# Patient Record
Sex: MALE | Race: WHITE | NOT HISPANIC OR LATINO | Employment: OTHER | URBAN - METROPOLITAN AREA
[De-identification: names, ages, dates, MRNs, and addresses within clinical notes are randomized per-mention and may not be internally consistent; named-entity substitution may affect disease eponyms.]

---

## 2017-05-30 ENCOUNTER — OFFICE VISIT (OUTPATIENT)
Dept: PODIATRY | Facility: CLINIC | Age: 77
End: 2017-05-30
Payer: MEDICARE

## 2017-05-30 VITALS — WEIGHT: 196.19 LBS | BODY MASS INDEX: 26.57 KG/M2 | HEIGHT: 72 IN

## 2017-05-30 DIAGNOSIS — L60.0 INGROWN NAIL: Primary | ICD-10-CM

## 2017-05-30 DIAGNOSIS — L03.031 PARONYCHIA, TOE, RIGHT: ICD-10-CM

## 2017-05-30 PROCEDURE — 1159F MED LIST DOCD IN RCRD: CPT | Mod: S$GLB,,, | Performed by: PODIATRIST

## 2017-05-30 PROCEDURE — 11719 TRIM NAIL(S) ANY NUMBER: CPT | Mod: S$GLB,,, | Performed by: PODIATRIST

## 2017-05-30 PROCEDURE — 99999 PR PBB SHADOW E&M-NEW PATIENT-LVL III: CPT | Mod: PBBFAC,,, | Performed by: PODIATRIST

## 2017-05-30 PROCEDURE — 99203 OFFICE O/P NEW LOW 30 MIN: CPT | Mod: 25,S$GLB,, | Performed by: PODIATRIST

## 2017-05-30 PROCEDURE — 1125F AMNT PAIN NOTED PAIN PRSNT: CPT | Mod: S$GLB,,, | Performed by: PODIATRIST

## 2017-05-30 RX ORDER — TOBRAMYCIN 3 MG/ML
2 SOLUTION/ DROPS OPHTHALMIC 2 TIMES DAILY
Qty: 5 ML | Refills: 3 | Status: SHIPPED | OUTPATIENT
Start: 2017-05-30 | End: 2017-06-06 | Stop reason: ALTCHOICE

## 2017-05-30 RX ORDER — DOXAZOSIN 8 MG/1
TABLET ORAL
COMMUNITY
Start: 2017-05-01 | End: 2019-01-08

## 2017-05-30 RX ORDER — LISINOPRIL 20 MG/1
TABLET ORAL
COMMUNITY
Start: 2017-03-31 | End: 2019-01-08

## 2017-05-30 RX ORDER — GABAPENTIN 300 MG/1
300 CAPSULE ORAL NIGHTLY
COMMUNITY
Start: 2017-03-18 | End: 2019-07-09 | Stop reason: SDUPTHER

## 2017-05-30 RX ORDER — ISOSORBIDE MONONITRATE 30 MG/1
TABLET, EXTENDED RELEASE ORAL
COMMUNITY
Start: 2017-05-01 | End: 2019-01-14 | Stop reason: SDUPTHER

## 2017-05-30 RX ORDER — FUROSEMIDE 20 MG/1
TABLET ORAL
COMMUNITY
Start: 2017-03-18 | End: 2019-01-08

## 2017-05-30 RX ORDER — SOTALOL HYDROCHLORIDE 80 MG/1
TABLET ORAL
COMMUNITY
Start: 2017-05-01 | End: 2019-01-14 | Stop reason: SDUPTHER

## 2017-05-30 RX ORDER — ALLOPURINOL 300 MG/1
300 TABLET ORAL DAILY
COMMUNITY
End: 2019-07-09 | Stop reason: SDUPTHER

## 2017-05-30 RX ORDER — HYDRALAZINE HYDROCHLORIDE 25 MG/1
TABLET, FILM COATED ORAL
COMMUNITY
Start: 2017-05-01 | End: 2019-01-08

## 2017-05-30 NOTE — PROGRESS NOTES
Subjective:      Patient ID: Dilip Lopez is a 76 y.o. male.    Chief Complaint: Ingrown Toenail (right great toe)    Sharp deep pain lateral right hallux form ingrown nail.  Both hallux painful on lateral sides, but right is red, swolen.  Gradual onset, worsening over past several weeks, aggravated by increased weight bearing, shoe gear, pressure.  Clindamycin helped some.  OTC pain med not helping.  Denies trauma, and surgery both hallux.    Review of Systems   Constitution: Negative for chills, diaphoresis, fever, malaise/fatigue and night sweats.   Cardiovascular: Negative for claudication, cyanosis, leg swelling and syncope.   Skin: Positive for nail changes. Negative for color change, dry skin, rash, suspicious lesions and unusual hair distribution.   Musculoskeletal: Negative for falls, joint pain, joint swelling, muscle cramps, muscle weakness and stiffness.   Gastrointestinal: Negative for constipation, diarrhea, nausea and vomiting.   Neurological: Negative for brief paralysis, disturbances in coordination, focal weakness, numbness, paresthesias, sensory change and tremors.           Objective:      Physical Exam   Constitutional: He is oriented to person, place, and time. He appears well-developed and well-nourished. He is cooperative.   Oriented to time, place, and person.   Cardiovascular:   Pulses:       Dorsalis pedis pulses are 2+ on the right side, and 2+ on the left side.        Posterior tibial pulses are 2+ on the right side, and 2+ on the left side.   Capillary fill time 3-5 seconds.  All toes warm to touch.      Negative lower extremity edema bilateral.    Negative elevational pallor and dependent rubor bilateral.     Musculoskeletal:   Normal angle, base, station of gait. All ten toes without clubbing, cyanosis, or signs of ischemia.  No pain to palpation bilateral lower extremities.  Range of motion, stability, muscle strength, and muscle tone normal bilateral feet and legs.        Lymphadenopathy:   Negative lymphadenopathy bilateral popliteal fossa and tarsal tunnel.     Neurological: He is alert and oriented to person, place, and time. He has normal strength. He is not disoriented. He displays no atrophy and no tremor. No sensory deficit. He exhibits normal muscle tone.   Reflex Scores:       Patellar reflexes are 2+ on the right side and 2+ on the left side.       Achilles reflexes are 2+ on the right side and 2+ on the left side.  Negative tinel sign to percussion sural, superficial peroneal, deep peroneal, saphenous, and posterior tibial nerves right and left ankles and feet.     Skin: Skin is warm, dry and intact. No abrasion, no bruising, no burn, no ecchymosis, no laceration, no lesion, no petechiae and no rash noted. He is not diaphoretic. No cyanosis or erythema. No pallor. Nails show no clubbing.   Visible and palpable ingrowth of toenail lateral border right hallux with pain to palpation, and focal localized erythema and edema, (left lateral border painful without redness/edema)  Both, without ulceration, drainage, pus, tracking, fluctuance, malodor, or cardinal signs infection.    Otherwise, Skin is normal age and health appropriate color, turgor, texture, and temperature bilateral lower extremities without ulceration, hyperpigmentation, discoloration, masses nodules or cords palpated.  No ecchymosis, erythema, edema, or cardinal signs of infection bilateral lower extremities.               Assessment:       Encounter Diagnoses   Name Primary?    Ingrown nail Yes    Paronychia, toe, right          Plan:       Dilip was seen today for ingrown toenail.    Diagnoses and all orders for this visit:    Ingrown nail    Paronychia, toe, right    Other orders  -     tobramycin sulfate 0.3% (TOBREX) 0.3 % ophthalmic solution; Place 2 drops into the left eye 2 (two) times daily.      I counseled the patient on his conditions, their implications and medical management.    With the  patient's permission, I trimmed lateral right hallux toenail with a clean nipper and curette, removing all offending nail and debris.  Patient tolerated the procedure well and related significant relief.    Rx tobramycin drops bid.  Keep both hallux covered all times with band aid dressings changing daily.    Discussed conservative treatment with shoes of adequate dimensions, material, and style to alleviate symptoms and delay or prevent surgical intervention.               Return in about 1 week (around 6/6/2017) for matrixectomy lateral left and right hallux.

## 2017-06-06 ENCOUNTER — OFFICE VISIT (OUTPATIENT)
Dept: PODIATRY | Facility: CLINIC | Age: 77
End: 2017-06-06
Payer: MEDICARE

## 2017-06-06 VITALS — WEIGHT: 196 LBS | HEIGHT: 72 IN | BODY MASS INDEX: 26.55 KG/M2

## 2017-06-06 DIAGNOSIS — L03.031 PARONYCHIA, TOE, RIGHT: ICD-10-CM

## 2017-06-06 DIAGNOSIS — L60.0 INGROWN NAIL: Primary | ICD-10-CM

## 2017-06-06 PROCEDURE — 99499 UNLISTED E&M SERVICE: CPT | Mod: S$GLB,,, | Performed by: PODIATRIST

## 2017-06-06 PROCEDURE — 99999 PR PBB SHADOW E&M-EST. PATIENT-LVL II: CPT | Mod: PBBFAC,,, | Performed by: PODIATRIST

## 2017-06-06 PROCEDURE — 11750 EXCISION NAIL&NAIL MATRIX: CPT | Mod: T5,S$GLB,, | Performed by: PODIATRIST

## 2017-06-06 RX ORDER — TOBRAMYCIN 3 MG/ML
2 SOLUTION/ DROPS OPHTHALMIC 2 TIMES DAILY
Qty: 5 ML | Refills: 3 | Status: SHIPPED | OUTPATIENT
Start: 2017-06-06 | End: 2017-06-06

## 2017-06-06 RX ORDER — TOBRAMYCIN 3 MG/ML
SOLUTION/ DROPS OPHTHALMIC
Qty: 5 ML | Refills: 3 | Status: SHIPPED | OUTPATIENT
Start: 2017-06-06 | End: 2019-01-08

## 2017-06-06 NOTE — PROGRESS NOTES
Subjective:      Patient ID: Dilip Lopez is a 76 y.o. male.    Chief Complaint: Ingrown Toenail (bilateral great toes, Lateral border)    Sharp deep pain lateral border right and left hallux  - minimal improvement from shoe changes and nail trimming alone.  Did not fill tobramycin drops - didn't make it to pharmacy.    Review of Systems   Constitution: Negative for chills, diaphoresis, fever, weakness, malaise/fatigue and night sweats.   Skin: Positive for nail changes.           Objective:      Physical Exam   Constitutional: He is oriented to person, place, and time. He appears well-developed and well-nourished. He is cooperative.   Oriented to time, place, and person.   Cardiovascular:   Pulses:       Dorsalis pedis pulses are 2+ on the right side, and 2+ on the left side.        Posterior tibial pulses are 2+ on the right side, and 2+ on the left side.   Capillary fill time 3-5 seconds.  All toes warm to touch.      Negative lower extremity edema bilateral.    Negative elevational pallor and dependent rubor bilateral.     Musculoskeletal:   Normal angle, base, station of gait. All ten toes without clubbing, cyanosis, or signs of ischemia.  No pain to palpation bilateral lower extremities.  Range of motion, stability, muscle strength, and muscle tone normal bilateral feet and legs.       Lymphadenopathy:   Negative lymphadenopathy bilateral popliteal fossa and tarsal tunnel.     Neurological: He is alert and oriented to person, place, and time. He has normal strength. He is not disoriented. He displays no atrophy and no tremor. No sensory deficit. He exhibits normal muscle tone.   Reflex Scores:       Patellar reflexes are 2+ on the right side and 2+ on the left side.       Achilles reflexes are 2+ on the right side and 2+ on the left side.  Negative tinel sign to percussion sural, superficial peroneal, deep peroneal, saphenous, and posterior tibial nerves right and left ankles and feet.     Skin: Skin is  warm, dry and intact. No abrasion, no bruising, no burn, no ecchymosis, no laceration, no lesion, no petechiae and no rash noted. He is not diaphoretic. No cyanosis or erythema. No pallor. Nails show no clubbing.   Visible and palpable ingrowth of toenail lateral border right and left hallux with pain to palpation, and focal localized erythema and edema, (left lateral border painful without redness/edema)  Both, without ulceration, drainage, pus, tracking, fluctuance, malodor, or cardinal signs infection.    Otherwise, Skin is normal age and health appropriate color, turgor, texture, and temperature bilateral lower extremities without ulceration, hyperpigmentation, discoloration, masses nodules or cords palpated.  No ecchymosis, erythema, edema, or cardinal signs of infection bilateral lower extremities.               Assessment:       Encounter Diagnoses   Name Primary?    Ingrown nail Yes    Paronychia, toe, right          Plan:       Dilip was seen today for ingrown toenail.    Diagnoses and all orders for this visit:    Ingrown nail    Paronychia, toe, right    Other orders  -     Discontinue: tobramycin sulfate 0.3% (TOBREX) 0.3 % ophthalmic solution; Place 2 drops into the left eye 2 (two) times daily.  -     tobramycin sulfate 0.3% (TOBREX) 0.3 % ophthalmic solution; 1-2 drops topically to affected areas right and left big toes.  Twice daily.      I counseled the patient on his conditions, their implications and medical management.      Time out performed prior to anesthetizing the surgical area and all patient identifiers procedures and site markings are in agreement.    This procedure has been fully reviewed with the patient and written informed consent has been obtained.        PREOPERATIVE DIAGNOSIS Ingrown toenail, lateral borders of the Bilateral 1st.    POSTOPERATIVE DIAGNOSIS: Ingrown toenail, lateral borders of the Bilateral 1st.     NAME OF THE PROCEDURE: Permanent matrixectomy,  lateral borders  of   the right and left hallux.     SURGEON: Dr. Parviz Monson.   No surgical assist.     ESTIMATED BLOOD LOSS: Minimal, being less than 1 mL.     HEMOSTASIS: Anatomic dissection, direct pressure manually.     ANESTHESIA: 1% lidocaine plain.     PROCEDURE IN DETAIL: After that time-out procedure and all the patient   identifiers and site markings being in agreement, I anesthetized the surgical toe(s) with a total of 3 mL of 1% lidocaine plain per digit. After verifying anesthesia, I   used a spatula  to undermine the lateral borders of the right and left  hallux, bring them from their soft tissue   attachments. I used an English Anvil nail splitter to split the nail and   propagated the split into the nail matrix of the lateral side   approximately 3 mm central from the offending borders with a sterile #15 blade. Using a   curved sterile hemostat to remove the offending portions of nail from the offending portions of the digit and discarded them into medical waste red bag .     At this time, the lateral most portions of the nail matrix of the   Right and left hallux and permanently destroyed with three consecutive 30-second   applications of 90% phenol, which was then neutralized with isopropyl alcohol   and rinsed with sterile normal saline, blotted dry and dressed with a thin layer   of antibiotic cream and a dry sterile dressing of 4 x 4s, Ruba and light   compression with Coban.     The patient was dispensed a surgical shoe today   and a prescription for tobramycin drops 0.3% for twice daily application to the   wound and keep it covered at all times. Follow in this office in two weeks for   reevaluation, sooner p.r.n. if he experiences fever, chills, night sweats,   nausea, vomiting, redness, pain out of proportion, drainage, pus or malodor of the surgical toe.              Return in about 2 weeks (around 6/20/2017) for post op nail both hallux.

## 2018-12-11 ENCOUNTER — TELEPHONE (OUTPATIENT)
Dept: FAMILY MEDICINE | Facility: CLINIC | Age: 78
End: 2018-12-11

## 2018-12-11 NOTE — TELEPHONE ENCOUNTER
----- Message from Isa Sandhuza sent at 12/10/2018  3:27 PM CST -----  Contact: self [phone]  He is requesting that you request his records.  Their phone number is [phone].  His doctor was Dr Kendrick Gunn Jr.  Thank you!

## 2018-12-27 ENCOUNTER — PATIENT OUTREACH (OUTPATIENT)
Dept: ADMINISTRATIVE | Facility: HOSPITAL | Age: 78
End: 2018-12-27

## 2019-01-08 ENCOUNTER — OFFICE VISIT (OUTPATIENT)
Dept: FAMILY MEDICINE | Facility: CLINIC | Age: 79
End: 2019-01-08
Payer: MEDICARE

## 2019-01-08 VITALS
SYSTOLIC BLOOD PRESSURE: 120 MMHG | DIASTOLIC BLOOD PRESSURE: 70 MMHG | HEIGHT: 72 IN | BODY MASS INDEX: 26.61 KG/M2 | OXYGEN SATURATION: 97 % | WEIGHT: 196.44 LBS | HEART RATE: 67 BPM

## 2019-01-08 DIAGNOSIS — I10 ESSENTIAL HYPERTENSION: ICD-10-CM

## 2019-01-08 DIAGNOSIS — R20.0 NUMBNESS AND TINGLING: ICD-10-CM

## 2019-01-08 DIAGNOSIS — M1A.09X0 IDIOPATHIC CHRONIC GOUT OF MULTIPLE SITES WITHOUT TOPHUS: ICD-10-CM

## 2019-01-08 DIAGNOSIS — G62.9 NEUROPATHY: ICD-10-CM

## 2019-01-08 DIAGNOSIS — R20.2 NUMBNESS AND TINGLING: ICD-10-CM

## 2019-01-08 DIAGNOSIS — R60.0 LOCALIZED EDEMA: ICD-10-CM

## 2019-01-08 DIAGNOSIS — E78.49 OTHER HYPERLIPIDEMIA: Primary | ICD-10-CM

## 2019-01-08 DIAGNOSIS — M51.36 DEGENERATIVE DISC DISEASE, LUMBAR: ICD-10-CM

## 2019-01-08 DIAGNOSIS — R53.83 OTHER FATIGUE: ICD-10-CM

## 2019-01-08 DIAGNOSIS — R53.1 WEAKNESS: ICD-10-CM

## 2019-01-08 DIAGNOSIS — I25.10 CORONARY ARTERY DISEASE INVOLVING NATIVE CORONARY ARTERY OF NATIVE HEART WITHOUT ANGINA PECTORIS: ICD-10-CM

## 2019-01-08 PROBLEM — M51.369 DEGENERATIVE DISC DISEASE, LUMBAR: Status: ACTIVE | Noted: 2019-01-08

## 2019-01-08 PROCEDURE — 99999 PR PBB SHADOW E&M-EST. PATIENT-LVL IV: ICD-10-PCS | Mod: PBBFAC,,, | Performed by: FAMILY MEDICINE

## 2019-01-08 PROCEDURE — 3074F SYST BP LT 130 MM HG: CPT | Mod: CPTII,S$GLB,, | Performed by: FAMILY MEDICINE

## 2019-01-08 PROCEDURE — 99204 PR OFFICE/OUTPT VISIT, NEW, LEVL IV, 45-59 MIN: ICD-10-PCS | Mod: S$GLB,,, | Performed by: FAMILY MEDICINE

## 2019-01-08 PROCEDURE — 3074F PR MOST RECENT SYSTOLIC BLOOD PRESSURE < 130 MM HG: ICD-10-PCS | Mod: CPTII,S$GLB,, | Performed by: FAMILY MEDICINE

## 2019-01-08 PROCEDURE — 1101F PT FALLS ASSESS-DOCD LE1/YR: CPT | Mod: CPTII,S$GLB,, | Performed by: FAMILY MEDICINE

## 2019-01-08 PROCEDURE — 99204 OFFICE O/P NEW MOD 45 MIN: CPT | Mod: S$GLB,,, | Performed by: FAMILY MEDICINE

## 2019-01-08 PROCEDURE — 99999 PR PBB SHADOW E&M-EST. PATIENT-LVL IV: CPT | Mod: PBBFAC,,, | Performed by: FAMILY MEDICINE

## 2019-01-08 PROCEDURE — 1101F PR PT FALLS ASSESS DOC 0-1 FALLS W/OUT INJ PAST YR: ICD-10-PCS | Mod: CPTII,S$GLB,, | Performed by: FAMILY MEDICINE

## 2019-01-08 PROCEDURE — 3078F PR MOST RECENT DIASTOLIC BLOOD PRESSURE < 80 MM HG: ICD-10-PCS | Mod: CPTII,S$GLB,, | Performed by: FAMILY MEDICINE

## 2019-01-08 PROCEDURE — 3078F DIAST BP <80 MM HG: CPT | Mod: CPTII,S$GLB,, | Performed by: FAMILY MEDICINE

## 2019-01-08 RX ORDER — LOSARTAN POTASSIUM AND HYDROCHLOROTHIAZIDE 25; 100 MG/1; MG/1
1 TABLET ORAL DAILY
COMMUNITY
Start: 2018-11-26 | End: 2019-07-09 | Stop reason: SDUPTHER

## 2019-01-08 RX ORDER — PROBENECID 500 MG/1
TABLET, FILM COATED ORAL
COMMUNITY
End: 2019-04-09

## 2019-01-08 RX ORDER — ATORVASTATIN CALCIUM 20 MG/1
TABLET, FILM COATED ORAL
COMMUNITY
Start: 2018-11-26 | End: 2019-01-14 | Stop reason: SDUPTHER

## 2019-01-08 RX ORDER — ASPIRIN 325 MG
325 TABLET, DELAYED RELEASE (ENTERIC COATED) ORAL NIGHTLY
COMMUNITY
End: 2023-04-19

## 2019-01-08 RX ORDER — CLOPIDOGREL BISULFATE 75 MG/1
TABLET ORAL
COMMUNITY
Start: 2018-11-13 | End: 2019-01-14 | Stop reason: SDUPTHER

## 2019-01-08 RX ORDER — DOXAZOSIN 4 MG/1
TABLET ORAL
COMMUNITY
Start: 2018-11-26 | End: 2019-01-14 | Stop reason: SDUPTHER

## 2019-01-08 NOTE — PROGRESS NOTES
Subjective:       Patient ID: Dilip Lopez is a 78 y.o. male.    Chief Complaint: Establish Care, hypertension, weakness of bilateral lower extremities, edema    The patient also has  coronary artery disease with stent placement, stated that he is not having any shortness of breath or chest pains at this visit but she keep his nitroglycerin just in case.  He is currently under the care of the cardiologist.  He also complains of weakness on bilateral lower extremities, unable to stand for long time because of the weakness and falling sensation.  He complains of numbness and tingling sensation on the feet.      Hypertension   This is a chronic problem. The current episode started more than 1 year ago. The problem is unchanged. The problem is controlled. Associated symptoms include malaise/fatigue and peripheral edema. Pertinent negatives include no anxiety, blurred vision, chest pain, headaches, neck pain, orthopnea, palpitations, PND, shortness of breath or sweats. There are no associated agents to hypertension. Risk factors for coronary artery disease include family history, male gender and sedentary lifestyle. Past treatments include angiotensin blockers and diuretics. The current treatment provides significant improvement. Compliance problems include diet and exercise.  Hypertensive end-organ damage includes CAD/MI. There is no history of angina, kidney disease, CVA, heart failure or left ventricular hypertrophy. There is no history of chronic renal disease or a hypertension causing med.   Edema   This is a chronic problem. The current episode started more than 1 month ago. The problem occurs intermittently. The problem has been waxing and waning. Associated symptoms include arthralgias, fatigue, numbness and weakness. Pertinent negatives include no abdominal pain, anorexia, change in bowel habit, chest pain, chills, congestion, coughing, fever, headaches, joint swelling, myalgias, nausea, neck pain, rash, sore  throat, swollen glands, urinary symptoms, vertigo, visual change or vomiting. The symptoms are aggravated by walking. He has tried position changes for the symptoms. The treatment provided mild relief.   Fatigue   This is a chronic problem. The current episode started more than 1 month ago. The problem occurs constantly. The problem has been gradually worsening. Associated symptoms include arthralgias, fatigue, numbness and weakness. Pertinent negatives include no abdominal pain, anorexia, change in bowel habit, chest pain, chills, congestion, coughing, fever, headaches, joint swelling, myalgias, nausea, neck pain, rash, sore throat, swollen glands, urinary symptoms, vertigo, visual change or vomiting. Nothing aggravates the symptoms. He has tried nothing for the symptoms. The treatment provided no relief.     Past medical history, past social history, past Family history, past surgical history was reviewed and discussed with the patient    Review of Systems   Constitutional: Positive for fatigue and malaise/fatigue. Negative for activity change, appetite change, chills and fever.   HENT: Negative for congestion, ear discharge and sore throat.    Eyes: Negative for blurred vision, discharge and itching.   Respiratory: Negative for cough, choking, chest tightness and shortness of breath.    Cardiovascular: Positive for leg swelling. Negative for chest pain, palpitations, orthopnea and PND.   Gastrointestinal: Negative for abdominal distention, abdominal pain, anorexia, change in bowel habit, nausea and vomiting.   Endocrine: Negative for cold intolerance and heat intolerance.   Genitourinary: Negative for dysuria and flank pain.   Musculoskeletal: Positive for arthralgias and back pain. Negative for joint swelling, myalgias and neck pain.   Skin: Negative for pallor and rash.   Allergic/Immunologic: Negative for environmental allergies and food allergies.   Neurological: Positive for weakness and numbness. Negative  for dizziness, vertigo, facial asymmetry and headaches.   Hematological: Negative for adenopathy. Does not bruise/bleed easily.   Psychiatric/Behavioral: Negative for agitation and confusion.       Objective:      Physical Exam   Constitutional: He appears well-developed and well-nourished. No distress.   HENT:   Head: Normocephalic and atraumatic.   Right Ear: External ear normal.   Left Ear: External ear normal.   Nose: Nose normal.   Mouth/Throat: No oropharyngeal exudate.   Eyes: Pupils are equal, round, and reactive to light. Left eye exhibits no discharge.   Neck: Normal range of motion. Neck supple. No thyromegaly present.   Cardiovascular: Normal rate, regular rhythm and normal heart sounds.   No murmur heard.  Pulmonary/Chest: Effort normal and breath sounds normal. No respiratory distress. He has no wheezes.   Abdominal: Soft. Bowel sounds are normal. There is no tenderness.   Musculoskeletal: He exhibits edema (Trace of bilateral lower extremities). He exhibits no tenderness.   Neurological: No cranial nerve deficit. Coordination normal.   Skin: Skin is warm and dry. No rash noted. He is not diaphoretic. No erythema.   Psychiatric: He has a normal mood and affect. His behavior is normal. Judgment and thought content normal.   Nursing note and vitals reviewed.      Assessment:       1. Other hyperlipidemia    2. Coronary artery disease involving native coronary artery of native heart without angina pectoris    3. Neuropathy    4. Idiopathic chronic gout of multiple sites without tophus    5. Essential hypertension    6. Other fatigue    7. Weakness    8. Numbness and tingling    9. Degenerative disc disease, lumbar        Plan:       Other hyperlipidemia:  Stable    Coronary artery disease involving native coronary artery of native heart without angina pectoris:  Stable    Neuropathy:  Stable    Idiopathic chronic gout of multiple sites without tophus:  Stable    Essential hypertension:  Well controlled  -      Comprehensive metabolic panel; Future; Expected date: 01/08/2019  -     Microalbumin/creatinine urine ratio; Future; Expected date: 01/08/2019  -     Lipid panel; Future; Expected date: 01/08/2019    Other fatigue:  Worsening  -     CBC auto differential; Future; Expected date: 01/08/2019  -     TSH; Future; Expected date: 01/08/2019    Weakness:  Worsening  -     X-Ray Lumbar Spine AP And Lateral; Future; Expected date: 01/08/2019    Numbness and tingling:  Stable  -     Vitamin B12; Future; Expected date: 01/08/2019  -     Folate; Future; Expected date: 01/08/2019    Degenerative disc disease, lumbar:  Worsening  -     X-Ray Lumbar Spine AP And Lateral; Future; Expected date: 01/08/2019  Edema:  Worsening.    Decrease salt intake to less than 2 g per day.  Place the feet elevated at the level the heart, increase physical activity as tolerated.  Will call the patient after we have the results of the test, healthy habits, 3 meals a day, 3 snacks and small portions, drink more water.  Continue with current medications.  Will release medical records from previous physician.  Patient agreed with assessment and plan. Patient verbalized understanding.

## 2019-01-08 NOTE — PATIENT INSTRUCTIONS
Eating Heart-Healthy Food: Using the DASH Plan    Eating for your heart doesnt have to be hard or boring. You just need to know how to make healthier choices. The DASH eating plan has been developed to help you do just that. DASH stands for Dietary Approaches to Stop Hypertension. It is a plan that has been proven to be healthier for your heart and to lower your risk for high blood pressure. It can also help lower your risk for cancer, heart disease, osteoporosis, and diabetes.  Choosing from each food group  Choose foods from each of the food groups below each day. Try to get the recommended number of servings for each food group. The serving numbers are based on a diet of 2,000 calories a day. Talk to your doctor if youre unsure about your calorie needs. Along with getting the correct servings, the DASH plan also recommends a sodium intake less than 2,300 mg per day.        Grains  Servings: 6 to 8 a day  A serving is:  · 1 slice bread  · 1 ounce dry cereal  · Half a cup cooked rice, pasta or cereal  Best choices: Whole grains and any grains high in fiber. Vegetables  Servings: 4 to 5 a day  A serving is:  · 1 cup raw leafy vegetable  · Half a cup cut-up raw or cooked vegetable  · Half a cup vegetable juice  Best choices: Fresh or frozen vegetables prepared without added salt or fat.   Fruits  Servings: 4 to 5 a day  A serving is:  · 1 medium fruit  · One-quarter cup dried fruit  · Half a cup fresh, frozen, or canned fruit  · Half a cup of 100% fruit juices  Best choices: A variety of fresh fruits of different colors. Whole fruits are a better choice than fruit juices. Low-fat or fat-free dairy  Servings: 2 to 3 a day  A serving is:  · 1 cup milk  · 1 cup yogurt  · One and a half ounces cheese  Best choices: Skim or 1% milk, low-fat or fat-free yogurt or buttermilk, and low-fat cheeses.         Lean meats, poultry, fish  Servings: 6 or fewer a day  A serving is:  · 1 ounce cooked meats, poultry, or fish  · 1  egg  Best choices: Lean poultry and fish. Trim away visible fat. Broil, grill, roast, or boil instead of frying. Remove skin from poultry before eating. Limit how much red meat you eat.  Nuts, seeds, beans  Servings: 4 to 5 a week  A serving is:  · One-third cup nuts (one and a half ounces)  · 2 tablespoons nut butter or seeds  · Half a cup cooked dry beans or legumes  Best choices: Dry roasted nuts with no salt added, lentils, kidney beans, garbanzo beans, and whole mata beans.   Fats and oils  Servings: 2 to 3 a day  A serving is:  · 1 teaspoon vegetable oil  · 1 teaspoon soft margarine  · 1 tablespoon mayonnaise  · 2 tablespoons salad dressing  Best choices: Nut and vegetable oils (nontropical vegetable oils), such as olive and canola oil. Sweets  Servings: 5 a week or fewer  A serving is:  · 1 tablespoon sugar, maple syrup, or honey  · 1 tablespoon jam or jelly  · 1 half-ounce jelly beans (about 15)  · 1 cup lemonade  Best choices: Dried fruit can be a satisfying sweet. Choose low-fat sweets. And watch your serving sizes!      For more on the DASH eating plan, visit:  www.nhlbi.nih.gov/health/health-topics/topics/dash   Date Last Reviewed: 6/1/2016  © 6112-6440 [employer]. [address] [place] [address], [place] [address]. All rights reserved. This information is not intended as a substitute for professional medical care. Always follow your healthcare professional's instructions.

## 2019-01-14 ENCOUNTER — OFFICE VISIT (OUTPATIENT)
Dept: CARDIOLOGY | Facility: CLINIC | Age: 79
End: 2019-01-14
Payer: MEDICARE

## 2019-01-14 ENCOUNTER — HOSPITAL ENCOUNTER (OUTPATIENT)
Dept: RADIOLOGY | Facility: HOSPITAL | Age: 79
Discharge: HOME | End: 2019-01-14
Attending: FAMILY MEDICINE
Payer: MEDICARE

## 2019-01-14 DIAGNOSIS — M47.26 OSTEOARTHRITIS OF SPINE WITH RADICULOPATHY, LUMBAR REGION: Primary | ICD-10-CM

## 2019-01-14 DIAGNOSIS — M51.36 DEGENERATIVE DISC DISEASE, LUMBAR: ICD-10-CM

## 2019-01-14 DIAGNOSIS — I25.118 CORONARY ARTERY DISEASE OF NATIVE ARTERY OF NATIVE HEART WITH STABLE ANGINA PECTORIS: Primary | ICD-10-CM

## 2019-01-14 DIAGNOSIS — I48.0 PAF (PAROXYSMAL ATRIAL FIBRILLATION): ICD-10-CM

## 2019-01-14 DIAGNOSIS — I25.10 CORONARY ARTERY DISEASE INVOLVING NATIVE CORONARY ARTERY OF NATIVE HEART WITHOUT ANGINA PECTORIS: ICD-10-CM

## 2019-01-14 DIAGNOSIS — E78.00 HYPERCHOLESTEROLEMIA: ICD-10-CM

## 2019-01-14 DIAGNOSIS — R53.1 WEAKNESS: ICD-10-CM

## 2019-01-14 DIAGNOSIS — I10 ESSENTIAL HYPERTENSION: ICD-10-CM

## 2019-01-14 DIAGNOSIS — Z79.02 LONG TERM (CURRENT) USE OF ANTITHROMBOTICS/ANTIPLATELETS: ICD-10-CM

## 2019-01-14 DIAGNOSIS — E78.49 OTHER HYPERLIPIDEMIA: ICD-10-CM

## 2019-01-14 DIAGNOSIS — Z79.899 LONG TERM CURRENT USE OF ANTIARRHYTHMIC DRUG: ICD-10-CM

## 2019-01-14 DIAGNOSIS — R01.1 UNDIAGNOSED CARDIAC MURMURS: ICD-10-CM

## 2019-01-14 PROCEDURE — 99999 PR PBB SHADOW E&M-EST. PATIENT-LVL II: CPT | Mod: PBBFAC,,, | Performed by: INTERNAL MEDICINE

## 2019-01-14 PROCEDURE — 99204 PR OFFICE/OUTPT VISIT, NEW, LEVL IV, 45-59 MIN: ICD-10-PCS | Mod: S$GLB,,, | Performed by: INTERNAL MEDICINE

## 2019-01-14 PROCEDURE — 72100 X-RAY EXAM L-S SPINE 2/3 VWS: CPT | Mod: TC,FY,PO

## 2019-01-14 PROCEDURE — 93000 EKG 12-LEAD: ICD-10-PCS | Mod: S$GLB,,, | Performed by: INTERNAL MEDICINE

## 2019-01-14 PROCEDURE — 1101F PT FALLS ASSESS-DOCD LE1/YR: CPT | Mod: CPTII,S$GLB,, | Performed by: INTERNAL MEDICINE

## 2019-01-14 PROCEDURE — 72100 XR LUMBAR SPINE AP AND LATERAL: ICD-10-PCS | Mod: 26,,, | Performed by: RADIOLOGY

## 2019-01-14 PROCEDURE — 72100 X-RAY EXAM L-S SPINE 2/3 VWS: CPT | Mod: 26,,, | Performed by: RADIOLOGY

## 2019-01-14 PROCEDURE — 99999 PR PBB SHADOW E&M-EST. PATIENT-LVL II: ICD-10-PCS | Mod: PBBFAC,,, | Performed by: INTERNAL MEDICINE

## 2019-01-14 PROCEDURE — 1101F PR PT FALLS ASSESS DOC 0-1 FALLS W/OUT INJ PAST YR: ICD-10-PCS | Mod: CPTII,S$GLB,, | Performed by: INTERNAL MEDICINE

## 2019-01-14 PROCEDURE — 99499 RISK ADDL DX/OHS AUDIT: ICD-10-PCS | Mod: S$GLB,,, | Performed by: INTERNAL MEDICINE

## 2019-01-14 PROCEDURE — 93000 ELECTROCARDIOGRAM COMPLETE: CPT | Mod: S$GLB,,, | Performed by: INTERNAL MEDICINE

## 2019-01-14 PROCEDURE — 99204 OFFICE O/P NEW MOD 45 MIN: CPT | Mod: S$GLB,,, | Performed by: INTERNAL MEDICINE

## 2019-01-14 PROCEDURE — 99499 UNLISTED E&M SERVICE: CPT | Mod: S$GLB,,, | Performed by: INTERNAL MEDICINE

## 2019-01-14 RX ORDER — ATORVASTATIN CALCIUM 20 MG/1
20 TABLET, FILM COATED ORAL NIGHTLY
Qty: 90 TABLET | Refills: 0 | Status: SHIPPED | OUTPATIENT
Start: 2019-01-14 | End: 2019-05-03 | Stop reason: SDUPTHER

## 2019-01-14 RX ORDER — DOXAZOSIN 4 MG/1
4 TABLET ORAL DAILY
Qty: 90 TABLET | Refills: 0 | Status: SHIPPED | OUTPATIENT
Start: 2019-01-14 | End: 2019-03-11 | Stop reason: ALTCHOICE

## 2019-01-14 RX ORDER — ISOSORBIDE MONONITRATE 30 MG/1
30 TABLET, EXTENDED RELEASE ORAL DAILY
Qty: 90 TABLET | Refills: 0 | Status: SHIPPED | OUTPATIENT
Start: 2019-01-14 | End: 2019-05-03 | Stop reason: SDUPTHER

## 2019-01-14 RX ORDER — CLOPIDOGREL BISULFATE 75 MG/1
75 TABLET ORAL DAILY
Qty: 90 TABLET | Refills: 0 | Status: SHIPPED | OUTPATIENT
Start: 2019-01-14 | End: 2019-05-03 | Stop reason: SDUPTHER

## 2019-01-14 RX ORDER — SOTALOL HYDROCHLORIDE 80 MG/1
80 TABLET ORAL DAILY
Qty: 90 TABLET | Refills: 0 | Status: SHIPPED | OUTPATIENT
Start: 2019-01-14 | End: 2019-07-09 | Stop reason: SDUPTHER

## 2019-01-14 NOTE — PROGRESS NOTES
Subjective:    Patient ID:  Dilip Lopez is a 78 y.o. male who presents for Coronary Artery Disease; Hypertension; Hyperlipidemia; and Atrial Fibrillation        Coronary Artery Disease   Presents for initial visit. The disease course has been stable. Pertinent negatives include no chest pain, dizziness, leg swelling (MILD CHRONIC), palpitations or shortness of breath. Risk factors include hyperlipidemia and hypertension. Past treatments include beta blockers, antiplatelet agents and statins. Compliance with prior treatments has been good.   Hypertension   This is a chronic problem. The current episode started more than 1 year ago. The problem is controlled. Pertinent negatives include no blurred vision, chest pain, malaise/fatigue, orthopnea, palpitations, PND or shortness of breath. Risk factors for coronary artery disease include dyslipidemia. Past treatments include angiotensin blockers and beta blockers.   Hyperlipidemia   This is a chronic problem. The problem is controlled. Pertinent negatives include no chest pain, focal weakness or shortness of breath. Current antihyperlipidemic treatment includes statins.   Atrial Fibrillation   Presents for follow-up (NO DEFINITE HISTORY) visit. Symptoms include hypertension. Symptoms are negative for chest pain, dizziness, palpitations, shortness of breath, syncope and weakness. The symptoms have been stable. Past medical history includes atrial fibrillation, CAD and hyperlipidemia.     NEW PATIENT EVALUATION, WAS FOLLOWED BY DR DIONE TERESA, CAD , PCI/ STENT 1/ 2018, HAD LABS TODAY,  H/O AFIB,RFA,DR GRANGER, ON SOTALOL, H/O DVT WAS ON ELIQUIS, TROUBLE STANDING UP WITH BACK AND HIPS,  SEE ROS    Past Medical History:   Diagnosis Date    Atrial fibrillation     CAD (coronary artery disease)     Hyperlipidemia     Hypertension      Past Surgical History:   Procedure Laterality Date    CARDIAC CATHETERIZATION      CARDIAC SURGERY      CORONARY ANGIOPLASTY       CORONARY STENT PLACEMENT  01/29/2018    HIATAL HERNIA REPAIR       Family History   Problem Relation Age of Onset    Hypertension Mother     Diabetes Mother     Heart disease Father 62        MI    Parkinsonism Brother      Social History     Socioeconomic History    Marital status:      Spouse name: Not on file    Number of children: Not on file    Years of education: Not on file    Highest education level: Not on file   Social Needs    Financial resource strain: Not on file    Food insecurity - worry: Not on file    Food insecurity - inability: Not on file    Transportation needs - medical: Not on file    Transportation needs - non-medical: Not on file   Occupational History    Not on file   Tobacco Use    Smoking status: Never Smoker    Smokeless tobacco: Never Used   Substance and Sexual Activity    Alcohol use: No     Frequency: Never    Drug use: No    Sexual activity: Not on file   Other Topics Concern    Not on file   Social History Narrative    Not on file       Review of patient's allergies indicates:   Allergen Reactions    Cinnamon Other (See Comments)    Ciprofloxacin Hives    Sulfa (sulfonamide antibiotics)        Current Outpatient Medications:     allopurinol (ZYLOPRIM) 300 MG tablet, Take 300 mg by mouth once daily., Disp: , Rfl:     aspirin (ECOTRIN) 325 MG EC tablet, aspirin 325 mg tablet,delayed release  Take 1 tablet every day by oral route., Disp: , Rfl:     atorvastatin (LIPITOR) 20 MG tablet, Take 1 tablet (20 mg total) by mouth every evening., Disp: 90 tablet, Rfl: 0    clopidogrel (PLAVIX) 75 mg tablet, Take 1 tablet (75 mg total) by mouth once daily., Disp: 90 tablet, Rfl: 0    doxazosin (CARDURA) 4 MG tablet, Take 1 tablet (4 mg total) by mouth once daily., Disp: 90 tablet, Rfl: 0    gabapentin (NEURONTIN) 300 MG capsule, , Disp: , Rfl:     isosorbide mononitrate (IMDUR) 30 MG 24 hr tablet, Take 1 tablet (30 mg total) by mouth once daily., Disp: 90  tablet, Rfl: 0    losartan-hydrochlorothiazide 100-25 mg (HYZAAR) 100-25 mg per tablet, , Disp: , Rfl:     probenecid (BENEMID) 500 mg Tab, probenecid 500 mg tablet, Disp: , Rfl:     sotalol (BETAPACE) 80 MG tablet, Take 1 tablet (80 mg total) by mouth once daily., Disp: 90 tablet, Rfl: 0    Current Facility-Administered Medications:     cyanocobalamin injection 1,000 mcg, 1,000 mcg, Intramuscular, Q7 Days, Nathalie Salgado MD    Review of Systems   Constitution: Negative for chills, diaphoresis, weakness, malaise/fatigue and night sweats.   HENT: Negative for congestion, hearing loss and nosebleeds.    Eyes: Negative for blurred vision, double vision and visual disturbance.   Cardiovascular: Negative for chest pain, claudication, cyanosis, dyspnea on exertion, irregular heartbeat, leg swelling (MILD CHRONIC), near-syncope, orthopnea, palpitations, paroxysmal nocturnal dyspnea and syncope.   Respiratory: Negative for cough, hemoptysis, shortness of breath and wheezing.    Endocrine: Negative for cold intolerance, heat intolerance and polyuria.   Hematologic/Lymphatic: Negative for adenopathy. Does not bruise/bleed easily.   Skin: Negative for color change, itching and nail changes.   Musculoskeletal: Negative for back pain (CHRONIC), falls, joint pain (HIPS) and stiffness.   Gastrointestinal: Negative for bloating, abdominal pain, change in bowel habit, dysphagia, heartburn, hematemesis, jaundice, melena and vomiting.   Genitourinary: Negative for dysuria, flank pain and frequency.   Neurological: Negative for brief paralysis, dizziness, focal weakness, light-headedness, loss of balance (SOME), numbness, paresthesias, seizures, sensory change and tremors.   Psychiatric/Behavioral: Negative for altered mental status, depression, memory loss and substance abuse. The patient is not nervous/anxious.    Allergic/Immunologic: Negative for persistent infections.        Objective:      There were no vitals filed for this  visit.  There is no height or weight on file to calculate BMI.    Physical Exam   Constitutional: He is oriented to person, place, and time. He appears well-developed and well-nourished. He is active.   HENT:   Head: Normocephalic and atraumatic.   Mouth/Throat: Oropharynx is clear and moist and mucous membranes are normal.   Eyes: Conjunctivae and EOM are normal. Pupils are equal, round, and reactive to light.   Neck: Normal range of motion. Neck supple. Normal carotid pulses, no hepatojugular reflux and no JVD present. Carotid bruit is not present. No tracheal deviation, no edema and no erythema present. No thyromegaly present.   Cardiovascular: Normal rate and regular rhythm.  No extrasystoles are present. PMI is not displaced. Exam reveals no gallop, no distant heart sounds, no friction rub and no midsystolic click.   Murmur heard.   Systolic murmur is present with a grade of 2/6 at the lower left sternal border.  Pulses:       Carotid pulses are 2+ on the right side, and 2+ on the left side.       Radial pulses are 2+ on the right side, and 2+ on the left side.        Femoral pulses are 2+ on the right side, and 2+ on the left side.       Dorsalis pedis pulses are 2+ on the right side, and 2+ on the left side.        Posterior tibial pulses are 2+ on the right side, and 2+ on the left side.   Pulmonary/Chest: Effort normal and breath sounds normal. No accessory muscle usage. No tachypnea and no bradypnea. No respiratory distress.   Abdominal: Soft. Bowel sounds are normal. He exhibits no distension and no mass. There is no hepatosplenomegaly. There is no tenderness. There is no CVA tenderness.   Musculoskeletal: Normal range of motion. He exhibits no edema or deformity.   SLOW GAIT, TRACE EDEMA   Lymphadenopathy:     He has no cervical adenopathy.   Neurological: He is alert and oriented to person, place, and time. He has normal strength. He displays no tremor. No cranial nerve deficit. He exhibits normal  muscle tone. Coordination normal.   Skin: Skin is warm and dry. No cyanosis or erythema. No pallor.   Psychiatric: He has a normal mood and affect. His speech is normal and behavior is normal.               ..    Chemistry        Component Value Date/Time     01/14/2019 1102    K 4.7 01/14/2019 1102     01/14/2019 1102    CO2 28 01/14/2019 1102    BUN 27 (H) 01/14/2019 1102    CREATININE 1.8 (H) 01/14/2019 1102     01/14/2019 1102        Component Value Date/Time    CALCIUM 9.3 01/14/2019 1102    ALKPHOS 88 01/14/2019 1102    AST 18 01/14/2019 1102    ALT 15 01/14/2019 1102    BILITOT 0.7 01/14/2019 1102    ESTGFRAFRICA 41.1 (A) 01/14/2019 1102    EGFRNONAA 35.5 (A) 01/14/2019 1102            ..  Lab Results   Component Value Date    CHOL 99 (L) 01/14/2019     Lab Results   Component Value Date    HDL 40 01/14/2019     Lab Results   Component Value Date    LDLCALC 44.0 (L) 01/14/2019     Lab Results   Component Value Date    TRIG 75 01/14/2019     Lab Results   Component Value Date    CHOLHDL 40.4 01/14/2019     ..  Lab Results   Component Value Date    WBC 5.50 01/14/2019    HGB 14.5 01/14/2019    HCT 45.6 01/14/2019    MCV 99 (H) 01/14/2019     01/14/2019       Test(s) Reviewed  I have reviewed the following in detail:  [] Stress test   [] Angiography   [] Echocardiogram   [x] Labs   [x] Other:       Assessment:         ICD-10-CM ICD-9-CM   1. Coronary artery disease of native artery of native heart with stable angina pectoris I25.118 414.01     413.9   2. Long term current use of antiarrhythmic drug Z79.899 V58.69   3. Long term (current) use of antithrombotics/antiplatelets Z79.02 V58.63   4. Essential hypertension I10 401.9   5. Hypercholesterolemia E78.00 272.0   6. Undiagnosed cardiac murmurs R01.1 785.2   7. PAF (paroxysmal atrial fibrillation) I48.0 427.31   8. Coronary artery disease involving native coronary artery of native heart without angina pectoris I25.10 414.01   9. Other  hyperlipidemia E78.49 272.4     Problem List Items Addressed This Visit        Cardiac/Vascular    Hypercholesterolemia    Relevant Medications    atorvastatin (LIPITOR) 20 MG tablet    Coronary artery disease of native artery of native heart with stable angina pectoris - Primary    Relevant Medications    clopidogrel (PLAVIX) 75 mg tablet    atorvastatin (LIPITOR) 20 MG tablet    Other Relevant Orders    SCHEDULED EKG 12-LEAD (to Muse) (Completed)    Transthoracic echo (TTE) complete (Cupid Only)    Essential hypertension    Relevant Medications    doxazosin (CARDURA) 4 MG tablet    Undiagnosed cardiac murmurs    Relevant Orders    Transthoracic echo (TTE) complete (Cupid Only)    PAF (paroxysmal atrial fibrillation)    Relevant Orders    Transthoracic echo (TTE) complete (Cupid Only)    Holter monitor - 24 hour (Cupid Only)       Hematology    Long term (current) use of antithrombotics/antiplatelets       Other    Long term current use of antiarrhythmic drug    Relevant Orders    Holter monitor - 24 hour (Cupid Only)      Other Visit Diagnoses     Coronary artery disease involving native coronary artery of native heart without angina pectoris        Relevant Medications    clopidogrel (PLAVIX) 75 mg tablet    atorvastatin (LIPITOR) 20 MG tablet    Other hyperlipidemia        Relevant Medications    atorvastatin (LIPITOR) 20 MG tablet           Plan:         ALL CV CLINICALLY RELATIVELY  STABLE, CLASS 1  ANGINA, NO HF, NO TIA, NO CLINICAL ARRHYTHMIA,CONTINUE CURRENT MEDS, EDUCATION, DIET, EXERCISE, CHECK ECHO, ASSESS VALVES, CHECK HOLTER , PAF, S/P RFA ON SOTALOL, DISCUSSED PLAN AT LENGTH, RTC IN FEW WEEKS  Coronary artery disease of native artery of native heart with stable angina pectoris  -     SCHEDULED EKG 12-LEAD (to Muse); Future; Expected date: 07/14/2019  -     Transthoracic echo (TTE) complete (Cupid Only); Future    Long term current use of antiarrhythmic drug  -     Holter monitor - 24 hour (Cupid  Only); Future    Long term (current) use of antithrombotics/antiplatelets    Essential hypertension  -     doxazosin (CARDURA) 4 MG tablet; Take 1 tablet (4 mg total) by mouth once daily.  Dispense: 90 tablet; Refill: 0    Hypercholesterolemia    Undiagnosed cardiac murmurs  -     Transthoracic echo (TTE) complete (Cupid Only); Future    PAF (paroxysmal atrial fibrillation)  -     Transthoracic echo (TTE) complete (Cupid Only); Future  -     Holter monitor - 24 hour (Cupid Only); Future    Coronary artery disease involving native coronary artery of native heart without angina pectoris  -     clopidogrel (PLAVIX) 75 mg tablet; Take 1 tablet (75 mg total) by mouth once daily.  Dispense: 90 tablet; Refill: 0  -     atorvastatin (LIPITOR) 20 MG tablet; Take 1 tablet (20 mg total) by mouth every evening.  Dispense: 90 tablet; Refill: 0    Other hyperlipidemia  -     atorvastatin (LIPITOR) 20 MG tablet; Take 1 tablet (20 mg total) by mouth every evening.  Dispense: 90 tablet; Refill: 0    Other orders  -     isosorbide mononitrate (IMDUR) 30 MG 24 hr tablet; Take 1 tablet (30 mg total) by mouth once daily.  Dispense: 90 tablet; Refill: 0  -     sotalol (BETAPACE) 80 MG tablet; Take 1 tablet (80 mg total) by mouth once daily.  Dispense: 90 tablet; Refill: 0    RTC Low level/low impact aerobic exercise 5x's/wk. Heart healthy diet and risk factor modification.    See labs and med orders.    Aerobic exercise 5x's/wk. Heart healthy diet and risk factor modification.    See labs and med orders.

## 2019-01-15 DIAGNOSIS — N18.30 CHRONIC KIDNEY DISEASE, STAGE 3: Primary | ICD-10-CM

## 2019-01-15 RX ORDER — CYANOCOBALAMIN 1000 UG/ML
1000 INJECTION, SOLUTION INTRAMUSCULAR; SUBCUTANEOUS
Status: COMPLETED | OUTPATIENT
Start: 2019-01-15 | End: 2019-02-06

## 2019-01-16 ENCOUNTER — CLINICAL SUPPORT (OUTPATIENT)
Dept: FAMILY MEDICINE | Facility: CLINIC | Age: 79
End: 2019-01-16
Payer: MEDICARE

## 2019-01-16 ENCOUNTER — HOSPITAL ENCOUNTER (OUTPATIENT)
Dept: RADIOLOGY | Facility: HOSPITAL | Age: 79
Discharge: HOME | End: 2019-01-16
Attending: FAMILY MEDICINE
Payer: MEDICARE

## 2019-01-16 DIAGNOSIS — N18.30 CHRONIC KIDNEY DISEASE, STAGE 3: ICD-10-CM

## 2019-01-16 DIAGNOSIS — E53.8 VITAMIN B 12 DEFICIENCY: Primary | ICD-10-CM

## 2019-01-16 PROCEDURE — 76770 US EXAM ABDO BACK WALL COMP: CPT | Mod: 26,,, | Performed by: RADIOLOGY

## 2019-01-16 PROCEDURE — 96372 PR INJECTION,THERAP/PROPH/DIAG2ST, IM OR SUBCUT: ICD-10-PCS | Mod: S$GLB,,, | Performed by: FAMILY MEDICINE

## 2019-01-16 PROCEDURE — 76770 US KIDNEY: ICD-10-PCS | Mod: 26,,, | Performed by: RADIOLOGY

## 2019-01-16 PROCEDURE — 76770 US EXAM ABDO BACK WALL COMP: CPT | Mod: TC,PO

## 2019-01-16 PROCEDURE — 96372 THER/PROPH/DIAG INJ SC/IM: CPT | Mod: S$GLB,,, | Performed by: FAMILY MEDICINE

## 2019-01-16 RX ADMIN — CYANOCOBALAMIN 1000 MCG: 1000 INJECTION, SOLUTION INTRAMUSCULAR; SUBCUTANEOUS at 03:01

## 2019-01-17 DIAGNOSIS — N18.30 CHRONIC KIDNEY DISEASE, STAGE 3: Primary | ICD-10-CM

## 2019-01-17 DIAGNOSIS — N28.1 KIDNEY CYSTS: ICD-10-CM

## 2019-01-22 ENCOUNTER — OFFICE VISIT (OUTPATIENT)
Dept: NEPHROLOGY | Facility: CLINIC | Age: 79
End: 2019-01-22
Payer: MEDICARE

## 2019-01-22 VITALS
HEART RATE: 67 BPM | HEIGHT: 72 IN | DIASTOLIC BLOOD PRESSURE: 66 MMHG | OXYGEN SATURATION: 97 % | BODY MASS INDEX: 26.34 KG/M2 | SYSTOLIC BLOOD PRESSURE: 114 MMHG | WEIGHT: 194.5 LBS

## 2019-01-22 DIAGNOSIS — M10.9 GOUT, UNSPECIFIED CAUSE, UNSPECIFIED CHRONICITY, UNSPECIFIED SITE: ICD-10-CM

## 2019-01-22 DIAGNOSIS — N18.30 CKD (CHRONIC KIDNEY DISEASE) STAGE 3, GFR 30-59 ML/MIN: Primary | ICD-10-CM

## 2019-01-22 PROCEDURE — 3074F PR MOST RECENT SYSTOLIC BLOOD PRESSURE < 130 MM HG: ICD-10-PCS | Mod: CPTII,S$GLB,, | Performed by: INTERNAL MEDICINE

## 2019-01-22 PROCEDURE — 1101F PR PT FALLS ASSESS DOC 0-1 FALLS W/OUT INJ PAST YR: ICD-10-PCS | Mod: CPTII,S$GLB,, | Performed by: INTERNAL MEDICINE

## 2019-01-22 PROCEDURE — 99499 UNLISTED E&M SERVICE: CPT | Mod: S$GLB,,, | Performed by: INTERNAL MEDICINE

## 2019-01-22 PROCEDURE — 99999 PR PBB SHADOW E&M-EST. PATIENT-LVL IV: ICD-10-PCS | Mod: PBBFAC,,, | Performed by: INTERNAL MEDICINE

## 2019-01-22 PROCEDURE — 3078F DIAST BP <80 MM HG: CPT | Mod: CPTII,S$GLB,, | Performed by: INTERNAL MEDICINE

## 2019-01-22 PROCEDURE — 99204 OFFICE O/P NEW MOD 45 MIN: CPT | Mod: S$GLB,,, | Performed by: INTERNAL MEDICINE

## 2019-01-22 PROCEDURE — 3074F SYST BP LT 130 MM HG: CPT | Mod: CPTII,S$GLB,, | Performed by: INTERNAL MEDICINE

## 2019-01-22 PROCEDURE — 1101F PT FALLS ASSESS-DOCD LE1/YR: CPT | Mod: CPTII,S$GLB,, | Performed by: INTERNAL MEDICINE

## 2019-01-22 PROCEDURE — 99499 RISK ADDL DX/OHS AUDIT: ICD-10-PCS | Mod: S$GLB,,, | Performed by: INTERNAL MEDICINE

## 2019-01-22 PROCEDURE — 99204 PR OFFICE/OUTPT VISIT, NEW, LEVL IV, 45-59 MIN: ICD-10-PCS | Mod: S$GLB,,, | Performed by: INTERNAL MEDICINE

## 2019-01-22 PROCEDURE — 3078F PR MOST RECENT DIASTOLIC BLOOD PRESSURE < 80 MM HG: ICD-10-PCS | Mod: CPTII,S$GLB,, | Performed by: INTERNAL MEDICINE

## 2019-01-22 PROCEDURE — 99999 PR PBB SHADOW E&M-EST. PATIENT-LVL IV: CPT | Mod: PBBFAC,,, | Performed by: INTERNAL MEDICINE

## 2019-01-22 RX ORDER — DIPHENHYDRAMINE HCL 25 MG
CAPSULE ORAL
Refills: 0
Start: 2019-01-22 | End: 2020-07-09

## 2019-01-22 NOTE — Clinical Note
January 29, 2019      Nathalie Salgado MD  [address] [facility] [address] [place] [address]           [place] [facility]  [address] [facility] [address] [place] [address]  Phone: [phone]          Patient: Dilip Lopez   MR Number: [id number]   YOB: 1940   Date of Visit: 1/22/2019       Dear Dr. Nathalie Salgado:    Thank you for referring Dilip Lopez to me for evaluation. Attached you will find relevant portions of my assessment and plan of care.    If you have questions, please do not hesitate to call me. I look forward to following Dilip Lopez along with you.    Sincerely,    Rich Vergara  CC:  No Recipients    If you would like to receive this communication electronically, please contact [email or web address] or [phone] to request more information on [employer] Link access.    For providers and/or their staff who would like to refer a patient to [facility], please contact us through our one-stop-shop provider referral line, [facility], at [phone].    If you feel you have received this communication in error or would no longer like to receive these types of communications, please e-mail [email or web address]

## 2019-01-24 ENCOUNTER — CLINICAL SUPPORT (OUTPATIENT)
Dept: FAMILY MEDICINE | Facility: CLINIC | Age: 79
End: 2019-01-24
Payer: MEDICARE

## 2019-01-24 ENCOUNTER — CLINICAL SUPPORT (OUTPATIENT)
Dept: CARDIOLOGY | Facility: CLINIC | Age: 79
End: 2019-01-24
Attending: INTERNAL MEDICINE
Payer: MEDICARE

## 2019-01-24 VITALS
WEIGHT: 194 LBS | HEART RATE: 75 BPM | DIASTOLIC BLOOD PRESSURE: 80 MMHG | BODY MASS INDEX: 26.28 KG/M2 | SYSTOLIC BLOOD PRESSURE: 120 MMHG | HEIGHT: 72 IN

## 2019-01-24 DIAGNOSIS — I48.0 PAF (PAROXYSMAL ATRIAL FIBRILLATION): ICD-10-CM

## 2019-01-24 DIAGNOSIS — Z79.899 LONG TERM CURRENT USE OF ANTIARRHYTHMIC DRUG: ICD-10-CM

## 2019-01-24 DIAGNOSIS — I25.118 CORONARY ARTERY DISEASE OF NATIVE ARTERY OF NATIVE HEART WITH STABLE ANGINA PECTORIS: ICD-10-CM

## 2019-01-24 DIAGNOSIS — E53.8 VITAMIN B 12 DEFICIENCY: Primary | ICD-10-CM

## 2019-01-24 DIAGNOSIS — R01.1 UNDIAGNOSED CARDIAC MURMURS: ICD-10-CM

## 2019-01-24 LAB
ASCENDING AORTA: 3.43 CM
AV INDEX (PROSTH): 0.75
AV MEAN GRADIENT: 6.4 MMHG
AV PEAK GRADIENT: 12.25 MMHG
AV VALVE AREA: 2.5 CM2
AV VELOCITY RATIO: 0.75
BSA FOR ECHO PROCEDURE: 2.11 M2
CV ECHO LV RWT: 0.49 CM
DOP CALC AO PEAK VEL: 1.75 M/S
DOP CALC AO VTI: 37.43 CM
DOP CALC LVOT AREA: 3.33 CM2
DOP CALC LVOT DIAMETER: 2.06 CM
DOP CALC LVOT PEAK VEL: 1.31 M/S
DOP CALC LVOT STROKE VOLUME: 93.64 CM3
DOP CALCLVOT PEAK VEL VTI: 28.11 CM
E WAVE DECELERATION TIME: 312.9 MSEC
E/A RATIO: 0.76
E/E' RATIO: 7.41
ECHO LV POSTERIOR WALL: 1.1 CM (ref 0.6–1.1)
FRACTIONAL SHORTENING: 52 % (ref 28–44)
INTERVENTRICULAR SEPTUM: 1.22 CM (ref 0.6–1.1)
IVRT: 0.1 MSEC
LA MAJOR: 5.49 CM
LA MINOR: 4.67 CM
LA WIDTH: 3.6 CM
LEFT ATRIUM SIZE: 3.43 CM
LEFT ATRIUM VOLUME INDEX: 25.2 ML/M2
LEFT ATRIUM VOLUME: 52.97 CM3
LEFT INTERNAL DIMENSION IN SYSTOLE: 2.16 CM (ref 2.1–4)
LEFT VENTRICLE DIASTOLIC VOLUME INDEX: 44.61 ML/M2
LEFT VENTRICLE DIASTOLIC VOLUME: 93.82 ML
LEFT VENTRICLE MASS INDEX: 90.7 G/M2
LEFT VENTRICLE SYSTOLIC VOLUME INDEX: 7.3 ML/M2
LEFT VENTRICLE SYSTOLIC VOLUME: 15.44 ML
LEFT VENTRICULAR INTERNAL DIMENSION IN DIASTOLE: 4.53 CM (ref 3.5–6)
LEFT VENTRICULAR MASS: 190.68 G
LV LATERAL E/E' RATIO: 6.3
LV SEPTAL E/E' RATIO: 9
MV PEAK A VEL: 0.83 M/S
MV PEAK E VEL: 0.63 M/S
PISA TR MAX VEL: 2.92 M/S
PULM VEIN S/D RATIO: 2.15
PV PEAK D VEL: 0.33 M/S
PV PEAK S VEL: 0.71 M/S
RA MAJOR: 5.44 CM
RA PRESSURE: 3 MMHG
RA WIDTH: 4.4 CM
RIGHT VENTRICULAR END-DIASTOLIC DIMENSION: 4.01 CM
RV TISSUE DOPPLER FREE WALL SYSTOLIC VELOCITY 1 (APICAL 4 CHAMBER VIEW): 21.47 M/S
SINUS: 2.86 CM
STJ: 3.18 CM
TDI LATERAL: 0.1
TDI SEPTAL: 0.07
TDI: 0.09
TR MAX PG: 34.11 MMHG
TRICUSPID ANNULAR PLANE SYSTOLIC EXCURSION: 2.68 CM
TV REST PULMONARY ARTERY PRESSURE: 37 MMHG

## 2019-01-24 PROCEDURE — 96372 THER/PROPH/DIAG INJ SC/IM: CPT | Mod: S$GLB,,, | Performed by: FAMILY MEDICINE

## 2019-01-24 PROCEDURE — 99999 PR PBB SHADOW E&M-EST. PATIENT-LVL II: ICD-10-PCS | Mod: PBBFAC,,,

## 2019-01-24 PROCEDURE — 93224 HOLTER MONITOR - 24 HOUR (CUPID ONLY): ICD-10-PCS | Mod: S$GLB,,, | Performed by: INTERNAL MEDICINE

## 2019-01-24 PROCEDURE — 93306 TTE W/DOPPLER COMPLETE: CPT | Mod: S$GLB,,, | Performed by: INTERNAL MEDICINE

## 2019-01-24 PROCEDURE — 99999 PR PBB SHADOW E&M-EST. PATIENT-LVL II: CPT | Mod: PBBFAC,,,

## 2019-01-24 PROCEDURE — 93224 XTRNL ECG REC UP TO 48 HRS: CPT | Mod: S$GLB,,, | Performed by: INTERNAL MEDICINE

## 2019-01-24 PROCEDURE — 93306 TRANSTHORACIC ECHO (TTE) COMPLETE (CUPID ONLY): ICD-10-PCS | Mod: S$GLB,,, | Performed by: INTERNAL MEDICINE

## 2019-01-24 PROCEDURE — 96372 PR INJECTION,THERAP/PROPH/DIAG2ST, IM OR SUBCUT: ICD-10-PCS | Mod: S$GLB,,, | Performed by: FAMILY MEDICINE

## 2019-01-24 RX ADMIN — CYANOCOBALAMIN 1000 MCG: 1000 INJECTION, SOLUTION INTRAMUSCULAR; SUBCUTANEOUS at 02:01

## 2019-01-25 ENCOUNTER — TELEPHONE (OUTPATIENT)
Dept: CARDIOLOGY | Facility: CLINIC | Age: 79
End: 2019-01-25

## 2019-01-30 ENCOUNTER — OFFICE VISIT (OUTPATIENT)
Dept: PAIN MEDICINE | Facility: CLINIC | Age: 79
End: 2019-01-30
Payer: MEDICARE

## 2019-01-30 ENCOUNTER — CLINICAL SUPPORT (OUTPATIENT)
Dept: FAMILY MEDICINE | Facility: CLINIC | Age: 79
End: 2019-01-30
Payer: MEDICARE

## 2019-01-30 VITALS
HEART RATE: 60 BPM | WEIGHT: 195.75 LBS | BODY MASS INDEX: 26.51 KG/M2 | OXYGEN SATURATION: 96 % | HEIGHT: 72 IN | SYSTOLIC BLOOD PRESSURE: 120 MMHG | DIASTOLIC BLOOD PRESSURE: 70 MMHG | TEMPERATURE: 97 F | RESPIRATION RATE: 18 BRPM

## 2019-01-30 DIAGNOSIS — G62.9 NEUROPATHY: ICD-10-CM

## 2019-01-30 DIAGNOSIS — I73.9 CLAUDICATION OF BOTH LOWER EXTREMITIES: ICD-10-CM

## 2019-01-30 DIAGNOSIS — M54.16 LUMBAR RADICULOPATHY: Primary | ICD-10-CM

## 2019-01-30 DIAGNOSIS — E53.8 VITAMIN B 12 DEFICIENCY: Primary | ICD-10-CM

## 2019-01-30 PROCEDURE — 3078F DIAST BP <80 MM HG: CPT | Mod: CPTII,S$GLB,, | Performed by: ANESTHESIOLOGY

## 2019-01-30 PROCEDURE — 99999 PR PBB SHADOW E&M-EST. PATIENT-LVL III: ICD-10-PCS | Mod: PBBFAC,,, | Performed by: ANESTHESIOLOGY

## 2019-01-30 PROCEDURE — 99999 PR PBB SHADOW E&M-EST. PATIENT-LVL III: CPT | Mod: PBBFAC,,, | Performed by: ANESTHESIOLOGY

## 2019-01-30 PROCEDURE — 99204 PR OFFICE/OUTPT VISIT, NEW, LEVL IV, 45-59 MIN: ICD-10-PCS | Mod: S$GLB,,, | Performed by: ANESTHESIOLOGY

## 2019-01-30 PROCEDURE — 99204 OFFICE O/P NEW MOD 45 MIN: CPT | Mod: S$GLB,,, | Performed by: ANESTHESIOLOGY

## 2019-01-30 PROCEDURE — 96372 PR INJECTION,THERAP/PROPH/DIAG2ST, IM OR SUBCUT: ICD-10-PCS | Mod: S$GLB,,, | Performed by: FAMILY MEDICINE

## 2019-01-30 PROCEDURE — 1101F PR PT FALLS ASSESS DOC 0-1 FALLS W/OUT INJ PAST YR: ICD-10-PCS | Mod: CPTII,S$GLB,, | Performed by: ANESTHESIOLOGY

## 2019-01-30 PROCEDURE — 96372 THER/PROPH/DIAG INJ SC/IM: CPT | Mod: S$GLB,,, | Performed by: FAMILY MEDICINE

## 2019-01-30 PROCEDURE — 99499 RISK ADDL DX/OHS AUDIT: ICD-10-PCS | Mod: S$GLB,,, | Performed by: ANESTHESIOLOGY

## 2019-01-30 PROCEDURE — 1101F PT FALLS ASSESS-DOCD LE1/YR: CPT | Mod: CPTII,S$GLB,, | Performed by: ANESTHESIOLOGY

## 2019-01-30 PROCEDURE — 3074F PR MOST RECENT SYSTOLIC BLOOD PRESSURE < 130 MM HG: ICD-10-PCS | Mod: CPTII,S$GLB,, | Performed by: ANESTHESIOLOGY

## 2019-01-30 PROCEDURE — 99499 UNLISTED E&M SERVICE: CPT | Mod: S$GLB,,, | Performed by: ANESTHESIOLOGY

## 2019-01-30 PROCEDURE — 3074F SYST BP LT 130 MM HG: CPT | Mod: CPTII,S$GLB,, | Performed by: ANESTHESIOLOGY

## 2019-01-30 PROCEDURE — 3078F PR MOST RECENT DIASTOLIC BLOOD PRESSURE < 80 MM HG: ICD-10-PCS | Mod: CPTII,S$GLB,, | Performed by: ANESTHESIOLOGY

## 2019-01-30 RX ADMIN — CYANOCOBALAMIN 1000 MCG: 1000 INJECTION, SOLUTION INTRAMUSCULAR; SUBCUTANEOUS at 10:01

## 2019-01-30 NOTE — LETTER
January 30, 2019      Nathalie Salgado MD  [address] [facility] [address] [place] [address]           [place] [facility]  [address] [facility] [address] [place] [address]  Phone: [phone]  Fax: [phone]          Patient: Dilip Lopez   MR Number: [id number]   YOB: 1940   Date of Visit: 1/30/2019       Dear Dr. Nathalie Salgado:    Thank you for referring Dilip Lopez to me for evaluation. Attached you will find relevant portions of my assessment and plan of care.    If you have questions, please do not hesitate to call me. I look forward to following Dilip Lopez along with you.    Sincerely,    Souleymane Laboy MD    Enclosure  CC:  No Recipients    If you would like to receive this communication electronically, please contact [email or web address] or [phone] to request more information on [employer] Link access.    For providers and/or their staff who would like to refer a patient to [facility], please contact us through our one-stop-shop provider referral line, [facility], at [phone].    If you feel you have received this communication in error or would no longer like to receive these types of communications, please e-mail [email or web address]

## 2019-01-30 NOTE — PROGRESS NOTES
[facility] New Patient Evaluation    Referred by: Dr. Salgado  Reason for referral: leg weakness    CC:   Chief Complaint   Patient presents with    Establish Care    Hip Pain    Leg Pain    Extremity Weakness      Last 3 PDI Scores 1/30/2019   Pain Disability Index (PDI) 0       HPI:   Dilip Lopez is a 78 y.o. male who complains of leg weakness    Onset: 5-10 years  Inciting Event: none  Progression: since onset, pain is gradually worsening  Current Pain Score: 1/10  Typical Range: 1-1/10  Timing: constant  Quality: heaviness  Radiation: yes, down both legs  Associated numbness or weakness: yes tingling the bottom of both feet, yes right leg and left leg, no saddle anesthesia  Exacerbated by: standing  Allievated by: nothing  Is Pain Level Acceptable?: No    Previous Therapies:  PT/OT: none  HEP:   Interventions: none  Surgery: none  Medications:   - NSAIDS:   - MSK Relaxants:   - TCAs:   - SNRIs:   - Topicals:   - Anticonvulsants:  - Opioids:     Current Pain Medications:  1. none     History:    Current Outpatient Medications:     allopurinol (ZYLOPRIM) 300 MG tablet, Take 300 mg by mouth once daily., Disp: , Rfl:     aspirin (ECOTRIN) 325 MG EC tablet, aspirin 325 mg tablet,delayed release  Take 1 tablet every day by oral route., Disp: , Rfl:     atorvastatin (LIPITOR) 20 MG tablet, Take 1 tablet (20 mg total) by mouth every evening., Disp: 90 tablet, Rfl: 0    clopidogrel (PLAVIX) 75 mg tablet, Take 1 tablet (75 mg total) by mouth once daily., Disp: 90 tablet, Rfl: 0    diphenhydrAMINE (BENADRYL) 25 mg capsule, Two po QHS for AR, Disp: , Rfl: 0    doxazosin (CARDURA) 4 MG tablet, Take 1 tablet (4 mg total) by mouth once daily., Disp: 90 tablet, Rfl: 0    gabapentin (NEURONTIN) 300 MG capsule, Take 300 mg by mouth every evening. , Disp: , Rfl:     isosorbide mononitrate (IMDUR) 30 MG 24 hr tablet, Take 1 tablet (30 mg total) by mouth once daily., Disp: 90 tablet, Rfl: 0     losartan-hydrochlorothiazide 100-25 mg (HYZAAR) 100-25 mg per tablet, Take 1 tablet by mouth once daily. , Disp: , Rfl:     probenecid (BENEMID) 500 mg Tab, probenecid 500 mg tablet prn, Disp: , Rfl:     sotalol (BETAPACE) 80 MG tablet, Take 1 tablet (80 mg total) by mouth once daily., Disp: 90 tablet, Rfl: 0    Current Facility-Administered Medications:     cyanocobalamin injection 1,000 mcg, 1,000 mcg, Intramuscular, Q7 Days, Nathalie Salgado MD, 1,000 mcg at 01/30/19 1043    Past Medical History:   Diagnosis Date    Atrial fibrillation     CAD (coronary artery disease)     Hyperlipidemia     Hypertension        Past Surgical History:   Procedure Laterality Date    CARDIAC CATHETERIZATION      CARDIAC SURGERY      CORONARY ANGIOPLASTY      CORONARY STENT PLACEMENT  01/29/2018    HIATAL HERNIA REPAIR         Family History   Problem Relation Age of Onset    Hypertension Mother     Diabetes Mother     Heart disease Father 62        MI    Parkinsonism Brother        Social History     Socioeconomic History    Marital status:      Spouse name: None    Number of children: None    Years of education: None    Highest education level: None   Social Needs    Financial resource strain: None    Food insecurity - worry: None    Food insecurity - inability: None    Transportation needs - medical: None    Transportation needs - non-medical: None   Occupational History    None   Tobacco Use    Smoking status: Never Smoker    Smokeless tobacco: Never Used   Substance and Sexual Activity    Alcohol use: No     Frequency: Never    Drug use: No    Sexual activity: None   Other Topics Concern    None   Social History Narrative    None       Review of patient's allergies indicates:   Allergen Reactions    Cinnamon Other (See Comments)    Ciprofloxacin Hives    Sulfa (sulfonamide antibiotics)        Review of Systems:  General ROS: negative for - fever  Psychological ROS: negative for -  hostility  Hematological and Lymphatic ROS: negative for - bleeding problems  Endocrine ROS: negative for - unexpected weight changes  Respiratory ROS: no cough, shortness of breath, or wheezing  Cardiovascular ROS: no chest pain or dyspnea on exertion  Gastrointestinal ROS: no abdominal pain, change in bowel habits, or black or bloody stools  Musculoskeletal ROS: negative for - muscular weakness  Neurological ROS: negative for - bowel and bladder control changes  Dermatological ROS: negative for rash    Physical Exam:  Vitals:    01/30/19 0954   BP: 120/70   Pulse: 60   Resp: 18   Temp: 96.6 °F (35.9 °C)   TempSrc: Oral   SpO2: 96%   Weight: 88.8 kg (195 lb 12.3 oz)   Height: 6' (1.829 m)   PainSc: 0-No pain     Body mass index is 26.55 kg/m².     Gen: NAD  Gait: gait intact  Psych:  Mood appropriate for given condition  HEENT: eyes anicteric   GI: Abd soft  CV: RRR  Lungs: breathing unlabored   ROM: limited AROM of the L spine in all planes, full ROM at ankles, knees and hips  Lumbar flexion 90 degrees, extension 50 degrees, side bending 30 degrees.    Sensation: intact to light touch in all dermatomes tested from L2-S1 bilaterally  Reflexes: 0/0 b/l patella and Achilles  Palpation: Diffusely tender over lumbar paraspinals  -TTP over the b/l greater trochanters and bilateral SI joint  Tone: normal in the b/l knees and hips   Skin: intact  Extremities: No edema in b/l ankles or hands  Provacative tests: - SLR, - Queen, - ALEAH testing, - FADIR testing       Right Left   L2/3 Iliacus Hip flexion  5  5   L3/4 Qudratus Femoris Knee Extension  5  5   L4/5 Tib Anterior Ankle Dorsiflexion   4  5   L5/S1 Extensor Hallicus Longus Great toe extension  3  5   L4/5 Tib Anterior/Posterior Inversion  5  5   L5/S1 Extensor Digitorum Longus, Peronues Eversion  5  5   S1/S2 Gastroc/Soleus Plantar Flexion  5  5       Reflexes: 2+ b/l Bicep, triceps, Singh negative  Motor:    Right Left   C4 Shoulder Abduction  5  5   C5 Elbow  Flexion    5  5   C6 Wrist Extension  5  5   C7 Elbow Extension   5  5   C8/T1 Hand Intrinsics   5  5   C8 First Dorsal Interosseus  5  5   C8 Abductor Pollicus Brevis  5  5     Imaging:  Xray lumbar spine 1/14/19  FINDINGS:  There is a mild rotary levocurvature of the lumbar spine.  The bones may be mildly osteopenic.  There is no acute fracture or malalignment.  There is moderate disc space narrowing at the L5-S1 level.  There is no other significant disc space narrowing.  There is marginal osteophyte formation most apparent at the thoracolumbar junction.    Labs:  BMP  Lab Results   Component Value Date     01/14/2019    K 4.7 01/14/2019     01/14/2019    CO2 28 01/14/2019    BUN 27 (H) 01/14/2019    CREATININE 1.8 (H) 01/14/2019    CALCIUM 9.3 01/14/2019    ANIONGAP 6 (L) 01/14/2019    ESTGFRAFRICA 41.1 (A) 01/14/2019    EGFRNONAA 35.5 (A) 01/14/2019     Lab Results   Component Value Date    ALT 15 01/14/2019    AST 18 01/14/2019    ALKPHOS 88 01/14/2019    BILITOT 0.7 01/14/2019       Assessment:  Problem List Items Addressed This Visit        Neuro    Neuropathy    Lumbar radiculopathy - Primary       Cardiac/Vascular    Claudication of both lower extremities          Treatment Plan:  78 y.o. year old male with PMH a-fib, CAD, HTN presents to the office today with 5-10 year history of gradually worsening leg weakness/heaviness.  Today he denies any pain in his back or extremities, but feels like when he starts walking his legs become heavy and he has a difficult time lifiting them up.  On exam he does have some weakness with right dorsiflexion and big toe extension, however he says that weakness is chronic after he had a tendon injury to his foot some years back.  His sensation is intact to light touch in all lower extremity dermatomes.  He says he had an MRI of his lumbar spine done a couple years ago and I have requested the patient to send over the report as well as the images.  I would also  like to get KRUNAL's to rule out any vascular cause of claudication as the patient's wife mentioned he had a history venous thrombosis in the past.  Once I have reviewed lumbar MRI and KRUNAL's, can consider next step in management which would likely be EMG/NCS of the lower extremities.  Since he does not have any pain today, I won't make any changes to his medication regimen.  He takes gabapentin 300mg qhs which I think is appropriate given the numbness he feels on the plantar surface of his feet.     Procedures: none at this time  PT/OT/HEP: no additional at this time  Medications: no changes  Labs: Reviewed and medications are appropriately dosed for current hepatorenal function.  Imaging: will have lumbar MRI report and images sent to the office, will get KRUNAL's to assess lower extremity circulation    : Not applicable    Souleymane Laboy M.D.  Interventional Pain Medicine / Anesthesiology

## 2019-01-31 LAB
OHS CV EVENT MONITOR DAY: 0
OHS CV HOLTER LENGTH DECIMAL HOURS: 24
OHS CV HOLTER LENGTH HOURS: 24
OHS CV HOLTER LENGTH MINUTES: 0

## 2019-02-04 ENCOUNTER — TELEPHONE (OUTPATIENT)
Dept: CARDIOLOGY | Facility: CLINIC | Age: 79
End: 2019-02-04

## 2019-02-06 ENCOUNTER — CLINICAL SUPPORT (OUTPATIENT)
Dept: FAMILY MEDICINE | Facility: CLINIC | Age: 79
End: 2019-02-06
Payer: MEDICARE

## 2019-02-06 PROCEDURE — 96372 THER/PROPH/DIAG INJ SC/IM: CPT | Mod: S$GLB,,, | Performed by: FAMILY MEDICINE

## 2019-02-06 PROCEDURE — 99999 PR PBB SHADOW E&M-EST. PATIENT-LVL I: ICD-10-PCS | Mod: PBBFAC,,,

## 2019-02-06 PROCEDURE — 99999 PR PBB SHADOW E&M-EST. PATIENT-LVL I: CPT | Mod: PBBFAC,,,

## 2019-02-06 PROCEDURE — 96372 PR INJECTION,THERAP/PROPH/DIAG2ST, IM OR SUBCUT: ICD-10-PCS | Mod: S$GLB,,, | Performed by: FAMILY MEDICINE

## 2019-02-06 RX ADMIN — CYANOCOBALAMIN 1000 MCG: 1000 INJECTION, SOLUTION INTRAMUSCULAR; SUBCUTANEOUS at 01:02

## 2019-02-10 PROBLEM — N18.30 CKD (CHRONIC KIDNEY DISEASE) STAGE 3, GFR 30-59 ML/MIN: Status: ACTIVE | Noted: 2019-02-10

## 2019-02-13 RX ORDER — CYANOCOBALAMIN 1000 UG/ML
1000 INJECTION, SOLUTION INTRAMUSCULAR; SUBCUTANEOUS
Status: SHIPPED | OUTPATIENT
Start: 2019-02-13 | End: 2019-03-13

## 2019-02-14 ENCOUNTER — CLINICAL SUPPORT (OUTPATIENT)
Dept: FAMILY MEDICINE | Facility: CLINIC | Age: 79
End: 2019-02-14
Payer: MEDICARE

## 2019-02-14 DIAGNOSIS — E53.8 VITAMIN B 12 DEFICIENCY: Primary | ICD-10-CM

## 2019-02-14 PROCEDURE — 96372 PR INJECTION,THERAP/PROPH/DIAG2ST, IM OR SUBCUT: ICD-10-PCS | Mod: S$GLB,,, | Performed by: FAMILY MEDICINE

## 2019-02-14 PROCEDURE — 96372 THER/PROPH/DIAG INJ SC/IM: CPT | Mod: S$GLB,,, | Performed by: FAMILY MEDICINE

## 2019-02-14 RX ADMIN — CYANOCOBALAMIN 1000 MCG: 1000 INJECTION, SOLUTION INTRAMUSCULAR; SUBCUTANEOUS at 01:02

## 2019-03-11 ENCOUNTER — OFFICE VISIT (OUTPATIENT)
Dept: CARDIOLOGY | Facility: CLINIC | Age: 79
End: 2019-03-11
Attending: INTERNAL MEDICINE
Payer: MEDICARE

## 2019-03-11 VITALS
HEART RATE: 92 BPM | DIASTOLIC BLOOD PRESSURE: 72 MMHG | WEIGHT: 190.94 LBS | SYSTOLIC BLOOD PRESSURE: 101 MMHG | BODY MASS INDEX: 25.86 KG/M2 | HEIGHT: 72 IN

## 2019-03-11 DIAGNOSIS — M79.605 PAIN IN BOTH LOWER EXTREMITIES: ICD-10-CM

## 2019-03-11 DIAGNOSIS — I49.3 FREQUENT PVCS: ICD-10-CM

## 2019-03-11 DIAGNOSIS — I10 ESSENTIAL HYPERTENSION: ICD-10-CM

## 2019-03-11 DIAGNOSIS — M79.604 PAIN IN BOTH LOWER EXTREMITIES: ICD-10-CM

## 2019-03-11 DIAGNOSIS — I25.118 CORONARY ARTERY DISEASE OF NATIVE ARTERY OF NATIVE HEART WITH STABLE ANGINA PECTORIS: Primary | ICD-10-CM

## 2019-03-11 DIAGNOSIS — I51.89 DIASTOLIC DYSFUNCTION: ICD-10-CM

## 2019-03-11 DIAGNOSIS — Z79.02 LONG TERM (CURRENT) USE OF ANTITHROMBOTICS/ANTIPLATELETS: ICD-10-CM

## 2019-03-11 DIAGNOSIS — E78.00 HYPERCHOLESTEROLEMIA: ICD-10-CM

## 2019-03-11 PROCEDURE — 1101F PR PT FALLS ASSESS DOC 0-1 FALLS W/OUT INJ PAST YR: ICD-10-PCS | Mod: CPTII,S$GLB,, | Performed by: INTERNAL MEDICINE

## 2019-03-11 PROCEDURE — 99999 PR PBB SHADOW E&M-EST. PATIENT-LVL III: CPT | Mod: PBBFAC,,, | Performed by: INTERNAL MEDICINE

## 2019-03-11 PROCEDURE — 3074F PR MOST RECENT SYSTOLIC BLOOD PRESSURE < 130 MM HG: ICD-10-PCS | Mod: CPTII,S$GLB,, | Performed by: INTERNAL MEDICINE

## 2019-03-11 PROCEDURE — 99499 RISK ADDL DX/OHS AUDIT: ICD-10-PCS | Mod: S$GLB,,, | Performed by: INTERNAL MEDICINE

## 2019-03-11 PROCEDURE — 3078F PR MOST RECENT DIASTOLIC BLOOD PRESSURE < 80 MM HG: ICD-10-PCS | Mod: CPTII,S$GLB,, | Performed by: INTERNAL MEDICINE

## 2019-03-11 PROCEDURE — 3078F DIAST BP <80 MM HG: CPT | Mod: CPTII,S$GLB,, | Performed by: INTERNAL MEDICINE

## 2019-03-11 PROCEDURE — 99999 PR PBB SHADOW E&M-EST. PATIENT-LVL III: ICD-10-PCS | Mod: PBBFAC,,, | Performed by: INTERNAL MEDICINE

## 2019-03-11 PROCEDURE — 99499 UNLISTED E&M SERVICE: CPT | Mod: S$GLB,,, | Performed by: INTERNAL MEDICINE

## 2019-03-11 PROCEDURE — 99214 PR OFFICE/OUTPT VISIT, EST, LEVL IV, 30-39 MIN: ICD-10-PCS | Mod: S$GLB,,, | Performed by: INTERNAL MEDICINE

## 2019-03-11 PROCEDURE — 3074F SYST BP LT 130 MM HG: CPT | Mod: CPTII,S$GLB,, | Performed by: INTERNAL MEDICINE

## 2019-03-11 PROCEDURE — 99214 OFFICE O/P EST MOD 30 MIN: CPT | Mod: S$GLB,,, | Performed by: INTERNAL MEDICINE

## 2019-03-11 PROCEDURE — 1101F PT FALLS ASSESS-DOCD LE1/YR: CPT | Mod: CPTII,S$GLB,, | Performed by: INTERNAL MEDICINE

## 2019-03-11 RX ORDER — METOPROLOL SUCCINATE 25 MG/1
12.5 TABLET, EXTENDED RELEASE ORAL DAILY
Qty: 45 TABLET | Refills: 1 | Status: SHIPPED | OUTPATIENT
Start: 2019-03-11 | End: 2020-07-09

## 2019-03-11 NOTE — PROGRESS NOTES
Subjective:    Patient ID:  Dilip Lopez is a 78 y.o. male who presents for Coronary Artery Disease (Holter and Echo ); Irregular Heart Beat; and Hypertension        HPI    DISCUSSED TESTS AND GOALS, HOLTER FREQUENT PVC;S, ECHO, HYPERCONTRACTILE LV, DIASTOLIC DYSFUNCTION, LVH,LEGS CHRONICALLY WEAK,  SEE ROS  Past Medical History:   Diagnosis Date    Atrial fibrillation     CAD (coronary artery disease)     Hyperlipidemia     Hypertension      Past Surgical History:   Procedure Laterality Date    CARDIAC CATHETERIZATION      CARDIAC SURGERY      CORONARY ANGIOPLASTY      CORONARY STENT PLACEMENT  01/29/2018    HIATAL HERNIA REPAIR       Family History   Problem Relation Age of Onset    Hypertension Mother     Diabetes Mother     Heart disease Father 62        MI    Parkinsonism Brother      Social History     Socioeconomic History    Marital status:      Spouse name: Not on file    Number of children: Not on file    Years of education: Not on file    Highest education level: Not on file   Social Needs    Financial resource strain: Not on file    Food insecurity - worry: Not on file    Food insecurity - inability: Not on file    Transportation needs - medical: Not on file    Transportation needs - non-medical: Not on file   Occupational History    Not on file   Tobacco Use    Smoking status: Never Smoker    Smokeless tobacco: Never Used   Substance and Sexual Activity    Alcohol use: No     Frequency: Never    Drug use: No    Sexual activity: Not on file   Other Topics Concern    Not on file   Social History Narrative    Not on file       Review of patient's allergies indicates:   Allergen Reactions    Cinnamon Other (See Comments)    Ciprofloxacin Hives    Sulfa (sulfonamide antibiotics)        Current Outpatient Medications:     allopurinol (ZYLOPRIM) 300 MG tablet, Take 300 mg by mouth once daily., Disp: , Rfl:     aspirin (ECOTRIN) 325 MG EC tablet, aspirin 325 mg  tablet,delayed release  Take 1 tablet every day by oral route., Disp: , Rfl:     atorvastatin (LIPITOR) 20 MG tablet, Take 1 tablet (20 mg total) by mouth every evening., Disp: 90 tablet, Rfl: 0    clopidogrel (PLAVIX) 75 mg tablet, Take 1 tablet (75 mg total) by mouth once daily., Disp: 90 tablet, Rfl: 0    diphenhydrAMINE (BENADRYL) 25 mg capsule, Two po QHS for AR, Disp: , Rfl: 0    gabapentin (NEURONTIN) 300 MG capsule, Take 300 mg by mouth every evening. , Disp: , Rfl:     isosorbide mononitrate (IMDUR) 30 MG 24 hr tablet, Take 1 tablet (30 mg total) by mouth once daily., Disp: 90 tablet, Rfl: 0    losartan-hydrochlorothiazide 100-25 mg (HYZAAR) 100-25 mg per tablet, Take 1 tablet by mouth once daily. , Disp: , Rfl:     probenecid (BENEMID) 500 mg Tab, probenecid 500 mg tablet prn, Disp: , Rfl:     sotalol (BETAPACE) 80 MG tablet, Take 1 tablet (80 mg total) by mouth once daily., Disp: 90 tablet, Rfl: 0    metoprolol succinate (TOPROL-XL) 25 MG 24 hr tablet, Take 0.5 tablets (12.5 mg total) by mouth once daily., Disp: 45 tablet, Rfl: 1    Current Facility-Administered Medications:     cyanocobalamin injection 1,000 mcg, 1,000 mcg, Intramuscular, Q7 Days, Nathalie Salgado MD, 1,000 mcg at 02/14/19 1323    Review of Systems   Constitution: Negative for chills, diaphoresis, weakness, malaise/fatigue and night sweats.   HENT: Negative for congestion, hearing loss and nosebleeds.    Eyes: Negative for blurred vision and visual disturbance.   Cardiovascular: Negative for chest pain, claudication (??), cyanosis, dyspnea on exertion, irregular heartbeat, leg swelling (MILD CHRONIC), near-syncope, orthopnea, palpitations, paroxysmal nocturnal dyspnea and syncope.   Respiratory: Negative for cough, hemoptysis, shortness of breath and wheezing.    Endocrine: Negative for cold intolerance, heat intolerance and polyuria.   Hematologic/Lymphatic: Negative for adenopathy. Does not bruise/bleed easily.   Skin: Negative  for color change, itching and nail changes.   Musculoskeletal: Negative for back pain (CHRONIC), falls, joint pain (HIPS) and stiffness.   Gastrointestinal: Negative for bloating, abdominal pain, change in bowel habit, dysphagia, heartburn, hematemesis, jaundice, melena and vomiting.   Genitourinary: Negative for dysuria, flank pain and frequency.   Neurological: Negative for brief paralysis, dizziness, focal weakness, light-headedness, loss of balance (SOME), numbness, paresthesias, seizures, sensory change and tremors.   Psychiatric/Behavioral: Negative for altered mental status, depression, memory loss and substance abuse. The patient is not nervous/anxious.    Allergic/Immunologic: Negative for persistent infections.        Objective:      Vitals:    03/11/19 1306   BP: 101/72   Pulse: 92   Weight: 86.6 kg (190 lb 14.7 oz)   Height: 6' (1.829 m)   PainSc: 0-No pain     Body mass index is 25.89 kg/m².    Physical Exam   Constitutional: He is oriented to person, place, and time. He appears well-developed and well-nourished. He is active.   HENT:   Head: Normocephalic and atraumatic.   Mouth/Throat: Oropharynx is clear and moist and mucous membranes are normal.   Eyes: Conjunctivae and EOM are normal. Pupils are equal, round, and reactive to light.   Neck: Normal range of motion. Neck supple. Normal carotid pulses, no hepatojugular reflux and no JVD present. Carotid bruit is not present. No edema and no erythema present. No thyromegaly present.   Cardiovascular: Normal rate and regular rhythm.  No extrasystoles are present. PMI is not displaced. Exam reveals no gallop, no distant heart sounds, no friction rub and no midsystolic click.   Murmur heard.   Systolic murmur is present with a grade of 2/6 at the lower left sternal border.  Pulses:       Carotid pulses are 2+ on the right side, and 2+ on the left side.       Radial pulses are 2+ on the right side, and 2+ on the left side.        Femoral pulses are 2+ on  the right side, and 2+ on the left side.       Dorsalis pedis pulses are 2+ on the right side, and 2+ on the left side.        Posterior tibial pulses are 2+ on the right side, and 2+ on the left side.   Pulmonary/Chest: Effort normal and breath sounds normal. No accessory muscle usage. No tachypnea and no bradypnea. No respiratory distress.   Abdominal: Soft. Bowel sounds are normal. He exhibits no distension and no mass. There is no hepatosplenomegaly. There is no CVA tenderness.   Musculoskeletal: Normal range of motion. He exhibits no edema or deformity.   SLOW GAIT, TRACE EDEMA   Lymphadenopathy:     He has no cervical adenopathy.   Neurological: He is alert and oriented to person, place, and time. He has normal strength. He displays no tremor. No cranial nerve deficit. He exhibits normal muscle tone. Coordination normal.   Skin: Skin is warm and dry. No cyanosis or erythema. No pallor.   Psychiatric: He has a normal mood and affect. His speech is normal and behavior is normal.               ..    Chemistry        Component Value Date/Time     01/14/2019 1102    K 4.7 01/14/2019 1102     01/14/2019 1102    CO2 28 01/14/2019 1102    BUN 27 (H) 01/14/2019 1102    CREATININE 1.8 (H) 01/14/2019 1102     01/14/2019 1102        Component Value Date/Time    CALCIUM 9.3 01/14/2019 1102    ALKPHOS 88 01/14/2019 1102    AST 18 01/14/2019 1102    ALT 15 01/14/2019 1102    BILITOT 0.7 01/14/2019 1102    ESTGFRAFRICA 41.1 (A) 01/14/2019 1102    EGFRNONAA 35.5 (A) 01/14/2019 1102            ..  Lab Results   Component Value Date    CHOL 99 (L) 01/14/2019     Lab Results   Component Value Date    HDL 40 01/14/2019     Lab Results   Component Value Date    LDLCALC 44.0 (L) 01/14/2019     Lab Results   Component Value Date    TRIG 75 01/14/2019     Lab Results   Component Value Date    CHOLHDL 40.4 01/14/2019     ..  Lab Results   Component Value Date    WBC 5.50 01/14/2019    HGB 14.5 01/14/2019    HCT 45.6  01/14/2019    MCV 99 (H) 01/14/2019     01/14/2019       Test(s) Reviewed  I have reviewed the following in detail:  [] Stress test   [] Angiography   [x] Echocardiogram   [x] Labs   [] Other:       Assessment:         ICD-10-CM ICD-9-CM   1. Coronary artery disease of native artery of native heart with stable angina pectoris I25.118 414.01     413.9   2. Frequent PVCs I49.3 427.69   3. Pain in both lower extremities M79.604 729.5    M79.605    4. Diastolic dysfunction I51.9 429.9   5. Long term (current) use of antithrombotics/antiplatelets Z79.02 V58.63   6. Essential hypertension I10 401.9   7. Hypercholesterolemia E78.00 272.0     Problem List Items Addressed This Visit        Cardiac/Vascular    Hypercholesterolemia    Relevant Orders    Comprehensive metabolic panel    Lipid panel    Coronary artery disease of native artery of native heart with stable angina pectoris - Primary    Relevant Orders    Comprehensive metabolic panel    Essential hypertension    Relevant Orders    Comprehensive metabolic panel    Frequent PVCs    Relevant Orders    Comprehensive metabolic panel    Diastolic dysfunction    Relevant Orders    Comprehensive metabolic panel       Hematology    Long term (current) use of antithrombotics/antiplatelets    Relevant Orders    Comprehensive metabolic panel    Hemoglobin       Orthopedic    Pain in both lower extremities    Relevant Orders    Resting KRUNAL (Cupid Only)           Plan:     DC CARDURA, START METOPROLOL IN VIEW OF ARRHYTHMIA, CHECK KRUNAL, PVD, ALL OTHER CV CLINICALLY STABLE, NO ANGINA, NO HF, NO TIA, TX FOR  CLINICAL ARRHYTHMIA,CONTINUE CURRENT MEDS, EDUCATION, DIET, EXERCISE, RTC IN 6 MO WITH LABS, DISCUSSED PLAN WITH PT AND WIFE      Coronary artery disease of native artery of native heart with stable angina pectoris  -     Comprehensive metabolic panel; Future; Expected date: 03/11/2019    Frequent PVCs  -     Comprehensive metabolic panel; Future; Expected date:  03/11/2019    Pain in both lower extremities  -     Resting KRUNAL (Cupid Only); Future    Diastolic dysfunction  -     Comprehensive metabolic panel; Future; Expected date: 03/11/2019    Long term (current) use of antithrombotics/antiplatelets  -     Comprehensive metabolic panel; Future; Expected date: 03/11/2019  -     Hemoglobin; Future; Expected date: 03/11/2019    Essential hypertension  -     Comprehensive metabolic panel; Future; Expected date: 03/11/2019    Hypercholesterolemia  -     Comprehensive metabolic panel; Future; Expected date: 03/11/2019  -     Lipid panel; Future; Expected date: 03/11/2019    Other orders  -     metoprolol succinate (TOPROL-XL) 25 MG 24 hr tablet; Take 0.5 tablets (12.5 mg total) by mouth once daily.  Dispense: 45 tablet; Refill: 1    RTC Low level/low impact aerobic exercise 5x's/wk. Heart healthy diet and risk factor modification.    See labs and med orders.    Aerobic exercise 5x's/wk. Heart healthy diet and risk factor modification.    See labs and med orders.

## 2019-03-14 ENCOUNTER — CLINICAL SUPPORT (OUTPATIENT)
Dept: CARDIOLOGY | Facility: CLINIC | Age: 79
End: 2019-03-14
Attending: INTERNAL MEDICINE
Payer: MEDICARE

## 2019-03-14 DIAGNOSIS — M79.604 PAIN IN BOTH LOWER EXTREMITIES: ICD-10-CM

## 2019-03-14 DIAGNOSIS — M79.605 PAIN IN BOTH LOWER EXTREMITIES: ICD-10-CM

## 2019-03-14 LAB
LEFT ABI: 1.11
LEFT ARM BP: 103 MMHG
LEFT DORSALIS PEDIS: 111 MMHG
LEFT POSTERIOR TIBIAL: 114 MMHG
RIGHT ABI: 1.1
RIGHT ARM BP: 99 MMHG
RIGHT DORSALIS PEDIS: 113 MMHG
RIGHT POSTERIOR TIBIAL: 102 MMHG

## 2019-03-14 PROCEDURE — 93922 CV US ANKLE BRACHIAL INDICES RESTING (CUPID ONLY): ICD-10-PCS | Mod: S$GLB,,, | Performed by: INTERNAL MEDICINE

## 2019-03-14 PROCEDURE — 93922 UPR/L XTREMITY ART 2 LEVELS: CPT | Mod: S$GLB,,, | Performed by: INTERNAL MEDICINE

## 2019-03-26 ENCOUNTER — PATIENT OUTREACH (OUTPATIENT)
Dept: ADMINISTRATIVE | Facility: HOSPITAL | Age: 79
End: 2019-03-26

## 2019-04-09 ENCOUNTER — OFFICE VISIT (OUTPATIENT)
Dept: FAMILY MEDICINE | Facility: CLINIC | Age: 79
End: 2019-04-09
Payer: MEDICARE

## 2019-04-09 VITALS
DIASTOLIC BLOOD PRESSURE: 82 MMHG | HEIGHT: 72 IN | BODY MASS INDEX: 25.86 KG/M2 | HEART RATE: 76 BPM | WEIGHT: 190.94 LBS | SYSTOLIC BLOOD PRESSURE: 122 MMHG

## 2019-04-09 DIAGNOSIS — L23.1 ALLERGIC CONTACT DERMATITIS DUE TO ADHESIVES: ICD-10-CM

## 2019-04-09 DIAGNOSIS — L23.9 ALLERGIC DERMATITIS: ICD-10-CM

## 2019-04-09 DIAGNOSIS — N18.30 CHRONIC KIDNEY DISEASE, STAGE 3: ICD-10-CM

## 2019-04-09 DIAGNOSIS — E53.8 B12 DEFICIENCY: ICD-10-CM

## 2019-04-09 DIAGNOSIS — M79.674 PAIN AND SWELLING OF TOE, RIGHT: ICD-10-CM

## 2019-04-09 DIAGNOSIS — M79.89 LEFT LEG SWELLING: ICD-10-CM

## 2019-04-09 DIAGNOSIS — M79.605 PAIN OF LEFT LEG: ICD-10-CM

## 2019-04-09 DIAGNOSIS — I10 ESSENTIAL HYPERTENSION: ICD-10-CM

## 2019-04-09 DIAGNOSIS — M79.89 PAIN AND SWELLING OF TOE, RIGHT: ICD-10-CM

## 2019-04-09 DIAGNOSIS — E78.49 OTHER HYPERLIPIDEMIA: ICD-10-CM

## 2019-04-09 DIAGNOSIS — G89.29 CHRONIC PAIN OF LEFT KNEE: Primary | ICD-10-CM

## 2019-04-09 DIAGNOSIS — M25.562 CHRONIC PAIN OF LEFT KNEE: Primary | ICD-10-CM

## 2019-04-09 PROCEDURE — 1101F PT FALLS ASSESS-DOCD LE1/YR: CPT | Mod: CPTII,S$GLB,, | Performed by: FAMILY MEDICINE

## 2019-04-09 PROCEDURE — 3079F PR MOST RECENT DIASTOLIC BLOOD PRESSURE 80-89 MM HG: ICD-10-PCS | Mod: CPTII,S$GLB,, | Performed by: FAMILY MEDICINE

## 2019-04-09 PROCEDURE — 99999 PR PBB SHADOW E&M-EST. PATIENT-LVL IV: CPT | Mod: PBBFAC,,, | Performed by: FAMILY MEDICINE

## 2019-04-09 PROCEDURE — 99214 PR OFFICE/OUTPT VISIT, EST, LEVL IV, 30-39 MIN: ICD-10-PCS | Mod: S$GLB,,, | Performed by: FAMILY MEDICINE

## 2019-04-09 PROCEDURE — 99214 OFFICE O/P EST MOD 30 MIN: CPT | Mod: S$GLB,,, | Performed by: FAMILY MEDICINE

## 2019-04-09 PROCEDURE — 3079F DIAST BP 80-89 MM HG: CPT | Mod: CPTII,S$GLB,, | Performed by: FAMILY MEDICINE

## 2019-04-09 PROCEDURE — 99999 PR PBB SHADOW E&M-EST. PATIENT-LVL IV: ICD-10-PCS | Mod: PBBFAC,,, | Performed by: FAMILY MEDICINE

## 2019-04-09 PROCEDURE — 3074F PR MOST RECENT SYSTOLIC BLOOD PRESSURE < 130 MM HG: ICD-10-PCS | Mod: CPTII,S$GLB,, | Performed by: FAMILY MEDICINE

## 2019-04-09 PROCEDURE — 99499 RISK ADDL DX/OHS AUDIT: ICD-10-PCS | Mod: S$GLB,,, | Performed by: FAMILY MEDICINE

## 2019-04-09 PROCEDURE — 1101F PR PT FALLS ASSESS DOC 0-1 FALLS W/OUT INJ PAST YR: ICD-10-PCS | Mod: CPTII,S$GLB,, | Performed by: FAMILY MEDICINE

## 2019-04-09 PROCEDURE — 3074F SYST BP LT 130 MM HG: CPT | Mod: CPTII,S$GLB,, | Performed by: FAMILY MEDICINE

## 2019-04-09 PROCEDURE — 99499 UNLISTED E&M SERVICE: CPT | Mod: S$GLB,,, | Performed by: FAMILY MEDICINE

## 2019-04-09 RX ORDER — CYANOCOBALAMIN 1000 UG/ML
1000 INJECTION, SOLUTION INTRAMUSCULAR; SUBCUTANEOUS
Qty: 3 ML | Refills: 3 | Status: SHIPPED | OUTPATIENT
Start: 2019-04-09 | End: 2019-07-09 | Stop reason: SDUPTHER

## 2019-04-09 NOTE — PROGRESS NOTES
Subjective:       Patient ID: Dilip Lopez is a 78 y.o. male.    Chief Complaint: Hyperlipidemia (here for 3 month f/u. Hm due tetanus,pneumo. states left leg back of knee hurting and swollen, has a spot on right upper foot, might have pulled a muscle on groin area)    HPI     The patient is coming here today complaining of a rash on the right foot, the symptoms started more than 4 weeks ago, the patient is not been applying any medications, he wants to make sure is not cancer.  The rash is dry and very pruritic.  The patient is coming here for assessment.    He also complains of  Pain, swelling and discomfort on the posterior aspect of the left knee, the symptoms started more than 6 fruits ago, the symptoms are getting worse.  The patient is not been taking over-the-counter medication.  He is concerned because the leg also swelling slightly and he has history of DVT in the past.    He also complaining of heating the right foot and noticed some swelling and pain on the 2nd and 3rd toes, but still swollen and wants to make sure that he not broke it.  He is coming here for assessment.    He has chronic kidney disease stage 3 which he sees take nephrologist for, he has scheduled to see the nephrologist again in 2 months, the patient has blood work scheduled at that time.  He had 1 episode after his of the cardiologist with low blood pressure and low heart rate, the patient stated that the episode last for a few minutes and resolved.  He currently was placed on metoprolol.  The patient also is taking sotalol.  The patient denies any shortness of breath, chest pain, palpitation at this visit.      Past medical history, past social history was reviewed discussed with the patient.    Review of Systems   Constitutional: Negative for activity change and appetite change.   HENT: Negative for congestion and dental problem.    Respiratory: Negative for apnea and chest tightness.    Cardiovascular: Negative for chest pain and  leg swelling.   Musculoskeletal: Positive for arthralgias, gait problem and joint swelling.       Objective:      Physical Exam   Constitutional: He appears well-developed and well-nourished. No distress.   HENT:   Head: Normocephalic and atraumatic.   Right Ear: External ear normal.   Left Ear: External ear normal.   Nose: Nose normal.   Eyes: Pupils are equal, round, and reactive to light.   Neck: Normal range of motion. Neck supple.   Cardiovascular: Regular rhythm and normal heart sounds.   No murmur heard.  Pulmonary/Chest: Effort normal and breath sounds normal. No respiratory distress.   Musculoskeletal: He exhibits edema (Trace edema on the left lower extremity) and tenderness (Tenderness to palpation on the left knee and on the left calf with decreased range of motion.).   Right foot has presence of swelling and tenderness on the 2nd toe   Neurological: No cranial nerve deficit.   Skin: Skin is warm and dry. No rash noted. He is not diaphoretic. No erythema. No pallor.   Psychiatric: He has a normal mood and affect. His behavior is normal. Judgment and thought content normal.   Nursing note and vitals reviewed.      Assessment:       1. Chronic pain of left knee    2. Acute gout due to renal impairment involving right foot    3. B12 deficiency    4. Essential hypertension    5. Other hyperlipidemia    6. Pain and swelling of toe, right    7. Left leg swelling    8. Pain of left leg         Plan:       Chronic pain of left knee:  New problem, workup needed  -     X-Ray Knee 1 or 2 View Left; Future; Expected date: 04/09/2019    B12 deficiency:  Stable  -     Cancel: Vitamin B12; Future; Expected date: 04/09/2019  -     cyanocobalamin 1,000 mcg/mL injection; Inject 1 mL (1,000 mcg total) into the muscle every 30 days.  Dispense: 3 mL; Refill: 3    Essential hypertension:  Well controlled    Other hyperlipidemia:  Well controlled    Pain and swelling of toe, right:  New problem, workup needed  -     X-Ray Toe  2 or More Views Right; Future; Expected date: 04/09/2019    Left leg swelling:  New problem, workup needed  -     US Lower Extremity Veins Left; Future; Expected date: 04/09/2019    Pain of left leg :  New problem, workup needed  -     US Lower Extremity Veins Left; Future; Expected date: 04/09/2019  Contact dermatitis:  Avoid  Triggers, use cortisone cream twice daily for 14 days.  If the symptoms get worse, the patient will notify us immediately.    The patient will contact the cardiologist secondary to the new episode of hypotension while taking the new blood pressure medication.  Will call the patient after we have the results of the test, because of the history of clots on the lower extremities in the past, will refer the patient for ultrasound on the lower extremity.  The patient was advised to take extra-strength Tylenol as needed for pain. Continue with healthy habits.  Follow up with the cardiologist and with the kidney specialist as directed.  Will discontinue probenecid due to abnormal kidney function.Patient agreed with assessment and plan. Patient verbalized understanding.

## 2019-04-10 ENCOUNTER — HOSPITAL ENCOUNTER (OUTPATIENT)
Dept: RADIOLOGY | Facility: HOSPITAL | Age: 79
Discharge: HOME | End: 2019-04-10
Attending: FAMILY MEDICINE
Payer: MEDICARE

## 2019-04-10 ENCOUNTER — ANCILLARY ORDERS (OUTPATIENT)
Dept: FAMILY MEDICINE | Facility: CLINIC | Age: 79
End: 2019-04-10

## 2019-04-10 DIAGNOSIS — M25.562 CHRONIC PAIN OF LEFT KNEE: ICD-10-CM

## 2019-04-10 DIAGNOSIS — G89.29 CHRONIC PAIN OF LEFT KNEE: ICD-10-CM

## 2019-04-10 DIAGNOSIS — M79.89 PAIN AND SWELLING OF TOE, RIGHT: ICD-10-CM

## 2019-04-10 DIAGNOSIS — M79.89 LEFT LEG SWELLING: ICD-10-CM

## 2019-04-10 DIAGNOSIS — M79.605 PAIN OF LEFT LEG: ICD-10-CM

## 2019-04-10 DIAGNOSIS — S92.521A CLOSED DISPLACED FRACTURE OF MIDDLE PHALANX OF LESSER TOE OF RIGHT FOOT, INITIAL ENCOUNTER: Primary | ICD-10-CM

## 2019-04-10 DIAGNOSIS — M79.674 PAIN AND SWELLING OF TOE, RIGHT: ICD-10-CM

## 2019-04-10 PROBLEM — S92.531A CLOSED DISPLACED FRACTURE OF DISTAL PHALANX OF LESSER TOE OF RIGHT FOOT: Status: ACTIVE | Noted: 2019-04-10

## 2019-04-10 PROCEDURE — 73660 X-RAY EXAM OF TOE(S): CPT | Mod: TC,FY,PO,RT

## 2019-04-10 PROCEDURE — 73562 X-RAY EXAM OF KNEE 3: CPT | Mod: 26,LT,, | Performed by: RADIOLOGY

## 2019-04-10 PROCEDURE — 73562 XR KNEE 3 VIEW LEFT: ICD-10-PCS | Mod: 26,LT,, | Performed by: RADIOLOGY

## 2019-04-10 PROCEDURE — 93971 US LOWER EXTREMITY VEINS LEFT: ICD-10-PCS | Mod: 26,LT,, | Performed by: RADIOLOGY

## 2019-04-10 PROCEDURE — 73562 X-RAY EXAM OF KNEE 3: CPT | Mod: TC,FY,PO,LT

## 2019-04-10 PROCEDURE — 73660 X-RAY EXAM OF TOE(S): CPT | Mod: 26,RT,, | Performed by: RADIOLOGY

## 2019-04-10 PROCEDURE — 93971 EXTREMITY STUDY: CPT | Mod: 26,LT,, | Performed by: RADIOLOGY

## 2019-04-10 PROCEDURE — 73660 XR TOE 2 OR MORE VIEWS RIGHT: ICD-10-PCS | Mod: 26,RT,, | Performed by: RADIOLOGY

## 2019-04-10 PROCEDURE — 93971 EXTREMITY STUDY: CPT | Mod: TC,PO,LT

## 2019-04-25 ENCOUNTER — DOCUMENTATION ONLY (OUTPATIENT)
Dept: FAMILY MEDICINE | Facility: CLINIC | Age: 79
End: 2019-04-25

## 2019-04-25 NOTE — PROGRESS NOTES
PA initiated for Cyanocobalam 1000mcg/1ml injection  CMM:  [id number]   Peoples  --------------------------------------------  Not Covered

## 2019-05-03 DIAGNOSIS — E78.49 OTHER HYPERLIPIDEMIA: ICD-10-CM

## 2019-05-03 DIAGNOSIS — I25.10 CORONARY ARTERY DISEASE INVOLVING NATIVE CORONARY ARTERY OF NATIVE HEART WITHOUT ANGINA PECTORIS: ICD-10-CM

## 2019-05-03 RX ORDER — ATORVASTATIN CALCIUM 20 MG/1
TABLET, FILM COATED ORAL
Qty: 90 TABLET | Refills: 0 | Status: SHIPPED | OUTPATIENT
Start: 2019-05-03 | End: 2019-07-09 | Stop reason: SDUPTHER

## 2019-05-03 RX ORDER — CLOPIDOGREL BISULFATE 75 MG/1
TABLET ORAL
Qty: 90 TABLET | Refills: 0 | Status: SHIPPED | OUTPATIENT
Start: 2019-05-03 | End: 2019-07-09 | Stop reason: SDUPTHER

## 2019-05-03 RX ORDER — ISOSORBIDE MONONITRATE 30 MG/1
TABLET, EXTENDED RELEASE ORAL
Qty: 90 TABLET | Refills: 0 | Status: SHIPPED | OUTPATIENT
Start: 2019-05-03 | End: 2019-07-09 | Stop reason: SDUPTHER

## 2019-06-25 ENCOUNTER — PATIENT OUTREACH (OUTPATIENT)
Dept: ADMINISTRATIVE | Facility: HOSPITAL | Age: 79
End: 2019-06-25

## 2019-07-09 ENCOUNTER — IMMUNIZATION (OUTPATIENT)
Dept: PHARMACY | Facility: CLINIC | Age: 79
End: 2019-07-09
Payer: MEDICARE

## 2019-07-09 ENCOUNTER — OFFICE VISIT (OUTPATIENT)
Dept: FAMILY MEDICINE | Facility: CLINIC | Age: 79
End: 2019-07-09
Payer: MEDICARE

## 2019-07-09 VITALS
BODY MASS INDEX: 25.05 KG/M2 | TEMPERATURE: 98 F | SYSTOLIC BLOOD PRESSURE: 108 MMHG | DIASTOLIC BLOOD PRESSURE: 68 MMHG | WEIGHT: 184.94 LBS | OXYGEN SATURATION: 95 % | HEIGHT: 72 IN | HEART RATE: 81 BPM

## 2019-07-09 DIAGNOSIS — M51.36 DEGENERATIVE DISC DISEASE, LUMBAR: ICD-10-CM

## 2019-07-09 DIAGNOSIS — E78.49 OTHER HYPERLIPIDEMIA: ICD-10-CM

## 2019-07-09 DIAGNOSIS — I48.0 PAF (PAROXYSMAL ATRIAL FIBRILLATION): ICD-10-CM

## 2019-07-09 DIAGNOSIS — I25.10 CORONARY ARTERY DISEASE INVOLVING NATIVE CORONARY ARTERY OF NATIVE HEART WITHOUT ANGINA PECTORIS: ICD-10-CM

## 2019-07-09 DIAGNOSIS — E53.8 B12 DEFICIENCY: Primary | ICD-10-CM

## 2019-07-09 DIAGNOSIS — M1A.09X0 IDIOPATHIC CHRONIC GOUT OF MULTIPLE SITES WITHOUT TOPHUS: ICD-10-CM

## 2019-07-09 DIAGNOSIS — I10 ESSENTIAL HYPERTENSION: ICD-10-CM

## 2019-07-09 PROCEDURE — 99499 RISK ADDL DX/OHS AUDIT: ICD-10-PCS | Mod: S$GLB,,, | Performed by: FAMILY MEDICINE

## 2019-07-09 PROCEDURE — 96372 THER/PROPH/DIAG INJ SC/IM: CPT | Mod: S$GLB,,, | Performed by: FAMILY MEDICINE

## 2019-07-09 PROCEDURE — 99999 PR PBB SHADOW E&M-EST. PATIENT-LVL III: CPT | Mod: PBBFAC,,, | Performed by: FAMILY MEDICINE

## 2019-07-09 PROCEDURE — 99499 UNLISTED E&M SERVICE: CPT | Mod: S$GLB,,, | Performed by: FAMILY MEDICINE

## 2019-07-09 PROCEDURE — 99999 PR PBB SHADOW E&M-EST. PATIENT-LVL III: ICD-10-PCS | Mod: PBBFAC,,, | Performed by: FAMILY MEDICINE

## 2019-07-09 PROCEDURE — 3074F PR MOST RECENT SYSTOLIC BLOOD PRESSURE < 130 MM HG: ICD-10-PCS | Mod: CPTII,S$GLB,, | Performed by: FAMILY MEDICINE

## 2019-07-09 PROCEDURE — 3074F SYST BP LT 130 MM HG: CPT | Mod: CPTII,S$GLB,, | Performed by: FAMILY MEDICINE

## 2019-07-09 PROCEDURE — 96372 PR INJECTION,THERAP/PROPH/DIAG2ST, IM OR SUBCUT: ICD-10-PCS | Mod: S$GLB,,, | Performed by: FAMILY MEDICINE

## 2019-07-09 PROCEDURE — 1101F PT FALLS ASSESS-DOCD LE1/YR: CPT | Mod: CPTII,S$GLB,, | Performed by: FAMILY MEDICINE

## 2019-07-09 PROCEDURE — 3078F PR MOST RECENT DIASTOLIC BLOOD PRESSURE < 80 MM HG: ICD-10-PCS | Mod: CPTII,S$GLB,, | Performed by: FAMILY MEDICINE

## 2019-07-09 PROCEDURE — 1101F PR PT FALLS ASSESS DOC 0-1 FALLS W/OUT INJ PAST YR: ICD-10-PCS | Mod: CPTII,S$GLB,, | Performed by: FAMILY MEDICINE

## 2019-07-09 PROCEDURE — 99214 OFFICE O/P EST MOD 30 MIN: CPT | Mod: 25,S$GLB,, | Performed by: FAMILY MEDICINE

## 2019-07-09 PROCEDURE — 3078F DIAST BP <80 MM HG: CPT | Mod: CPTII,S$GLB,, | Performed by: FAMILY MEDICINE

## 2019-07-09 PROCEDURE — 99214 PR OFFICE/OUTPT VISIT, EST, LEVL IV, 30-39 MIN: ICD-10-PCS | Mod: 25,S$GLB,, | Performed by: FAMILY MEDICINE

## 2019-07-09 RX ORDER — GABAPENTIN 300 MG/1
300 CAPSULE ORAL NIGHTLY
Qty: 90 CAPSULE | Refills: 3 | Status: SHIPPED | OUTPATIENT
Start: 2019-07-09 | End: 2020-06-09

## 2019-07-09 RX ORDER — ATORVASTATIN CALCIUM 20 MG/1
20 TABLET, FILM COATED ORAL NIGHTLY
Qty: 90 TABLET | Refills: 3 | Status: SHIPPED | OUTPATIENT
Start: 2019-07-09 | End: 2020-07-13 | Stop reason: DRUGHIGH

## 2019-07-09 RX ORDER — CLOPIDOGREL BISULFATE 75 MG/1
75 TABLET ORAL DAILY
Qty: 90 TABLET | Refills: 3 | Status: SHIPPED | OUTPATIENT
Start: 2019-07-09 | End: 2020-07-16

## 2019-07-09 RX ORDER — LOSARTAN POTASSIUM AND HYDROCHLOROTHIAZIDE 25; 100 MG/1; MG/1
1 TABLET ORAL DAILY
Qty: 90 TABLET | Refills: 3 | Status: SHIPPED | OUTPATIENT
Start: 2019-07-09 | End: 2019-10-01 | Stop reason: SDUPTHER

## 2019-07-09 RX ORDER — SOTALOL HYDROCHLORIDE 80 MG/1
80 TABLET ORAL DAILY
Qty: 90 TABLET | Refills: 0 | Status: SHIPPED | OUTPATIENT
Start: 2019-07-09 | End: 2020-08-04

## 2019-07-09 RX ORDER — CYANOCOBALAMIN 1000 UG/ML
1000 INJECTION, SOLUTION INTRAMUSCULAR; SUBCUTANEOUS
Status: SHIPPED | OUTPATIENT
Start: 2019-07-09 | End: 2020-07-03

## 2019-07-09 RX ORDER — ALLOPURINOL 300 MG/1
300 TABLET ORAL DAILY
Qty: 90 TABLET | Refills: 3 | Status: SHIPPED | OUTPATIENT
Start: 2019-07-09 | End: 2020-06-08

## 2019-07-09 RX ORDER — ISOSORBIDE MONONITRATE 30 MG/1
30 TABLET, EXTENDED RELEASE ORAL DAILY
Qty: 90 TABLET | Refills: 3 | Status: SHIPPED | OUTPATIENT
Start: 2019-07-09 | End: 2020-07-16 | Stop reason: SDUPTHER

## 2019-07-09 RX ADMIN — CYANOCOBALAMIN 1000 MCG: 1000 INJECTION, SOLUTION INTRAMUSCULAR; SUBCUTANEOUS at 03:07

## 2019-07-09 NOTE — PATIENT INSTRUCTIONS

## 2019-07-09 NOTE — PROGRESS NOTES
Subjective:       Patient ID: Dilip Lopez is a 78 y.o. male.    Chief Complaint: Follow-up (3mth ) and Medication Refill    HPI     The patient is coming here today for a follow-up visit and medication refill, the patient is out almost all his medications.  He has high blood pressure, he takes losartan HCTZ, he went to see the nephrologist who recommend more blood work and also continue with losartan and increase the water intake and avoid any anti-inflammatories.  The patient the no go for a follow-up appointment with nephrologist, and he not do the blood work.  But he wants to come back fasting to do it.  He does not monitor his blood pressure home, he not bring a log.  He denies any symptoms of chest pain, shortness of breath, nausea or vomiting at this office visit.    The patient also has hyperlipidemia, he wants to know why he is taking his cholesterol medication as his cholesterol levels are always been good.  He has coronary artery disease, he is taking currently Plavix and aspirin.  His cholesterol levels were actually low.  He has an appointment to see the cardiologist in 2 months, he needs his prescriptions at this time, he will discuss with the cardiologist about decreasing the dosage of the cholesterol medicine.    The patient also was diagnosed with B12 deficiency, he was given B12 injections once daily for 7 days and then once a week for 4 weeks, but the patient stated that his insurance did not pay to have B12 injections at home, and preferred to have a in the office.  He also has gout, he denies any new exacerbations, he is taking allopurinol as directed.    He has chronic back pain, he is taking gabapentin with good relief of the symptoms, the patient needs a new prescription for that.    He also complains of pruritus on bilateral ears.    The patient is due for Prevnar 13, he would like to receive the injection today.      Past medical history, past social history was reviewed and discussed  with the patient.    Review of Systems   Constitutional: Negative for activity change and appetite change.   HENT: Negative for congestion and dental problem.    Respiratory: Negative for cough and shortness of breath.    Cardiovascular: Negative for chest pain and leg swelling.       Objective:      Physical Exam   Constitutional: He appears well-developed and well-nourished. No distress.   Limited ambulation, the patient is using a cane   HENT:   Head: Normocephalic and atraumatic.   Right Ear: External ear normal.   Left Ear: External ear normal.   Nose: Nose normal.   Mouth/Throat: No oropharyngeal exudate.   Eyes: Pupils are equal, round, and reactive to light. Right eye exhibits no discharge. Left eye exhibits no discharge.   Neck: Normal range of motion. Neck supple. No thyromegaly present.   Cardiovascular: Normal rate, regular rhythm and normal heart sounds. Exam reveals no friction rub.   No murmur heard.  Pulmonary/Chest: Effort normal and breath sounds normal. No respiratory distress. He has no wheezes.   Musculoskeletal: He exhibits no edema or tenderness.   Neurological: No cranial nerve deficit.   Skin: Skin is warm and dry. No rash noted. He is not diaphoretic. No erythema.   Psychiatric: He has a normal mood and affect. His behavior is normal. Judgment and thought content normal.   Nursing note and vitals reviewed.      Assessment:       1. B12 deficiency    2. Other hyperlipidemia    3. Coronary artery disease involving native coronary artery of native heart without angina pectoris    4. Essential hypertension    5. 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 Degenerative disc disease, lumbar        Plan:       B12 deficiency:  Worsening  -     cyanocobalamin injection 1,000 mcg    Other hyperlipidemia:  Well controlled, on the lower side  -     atorvastatin (LIPITOR) 20 MG tablet; Take 1 tablet (20 mg total) by mouth every evening.   Dispense: 90 tablet; Refill: 3    Coronary artery disease involving native coronary artery of native heart without angina pectoris:  Stable  -     atorvastatin (LIPITOR) 20 MG tablet; Take 1 tablet (20 mg total) by mouth every evening.  Dispense: 90 tablet; Refill: 3  -     clopidogrel (PLAVIX) 75 mg tablet; Take 1 tablet (75 mg total) by mouth once daily.  Dispense: 90 tablet; Refill: 3  -     sotalol (BETAPACE) 80 MG tablet; Take 1 tablet (80 mg total) by mouth once daily.  Dispense: 90 tablet; Refill: 0    Essential hypertension:  Well controlled  -     losartan-hydrochlorothiazide 100-25 mg (HYZAAR) 100-25 mg per tablet; Take 1 tablet by mouth once daily.  Dispense: 90 tablet; Refill: 3    PAF (paroxysmal atrial fibrillation):  Stable  -     isosorbide mononitrate (IMDUR) 30 MG 24 hr tablet; Take 1 tablet (30 mg total) by mouth once daily.  Dispense: 90 tablet; Refill: 3  -     sotalol (BETAPACE) 80 MG tablet; Take 1 tablet (80 mg total) by mouth once daily.  Dispense: 90 tablet; Refill: 0    Idiopathic chronic gout of multiple sites without tophus:  Stable  -     allopurinol (ZYLOPRIM) 300 MG tablet; Take 1 tablet (300 mg total) by mouth once daily.  Dispense: 90 tablet; Refill: 3    Degenerative disc disease, lumbar:  Stable  -     gabapentin (NEURONTIN) 300 MG capsule; Take 1 capsule (300 mg total) by mouth every evening.  Dispense: 90 capsule; Refill: 3    Ears examination did not show any abnormalities, he was recommended to apply some Vaseline outside in the canal in the ears.  If the symptoms get worse, the patient will let us know.  He also was recommended to drink more water.  The patient will follow with the cardiologist, he will return in 1 month for a blood work that was ordered from the cardiologist and nephrologist.  B12 injection was given in the clinic, the patient will return in a month for a B12 injection.  Continue with healthy habits, weight fried foods, red meat and processed starches.  Will  give the prescription for sotalol until he can talk with his cardiologist for further refills.Patient agreed with assessment and plan. Patient verbalized understanding.

## 2019-08-07 ENCOUNTER — LAB VISIT (OUTPATIENT)
Dept: LAB | Facility: HOSPITAL | Age: 79
End: 2019-08-07
Attending: INTERNAL MEDICINE
Payer: MEDICARE

## 2019-08-07 ENCOUNTER — CLINICAL SUPPORT (OUTPATIENT)
Dept: FAMILY MEDICINE | Facility: CLINIC | Age: 79
End: 2019-08-07
Payer: MEDICARE

## 2019-08-07 DIAGNOSIS — N18.30 CKD (CHRONIC KIDNEY DISEASE) STAGE 3, GFR 30-59 ML/MIN: ICD-10-CM

## 2019-08-07 DIAGNOSIS — M10.9 GOUT, UNSPECIFIED CAUSE, UNSPECIFIED CHRONICITY, UNSPECIFIED SITE: ICD-10-CM

## 2019-08-07 LAB
BACTERIA #/AREA URNS HPF: ABNORMAL /HPF
BILIRUB UR QL STRIP: NEGATIVE
CLARITY UR: CLEAR
COLOR UR: YELLOW
CREAT UR-MCNC: 122 MG/DL (ref 23–375)
GLUCOSE UR QL STRIP: NEGATIVE
HGB UR QL STRIP: NEGATIVE
HYALINE CASTS #/AREA URNS LPF: 1 /LPF
KETONES UR QL STRIP: NEGATIVE
LEUKOCYTE ESTERASE UR QL STRIP: ABNORMAL
MICROSCOPIC COMMENT: ABNORMAL
NITRITE UR QL STRIP: NEGATIVE
PH UR STRIP: 5 [PH] (ref 5–8)
PROT UR QL STRIP: NEGATIVE
PROT UR-MCNC: <7 MG/DL (ref 0–15)
PROT/CREAT UR: NORMAL MG/G{CREAT} (ref 0–0.2)
SP GR UR STRIP: >=1.03 (ref 1–1.03)
SQUAMOUS #/AREA URNS HPF: 1 /HPF
URN SPEC COLLECT METH UR: ABNORMAL
WBC #/AREA URNS HPF: 4 /HPF (ref 0–5)

## 2019-08-07 PROCEDURE — 96372 THER/PROPH/DIAG INJ SC/IM: CPT | Mod: S$GLB,,, | Performed by: FAMILY MEDICINE

## 2019-08-07 PROCEDURE — 84156 ASSAY OF PROTEIN URINE: CPT

## 2019-08-07 PROCEDURE — 81000 URINALYSIS NONAUTO W/SCOPE: CPT | Mod: PO

## 2019-08-07 PROCEDURE — 96372 PR INJECTION,THERAP/PROPH/DIAG2ST, IM OR SUBCUT: ICD-10-PCS | Mod: S$GLB,,, | Performed by: FAMILY MEDICINE

## 2019-08-07 RX ADMIN — CYANOCOBALAMIN 1000 MCG: 1000 INJECTION, SOLUTION INTRAMUSCULAR; SUBCUTANEOUS at 08:08

## 2019-09-04 ENCOUNTER — OFFICE VISIT (OUTPATIENT)
Dept: DERMATOLOGY | Facility: CLINIC | Age: 79
End: 2019-09-04
Payer: MEDICARE

## 2019-09-04 VITALS — HEIGHT: 72 IN | BODY MASS INDEX: 25.05 KG/M2 | WEIGHT: 184.94 LBS | RESPIRATION RATE: 18 BRPM

## 2019-09-04 DIAGNOSIS — L30.8 OTHER ECZEMA: Primary | ICD-10-CM

## 2019-09-04 DIAGNOSIS — D48.5 NEOPLASM OF UNCERTAIN BEHAVIOR OF SKIN: ICD-10-CM

## 2019-09-04 DIAGNOSIS — L57.0 ACTINIC KERATOSIS: ICD-10-CM

## 2019-09-04 DIAGNOSIS — L21.9 SEBORRHEIC DERMATITIS: ICD-10-CM

## 2019-09-04 PROCEDURE — 11102 PR TANGENTIAL BIOPSY, SKIN, SINGLE LESION: ICD-10-PCS | Mod: S$GLB,,, | Performed by: DERMATOLOGY

## 2019-09-04 PROCEDURE — 1101F PT FALLS ASSESS-DOCD LE1/YR: CPT | Mod: CPTII,S$GLB,, | Performed by: DERMATOLOGY

## 2019-09-04 PROCEDURE — 11102 TANGNTL BX SKIN SINGLE LES: CPT | Mod: S$GLB,,, | Performed by: DERMATOLOGY

## 2019-09-04 PROCEDURE — 88305 TISSUE EXAM BY PATHOLOGIST: CPT | Mod: 26,,, | Performed by: PATHOLOGY

## 2019-09-04 PROCEDURE — 99999 PR PBB SHADOW E&M-EST. PATIENT-LVL III: ICD-10-PCS | Mod: PBBFAC,,, | Performed by: DERMATOLOGY

## 2019-09-04 PROCEDURE — 88305 TISSUE SPECIMEN TO PATHOLOGY, DERMATOLOGY: ICD-10-PCS | Mod: 26,,, | Performed by: PATHOLOGY

## 2019-09-04 PROCEDURE — 99999 PR PBB SHADOW E&M-EST. PATIENT-LVL III: CPT | Mod: PBBFAC,,, | Performed by: DERMATOLOGY

## 2019-09-04 PROCEDURE — 99203 OFFICE O/P NEW LOW 30 MIN: CPT | Mod: 25,S$GLB,, | Performed by: DERMATOLOGY

## 2019-09-04 PROCEDURE — 1101F PR PT FALLS ASSESS DOC 0-1 FALLS W/OUT INJ PAST YR: ICD-10-PCS | Mod: CPTII,S$GLB,, | Performed by: DERMATOLOGY

## 2019-09-04 PROCEDURE — 88305 TISSUE EXAM BY PATHOLOGIST: CPT | Performed by: PATHOLOGY

## 2019-09-04 PROCEDURE — 99203 PR OFFICE/OUTPT VISIT, NEW, LEVL III, 30-44 MIN: ICD-10-PCS | Mod: 25,S$GLB,, | Performed by: DERMATOLOGY

## 2019-09-04 RX ORDER — TRIAMCINOLONE ACETONIDE 1 MG/G
CREAM TOPICAL 2 TIMES DAILY
Qty: 45 G | Refills: 1 | Status: SHIPPED | OUTPATIENT
Start: 2019-09-04 | End: 2020-07-21 | Stop reason: SDUPTHER

## 2019-09-04 NOTE — PROGRESS NOTES
Subjective:       Patient ID:  Dilip oLpez is a 78 y.o. male who presents for   Chief Complaint   Patient presents with    Spot     eyebrow, R foot, nose, back     HPI  77 yo M presents for evaluation of several spots:    Rash on L eyebrow, R foot x 6 months, itchy, tried antibiotic ointment, no improvement  Lesion on L nose x 8-9 months, + bleeding, no prior treatments    Denies personal or family h/o skin cancer    Past Medical History:   Diagnosis Date    Atrial fibrillation     CAD (coronary artery disease)     Hyperlipidemia     Hypertension      Review of Systems   Skin: Positive for itching and rash.   Hematologic/Lymphatic: Bruises/bleeds easily.        Objective:    Physical Exam   Constitutional: He appears well-developed and well-nourished.   HENT:   Mouth/Throat: Lips normal.    Eyes: Lids are normal.    Cardiovascular: There is local extremity swelling.     Neurological: He is alert and oriented to person, place, and time.   Psychiatric: He has a normal mood and affect.   Skin:   Areas Examined (abnormalities noted in diagram):   Scalp / Hair Palpated and Inspected  Head / Face Inspection Performed  Neck Inspection Performed  RUE Inspected  LUE Inspection Performed  RLE Inspected                       Diagram Legend     Erythematous scaling macule/papule c/w actinic keratosis       Vascular papule c/w angioma      Pigmented verrucoid papule/plaque c/w seborrheic keratosis      Yellow umbilicated papule c/w sebaceous hyperplasia      Irregularly shaped tan macule c/w lentigo     1-2 mm smooth white papules consistent with Milia      Movable subcutaneous cyst with punctum c/w epidermal inclusion cyst      Subcutaneous movable cyst c/w pilar cyst      Firm pink to brown papule c/w dermatofibroma      Pedunculated fleshy papule(s) c/w skin tag(s)      Evenly pigmented macule c/w junctional nevus     Mildly variegated pigmented, slightly irregular-bordered macule c/w mildly atypical nevus       Flesh colored to evenly pigmented papule c/w intradermal nevus       Pink pearly papule/plaque c/w basal cell carcinoma      Erythematous hyperkeratotic cursted plaque c/w SCC      Surgical scar with no sign of skin cancer recurrence      Open and closed comedones      Inflammatory papules and pustules      Verrucoid papule consistent consistent with wart     Erythematous eczematous patches and plaques     Dystrophic onycholytic nail with subungual debris c/w onychomycosis     Umbilicated papule    Erythematous-base heme-crusted tan verrucoid plaque consistent with inflamed seborrheic keratosis     Erythematous Silvery Scaling Plaque c/w Psoriasis     See annotation      Assessment / Plan:      Pathology Orders:     Normal Orders This Visit    Tissue Specimen To Pathology, Dermatology     Questions:    Directional Terms:  Other(comment)    Clinical Information:  r/o BCC    Specific Site:  L nasal sidewall        Neoplasm of uncertain behavior of skin  -     Tissue Specimen To Pathology, Dermatology  Shave biopsy procedure note:    Shave biopsy performed after verbal consent including risk of infection, scar, recurrence, need for additional treatment of site. Area prepped with alcohol, anesthetized with approximately 1.0cc of 1% lidocaine with epinephrine. Lesional tissue shaved with razor blade. Hemostasis achieved with application of aluminum chloride followed by hyfrecation. No complications. Dressing applied. Wound care explained.    If +, Mohs    Other eczema-xerotic eczema  -     triamcinolone acetonide 0.1% (KENALOG) 0.1 % cream; Apply topically 2 (two) times daily. Right foot  Dispense: 45 g; Refill: 1    Actinic keratosis  Will defer until next OV    Seborrheic dermatitis  Hydrocortisone OCT bid prn flare           Follow up for pending Pathology.

## 2019-09-17 ENCOUNTER — TELEPHONE (OUTPATIENT)
Dept: DERMATOLOGY | Facility: CLINIC | Age: 79
End: 2019-09-17

## 2019-09-17 NOTE — TELEPHONE ENCOUNTER
----- Message from Marisa Parnell MD sent at 9/17/2019 12:43 PM CDT -----  FINAL PATHOLOGIC DIAGNOSIS  Skin, left nasal sidewall, shave biopsy:  - BASAL CELL CARCINOMA, NODULAR GROWTH PATTERN.  - THE DEEP AND ONE LATERAL BIOPSY EDGES ARE INVOLVED.    Recommend meeting with Dr Hubbard to discuss Mohs.  Please notify.  Thanks  ERIS

## 2019-10-01 DIAGNOSIS — I10 ESSENTIAL HYPERTENSION: ICD-10-CM

## 2019-10-01 RX ORDER — LOSARTAN POTASSIUM AND HYDROCHLOROTHIAZIDE 25; 100 MG/1; MG/1
1 TABLET ORAL DAILY
Qty: 90 TABLET | Refills: 3 | Status: SHIPPED | OUTPATIENT
Start: 2019-10-01 | End: 2019-10-09 | Stop reason: DRUGHIGH

## 2019-10-02 NOTE — TELEPHONE ENCOUNTER
----- Message from Shiloh Horowitz sent at 10/1/2019  4:48 PM CDT -----  Contact: Celeste Mahoney  Placed call to Celeste rodriguez want to speak with a nurse regarding losartan-hydrochlorothiazide 100-25 mg being on back order please call back at [phone] option 2    Reference number [id number]

## 2019-10-02 NOTE — TELEPHONE ENCOUNTER
The patient has an appointment with me next week, can you ask the patient he has enough medication until he sees me back next week?

## 2019-10-07 ENCOUNTER — PATIENT OUTREACH (OUTPATIENT)
Dept: ADMINISTRATIVE | Facility: OTHER | Age: 79
End: 2019-10-07

## 2019-10-09 ENCOUNTER — OFFICE VISIT (OUTPATIENT)
Dept: FAMILY MEDICINE | Facility: CLINIC | Age: 79
End: 2019-10-09
Payer: MEDICARE

## 2019-10-09 ENCOUNTER — LAB VISIT (OUTPATIENT)
Dept: LAB | Facility: HOSPITAL | Age: 79
End: 2019-10-09
Attending: FAMILY MEDICINE
Payer: MEDICARE

## 2019-10-09 VITALS
DIASTOLIC BLOOD PRESSURE: 80 MMHG | SYSTOLIC BLOOD PRESSURE: 138 MMHG | OXYGEN SATURATION: 97 % | WEIGHT: 186.5 LBS | BODY MASS INDEX: 25.3 KG/M2 | HEART RATE: 61 BPM

## 2019-10-09 DIAGNOSIS — M1A.09X0 IDIOPATHIC CHRONIC GOUT OF MULTIPLE SITES WITHOUT TOPHUS: ICD-10-CM

## 2019-10-09 DIAGNOSIS — E53.8 B12 DEFICIENCY: ICD-10-CM

## 2019-10-09 DIAGNOSIS — J01.80 ACUTE NON-RECURRENT SINUSITIS OF OTHER SINUS: ICD-10-CM

## 2019-10-09 DIAGNOSIS — I10 ESSENTIAL HYPERTENSION: ICD-10-CM

## 2019-10-09 DIAGNOSIS — J02.9 SORE THROAT: Primary | ICD-10-CM

## 2019-10-09 LAB
BASOPHILS # BLD AUTO: 0.02 K/UL (ref 0–0.2)
BASOPHILS NFR BLD: 0.3 % (ref 0–1.9)
DIFFERENTIAL METHOD: ABNORMAL
EOSINOPHIL # BLD AUTO: 0.4 K/UL (ref 0–0.5)
EOSINOPHIL NFR BLD: 5.3 % (ref 0–8)
ERYTHROCYTE [DISTWIDTH] IN BLOOD BY AUTOMATED COUNT: 13.9 % (ref 11.5–14.5)
HCT VFR BLD AUTO: 43.9 % (ref 40–54)
HGB BLD-MCNC: 14.4 G/DL (ref 14–18)
IMM GRANULOCYTES # BLD AUTO: 0.01 K/UL (ref 0–0.04)
IMM GRANULOCYTES NFR BLD AUTO: 0.1 % (ref 0–0.5)
LYMPHOCYTES # BLD AUTO: 1.4 K/UL (ref 1–4.8)
LYMPHOCYTES NFR BLD: 20 % (ref 18–48)
MCH RBC QN AUTO: 32.4 PG (ref 27–31)
MCHC RBC AUTO-ENTMCNC: 32.8 G/DL (ref 32–36)
MCV RBC AUTO: 99 FL (ref 82–98)
MONOCYTES # BLD AUTO: 0.8 K/UL (ref 0.3–1)
MONOCYTES NFR BLD: 12.3 % (ref 4–15)
NEUTROPHILS # BLD AUTO: 4.3 K/UL (ref 1.8–7.7)
NEUTROPHILS NFR BLD: 62 % (ref 38–73)
NRBC BLD-RTO: 0 /100 WBC
PLATELET # BLD AUTO: 174 K/UL (ref 150–350)
PMV BLD AUTO: 10.7 FL (ref 9.2–12.9)
RBC # BLD AUTO: 4.45 M/UL (ref 4.6–6.2)
WBC # BLD AUTO: 6.85 K/UL (ref 3.9–12.7)

## 2019-10-09 PROCEDURE — 3079F DIAST BP 80-89 MM HG: CPT | Mod: CPTII,S$GLB,, | Performed by: FAMILY MEDICINE

## 2019-10-09 PROCEDURE — 84550 ASSAY OF BLOOD/URIC ACID: CPT

## 2019-10-09 PROCEDURE — 99214 OFFICE O/P EST MOD 30 MIN: CPT | Mod: S$GLB,,, | Performed by: FAMILY MEDICINE

## 2019-10-09 PROCEDURE — 99999 PR PBB SHADOW E&M-EST. PATIENT-LVL III: ICD-10-PCS | Mod: PBBFAC,,, | Performed by: FAMILY MEDICINE

## 2019-10-09 PROCEDURE — 99999 PR PBB SHADOW E&M-EST. PATIENT-LVL III: CPT | Mod: PBBFAC,,, | Performed by: FAMILY MEDICINE

## 2019-10-09 PROCEDURE — 99214 PR OFFICE/OUTPT VISIT, EST, LEVL IV, 30-39 MIN: ICD-10-PCS | Mod: S$GLB,,, | Performed by: FAMILY MEDICINE

## 2019-10-09 PROCEDURE — 1101F PR PT FALLS ASSESS DOC 0-1 FALLS W/OUT INJ PAST YR: ICD-10-PCS | Mod: CPTII,S$GLB,, | Performed by: FAMILY MEDICINE

## 2019-10-09 PROCEDURE — 1101F PT FALLS ASSESS-DOCD LE1/YR: CPT | Mod: CPTII,S$GLB,, | Performed by: FAMILY MEDICINE

## 2019-10-09 PROCEDURE — 87081 CULTURE SCREEN ONLY: CPT

## 2019-10-09 PROCEDURE — 3075F PR MOST RECENT SYSTOLIC BLOOD PRESS GE 130-139MM HG: ICD-10-PCS | Mod: CPTII,S$GLB,, | Performed by: FAMILY MEDICINE

## 2019-10-09 PROCEDURE — 36415 COLL VENOUS BLD VENIPUNCTURE: CPT | Mod: PO

## 2019-10-09 PROCEDURE — 85025 COMPLETE CBC W/AUTO DIFF WBC: CPT

## 2019-10-09 PROCEDURE — 3075F SYST BP GE 130 - 139MM HG: CPT | Mod: CPTII,S$GLB,, | Performed by: FAMILY MEDICINE

## 2019-10-09 PROCEDURE — 3079F PR MOST RECENT DIASTOLIC BLOOD PRESSURE 80-89 MM HG: ICD-10-PCS | Mod: CPTII,S$GLB,, | Performed by: FAMILY MEDICINE

## 2019-10-09 PROCEDURE — 80048 BASIC METABOLIC PNL TOTAL CA: CPT

## 2019-10-09 RX ORDER — HYDROCHLOROTHIAZIDE 12.5 MG/1
12.5 TABLET ORAL DAILY
Qty: 90 TABLET | Refills: 3 | Status: SHIPPED | OUTPATIENT
Start: 2019-10-09 | End: 2020-10-29

## 2019-10-09 RX ORDER — MONTELUKAST SODIUM 10 MG/1
10 TABLET ORAL NIGHTLY
Qty: 30 TABLET | Refills: 0 | Status: SHIPPED | OUTPATIENT
Start: 2019-10-09 | End: 2019-11-08 | Stop reason: SDUPTHER

## 2019-10-09 RX ORDER — CYANOCOBALAMIN 1000 UG/ML
100 INJECTION, SOLUTION INTRAMUSCULAR; SUBCUTANEOUS ONCE
Status: DISCONTINUED | OUTPATIENT
Start: 2019-10-09 | End: 2019-10-15

## 2019-10-09 RX ORDER — CODEINE PHOSPHATE AND GUAIFENESIN 10; 100 MG/5ML; MG/5ML
5 SOLUTION ORAL NIGHTLY PRN
Qty: 118 ML | Refills: 0 | Status: SHIPPED | OUTPATIENT
Start: 2019-10-09 | End: 2020-07-09

## 2019-10-09 RX ORDER — MONTELUKAST SODIUM 10 MG/1
10 TABLET ORAL NIGHTLY
Qty: 30 TABLET | Refills: 0 | Status: SHIPPED | OUTPATIENT
Start: 2019-10-09 | End: 2019-10-09 | Stop reason: SDUPTHER

## 2019-10-09 RX ORDER — FLUTICASONE PROPIONATE 50 MCG
1 SPRAY, SUSPENSION (ML) NASAL DAILY
Qty: 1 BOTTLE | Refills: 0 | Status: SHIPPED | OUTPATIENT
Start: 2019-10-09 | End: 2020-07-09

## 2019-10-09 RX ORDER — LOSARTAN POTASSIUM 100 MG/1
100 TABLET ORAL DAILY
Qty: 90 TABLET | Refills: 3 | Status: SHIPPED | OUTPATIENT
Start: 2019-10-09 | End: 2020-10-29

## 2019-10-09 NOTE — PATIENT INSTRUCTIONS
Eating Heart-Healthy Food: Using the DASH Plan    Eating for your heart doesnt have to be hard or boring. You just need to know how to make healthier choices. The DASH eating plan has been developed to help you do just that. DASH stands for Dietary Approaches to Stop Hypertension. It is a plan that has been proven to be healthier for your heart and to lower your risk for high blood pressure. It can also help lower your risk for cancer, heart disease, osteoporosis, and diabetes.  Choosing from each food group  Choose foods from each of the food groups below each day. Try to get the recommended number of servings for each food group. The serving numbers are based on a diet of 2,000 calories a day. Talk to your doctor if youre unsure about your calorie needs. Along with getting the correct servings, the DASH plan also recommends a sodium intake less than 2,300 mg per day.        Grains  Servings: 6 to 8 a day  A serving is:  · 1 slice bread  · 1 ounce dry cereal  · Half a cup cooked rice, pasta or cereal  Best choices: Whole grains and any grains high in fiber. Vegetables  Servings: 4 to 5 a day  A serving is:  · 1 cup raw leafy vegetable  · Half a cup cut-up raw or cooked vegetable  · Half a cup vegetable juice  Best choices: Fresh or frozen vegetables prepared without added salt or fat.   Fruits  Servings: 4 to 5 a day  A serving is:  · 1 medium fruit  · One-quarter cup dried fruit  · Half a cup fresh, frozen, or canned fruit  · Half a cup of 100% fruit juices  Best choices: A variety of fresh fruits of different colors. Whole fruits are a better choice than fruit juices. Low-fat or fat-free dairy  Servings: 2 to 3 a day  A serving is:  · 1 cup milk  · 1 cup yogurt  · One and a half ounces cheese  Best choices: Skim or 1% milk, low-fat or fat-free yogurt or buttermilk, and low-fat cheeses.         Lean meats, poultry, fish  Servings: 6 or fewer a day  A serving is:  · 1 ounce cooked meats, poultry, or fish  · 1  egg  Best choices: Lean poultry and fish. Trim away visible fat. Broil, grill, roast, or boil instead of frying. Remove skin from poultry before eating. Limit how much red meat you eat.  Nuts, seeds, beans  Servings: 4 to 5 a week  A serving is:  · One-third cup nuts (one and a half ounces)  · 2 tablespoons nut butter or seeds  · Half a cup cooked dry beans or legumes  Best choices: Dry roasted nuts with no salt added, lentils, kidney beans, garbanzo beans, and whole mata beans.   Fats and oils  Servings: 2 to 3 a day  A serving is:  · 1 teaspoon vegetable oil  · 1 teaspoon soft margarine  · 1 tablespoon mayonnaise  · 2 tablespoons salad dressing  Best choices: Nut and vegetable oils (nontropical vegetable oils), such as olive and canola oil. Sweets  Servings: 5 a week or fewer  A serving is:  · 1 tablespoon sugar, maple syrup, or honey  · 1 tablespoon jam or jelly  · 1 half-ounce jelly beans (about 15)  · 1 cup lemonade  Best choices: Dried fruit can be a satisfying sweet. Choose low-fat sweets. And watch your serving sizes!      For more on the DASH eating plan, visit:  www.nhlbi.nih.gov/health/health-topics/topics/dash   Date Last Reviewed: 6/1/2016  © 8997-1114 [email or web address]. [address] [place] [address], [place] [address]. All rights reserved. This information is not intended as a substitute for professional medical care. Always follow your healthcare professional's instructions.

## 2019-10-10 ENCOUNTER — INITIAL CONSULT (OUTPATIENT)
Dept: DERMATOLOGY | Facility: CLINIC | Age: 79
End: 2019-10-10
Payer: MEDICARE

## 2019-10-10 VITALS
BODY MASS INDEX: 25.06 KG/M2 | WEIGHT: 185 LBS | HEART RATE: 75 BPM | HEIGHT: 72 IN | SYSTOLIC BLOOD PRESSURE: 136 MMHG | DIASTOLIC BLOOD PRESSURE: 86 MMHG

## 2019-10-10 DIAGNOSIS — C44.311 BASAL CELL CARCINOMA OF NOSE: Primary | ICD-10-CM

## 2019-10-10 LAB
ANION GAP SERPL CALC-SCNC: 9 MMOL/L (ref 8–16)
BUN SERPL-MCNC: 20 MG/DL (ref 8–23)
CALCIUM SERPL-MCNC: 9.1 MG/DL (ref 8.7–10.5)
CHLORIDE SERPL-SCNC: 105 MMOL/L (ref 95–110)
CO2 SERPL-SCNC: 24 MMOL/L (ref 23–29)
CREAT SERPL-MCNC: 1.5 MG/DL (ref 0.5–1.4)
EST. GFR  (AFRICAN AMERICAN): 51.2 ML/MIN/1.73 M^2
EST. GFR  (NON AFRICAN AMERICAN): 44.3 ML/MIN/1.73 M^2
GLUCOSE SERPL-MCNC: 106 MG/DL (ref 70–110)
POTASSIUM SERPL-SCNC: 3.9 MMOL/L (ref 3.5–5.1)
SODIUM SERPL-SCNC: 138 MMOL/L (ref 136–145)
URATE SERPL-MCNC: 6.2 MG/DL (ref 3.4–7)

## 2019-10-10 PROCEDURE — 99214 OFFICE O/P EST MOD 30 MIN: CPT | Mod: S$GLB,,, | Performed by: DERMATOLOGY

## 2019-10-10 PROCEDURE — 1101F PR PT FALLS ASSESS DOC 0-1 FALLS W/OUT INJ PAST YR: ICD-10-PCS | Mod: CPTII,S$GLB,, | Performed by: DERMATOLOGY

## 2019-10-10 PROCEDURE — 3079F DIAST BP 80-89 MM HG: CPT | Mod: CPTII,S$GLB,, | Performed by: DERMATOLOGY

## 2019-10-10 PROCEDURE — 1101F PT FALLS ASSESS-DOCD LE1/YR: CPT | Mod: CPTII,S$GLB,, | Performed by: DERMATOLOGY

## 2019-10-10 PROCEDURE — 3075F SYST BP GE 130 - 139MM HG: CPT | Mod: CPTII,S$GLB,, | Performed by: DERMATOLOGY

## 2019-10-10 PROCEDURE — 99999 PR PBB SHADOW E&M-EST. PATIENT-LVL III: ICD-10-PCS | Mod: PBBFAC,,, | Performed by: DERMATOLOGY

## 2019-10-10 PROCEDURE — 99999 PR PBB SHADOW E&M-EST. PATIENT-LVL III: CPT | Mod: PBBFAC,,, | Performed by: DERMATOLOGY

## 2019-10-10 PROCEDURE — 99499 UNLISTED E&M SERVICE: CPT | Mod: S$GLB,,, | Performed by: DERMATOLOGY

## 2019-10-10 PROCEDURE — 3075F PR MOST RECENT SYSTOLIC BLOOD PRESS GE 130-139MM HG: ICD-10-PCS | Mod: CPTII,S$GLB,, | Performed by: DERMATOLOGY

## 2019-10-10 PROCEDURE — 99214 PR OFFICE/OUTPT VISIT, EST, LEVL IV, 30-39 MIN: ICD-10-PCS | Mod: S$GLB,,, | Performed by: DERMATOLOGY

## 2019-10-10 PROCEDURE — 3079F PR MOST RECENT DIASTOLIC BLOOD PRESSURE 80-89 MM HG: ICD-10-PCS | Mod: CPTII,S$GLB,, | Performed by: DERMATOLOGY

## 2019-10-10 PROCEDURE — 99499 RISK ADDL DX/OHS AUDIT: ICD-10-PCS | Mod: S$GLB,,, | Performed by: DERMATOLOGY

## 2019-10-10 NOTE — PROGRESS NOTES
ALLERGIES:  Bactrim [sulfamethoxazole-trimethoprim]; Cinnamon; Ciprofloxacin; and Sulfa (sulfonamide antibiotics)    CHIEF COMPLAINT:  This 78 y.o. male comes for evaluation for Mohs' Micrographic Surgery, Fresh Tissue Technique, for treatment of a biopsy-proven basal cell carcinoma on the left nasal sidewall. Consultation requested by Marisa Parnell M.D..    The patient is accompanied to this visit by his wife.    HISTORY OF PRESENT ILLNESS:   Location: left nasal sidewall  Duration: 4-6 months or more  Quality: persistent  Context: status post biopsy by Marisa Parnell M.D.; path = basal cell carcinoma; pathology accession #[id number], [facility]     Prior Treatment: none  See also the handwritten notes/diagrams scanned to chart for additional details.    Defibrillator: No  Pacemaker: No  Artificial heart valves: No  Artificial joints: No    REVIEW OF SYSTEMS:   General: general health good  Skin: previous skin cancer(s) No   If yes, details:   Relevant other:  No   Cardiovascular:   High Blood Pressure: Yes   Chest Pain:  No   Defibrillator: as above  Pacemaker: as above  Artificial heart valves: as above  Prior Endocarditis: No   Prior Heart Attack/MI: No     If yes, when:   Prior Cardiac Bypass or Stents:  Yes   If yes, when: Stents 2018  Mitral Valve Prolapse: No   Relevant other:  A- Fib  Respiratory:   Shortness of breath:  No   Relevant other:  No   Endocrine:   Diabetes:  No   Relevant other:  No   Hem/Lymph:   Taking Prescribed Blood Thinners:  Yes  Plavix and aspirin 325 mg  Easy Bleeding:  Yes  Relevant other:  No   Allergy/Immuno: as noted above  Relevant other:  No   GI:   Prior Hepatitis:  No     If yes, details:   Relevant other:  No   Musculoskeletal:   Artificial joints: as above  Relevant other:  No   Neurologic:   Prior Stroke:  No     If yes, details:   Relevant other:  No   Relevant other info:  No      PAST MEDICAL HISTORY:  Past Medical History:   Diagnosis Date    Atrial fibrillation      CAD (coronary artery disease)     Hyperlipidemia     Hypertension        PAST SURGICAL HISTORY:  Past Surgical History:   Procedure Laterality Date    CARDIAC CATHETERIZATION      CARDIAC SURGERY      CORONARY ANGIOPLASTY      CORONARY STENT PLACEMENT  01/29/2018    HIATAL HERNIA REPAIR          SOCIAL HISTORY:  Dependencies: smoking status as noted below  Social History     Tobacco Use    Smoking status: Never Smoker    Smokeless tobacco: Never Used   Substance Use Topics    Alcohol use: No     Frequency: Never    Drug use: No       PERTINENT MEDICATIONS:  See medications list.    Current Outpatient Medications:     allopurinol (ZYLOPRIM) 300 MG tablet, Take 1 tablet (300 mg total) by mouth once daily., Disp: 90 tablet, Rfl: 3    aspirin (ECOTRIN) 325 MG EC tablet, aspirin 325 mg tablet,delayed release  Take 1 tablet every day by oral route., Disp: , Rfl:     atorvastatin (LIPITOR) 20 MG tablet, Take 1 tablet (20 mg total) by mouth every evening., Disp: 90 tablet, Rfl: 3    clopidogrel (PLAVIX) 75 mg tablet, Take 1 tablet (75 mg total) by mouth once daily., Disp: 90 tablet, Rfl: 3    diphenhydrAMINE (BENADRYL) 25 mg capsule, Two po QHS for AR, Disp: , Rfl: 0    fluticasone propionate (FLONASE) 50 mcg/actuation nasal spray, 1 spray (50 mcg total) by Each Nostril route once daily., Disp: 1 Bottle, Rfl: 0    gabapentin (NEURONTIN) 300 MG capsule, Take 1 capsule (300 mg total) by mouth every evening., Disp: 90 capsule, Rfl: 3    guaifenesin-codeine 100-10 mg/5 ml (GUAIFENESIN AC)  mg/5 mL syrup, Take 5 mLs by mouth nightly as needed for Cough., Disp: 118 mL, Rfl: 0    hydroCHLOROthiazide (HYDRODIURIL) 12.5 MG Tab, Take 1 tablet (12.5 mg total) by mouth once daily., Disp: 90 tablet, Rfl: 3    isosorbide mononitrate (IMDUR) 30 MG 24 hr tablet, Take 1 tablet (30 mg total) by mouth once daily., Disp: 90 tablet, Rfl: 3    losartan (COZAAR) 100 MG tablet, Take 1 tablet (100 mg total) by mouth  once daily., Disp: 90 tablet, Rfl: 3    metoprolol succinate (TOPROL-XL) 25 MG 24 hr tablet, Take 0.5 tablets (12.5 mg total) by mouth once daily., Disp: 45 tablet, Rfl: 1    montelukast (SINGULAIR) 10 mg tablet, Take 1 tablet (10 mg total) by mouth every evening., Disp: 30 tablet, Rfl: 0    sotalol (BETAPACE) 80 MG tablet, Take 1 tablet (80 mg total) by mouth once daily., Disp: 90 tablet, Rfl: 0    triamcinolone acetonide 0.1% (KENALOG) 0.1 % cream, Apply topically 2 (two) times daily. Right foot, Disp: 45 g, Rfl: 1    Current Facility-Administered Medications:     cyanocobalamin injection 1,000 mcg, 1,000 mcg, Intramuscular, Q30 Days, Nathalie Salgado MD, 1,000 mcg at 08/07/19 0845    cyanocobalamin injection 100 mcg, 100 mcg, Intramuscular, Once, Nathalie Salgado MD    ALLERGIES:  Bactrim [sulfamethoxazole-trimethoprim]; Cinnamon; Ciprofloxacin; and Sulfa (sulfonamide antibiotics)    EXAM:  See also the handwritten notes/diagrams scanned to chart for additional details.  Constitutional  General appearance: well-developed, well-nourished, well-kempt older white male    Eyes  Inspection of conjunctivae and lids reveals no abnormalities; sclerae anicteric  Neurologic/Psychiatric  Alert,  normal orientation to time, place, person  Normal mood and affect with no evidence of depression, anxiety, agitation  Skin: see photo(s)  Head: background marked solar damage to exposed areas of skin; in addition, inspection/palpation reveals an approximately 1 cm pearly plaque on the left upper nose which feels freely movable over the underlying tissues on palpation; site(s) confirmed by reference to the photograph(s) attached below taken at the time of the biopsy/biopsies by the referring physician and he confirmed this as the site of the prior biopsy  Neck: examination reveals marked chronic solar damage  Right upper extremity: examination reveals marked chronic solar damage; some ecchymosis/ecchymoses   Left upper extremity:  examination reveals marked chronic solar damage; some ecchymosis/ecchymoses     Photo(s) this visit:       Photo(s) from biopsy visit:      ASSESSMENT: biopsy-proven basal cell carcinoma of the left upper nose  chronic solar damage to areas as noted above  personal history of non-melanoma skin cancer  Long term current use of anticoagulant  Long term current use of aspirin    PLAN:  The diagnosis and management options, and risks and benefits of the alternatives, including observation/non-treatment, radiation treatment, excision with vertical frozen section or paraffin-embedded section margin evaluation, and Mohs' Micrographic Surgery, Fresh Tissue Technique, were discussed at length with the patient. In particular, the discussion included, but was not limited to, the following:    One alternative at this point would be to defer further treatment and observe the lesion. With small skin cancers of this kind, it is possible that a biopsy can be sufficient to definitively treat a small skin cancer of this kind. Alternatively, some skin cancers are slow growing and do not require immediate treatment. The potential advantage of this choice would be to avoid the need for possibly unnecessary additional surgery. Among the potential disadvantages of this would be the possibility of enlargement of the lesion, more extensive spread of the lesion or recurrence at a later date, which might necessitate a larger and more complex surgery.    Radiation treatment can be an effective treatment for this type of skin cancer. The usual course of treatment is every weekday for several weeks. Local irritation will result from treatment, although no systemic side effects are expected. The potential advantage of radiation treatment is that it avoids the need for surgery. Among the disadvantages of radiation treatment are the length of treatment, the local inflammatory response, the absence of pathologic confirmation of the removal of the  skin cancer, a possible increased risk of additional skin cancer in the treated area in later years, and a somewhat increased risk of recurrence at a later date.     Excisional surgery can be an effective treatment for this type of skin cancer. This would involve excision of the lesion with margin evaluation by submitting the specimen to a pathologist for either immediate marginal assessment via frozen section processing, or delayed marginal assessment by fixed-tissue processing. The potential advantage of this technique is that it offers a way of treating the lesion with some degree of histologic confirmation of tumor removal. Among the disadvantages of this treatment are the possible need for re-excision if marginal involvement is identified, a somewhat greater likelihood of recurrence as compared to Mohs' surgery because of the less comprehensive margin evaluation inherent in the technique, and the general potential risks of surgery, including allergic reactions to the anesthetic and other materials used, infection, injury to nerves in the area with consequent loss of sensation or muscle function, and scarring or distortion of surrounding structures.    Mohs' surgery is a very effective treatment for this type of skin cancer. The potential advantage of Mohs' surgery is that this technique offers the greatest possible certainty of knowing that the skin cancer has been completely removed, with the removal of the least amount of normal tissue. The potential disadvantages of Mohs' surgery include the duration of the surgery, the possible need for a separate surgery for reconstruction following tumor removal, and scarring as a result. In addition, general potential risks of surgery as noted above also apply to treatment via Mohs' surgery.    In light of the nature of this tumor and the location on the nose in an area of increased risk of recurrence,  Mohs' micrographic surgery was thought to be the most appropriate  management choice, and this diagnosis is appropriate for treatment by Mohs' micrographic surgery.     Sufficient time was available for questions, and all questions were answered to his satisfaction. He fully understands the aims, risks, alternatives, and possible complications, and has elected to proceed with the surgery, and verbally consented to do so. The procedure will be scheduled in the near future.    Routine pre-op instructions were given to him.    I instructed him to continue ASA and Plavix prior to surgery.  --------------------------------------  Note: Some or all of this note may have been generated using voice recognition software. There may be voice recognition errors including grammatical and/or spelling errors found in the text. Attempts were made to correct these errors prior to signature.

## 2019-10-10 NOTE — LETTER
October 10, 2019      Marisa Parnell MD  [address] [facility] [address] [place] [address]           [facility]  [address] [place] [address]  Phone: [phone]          Patient: Dilip Lopez   MR Number: [id number]   YOB: 1940   Date of Visit: 10/10/2019       Dear Dr. Marisa Parnell:    Thank you for referring Dilip Lopez to me for evaluation. Attached you will find relevant portions of my assessment and plan of care.    If you have questions, please do not hesitate to call me. I look forward to following Dilip Lopez along with you.    Sincerely,    Danilo Hubbard MD    Enclosure  CC:  No Recipients    If you would like to receive this communication electronically, please contact [email or web address] or [phone] to request more information on [facility] access.    For providers and/or their staff who would like to refer a patient to [facility], please contact us through our one-stop-shop provider referral line, [facility], at [phone].    If you feel you have received this communication in error or would no longer like to receive these types of communications, please e-mail [email or web address]

## 2019-10-12 LAB — BACTERIA THROAT CULT: NORMAL

## 2019-10-15 NOTE — PROGRESS NOTES
Subjective:       Patient ID: Dilip Lopez is a 78 y.o. male.    Chief Complaint: Follow-up; Cold Exposure; and Sore Throat    Sore Throat    This is a new problem. The current episode started in the past 7 days. The problem has been gradually worsening. There has been no fever. The pain is mild. Associated symptoms include congestion, coughing and a plugged ear sensation. Pertinent negatives include no abdominal pain, diarrhea, drooling, ear discharge, ear pain, headaches, hoarse voice, neck pain, shortness of breath, stridor, swollen glands, trouble swallowing or vomiting. He has had no exposure to strep. He has tried cool liquids and acetaminophen for the symptoms. The treatment provided no relief.   Hypertension   This is a chronic problem. The current episode started more than 1 year ago. The problem is unchanged. The problem is controlled. Pertinent negatives include no anxiety, blurred vision, chest pain, headaches, malaise/fatigue, neck pain, orthopnea, palpitations, peripheral edema, PND, shortness of breath or sweats. There are no associated agents to hypertension. Risk factors for coronary artery disease include dyslipidemia, male gender and stress. The current treatment provides moderate improvement. Hypertensive end-organ damage includes CAD/MI. There is no history of angina.      The patient is also here today for a follow-up on gout, denies any new symptoms of gout, he is currently taking allopurinol 300 mg.  The last uric acid levels were not therapeutic.    The patient also has B12 deficiency, he did not return for B12 injections, he is coming here for assessment.    Past medical history, past social history was reviewed and discussed with the patient.    Review of Systems   Constitutional: Negative for activity change, appetite change and malaise/fatigue.   HENT: Positive for congestion, postnasal drip, rhinorrhea, sinus pain and sore throat. Negative for drooling, ear discharge, ear pain, hoarse  voice and trouble swallowing.    Eyes: Negative for blurred vision, discharge and itching.   Respiratory: Positive for cough. Negative for choking, chest tightness, shortness of breath and stridor.    Cardiovascular: Negative for chest pain, palpitations, orthopnea, leg swelling and PND.   Gastrointestinal: Negative for abdominal distention, abdominal pain, diarrhea and vomiting.   Endocrine: Negative for cold intolerance and heat intolerance.   Genitourinary: Negative for dysuria and flank pain.   Musculoskeletal: Negative for arthralgias, back pain and neck pain.   Skin: Negative for pallor and rash.   Allergic/Immunologic: Negative for environmental allergies and food allergies.   Neurological: Negative for dizziness, facial asymmetry and headaches.   Hematological: Negative for adenopathy. Does not bruise/bleed easily.   Psychiatric/Behavioral: Negative for agitation, confusion and sleep disturbance.       Objective:      Physical Exam   Constitutional: He appears well-developed and well-nourished. No distress.   HENT:   Head: Normocephalic and atraumatic.   Right Ear: External ear normal.   Left Ear: External ear normal.   Nose: Rhinorrhea present.   Mouth/Throat: Posterior oropharyngeal erythema present. No oropharyngeal exudate.   Eyes: Pupils are equal, round, and reactive to light. Left eye exhibits no discharge. No scleral icterus.   Neck: Normal range of motion. Neck supple. No tracheal deviation present. No thyromegaly present.   Cardiovascular: Normal rate, regular rhythm and normal heart sounds. Exam reveals no friction rub.   No murmur heard.  Pulmonary/Chest: Effort normal and breath sounds normal. No respiratory distress. He has no wheezes.   Abdominal: Soft. Bowel sounds are normal. There is no tenderness. There is no guarding.   Musculoskeletal: He exhibits no edema or tenderness.   Neurological: No cranial nerve deficit. Coordination normal.   Skin: Skin is warm and dry. No rash noted. He is not  diaphoretic. No erythema. No pallor.   Psychiatric: He has a normal mood and affect. His behavior is normal. Judgment and thought content normal.   Nursing note and vitals reviewed.      Assessment:       1. Sore throat    2. Acute non-recurrent sinusitis of other sinus    3. Essential hypertension    4. B12 deficiency    5. Idiopathic chronic gout of multiple sites without tophus        Plan:       Sore throat:  New problem, workup needed  -     Strep A culture, throat    Acute non-recurrent sinusitis of other sinus:  New problem, next visit workup  -     fluticasone propionate (FLONASE) 50 mcg/actuation nasal spray; 1 spray (50 mcg total) by Each Nostril route once daily.  Dispense: 1 Bottle; Refill: 0  -     montelukast (SINGULAIR) 10 mg tablet; Take 1 tablet (10 mg total) by mouth every evening.  Dispense: 30 tablet; Refill: 0  -     guaifenesin-codeine 100-10 mg/5 ml (GUAIFENESIN AC)  mg/5 mL syrup; Take 5 mLs by mouth nightly as needed for Cough.  Dispense: 118 mL; Refill: 0    Essential hypertension:  Well controlled  -     hydroCHLOROthiazide (HYDRODIURIL) 12.5 MG Tab; Take 1 tablet (12.5 mg total) by mouth once daily.  Dispense: 90 tablet; Refill: 3    B12 deficiency:  Improved  -     cyanocobalamin injection 1000 mcg    Idiopathic chronic gout of multiple sites without tophus:  Stable  -     Uric acid; Future; Expected date: 10/09/2019  -     Basic metabolic panel; Future; Expected date: 10/09/2019  -     CBC auto differential; Future; Expected date: 10/09/2019    I spent 30 min in this encounter, from this time more than 50% of the time was spent in counseling and plan of care for this patient.  Will call the patient after we have the results of the test, fluticasone, montelukast and guaifenesin were prescribed for the patient, hydrochlorothiazide to take 1 tablet p.o. q.day, if the symptoms get worse, the patient will notify us immediately.Patient agreed with assessment and plan. Patient verbalized  understanding.

## 2019-11-08 DIAGNOSIS — J01.80 ACUTE NON-RECURRENT SINUSITIS OF OTHER SINUS: ICD-10-CM

## 2019-11-08 RX ORDER — MONTELUKAST SODIUM 10 MG/1
TABLET ORAL
Qty: 90 TABLET | Refills: 3 | Status: SHIPPED | OUTPATIENT
Start: 2019-11-08 | End: 2020-04-02 | Stop reason: SDUPTHER

## 2019-11-08 NOTE — PROGRESS NOTES
Refill Routing Note    Medication(s) are appropriate for refill Outside of protocol  Medication(s) are on the active medication list Yes  Refills and Dispense Quantity have been updated NO      Requested Prescriptions   Pending Prescriptions Disp Refills    montelukast (SINGULAIR) 10 mg tablet [Pharmacy Med Name: MONTELUKAST SOD 10 MG TABLET] 30 tablet 0     Sig: TAKE 1 TABLET BY MOUTH EVERY DAY IN THE EVENING       Pulmonology:  Leukotriene Inhibitors Passed - 11/8/2019  3:51 AM        Passed - Patient is at least 18 years old        Passed - Office visit in past 12 months or future 90 days     Recent Outpatient Visits            1 month ago Sore throat    [facility] Nathalie Salgado MD    2 months ago Other eczema    [facility] Marisa Parnell MD    4 months ago B12 deficiency    [facility] Nathalie Salgado MD    7 months ago Chronic pain of left knee    [facility] Nathalie Salgado MD    8 months ago Coronary artery disease of native artery of native heart with stable angina pectoris    [facility] Jose Walker MD          Future Appointments              [facility] month [facility]    In 2 months [facility]                Passed - An appropriate indication is on the problem list     Allergic Rhinitis  Sinusitis  Seasonal Allergies   Asthma

## 2019-12-21 ENCOUNTER — PATIENT OUTREACH (OUTPATIENT)
Dept: ADMINISTRATIVE | Facility: OTHER | Age: 79
End: 2019-12-21

## 2019-12-22 NOTE — PROGRESS NOTES
Prior photo(s) of site(s) to confirm location(s):    ALLERGIES:   Bactrim [sulfamethoxazole-trimethoprim]; Cinnamon; Ciprofloxacin; and Sulfa (sulfonamide antibiotics)      Current Outpatient Medications:     allopurinol (ZYLOPRIM) 300 MG tablet, Take 1 tablet (300 mg total) by mouth once daily., Disp: 90 tablet, Rfl: 3    aspirin (ECOTRIN) 325 MG EC tablet, aspirin 325 mg tablet,delayed release  Take 1 tablet every day by oral route., Disp: , Rfl:     atorvastatin (LIPITOR) 20 MG tablet, Take 1 tablet (20 mg total) by mouth every evening., Disp: 90 tablet, Rfl: 3    clopidogrel (PLAVIX) 75 mg tablet, Take 1 tablet (75 mg total) by mouth once daily., Disp: 90 tablet, Rfl: 3    diphenhydrAMINE (BENADRYL) 25 mg capsule, Two po QHS for AR, Disp: , Rfl: 0    fluticasone propionate (FLONASE) 50 mcg/actuation nasal spray, 1 spray (50 mcg total) by Each Nostril route once daily., Disp: 1 Bottle, Rfl: 0    gabapentin (NEURONTIN) 300 MG capsule, Take 1 capsule (300 mg total) by mouth every evening., Disp: 90 capsule, Rfl: 3    guaifenesin-codeine 100-10 mg/5 ml (GUAIFENESIN AC)  mg/5 mL syrup, Take 5 mLs by mouth nightly as needed for Cough., Disp: 118 mL, Rfl: 0    hydroCHLOROthiazide (HYDRODIURIL) 12.5 MG Tab, Take 1 tablet (12.5 mg total) by mouth once daily., Disp: 90 tablet, Rfl: 3    isosorbide mononitrate (IMDUR) 30 MG 24 hr tablet, Take 1 tablet (30 mg total) by mouth once daily., Disp: 90 tablet, Rfl: 3    losartan (COZAAR) 100 MG tablet, Take 1 tablet (100 mg total) by mouth once daily., Disp: 90 tablet, Rfl: 3    metoprolol succinate (TOPROL-XL) 25 MG 24 hr tablet, Take 0.5 tablets (12.5 mg total) by mouth once daily., Disp: 45 tablet, Rfl: 1    montelukast (SINGULAIR) 10 mg tablet, TAKE 1 TABLET BY MOUTH EVERY DAY IN THE EVENING, Disp: 90 tablet, Rfl: 3    sotalol (BETAPACE) 80 MG tablet, Take 1 tablet (80 mg total) by mouth once daily., Disp: 90 tablet, Rfl: 0    triamcinolone acetonide 0.1%  (KENALOG) 0.1 % cream, Apply topically 2 (two) times daily. Right foot, Disp: 45 g, Rfl: 1    Current Facility-Administered Medications:     cyanocobalamin injection 1,000 mcg, 1,000 mcg, Intramuscular, Q30 Days, Nathalie Salgado MD, 1,000 mcg at 08/07/19 0845  -------------------------------------------------------------  PROCEDURE: Mohs' Micrographic Surgery    SITE: left nasal sidewall    INDICATION: basal cell carcinoma in an area at increased risk of recurrence    CASE NUMBER: [id number]      ANESTHETIC: 1.5 mL 1% Lidocaine with Epinephrine 1:100,000    SURGICAL PREP: Ethanol and ophthalmicBetadine    SURGEON: Danilo Hubbard MD    ASSISTANTS: Reilly Sheppard CST     PREOPERATIVE DIAGNOSIS: basal cell carcinoma    POSTOPERATIVE DIAGNOSIS: basal cell carcinoma    PATHOLOGIC DIAGNOSIS: basal cell carcinoma    STAGES OF MOHS' SURGERY PERFORMED: one    TUMOR-FREE PLANE ACHIEVED: yes    HEMOSTASIS: Hyfrecation     SPECIMENS: one (one in stage A)    INITIAL LESION SIZE: 0.9 x 1.5 cm    FINAL DEFECT SIZE: 1.1 x 1.5 cm    WOUND REPAIR/DISPOSITION: see below    NARRATIVE:    The patient is a 79 y.o.male referred by Marisa Parnell MD with a history of cancer on the left upper nasal sidewall  which was biopsied - pathology accession #[id number], [facility]. Findings revealed basal cell carcinoma. Examination revealed a hypopigmented scar on the left upper nose at the site of prior biopsy, which was confirmed by reference to the photograph taken at the previous patient visit, as attached above. In light of the nature of this tumor and the location on the nose, Mohs' micrographic surgery was thought to be the most appropriate management choice, and this diagnosis is appropriate for treatment by Mohs' micrographic surgery.  I discussed it with the patient and he fully understands the aims, risks, alternatives, and possible complications, and elects to proceed.  There are no medical or surgical contraindications to the  "procedure.     A signed informed consent was obtained.    PROCEDURE:  The patient was placed in the semi-recumbent position on the operating table in the Mohs' Surgery Suite. The area in question was thoroughly prepped with ethanol and ophthalmicBetadine. A sterile surgical marker was used to outline the clinically apparent margins of the involved area, and a narrow margin of normal-appearing skin. Reference marks were made at the periphery of the outlined area with the surgical marker. The proposed area of excision was measured and photographed. Local anesthesia as noted above was administered.  The total volume of anesthetic used throughout this portion of the procedure was as documented above. The area was prepared and draped in the standard manner. All of the grossly identifiable area of clinically abnormal tissue and an underlying/peripheral layer was taken and processed by the Mohs' technique.  Hemostasis was obtained with the hyfrecator. Tissue was taken from any areas of residual marginal involvement (if present) and processed by the Mohs' technique in as many stages as needed until a tumor-free plane was achieved.    Colors of inks used in the reference nicks at epidermal margins (if present) and/or inking of non-epithelial edges, if applicable, is represented on the Mohs map as follows: solid lines represent red ink, dots represent blue ink, jagged lines represent black ink, curlicues represent green ink, "xxx" represents yellow ink.    The first Mohs' layer consisted of one section(s) with 3 slide(s) evaluated. No residual tumor was noted at the margins of the first Mohs' layer. Histology of the specimen(s) showed changes consistent with chronic solar damage.     A total of one section(s) and 3 slide(s) were examined under the microscope via the Mohs technique.  A cancer free plane was reached after layer number one. Defect final size was as noted above.      The wound was covered with a nonadherent " dressing between stages, and the patient allowed to wait in the waiting area during these periods. The final defect was photographed at the completion of the Mohs' procedure.    See the separate procedure note which follows regarding repair of the defect following Mohs' surgery.       -----------------------------------------------    REPAIR FOLLOWING MOHS' MICROGRAPHIC SURGERY    PREOPERATIVE DIAGNOSIS: defect following Mohs' surgery for a basal cell carcinoma    POSTOPERATIVE DIAGNOSIS: same    PROCEDURE PERFORMED: complex linear closure     ANESTHETIC: 3 mL 1%ophthalmic Lidocaine with Epinephrine 1:100,000    SURGICAL PREP: ophthalmic Betadine    SURGEON: Danilo Hubbard MD     ASSISTANTS: as above    LOCATION: left nasal sidewall      INDICATIONS:  Earlier in the day, the patient underwent Mohs' micrographic surgical excision of a basal cell carcinoma on the left nose. Tumor free margins were achieved after layer number one.  Later in the day, the management of the resulting wound was addressed with the patient. I discussed the various wound management options with the patient and he fully understands the aims, risks, alternatives, and possible complications of the alternatives, and he elects to proceed with closure of the defect in the manner noted below.  There are no medical or surgical contraindications to the procedure.    A signed informed consent was previously obtained.    PROCEDURE:  Repair via complex closure:  The patient was returned to the procedure room following completion of the Mohs' procedure and final slide review. Because of the size, shape and location of the defect, simple closure could not be achieved without possible distortion of surrounding structures, excessive tension on the wound margins and an unacceptable risk of wound dehiscence, and the creation of standing cone deformities. Consideration was given to the site of the wound, the surrounding structures, and the orientation of  closure necessary to provide the optimal functional and cosmetic outcome. After devoting time to these considerations, and to the orientation of the vectors of maximal skin tension surrounding the defect, the area was prepped again and a fusiform closure was outlined on the skin surrounding the defect with a sterile surgical marker, to minimize tension across the wound. Additional anesthetic was infiltrated into the tissues surrounding the defect and the anticipated area of repair, to maintain anesthesia during the procedure. Preparation of the site for closure was then carried out by extending the defect through excision of triangles of superfluous tissue on either side of the wound to square the shoulders of the defect and to allow closure without distortion by standing cone deformities, creating a fusiform defect measuring 1.1 x 3.0 cm in size.  Wound margins were extensively undermined to allow advancement of the wound margins into the defect and to permit closure with minimal tension. After hemostasis was achieved with the hyfrecator, closure was accomplished with:      multiple #5-0 buried interrupted Vicryl suture(s) and    multiple #5-0 vertical mattress horizontal mattress, and simple interrupted Prolene suture(s) and    one #5-0 running locked Prolene suture(s) for final approximation of the wound margins.    Total length of the final closure was 3.0 cm.      The site was photographed following completion of the repair. Final dressing consisted of petrolatum, Telfa and tape.    Estimated blood loss for the total procedure was less than 10 mL.    Total operative time including tissue processing in the Mohs' laboratory and microscopic Mohs' frozen section slide review was 2 hour(s). Verbal and written wound care instructions were given to the patient, and he expressed understanding of these instructions. The patient tolerated the procedure well and left the operating room in good condition; he is to return  in 7 days for suture removal.     Dr. Hubbard's cell phone number was given to the patient with instructions to call prn with any problems.

## 2019-12-23 ENCOUNTER — PROCEDURE VISIT (OUTPATIENT)
Dept: DERMATOLOGY | Facility: CLINIC | Age: 79
End: 2019-12-23
Payer: MEDICARE

## 2019-12-23 VITALS
WEIGHT: 185 LBS | SYSTOLIC BLOOD PRESSURE: 118 MMHG | HEIGHT: 72 IN | HEART RATE: 53 BPM | DIASTOLIC BLOOD PRESSURE: 73 MMHG | BODY MASS INDEX: 25.06 KG/M2

## 2019-12-23 DIAGNOSIS — C44.311 BASAL CELL CARCINOMA OF NOSE: Primary | ICD-10-CM

## 2019-12-23 PROCEDURE — 17311: ICD-10-PCS | Mod: S$GLB,,, | Performed by: DERMATOLOGY

## 2019-12-23 PROCEDURE — 17311 MOHS 1 STAGE H/N/HF/G: CPT | Mod: S$GLB,,, | Performed by: DERMATOLOGY

## 2019-12-23 PROCEDURE — 99499 UNLISTED E&M SERVICE: CPT | Mod: S$GLB,,, | Performed by: DERMATOLOGY

## 2019-12-23 PROCEDURE — 13152 CMPLX RPR E/N/E/L 2.6-7.5 CM: CPT | Mod: 51,S$GLB,, | Performed by: DERMATOLOGY

## 2019-12-23 PROCEDURE — 13152 PR RECMPL WND LID,NOS,EAR 2.6-7.5 CM: ICD-10-PCS | Mod: 51,S$GLB,, | Performed by: DERMATOLOGY

## 2019-12-23 PROCEDURE — 99499 NO LOS: ICD-10-PCS | Mod: S$GLB,,, | Performed by: DERMATOLOGY

## 2019-12-30 ENCOUNTER — OFFICE VISIT (OUTPATIENT)
Dept: DERMATOLOGY | Facility: CLINIC | Age: 79
End: 2019-12-30
Payer: MEDICARE

## 2019-12-30 DIAGNOSIS — Z48.02 VISIT FOR SUTURE REMOVAL: Primary | ICD-10-CM

## 2019-12-30 PROCEDURE — 99024 POSTOP FOLLOW-UP VISIT: CPT | Mod: S$GLB,,, | Performed by: DERMATOLOGY

## 2019-12-30 PROCEDURE — 99999 PR PBB SHADOW E&M-EST. PATIENT-LVL II: CPT | Mod: PBBFAC,,, | Performed by: DERMATOLOGY

## 2019-12-30 PROCEDURE — 99999 PR PBB SHADOW E&M-EST. PATIENT-LVL II: ICD-10-PCS | Mod: PBBFAC,,, | Performed by: DERMATOLOGY

## 2019-12-30 PROCEDURE — 99024 PR POST-OP FOLLOW-UP VISIT: ICD-10-PCS | Mod: S$GLB,,, | Performed by: DERMATOLOGY

## 2019-12-30 NOTE — PROGRESS NOTES
CC: 79 y.o.male patient is here for suture removal.     HPI: Patient is one week(s) s/p Mohs' micrographic surgery, fresh tissue technique of a basal cell carcinoma on the nose, with subsequent repair   Patient reports no problems.    EXAM:  Sutures intact.  Wound healing well.  Good approximation of skin edges.  No undue erythema to surrounding skin or signs or symptoms of infection.    IMPRESSION:  Healing well post Mohs' micrographic surgery and repair    PLAN:  Site cleaned with peroxide, sutures removed  Dressed with petrolatum   Reviewed further care and expected course  Followup 6 weeks; call prn sooner

## 2020-01-21 DIAGNOSIS — J01.80 ACUTE NON-RECURRENT SINUSITIS OF OTHER SINUS: ICD-10-CM

## 2020-01-21 RX ORDER — MONTELUKAST SODIUM 10 MG/1
10 TABLET ORAL NIGHTLY
Qty: 90 TABLET | Refills: 3 | Status: CANCELLED | OUTPATIENT
Start: 2020-01-21

## 2020-02-06 ENCOUNTER — TELEPHONE (OUTPATIENT)
Dept: DERMATOLOGY | Facility: CLINIC | Age: 80
End: 2020-02-06

## 2020-02-10 ENCOUNTER — TELEPHONE (OUTPATIENT)
Dept: DERMATOLOGY | Facility: CLINIC | Age: 80
End: 2020-02-10

## 2020-02-10 NOTE — TELEPHONE ENCOUNTER
----- Message from Jane Aaron sent at 2/10/2020  6:09 AM CST -----  Contact: Pt request via [facility]   Message     Dilip Lopez would like to cancel the following appointments:    Danilo Hubbard MD in [facility] DERMATOLOGY SURGERY ([phone]), 3/19/2020  1:45 PM    Comments:  Please cancel my appointment. I Will reschedule at a later   date.

## 2020-02-12 ENCOUNTER — TELEPHONE (OUTPATIENT)
Dept: DERMATOLOGY | Facility: CLINIC | Age: 80
End: 2020-02-12

## 2020-02-12 NOTE — TELEPHONE ENCOUNTER
----- Message from Danilo Hubbard MD sent at 2/12/2020 11:03 AM CST -----  If he is doing well, he does not need to come back to me.  He should see Dr. Parnell in 4-6 months for routine followup.  Thanks.   ----- Message -----  From: Ny Bland CST  Sent: 2/10/2020   8:23 AM CST  To: Ny Bland, JAMIE, Danilo Hubbard MD    Patient canceled his follow up visit in March. Do you need to see him again.

## 2020-03-24 ENCOUNTER — TELEPHONE (OUTPATIENT)
Dept: FAMILY MEDICINE | Facility: CLINIC | Age: 80
End: 2020-03-24

## 2020-03-24 NOTE — TELEPHONE ENCOUNTER
----- Message from Katina Ortiz sent at 3/24/2020  8:41 AM CDT -----  Contact: Jane wife  Type: Needs Medical Advice    Who Called:  Jane  Symptoms (please be specific): sinus infection,  Fever, chills, burning when he urinates, possible UTI, pt does have a cough  How long has patient had these symptoms:  Sinus infection started last week, UTI symptoms yesterday  Pharmacy name and phone #:      [id number] [place] - [address]  [address] [place] [address] AVE.  Radha FRITZ [address]  Phone: [phone] Fax: [phone]      Best Call Back Number: [phone]  Additional Information: Pls call pt to adv of what should he do

## 2020-03-24 NOTE — TELEPHONE ENCOUNTER
Per dr weeks, pt to go to [facility] due to fever and chills, cough and congestion and burning during urination and constipation, pt verbalized understanding and address given

## 2020-03-26 ENCOUNTER — OFFICE VISIT (OUTPATIENT)
Dept: FAMILY MEDICINE | Facility: CLINIC | Age: 80
End: 2020-03-26
Payer: MEDICARE

## 2020-03-26 ENCOUNTER — LAB VISIT (OUTPATIENT)
Dept: LAB | Facility: HOSPITAL | Age: 80
End: 2020-03-26
Attending: FAMILY MEDICINE
Payer: MEDICARE

## 2020-03-26 VITALS
HEIGHT: 72 IN | HEART RATE: 56 BPM | BODY MASS INDEX: 24.73 KG/M2 | SYSTOLIC BLOOD PRESSURE: 142 MMHG | TEMPERATURE: 98 F | DIASTOLIC BLOOD PRESSURE: 88 MMHG | WEIGHT: 182.56 LBS | OXYGEN SATURATION: 96 %

## 2020-03-26 DIAGNOSIS — E53.8 B12 DEFICIENCY: ICD-10-CM

## 2020-03-26 DIAGNOSIS — N30.00 ACUTE CYSTITIS WITHOUT HEMATURIA: ICD-10-CM

## 2020-03-26 DIAGNOSIS — E53.8 B12 DEFICIENCY: Primary | ICD-10-CM

## 2020-03-26 DIAGNOSIS — I10 ESSENTIAL HYPERTENSION: ICD-10-CM

## 2020-03-26 DIAGNOSIS — A49.9 BACTERIAL UTI: ICD-10-CM

## 2020-03-26 DIAGNOSIS — I25.118 CORONARY ARTERY DISEASE OF NATIVE ARTERY OF NATIVE HEART WITH STABLE ANGINA PECTORIS: ICD-10-CM

## 2020-03-26 DIAGNOSIS — N39.0 BACTERIAL UTI: ICD-10-CM

## 2020-03-26 DIAGNOSIS — I48.91 ATRIAL FIBRILLATION, UNSPECIFIED TYPE: ICD-10-CM

## 2020-03-26 DIAGNOSIS — N18.30 STAGE 3 CHRONIC KIDNEY DISEASE: ICD-10-CM

## 2020-03-26 DIAGNOSIS — K21.00 GASTROESOPHAGEAL REFLUX DISEASE WITH ESOPHAGITIS: ICD-10-CM

## 2020-03-26 DIAGNOSIS — I73.9 CLAUDICATION OF BOTH LOWER EXTREMITIES: ICD-10-CM

## 2020-03-26 LAB
ALBUMIN SERPL BCP-MCNC: 3.7 G/DL (ref 3.5–5.2)
ALP SERPL-CCNC: 89 U/L (ref 55–135)
ALT SERPL W/O P-5'-P-CCNC: 8 U/L (ref 10–44)
ANION GAP SERPL CALC-SCNC: 12 MMOL/L (ref 8–16)
AST SERPL-CCNC: 13 U/L (ref 10–40)
BACTERIA #/AREA URNS HPF: ABNORMAL /HPF
BASOPHILS # BLD AUTO: 0.03 K/UL (ref 0–0.2)
BASOPHILS NFR BLD: 0.3 % (ref 0–1.9)
BILIRUB SERPL-MCNC: 0.5 MG/DL (ref 0.1–1)
BILIRUB UR QL STRIP: NEGATIVE
BUN SERPL-MCNC: 39 MG/DL (ref 8–23)
CALCIUM SERPL-MCNC: 9.2 MG/DL (ref 8.7–10.5)
CHLORIDE SERPL-SCNC: 104 MMOL/L (ref 95–110)
CHOLEST SERPL-MCNC: 122 MG/DL (ref 120–199)
CHOLEST/HDLC SERPL: 3.5 {RATIO} (ref 2–5)
CLARITY UR: ABNORMAL
CO2 SERPL-SCNC: 22 MMOL/L (ref 23–29)
COLOR UR: YELLOW
CREAT SERPL-MCNC: 2.4 MG/DL (ref 0.5–1.4)
DIFFERENTIAL METHOD: ABNORMAL
EOSINOPHIL # BLD AUTO: 0.4 K/UL (ref 0–0.5)
EOSINOPHIL NFR BLD: 3.5 % (ref 0–8)
ERYTHROCYTE [DISTWIDTH] IN BLOOD BY AUTOMATED COUNT: 14.3 % (ref 11.5–14.5)
EST. GFR  (AFRICAN AMERICAN): 28.8 ML/MIN/1.73 M^2
EST. GFR  (NON AFRICAN AMERICAN): 24.9 ML/MIN/1.73 M^2
GLUCOSE SERPL-MCNC: 93 MG/DL (ref 70–110)
GLUCOSE UR QL STRIP: NEGATIVE
HCT VFR BLD AUTO: 48.9 % (ref 40–54)
HDLC SERPL-MCNC: 35 MG/DL (ref 40–75)
HDLC SERPL: 28.7 % (ref 20–50)
HGB BLD-MCNC: 15.6 G/DL (ref 14–18)
HGB UR QL STRIP: NEGATIVE
HYALINE CASTS #/AREA URNS LPF: 1 /LPF
IMM GRANULOCYTES # BLD AUTO: 0.04 K/UL (ref 0–0.04)
IMM GRANULOCYTES NFR BLD AUTO: 0.4 % (ref 0–0.5)
KETONES UR QL STRIP: NEGATIVE
LDLC SERPL CALC-MCNC: 71.4 MG/DL (ref 63–159)
LEUKOCYTE ESTERASE UR QL STRIP: ABNORMAL
LYMPHOCYTES # BLD AUTO: 1.3 K/UL (ref 1–4.8)
LYMPHOCYTES NFR BLD: 12.7 % (ref 18–48)
MCH RBC QN AUTO: 32 PG (ref 27–31)
MCHC RBC AUTO-ENTMCNC: 31.9 G/DL (ref 32–36)
MCV RBC AUTO: 100 FL (ref 82–98)
MICROSCOPIC COMMENT: ABNORMAL
MONOCYTES # BLD AUTO: 0.9 K/UL (ref 0.3–1)
MONOCYTES NFR BLD: 8.8 % (ref 4–15)
NEUTROPHILS # BLD AUTO: 7.6 K/UL (ref 1.8–7.7)
NEUTROPHILS NFR BLD: 74.3 % (ref 38–73)
NITRITE UR QL STRIP: NEGATIVE
NONHDLC SERPL-MCNC: 87 MG/DL
NRBC BLD-RTO: 0 /100 WBC
PH UR STRIP: 5 [PH] (ref 5–8)
PLATELET # BLD AUTO: 208 K/UL (ref 150–350)
PMV BLD AUTO: 10.6 FL (ref 9.2–12.9)
POTASSIUM SERPL-SCNC: 4.3 MMOL/L (ref 3.5–5.1)
PROT SERPL-MCNC: 8 G/DL (ref 6–8.4)
PROT UR QL STRIP: NEGATIVE
RBC # BLD AUTO: 4.87 M/UL (ref 4.6–6.2)
SODIUM SERPL-SCNC: 138 MMOL/L (ref 136–145)
SP GR UR STRIP: >=1.03 (ref 1–1.03)
SQUAMOUS #/AREA URNS HPF: 1 /HPF
TRIGL SERPL-MCNC: 78 MG/DL (ref 30–150)
URN SPEC COLLECT METH UR: ABNORMAL
VIT B12 SERPL-MCNC: 341 PG/ML (ref 210–950)
WBC # BLD AUTO: 10.23 K/UL (ref 3.9–12.7)
WBC #/AREA URNS HPF: 30 /HPF (ref 0–5)

## 2020-03-26 PROCEDURE — 3079F DIAST BP 80-89 MM HG: CPT | Mod: CPTII,S$GLB,, | Performed by: FAMILY MEDICINE

## 2020-03-26 PROCEDURE — 80053 COMPREHEN METABOLIC PANEL: CPT

## 2020-03-26 PROCEDURE — 99999 PR PBB SHADOW E&M-EST. PATIENT-LVL III: ICD-10-PCS | Mod: PBBFAC,,, | Performed by: FAMILY MEDICINE

## 2020-03-26 PROCEDURE — 81000 URINALYSIS NONAUTO W/SCOPE: CPT | Mod: PO

## 2020-03-26 PROCEDURE — 36415 COLL VENOUS BLD VENIPUNCTURE: CPT | Mod: PO

## 2020-03-26 PROCEDURE — 3077F SYST BP >= 140 MM HG: CPT | Mod: CPTII,S$GLB,, | Performed by: FAMILY MEDICINE

## 2020-03-26 PROCEDURE — 3288F PR FALLS RISK ASSESSMENT DOCUMENTED: ICD-10-PCS | Mod: CPTII,S$GLB,, | Performed by: FAMILY MEDICINE

## 2020-03-26 PROCEDURE — 3077F PR MOST RECENT SYSTOLIC BLOOD PRESSURE >= 140 MM HG: ICD-10-PCS | Mod: CPTII,S$GLB,, | Performed by: FAMILY MEDICINE

## 2020-03-26 PROCEDURE — 99499 UNLISTED E&M SERVICE: CPT | Mod: S$GLB,,, | Performed by: FAMILY MEDICINE

## 2020-03-26 PROCEDURE — 1100F PTFALLS ASSESS-DOCD GE2>/YR: CPT | Mod: CPTII,S$GLB,, | Performed by: FAMILY MEDICINE

## 2020-03-26 PROCEDURE — 1100F PR PT FALLS ASSESS DOC 2+ FALLS/FALL W/INJURY/YR: ICD-10-PCS | Mod: CPTII,S$GLB,, | Performed by: FAMILY MEDICINE

## 2020-03-26 PROCEDURE — 99499 RISK ADDL DX/OHS AUDIT: ICD-10-PCS | Mod: S$GLB,,, | Performed by: FAMILY MEDICINE

## 2020-03-26 PROCEDURE — 99214 PR OFFICE/OUTPT VISIT, EST, LEVL IV, 30-39 MIN: ICD-10-PCS | Mod: S$GLB,,, | Performed by: FAMILY MEDICINE

## 2020-03-26 PROCEDURE — 85025 COMPLETE CBC W/AUTO DIFF WBC: CPT

## 2020-03-26 PROCEDURE — 99999 PR PBB SHADOW E&M-EST. PATIENT-LVL III: CPT | Mod: PBBFAC,,, | Performed by: FAMILY MEDICINE

## 2020-03-26 PROCEDURE — 1159F PR MEDICATION LIST DOCUMENTED IN MEDICAL RECORD: ICD-10-PCS | Mod: S$GLB,,, | Performed by: FAMILY MEDICINE

## 2020-03-26 PROCEDURE — 3288F FALL RISK ASSESSMENT DOCD: CPT | Mod: CPTII,S$GLB,, | Performed by: FAMILY MEDICINE

## 2020-03-26 PROCEDURE — 3079F PR MOST RECENT DIASTOLIC BLOOD PRESSURE 80-89 MM HG: ICD-10-PCS | Mod: CPTII,S$GLB,, | Performed by: FAMILY MEDICINE

## 2020-03-26 PROCEDURE — 80061 LIPID PANEL: CPT

## 2020-03-26 PROCEDURE — 82607 VITAMIN B-12: CPT

## 2020-03-26 PROCEDURE — 87086 URINE CULTURE/COLONY COUNT: CPT

## 2020-03-26 PROCEDURE — 99214 OFFICE O/P EST MOD 30 MIN: CPT | Mod: S$GLB,,, | Performed by: FAMILY MEDICINE

## 2020-03-26 PROCEDURE — 1159F MED LIST DOCD IN RCRD: CPT | Mod: S$GLB,,, | Performed by: FAMILY MEDICINE

## 2020-03-26 RX ORDER — ALLOPURINOL 300 MG/1
TABLET ORAL
COMMUNITY
End: 2020-03-27

## 2020-03-26 RX ORDER — DOXYCYCLINE 100 MG/1
CAPSULE ORAL
COMMUNITY
End: 2020-07-09

## 2020-03-26 RX ORDER — AMOXICILLIN AND CLAVULANATE POTASSIUM 875; 125 MG/1; MG/1
1 TABLET, FILM COATED ORAL 2 TIMES DAILY
Qty: 20 TABLET | Refills: 0 | Status: SHIPPED | OUTPATIENT
Start: 2020-03-26 | End: 2020-03-27 | Stop reason: DRUGHIGH

## 2020-03-26 RX ORDER — SOTALOL HYDROCHLORIDE 80 MG/1
TABLET ORAL
COMMUNITY
End: 2020-07-09

## 2020-03-26 RX ORDER — PROBENECID 500 MG/1
TABLET, FILM COATED ORAL
COMMUNITY
End: 2020-03-27

## 2020-03-26 RX ORDER — HYDRALAZINE HYDROCHLORIDE 25 MG/1
25 TABLET, FILM COATED ORAL NIGHTLY
COMMUNITY
End: 2023-12-26

## 2020-03-26 RX ORDER — ISOSORBIDE MONONITRATE 30 MG/1
TABLET, EXTENDED RELEASE ORAL
COMMUNITY
End: 2020-07-16 | Stop reason: SDUPTHER

## 2020-03-26 NOTE — PATIENT INSTRUCTIONS
Eating Heart-Healthy Food: Using the DASH Plan    Eating for your heart doesnt have to be hard or boring. You just need to know how to make healthier choices. The DASH eating plan has been developed to help you do just that. DASH stands for Dietary Approaches to Stop Hypertension. It is a plan that has been proven to be healthier for your heart and to lower your risk for high blood pressure. It can also help lower your risk for cancer, heart disease, osteoporosis, and diabetes.  Choosing from each food group  Choose foods from each of the food groups below each day. Try to get the recommended number of servings for each food group. The serving numbers are based on a diet of 2,000 calories a day. Talk to your doctor if youre unsure about your calorie needs. Along with getting the correct servings, the DASH plan also recommends a sodium intake less than 2,300 mg per day.        Grains  Servings: 6 to 8 a day  A serving is:  · 1 slice bread  · 1 ounce dry cereal  · Half a cup cooked rice, pasta or cereal  Best choices: Whole grains and any grains high in fiber. Vegetables  Servings: 4 to 5 a day  A serving is:  · 1 cup raw leafy vegetable  · Half a cup cut-up raw or cooked vegetable  · Half a cup vegetable juice  Best choices: Fresh or frozen vegetables prepared without added salt or fat.   Fruits  Servings: 4 to 5 a day  A serving is:  · 1 medium fruit  · One-quarter cup dried fruit  · Half a cup fresh, frozen, or canned fruit  · Half a cup of 100% fruit juices  Best choices: A variety of fresh fruits of different colors. Whole fruits are a better choice than fruit juices. Low-fat or fat-free dairy  Servings: 2 to 3 a day  A serving is:  · 1 cup milk  · 1 cup yogurt  · One and a half ounces cheese  Best choices: Skim or 1% milk, low-fat or fat-free yogurt or buttermilk, and low-fat cheeses.         Lean meats, poultry, fish  Servings: 6 or fewer a day  A serving is:  · 1 ounce cooked meats, poultry, or fish  · 1  egg  Best choices: Lean poultry and fish. Trim away visible fat. Broil, grill, roast, or boil instead of frying. Remove skin from poultry before eating. Limit how much red meat you eat.  Nuts, seeds, beans  Servings: 4 to 5 a week  A serving is:  · One-third cup nuts (one and a half ounces)  · 2 tablespoons nut butter or seeds  · Half a cup cooked dry beans or legumes  Best choices: Dry roasted nuts with no salt added, lentils, kidney beans, garbanzo beans, and whole mata beans.   Fats and oils  Servings: 2 to 3 a day  A serving is:  · 1 teaspoon vegetable oil  · 1 teaspoon soft margarine  · 1 tablespoon mayonnaise  · 2 tablespoons salad dressing  Best choices: Nut and vegetable oils (nontropical vegetable oils), such as olive and canola oil. Sweets  Servings: 5 a week or fewer  A serving is:  · 1 tablespoon sugar, maple syrup, or honey  · 1 tablespoon jam or jelly  · 1 half-ounce jelly beans (about 15)  · 1 cup lemonade  Best choices: Dried fruit can be a satisfying sweet. Choose low-fat sweets. And watch your serving sizes!      For more on the DASH eating plan, visit:  www.nhlbi.nih.gov/health/health-topics/topics/dash   Date Last Reviewed: 6/1/2016  © 7262-6484 [employer]. [address] [place] [address], [place] [address]. All rights reserved. This information is not intended as a substitute for professional medical care. Always follow your healthcare professional's instructions.

## 2020-03-26 NOTE — PROGRESS NOTES
"Subjective:       Patient ID: Dilip Lopez is a 79 y.o. male.    Chief Complaint: Urinary Tract Infection ("burning like fire" [id number])    HPI     Patient is coming here today for a follow-up visit and also secondary to UTI.    UTI:  The patient complains of burning sensation of urination for the last 10 days, he started taking ciprofloxacin from his wife left overs for 2 days, he stop yesterday because he was having rash all over, as soon as he stop the medication the rash start to subside and only is localized now on the left leg.  The patient denies any abdominal pain, chest pain or shortness of breath or fever at this office visit.    Hypertension:  The patient has been taking his blood pressure medications as directed.  He did not bring a log of the blood pressure readings from home.    Afib:  The patient is currently seen the cardiologist for this problem.  He is taking all his medications as recommended.    Chronic kidney disease stage 3:  The patient last kidney function was in the stage III.  The patient stated that he can take penicillin related medications but not sulfa or Cipro.    Claudication:  The patient stated the symptoms of weakness are improved and is able to walk more without having to stop as much.    Past medical history, past social history was reviewed and discussed with the patient.    Review of Systems   Constitutional: Negative for activity change and appetite change.   HENT: Negative for congestion and ear discharge.    Eyes: Negative for discharge and itching.   Respiratory: Negative for choking and chest tightness.    Cardiovascular: Negative for chest pain and leg swelling.   Gastrointestinal: Negative for abdominal distention and abdominal pain.   Endocrine: Negative for cold intolerance and heat intolerance.   Genitourinary: Positive for dysuria. Negative for flank pain.   Musculoskeletal: Positive for arthralgias. Negative for back pain.   Skin: Negative for pallor and rash. "   Allergic/Immunologic: Negative for environmental allergies and food allergies.   Neurological: Negative for dizziness and facial asymmetry.   Hematological: Negative for adenopathy. Does not bruise/bleed easily.   Psychiatric/Behavioral: Negative for agitation and confusion.       Objective:      Physical Exam   Constitutional: He appears well-developed and well-nourished. No distress.   Limited ambulation, the patient is using a cane for mobilization.   HENT:   Head: Normocephalic and atraumatic.   Right Ear: External ear normal.   Left Ear: External ear normal.   Nose: Nose normal.   Eyes: Pupils are equal, round, and reactive to light. Right eye exhibits no discharge. Left eye exhibits no discharge. No scleral icterus.   Neck: Normal range of motion. Neck supple. No tracheal deviation present. No thyromegaly present.   Cardiovascular: Normal rate, regular rhythm and normal heart sounds. Exam reveals no friction rub.   No murmur heard.  Pulmonary/Chest: Effort normal and breath sounds normal. No respiratory distress. He has no wheezes.   Abdominal: Soft. Bowel sounds are normal. He exhibits no distension. There is no tenderness.   Musculoskeletal: He exhibits no edema or tenderness.   Neurological: No cranial nerve deficit. Coordination normal.   Skin: Skin is warm and dry. No rash noted. He is not diaphoretic. No erythema. No pallor.   Psychiatric: He has a normal mood and affect. His behavior is normal. Judgment and thought content normal.   Nursing note and vitals reviewed.      Assessment:       1. B12 deficiency    2. Acute cystitis without hematuria    3. Bacterial UTI    4. Gastroesophageal reflux disease with esophagitis    5. Stage 3 chronic kidney disease    6. Atrial fibrillation, unspecified type    7. Claudication of both lower extremities    8. Coronary artery disease of native artery of native heart with stable angina pectoris    9. Essential hypertension        Plan:         B12 deficiency:   Stable  -     Vitamin B12; Future; Expected date: 03/26/2020    Acute cystitis without hematuria:  New problem workup needed  -     Urinalysis; Future; Expected date: 03/26/2020  -     Urine culture; Future; Expected date: 03/26/2020    Bacterial UTI:  New problem, workup needed  -     amoxicillin-clavulanate 875-125mg (AUGMENTIN) 875-125 mg per tablet; Take 1 tablet by mouth 2 (two) times daily. for 10 days  Dispense: 20 tablet; Refill: 0  -     Urinalysis Microscopic    Gastroesophageal reflux disease with esophagitis:  Stable    Stage 3 chronic kidney disease:  Stable  -     CBC auto differential; Future; Expected date: 03/26/2020  -     Comprehensive metabolic panel; Future; Expected date: 03/26/2020    Atrial fibrillation, unspecified type:  Stable    Claudication of both lower extremities:  Improved    Coronary artery disease of native artery of native heart with stable angina pectoris:  Stable  -     Lipid panel; Future; Expected date: 03/26/2020    Essential hypertension:  Uncontrolled    Will start patient Augmentin, discontinue ciprofloxacin due to side effects, drink plenty water, can take Benadryl over-the-counter, can apply cortisone cream on the rash area.  The symptoms get worse, the patient will notify us immediately.  Will call the patient after we have the results of the test.   Patient agreed with assessment and plan. Patient verbalized understanding.

## 2020-03-27 ENCOUNTER — TELEPHONE (OUTPATIENT)
Dept: FAMILY MEDICINE | Facility: CLINIC | Age: 80
End: 2020-03-27

## 2020-03-27 DIAGNOSIS — A49.9 BACTERIAL UTI: Primary | ICD-10-CM

## 2020-03-27 DIAGNOSIS — N28.9 ABNORMAL KIDNEY FUNCTION: Primary | ICD-10-CM

## 2020-03-27 DIAGNOSIS — N39.0 BACTERIAL UTI: Primary | ICD-10-CM

## 2020-03-27 LAB — BACTERIA UR CULT: NO GROWTH

## 2020-03-27 RX ORDER — AMOXICILLIN AND CLAVULANATE POTASSIUM 500; 125 MG/1; MG/1
1 TABLET, FILM COATED ORAL 2 TIMES DAILY
Qty: 20 TABLET | Refills: 0 | Status: SHIPPED | OUTPATIENT
Start: 2020-03-27 | End: 2020-04-06

## 2020-03-27 NOTE — TELEPHONE ENCOUNTER
Spoke with pts wife, whos reports pts symptoms are worse, but pt refuses to go to ER at this time, advised that per Beatrice, she recommends ER visit for treatment of worsening symptoms. Wife verbalized understanding

## 2020-03-27 NOTE — TELEPHONE ENCOUNTER
----- Message from Kendrick Birch sent at 3/27/2020  1:50 PM CDT -----  Contact: jose alberto Lara [phone]  Type: Needs Medical Advice    Who Called:  jose alberto Lara [phone]    Symptoms (please be specific):  Constipated nothing is working to help him the past three days, has UTI burns badly on urinating.     How long has patient had these symptoms:  The past three days.    Pharmacy name and phone #:      [facility], [address] [place] [address]  [phone]    Best Call Back Number: jose alberto Lara [phone]    Additional Information: Advised needs to speak with the nurse about his condition, ptnt very distressed at this point.  Please call as soon as possible

## 2020-03-30 ENCOUNTER — LAB VISIT (OUTPATIENT)
Dept: LAB | Facility: HOSPITAL | Age: 80
End: 2020-03-30
Attending: FAMILY MEDICINE
Payer: MEDICARE

## 2020-03-30 DIAGNOSIS — N28.9 ABNORMAL KIDNEY FUNCTION: ICD-10-CM

## 2020-03-30 LAB
ANION GAP SERPL CALC-SCNC: 9 MMOL/L (ref 8–16)
BUN SERPL-MCNC: 21 MG/DL (ref 8–23)
CALCIUM SERPL-MCNC: 8.5 MG/DL (ref 8.7–10.5)
CHLORIDE SERPL-SCNC: 107 MMOL/L (ref 95–110)
CO2 SERPL-SCNC: 21 MMOL/L (ref 23–29)
CREAT SERPL-MCNC: 1.4 MG/DL (ref 0.5–1.4)
EST. GFR  (AFRICAN AMERICAN): 55.2 ML/MIN/1.73 M^2
EST. GFR  (NON AFRICAN AMERICAN): 47.8 ML/MIN/1.73 M^2
GLUCOSE SERPL-MCNC: 91 MG/DL (ref 70–110)
POTASSIUM SERPL-SCNC: 4.6 MMOL/L (ref 3.5–5.1)
SODIUM SERPL-SCNC: 137 MMOL/L (ref 136–145)

## 2020-03-30 PROCEDURE — 36415 COLL VENOUS BLD VENIPUNCTURE: CPT | Mod: PO

## 2020-03-30 PROCEDURE — 80048 BASIC METABOLIC PNL TOTAL CA: CPT

## 2020-04-02 DIAGNOSIS — J01.80 ACUTE NON-RECURRENT SINUSITIS OF OTHER SINUS: ICD-10-CM

## 2020-04-02 NOTE — TELEPHONE ENCOUNTER
----- Message from Henrietta Morgan sent at 4/2/2020  8:46 AM CDT -----  Contact: wife  Type:  RX Refill Request    Who Called:  Wife, Jane  Refill or New Rx:  Refill  RX Name and Strength: montelukast (SINGULAIR) 10 mg tablet  How is the patient currently taking it? (ex. 1XDay):  1XDay  Is this a 30 day or 90 day RX: 90  Preferred Pharmacy with phone number:  [facility] - [address] [phone] (Phone)  [phone] (Fax)    Local or Mail Order:  Mail  Ordering Provider:  Naomi Brown Call Back Number:  [phone]  Additional Information:

## 2020-04-07 RX ORDER — MONTELUKAST SODIUM 10 MG/1
10 TABLET ORAL NIGHTLY
Qty: 90 TABLET | Refills: 3 | Status: SHIPPED | OUTPATIENT
Start: 2020-04-07 | End: 2020-07-09

## 2020-04-07 NOTE — PROGRESS NOTES
Refill Authorization Note     is requesting a refill authorization.    Brief assessment and rationale for refill: APPROVE: prr                                         Comments:   Refill Center Care Gap Closure protocols temporarily suspended.   Requested Prescriptions   Pending Prescriptions Disp Refills    montelukast (SINGULAIR) 10 mg tablet 90 tablet 3     Sig: Take 1 tablet (10 mg total) by mouth every evening.       Pulmonology:  Leukotriene Inhibitors Passed - 4/2/2020  8:51 AM        Passed - Patient is at least 18 years old        Passed - Office visit in past 12 months or future 90 days.     Recent Outpatient Visits            1 week ago B12 deficiency    [facility] Nathalie Salgado MD    3 months ago Visit for suture removal    [facility] Danilo Hubbard MD    6 months ago Sore throat    [facility] Nathalie Salgado MD    7 months ago Other eczema    [facility] Marisa Parnell MD    9 months ago B12 deficiency    [facility] Nathalie Salgado MD                    Passed - An appropriate indication is on the problem list     Allergic Rhinitis  Sinusitis  Seasonal Allergies   Asthma               Appointments  past 12m or future 3m with PCP    Date Provider   Last Visit   3/26/2020 Nathalie Salgado MD   Next Visit   Visit date not found Nathalie Salgado MD   .  ED visits in past 90 days: 0       Note composed:10:22 PM 04/06/2020

## 2020-04-10 DIAGNOSIS — J01.80 ACUTE NON-RECURRENT SINUSITIS OF OTHER SINUS: ICD-10-CM

## 2020-04-10 RX ORDER — MONTELUKAST SODIUM 10 MG/1
10 TABLET ORAL NIGHTLY
Qty: 90 TABLET | Refills: 3 | Status: CANCELLED | OUTPATIENT
Start: 2020-04-10

## 2020-04-14 RX ORDER — MONTELUKAST SODIUM 10 MG/1
10 TABLET ORAL NIGHTLY
Qty: 90 TABLET | Refills: 3 | OUTPATIENT
Start: 2020-04-14

## 2020-04-14 NOTE — PROGRESS NOTES
Quick DC. Duplicate Request  Refill Authorization Note     is requesting a refill authorization.    Brief assessment and rationale for refill: QUICK DC: handled in a previous encounter               Medication reconciliation completed: No                         Comments:   Pended Medication(s)   Requested Prescriptions     Pending Prescriptions Disp Refills    montelukast (SINGULAIR) 10 mg tablet 90 tablet 3     Sig: Take 1 tablet (10 mg total) by mouth every evening.        Duplicate Pended Encounter(s)/ Last Prescribed Details:    Ordering Encounter Report     Associated Reports   View Encounter                  Note composed:1:04 PM 04/14/2020

## 2020-05-06 ENCOUNTER — PATIENT MESSAGE (OUTPATIENT)
Dept: ADMINISTRATIVE | Facility: HOSPITAL | Age: 80
End: 2020-05-06

## 2020-05-26 ENCOUNTER — PATIENT OUTREACH (OUTPATIENT)
Dept: ADMINISTRATIVE | Facility: OTHER | Age: 80
End: 2020-05-26

## 2020-05-26 ENCOUNTER — PATIENT MESSAGE (OUTPATIENT)
Dept: FAMILY MEDICINE | Facility: CLINIC | Age: 80
End: 2020-05-26

## 2020-05-26 DIAGNOSIS — L29.9 SCALP PRURITUS: Primary | ICD-10-CM

## 2020-05-26 NOTE — TELEPHONE ENCOUNTER
Please see mychart message.   Should we perhaps sent to Dermatology?    Referral pended for review.

## 2020-05-27 ENCOUNTER — OFFICE VISIT (OUTPATIENT)
Dept: DERMATOLOGY | Facility: CLINIC | Age: 80
End: 2020-05-27
Payer: MEDICARE

## 2020-05-27 DIAGNOSIS — Z85.828 HISTORY OF NONMELANOMA SKIN CANCER: Primary | ICD-10-CM

## 2020-05-27 DIAGNOSIS — L70.2 ACNE NECROTICA: ICD-10-CM

## 2020-05-27 PROCEDURE — 99212 OFFICE O/P EST SF 10 MIN: CPT | Mod: S$GLB,,, | Performed by: DERMATOLOGY

## 2020-05-27 PROCEDURE — 1159F MED LIST DOCD IN RCRD: CPT | Mod: S$GLB,,, | Performed by: DERMATOLOGY

## 2020-05-27 PROCEDURE — 99212 PR OFFICE/OUTPT VISIT, EST, LEVL II, 10-19 MIN: ICD-10-PCS | Mod: S$GLB,,, | Performed by: DERMATOLOGY

## 2020-05-27 PROCEDURE — 99999 PR PBB SHADOW E&M-EST. PATIENT-LVL II: ICD-10-PCS | Mod: PBBFAC,,, | Performed by: DERMATOLOGY

## 2020-05-27 PROCEDURE — 1100F PR PT FALLS ASSESS DOC 2+ FALLS/FALL W/INJURY/YR: ICD-10-PCS | Mod: CPTII,S$GLB,, | Performed by: DERMATOLOGY

## 2020-05-27 PROCEDURE — 3288F FALL RISK ASSESSMENT DOCD: CPT | Mod: CPTII,S$GLB,, | Performed by: DERMATOLOGY

## 2020-05-27 PROCEDURE — 3288F PR FALLS RISK ASSESSMENT DOCUMENTED: ICD-10-PCS | Mod: CPTII,S$GLB,, | Performed by: DERMATOLOGY

## 2020-05-27 PROCEDURE — 99999 PR PBB SHADOW E&M-EST. PATIENT-LVL II: CPT | Mod: PBBFAC,,, | Performed by: DERMATOLOGY

## 2020-05-27 PROCEDURE — 1159F PR MEDICATION LIST DOCUMENTED IN MEDICAL RECORD: ICD-10-PCS | Mod: S$GLB,,, | Performed by: DERMATOLOGY

## 2020-05-27 PROCEDURE — 1100F PTFALLS ASSESS-DOCD GE2>/YR: CPT | Mod: CPTII,S$GLB,, | Performed by: DERMATOLOGY

## 2020-05-27 RX ORDER — KETOCONAZOLE 20 MG/ML
SHAMPOO, SUSPENSION TOPICAL
Qty: 120 ML | Refills: 5 | Status: SHIPPED | OUTPATIENT
Start: 2020-05-27 | End: 2021-04-12

## 2020-05-27 RX ORDER — CLINDAMYCIN PHOSPHATE 11.9 MG/ML
SOLUTION TOPICAL 2 TIMES DAILY
Qty: 60 ML | Refills: 1 | Status: SHIPPED | OUTPATIENT
Start: 2020-05-27 | End: 2021-04-12

## 2020-05-27 RX ORDER — DOXYCYCLINE 100 MG/1
100 CAPSULE ORAL DAILY
Qty: 30 CAPSULE | Refills: 1 | Status: SHIPPED | OUTPATIENT
Start: 2020-05-27 | End: 2020-07-09

## 2020-05-27 NOTE — LETTER
May 27, 2020      Nathalie Salgado MD  [address] [facility] [address] [place] [address]           [place] - D[facility]  [address] [place] [address]  Phone: [phone]  Fax: [phone]          Patient: Dilip Lopez   MR Number: [id number]   YOB: 1940   Date of Visit: 5/27/2020       Dear Dr. Nathalie Salgado:    Thank you for referring Dilip Lopez to me for evaluation. Attached you will find relevant portions of my assessment and plan of care.    If you have questions, please do not hesitate to call me. I look forward to following Dilip Lopez along with you.    Sincerely,    Kalyani Anderson MD    Enclosure  CC:  No Recipients    If you would like to receive this communication electronically, please contact [email or web address] or [phone] to request more information on [facility] Link access.    For providers and/or their staff who would like to refer a patient to [facility], please contact us through our one-stop-shop provider referral line, [facility], [place], at [phone].    If you feel you have received this communication in error or would no longer like to receive these types of communications, please e-mail [email or web address]

## 2020-05-27 NOTE — PROGRESS NOTES
Subjective:       Patient ID:  Dilip Lopez is a 79 y.o. male who presents for   Chief Complaint   Patient presents with    Itching     Patient present for initial visit w. provider, rash. Last seen on 9/4/2019 w/ Dr. Parnell    C/o rash to scalp x 1years, pt states pain is a 0/10, rash per pt appears to be  itchy, pt states no new exposures, Medications, soaps, detergents, or topical applications. pt states no known causes to flare rash.  Denies oral ulcers or erosions     Treated with:  pt states use of otc treatments, anti-dandriff shampoo    Pt hx:  yes Allergies  No Asthma  yes Eczema  no psoriasis      Skin Cancer assessment:  yes Phx of NMSC.  + BCC L nasal sidewall  ([facility] 12/23/2019)  no Fhx of melanoma.    Past Medical History:  No date: Atrial fibrillation  No date: CAD (coronary artery disease)  No date: Hyperlipidemia  No date: Hypertension      Review of Systems   Skin: Positive for itching and rash.   Hematologic/Lymphatic: Bruises/bleeds easily.        Objective:    Physical Exam   Constitutional: He appears well-developed and well-nourished. No distress.   Neurological: He is alert and oriented to person, place, and time. He is not disoriented.   Psychiatric: He has a normal mood and affect.   Skin:   Areas Examined (abnormalities noted in diagram):   Scalp / Hair Palpated and Inspected              Diagram Legend     Erythematous scaling macule/papule c/w actinic keratosis       Vascular papule c/w angioma      Pigmented verrucoid papule/plaque c/w seborrheic keratosis      Yellow umbilicated papule c/w sebaceous hyperplasia      Irregularly shaped tan macule c/w lentigo     1-2 mm smooth white papules consistent with Milia      Movable subcutaneous cyst with punctum c/w epidermal inclusion cyst      Subcutaneous movable cyst c/w pilar cyst      Firm pink to brown papule c/w dermatofibroma      Pedunculated fleshy papule(s) c/w skin tag(s)      Evenly pigmented macule c/w junctional nevus      Mildly variegated pigmented, slightly irregular-bordered macule c/w mildly atypical nevus      Flesh colored to evenly pigmented papule c/w intradermal nevus       Pink pearly papule/plaque c/w basal cell carcinoma      Erythematous hyperkeratotic cursted plaque c/w SCC      Surgical scar with no sign of skin cancer recurrence      Open and closed comedones      Inflammatory papules and pustules      Verrucoid papule consistent consistent with wart     Erythematous eczematous patches and plaques     Dystrophic onycholytic nail with subungual debris c/w onychomycosis     Umbilicated papule    Erythematous-base heme-crusted tan verrucoid plaque consistent with inflamed seborrheic keratosis     Erythematous Silvery Scaling Plaque c/w Psoriasis     See annotation      Assessment / Plan:        History of nonmelanoma skin cancer    Acne necrotica  -     Ambulatory referral/consult to Dermatology  -     ketoconazole (NIZORAL) 2 % shampoo; Wash hair with medicated shampoo at least 2x/week - let sit on scalp at least 5 minutes prior to rinsing  Dispense: 120 mL; Refill: 5  -     clindamycin (CLEOCIN T) 1 % external solution; Apply topically 2 (two) times daily.  Dispense: 60 mL; Refill: 1  -     doxycycline (VIBRAMYCIN) 100 MG Cap; Take 1 capsule (100 mg total) by mouth once daily.  Dispense: 30 capsule; Refill: 1    The patient was counseled on the chronic waxing and waning nature for this condition.  - Will initiate oral antibiotic regimen, specifically doxycycline 100 milligrams PO daily for the next 2 months. Side effects were reviewed with the patien  - Initiate topical clindamycin lotion 1% at least twice daily up to six times per day.  - I prescribed Ketoconazole 2% shampoo to be applied to the affected areas once daily, lather, wait for 5 minutes, then rinse. Instructed to do this daily for 1 week. Then 1-2 times weekly thereafter as maintenance therapy.   - May alternate with Head & Shoulders, Selsun Blue, or  Neutrogena T-Gel shampoo as desired.    History of NMSC  - scar noted on left nasal sidewall. No evidence of recurrence               Follow up if symptoms worsen or fail to improve.

## 2020-05-29 ENCOUNTER — TELEPHONE (OUTPATIENT)
Dept: DERMATOLOGY | Facility: CLINIC | Age: 80
End: 2020-05-29

## 2020-05-29 NOTE — TELEPHONE ENCOUNTER
Attempted to contact pt. Medications were sent to wrong pharmacy. Spoke with pharmacist and had Rx sent to [facility]. Left detailed message.

## 2020-06-22 ENCOUNTER — TELEPHONE (OUTPATIENT)
Dept: FAMILY MEDICINE | Facility: CLINIC | Age: 80
End: 2020-06-22

## 2020-06-22 NOTE — TELEPHONE ENCOUNTER
Pt requesting a referral for ENT for left ear drainage & Pain and itching in inner right ear. Provider is out does pt need an appointment with you for this referral. Please advise

## 2020-06-22 NOTE — TELEPHONE ENCOUNTER
----- Message from Za Mcmahon sent at 6/22/2020  9:37 AM CDT -----  Regarding: Referral for ENT  Contact: pt  Patient called  and asked for a Referral to see ENT for Left Ear drainage and pain and itching on the Right  inner Ear.    Call back  [phone]

## 2020-07-08 ENCOUNTER — PATIENT OUTREACH (OUTPATIENT)
Dept: ADMINISTRATIVE | Facility: HOSPITAL | Age: 80
End: 2020-07-08

## 2020-07-08 NOTE — PROGRESS NOTES
Chart review completed 07/08/2020  Care Everywhere updates requested and reviewed.  Immunizations reconciled. Media reviewed.     Health Maintenance Due   Topic Date Due    TETANUS VACCINE  10/29/1958    Shingles Vaccine (1 of 2) 10/29/1991    Pneumococcal Vaccine (65+ High/Highest Risk) (2 of 2 - PPSV23) 09/03/2019

## 2020-07-09 ENCOUNTER — TELEPHONE (OUTPATIENT)
Dept: FAMILY MEDICINE | Facility: CLINIC | Age: 80
End: 2020-07-09

## 2020-07-09 ENCOUNTER — OFFICE VISIT (OUTPATIENT)
Dept: FAMILY MEDICINE | Facility: CLINIC | Age: 80
End: 2020-07-09
Payer: MEDICARE

## 2020-07-09 ENCOUNTER — LAB VISIT (OUTPATIENT)
Dept: LAB | Facility: HOSPITAL | Age: 80
End: 2020-07-09
Attending: FAMILY MEDICINE
Payer: MEDICARE

## 2020-07-09 VITALS
SYSTOLIC BLOOD PRESSURE: 128 MMHG | OXYGEN SATURATION: 99 % | WEIGHT: 182.13 LBS | DIASTOLIC BLOOD PRESSURE: 78 MMHG | HEART RATE: 40 BPM | TEMPERATURE: 98 F | BODY MASS INDEX: 24.7 KG/M2

## 2020-07-09 DIAGNOSIS — R53.83 OTHER FATIGUE: ICD-10-CM

## 2020-07-09 DIAGNOSIS — I10 ESSENTIAL HYPERTENSION: ICD-10-CM

## 2020-07-09 DIAGNOSIS — E53.8 B12 DEFICIENCY: ICD-10-CM

## 2020-07-09 DIAGNOSIS — M1A.09X0 IDIOPATHIC CHRONIC GOUT OF MULTIPLE SITES WITHOUT TOPHUS: ICD-10-CM

## 2020-07-09 DIAGNOSIS — R00.1 BRADYCARDIA: ICD-10-CM

## 2020-07-09 DIAGNOSIS — L29.9 PRURITUS: Primary | ICD-10-CM

## 2020-07-09 DIAGNOSIS — J30.2 CHRONIC SEASONAL ALLERGIC RHINITIS: ICD-10-CM

## 2020-07-09 DIAGNOSIS — N18.30 STAGE 3 CHRONIC KIDNEY DISEASE: ICD-10-CM

## 2020-07-09 LAB
25(OH)D3+25(OH)D2 SERPL-MCNC: 27 NG/ML (ref 30–96)
ALBUMIN SERPL BCP-MCNC: 4.2 G/DL (ref 3.5–5.2)
ALP SERPL-CCNC: 78 U/L (ref 55–135)
ALT SERPL W/O P-5'-P-CCNC: 10 U/L (ref 10–44)
ANION GAP SERPL CALC-SCNC: 8 MMOL/L (ref 8–16)
AST SERPL-CCNC: 16 U/L (ref 10–40)
BASOPHILS # BLD AUTO: 0.02 K/UL (ref 0–0.2)
BASOPHILS NFR BLD: 0.4 % (ref 0–1.9)
BILIRUB SERPL-MCNC: 0.9 MG/DL (ref 0.1–1)
BUN SERPL-MCNC: 21 MG/DL (ref 8–23)
CALCIUM SERPL-MCNC: 8.8 MG/DL (ref 8.7–10.5)
CHLORIDE SERPL-SCNC: 107 MMOL/L (ref 95–110)
CHOLEST SERPL-MCNC: 99 MG/DL (ref 120–199)
CHOLEST/HDLC SERPL: 3 {RATIO} (ref 2–5)
CO2 SERPL-SCNC: 22 MMOL/L (ref 23–29)
CREAT SERPL-MCNC: 1.6 MG/DL (ref 0.5–1.4)
DIFFERENTIAL METHOD: ABNORMAL
EOSINOPHIL # BLD AUTO: 0.3 K/UL (ref 0–0.5)
EOSINOPHIL NFR BLD: 6.2 % (ref 0–8)
ERYTHROCYTE [DISTWIDTH] IN BLOOD BY AUTOMATED COUNT: 13.3 % (ref 11.5–14.5)
EST. GFR  (AFRICAN AMERICAN): 47 ML/MIN/1.73 M^2
EST. GFR  (NON AFRICAN AMERICAN): 40.7 ML/MIN/1.73 M^2
GLUCOSE SERPL-MCNC: 88 MG/DL (ref 70–110)
HCT VFR BLD AUTO: 47.2 % (ref 40–54)
HDLC SERPL-MCNC: 33 MG/DL (ref 40–75)
HDLC SERPL: 33.3 % (ref 20–50)
HGB BLD-MCNC: 15 G/DL (ref 14–18)
IMM GRANULOCYTES # BLD AUTO: 0.01 K/UL (ref 0–0.04)
IMM GRANULOCYTES NFR BLD AUTO: 0.2 % (ref 0–0.5)
LDLC SERPL CALC-MCNC: 46.2 MG/DL (ref 63–159)
LYMPHOCYTES # BLD AUTO: 1.3 K/UL (ref 1–4.8)
LYMPHOCYTES NFR BLD: 24 % (ref 18–48)
MCH RBC QN AUTO: 31.7 PG (ref 27–31)
MCHC RBC AUTO-ENTMCNC: 31.8 G/DL (ref 32–36)
MCV RBC AUTO: 100 FL (ref 82–98)
MONOCYTES # BLD AUTO: 0.8 K/UL (ref 0.3–1)
MONOCYTES NFR BLD: 15.3 % (ref 4–15)
NEUTROPHILS # BLD AUTO: 2.9 K/UL (ref 1.8–7.7)
NEUTROPHILS NFR BLD: 53.9 % (ref 38–73)
NONHDLC SERPL-MCNC: 66 MG/DL
NRBC BLD-RTO: 0 /100 WBC
PLATELET # BLD AUTO: 167 K/UL (ref 150–350)
PMV BLD AUTO: 11.5 FL (ref 9.2–12.9)
POTASSIUM SERPL-SCNC: 4.7 MMOL/L (ref 3.5–5.1)
PROT SERPL-MCNC: 7.3 G/DL (ref 6–8.4)
RBC # BLD AUTO: 4.73 M/UL (ref 4.6–6.2)
SODIUM SERPL-SCNC: 137 MMOL/L (ref 136–145)
TRIGL SERPL-MCNC: 99 MG/DL (ref 30–150)
TSH SERPL DL<=0.005 MIU/L-ACNC: 1.73 UIU/ML (ref 0.4–4)
URATE SERPL-MCNC: 9.1 MG/DL (ref 3.4–7)
VIT B12 SERPL-MCNC: 196 PG/ML (ref 210–950)
WBC # BLD AUTO: 5.3 K/UL (ref 3.9–12.7)

## 2020-07-09 PROCEDURE — 99999 PR PBB SHADOW E&M-EST. PATIENT-LVL IV: ICD-10-PCS | Mod: PBBFAC,,, | Performed by: FAMILY MEDICINE

## 2020-07-09 PROCEDURE — 99214 PR OFFICE/OUTPT VISIT, EST, LEVL IV, 30-39 MIN: ICD-10-PCS | Mod: S$GLB,,, | Performed by: FAMILY MEDICINE

## 2020-07-09 PROCEDURE — 1126F PR PAIN SEVERITY QUANTIFIED, NO PAIN PRESENT: ICD-10-PCS | Mod: S$GLB,,, | Performed by: FAMILY MEDICINE

## 2020-07-09 PROCEDURE — 3288F FALL RISK ASSESSMENT DOCD: CPT | Mod: CPTII,S$GLB,, | Performed by: FAMILY MEDICINE

## 2020-07-09 PROCEDURE — 36415 COLL VENOUS BLD VENIPUNCTURE: CPT | Mod: PO

## 2020-07-09 PROCEDURE — 82306 VITAMIN D 25 HYDROXY: CPT

## 2020-07-09 PROCEDURE — 82607 VITAMIN B-12: CPT

## 2020-07-09 PROCEDURE — 3074F SYST BP LT 130 MM HG: CPT | Mod: CPTII,S$GLB,, | Performed by: FAMILY MEDICINE

## 2020-07-09 PROCEDURE — 3078F PR MOST RECENT DIASTOLIC BLOOD PRESSURE < 80 MM HG: ICD-10-PCS | Mod: CPTII,S$GLB,, | Performed by: FAMILY MEDICINE

## 2020-07-09 PROCEDURE — 1159F PR MEDICATION LIST DOCUMENTED IN MEDICAL RECORD: ICD-10-PCS | Mod: S$GLB,,, | Performed by: FAMILY MEDICINE

## 2020-07-09 PROCEDURE — 3078F DIAST BP <80 MM HG: CPT | Mod: CPTII,S$GLB,, | Performed by: FAMILY MEDICINE

## 2020-07-09 PROCEDURE — 1100F PTFALLS ASSESS-DOCD GE2>/YR: CPT | Mod: CPTII,S$GLB,, | Performed by: FAMILY MEDICINE

## 2020-07-09 PROCEDURE — 1100F PR PT FALLS ASSESS DOC 2+ FALLS/FALL W/INJURY/YR: ICD-10-PCS | Mod: CPTII,S$GLB,, | Performed by: FAMILY MEDICINE

## 2020-07-09 PROCEDURE — 84443 ASSAY THYROID STIM HORMONE: CPT

## 2020-07-09 PROCEDURE — 1159F MED LIST DOCD IN RCRD: CPT | Mod: S$GLB,,, | Performed by: FAMILY MEDICINE

## 2020-07-09 PROCEDURE — 3288F PR FALLS RISK ASSESSMENT DOCUMENTED: ICD-10-PCS | Mod: CPTII,S$GLB,, | Performed by: FAMILY MEDICINE

## 2020-07-09 PROCEDURE — 80061 LIPID PANEL: CPT

## 2020-07-09 PROCEDURE — 99999 PR PBB SHADOW E&M-EST. PATIENT-LVL IV: CPT | Mod: PBBFAC,,, | Performed by: FAMILY MEDICINE

## 2020-07-09 PROCEDURE — 80053 COMPREHEN METABOLIC PANEL: CPT

## 2020-07-09 PROCEDURE — 84550 ASSAY OF BLOOD/URIC ACID: CPT

## 2020-07-09 PROCEDURE — 99214 OFFICE O/P EST MOD 30 MIN: CPT | Mod: S$GLB,,, | Performed by: FAMILY MEDICINE

## 2020-07-09 PROCEDURE — 3074F PR MOST RECENT SYSTOLIC BLOOD PRESSURE < 130 MM HG: ICD-10-PCS | Mod: CPTII,S$GLB,, | Performed by: FAMILY MEDICINE

## 2020-07-09 PROCEDURE — 1126F AMNT PAIN NOTED NONE PRSNT: CPT | Mod: S$GLB,,, | Performed by: FAMILY MEDICINE

## 2020-07-09 PROCEDURE — 85025 COMPLETE CBC W/AUTO DIFF WBC: CPT

## 2020-07-09 RX ORDER — AZELASTINE 1 MG/ML
2 SPRAY, METERED NASAL 2 TIMES DAILY
Qty: 30 ML | Refills: 6 | Status: SHIPPED | OUTPATIENT
Start: 2020-07-09 | End: 2020-09-12 | Stop reason: SDUPTHER

## 2020-07-09 RX ORDER — HYDROCORTISONE 25 MG/ML
LOTION TOPICAL 2 TIMES DAILY
Qty: 59 ML | Refills: 0 | Status: SHIPPED | OUTPATIENT
Start: 2020-07-09 | End: 2021-04-12

## 2020-07-09 NOTE — PROGRESS NOTES
Subjective:       Patient ID: Dilip oLpez is a 79 y.o. male.    Chief Complaint: Ear Problem (both ears)    HPI     The patient is coming here today for a follow-up visit.    Pruritus of the ears:  The patient is complaining of severe itching on bilateral ears, the symptoms are getting worse.  The patient went to see the dermatologist who prescribed some antibiotics and creams but the patient stated that is not helping too much.  The patient is coming here for assessment.  The patient denies any pain on the ears.  The patient stated the symptoms started to get worse since he start taking montelukast.  He complains of congestion but the montelukast is helping for that.    Bradycardia:  The patient stated that he has been checking at home his heart rate and is in the 30s and 40s, the patient is concerned because he is feeling very tired most of the time.  He has follow-up with the cardiologist, he is taking all his medications as directed.  His metoprolol is 12.5 mg daily.    Gout:  The patient denies any new gouty attack, he is currently taking allopurinol 100 mg daily.  The uric acid levels were 9.0.    Chronic kidney disease stage 3:  The last kidney function was improved slightly from previous.  The patient has been drinking more water.    B12 deficiency:  The patient was receiving B12 injections secondary to B12 deficiency, he has not been taking injections for more than 6 months.    Past medical history, past social history was reviewed and discussed with the patient.    Review of Systems   Constitutional: Positive for fatigue. Negative for activity change and appetite change.   HENT: Positive for congestion. Negative for ear discharge.    Eyes: Negative for discharge and itching.   Respiratory: Negative for choking and chest tightness.    Cardiovascular: Negative for chest pain and leg swelling.   Gastrointestinal: Negative for abdominal distention and abdominal pain.   Endocrine: Negative for cold  intolerance and heat intolerance.   Genitourinary: Negative for dysuria and flank pain.   Musculoskeletal: Positive for arthralgias. Negative for back pain.   Skin: Positive for rash. Negative for pallor.   Allergic/Immunologic: Negative for environmental allergies and food allergies.   Neurological: Negative for dizziness and facial asymmetry.   Hematological: Negative for adenopathy. Does not bruise/bleed easily.   Psychiatric/Behavioral: Negative for agitation and confusion.       Objective:      Physical Exam  Vitals signs and nursing note reviewed.   Constitutional:       General: He is not in acute distress.     Appearance: He is well-developed. He is not diaphoretic.   HENT:      Head: Normocephalic and atraumatic.      Right Ear: External ear normal. There is no impacted cerumen.      Left Ear: External ear normal. Decreased hearing noted. There is impacted cerumen.      Nose: Nose normal.      Mouth/Throat:      Pharynx: No oropharyngeal exudate.   Eyes:      General: No scleral icterus.        Left eye: No discharge.      Pupils: Pupils are equal, round, and reactive to light.   Neck:      Musculoskeletal: Normal range of motion and neck supple.   Cardiovascular:      Rate and Rhythm: Normal rate and regular rhythm.      Heart sounds: Normal heart sounds. No murmur. No friction rub.   Pulmonary:      Effort: Pulmonary effort is normal. No respiratory distress.      Breath sounds: Normal breath sounds. No wheezing.   Musculoskeletal:         General: No tenderness.   Skin:     General: Skin is warm and dry.      Coloration: Skin is not pale.      Findings: No erythema.   Neurological:      Cranial Nerves: No cranial nerve deficit.      Motor: No abnormal muscle tone.      Coordination: Coordination normal.   Psychiatric:         Behavior: Behavior normal.         Thought Content: Thought content normal.         Judgment: Judgment normal.         Assessment:       1. Pruritus    2. Bradycardia    3.  Essential hypertension    4. Other fatigue    5. Idiopathic chronic gout of multiple sites without tophus    6. B12 deficiency    7. Stage 3 chronic kidney disease        Plan:       Pruritus:  Worsening  -     hydrocortisone 2.5 % lotion; Apply topically 2 (two) times daily.  Dispense: 59 mL; Refill: 0    Bradycardia:  Worsening    Essential hypertension:  Stable  -     Lipid Panel; Future; Expected date: 07/09/2020    Other fatigue:  Worsening  -     CBC auto differential; Future; Expected date: 07/09/2020  -     Comprehensive metabolic panel; Future; Expected date: 07/09/2020  -     TSH; Future; Expected date: 07/09/2020    Idiopathic chronic gout of multiple sites without tophus:  Uncontrolled  -     Uric acid; Future; Expected date: 07/09/2020    B12 deficiency:  Stable  -     Vitamin B12; Future; Expected date: 07/09/2020    Stage 3 chronic kidney disease:  Improved  -     Vitamin D; Future; Expected date: 07/09/2020    Chronic seasonal allergic rhinitis:  Stable  -     azelastine (ASTELIN) 137 mcg (0.1 %) nasal spray; 2 sprays (274 mcg total) by Nasal route 2 (two) times daily.  Dispense: 30 mL; Refill: 6      Will call the patient after have the results of the test, will start patient on hydrocortisone lotion 2.5% apply on the affected area, if the symptoms get worse, the patient will notify us immediately.  Will discontinue metoprolol, will notify this to the cardiologist.  Will start patient on Astelin 137 mcg discontinue montelukast.   Patient agreed with assessment and plan. Patient verbalized understanding.

## 2020-07-09 NOTE — TELEPHONE ENCOUNTER
Spoke with pt informed him of message below and scheduled follow up appointment. Pt verbalized understanding phone call ended.

## 2020-07-09 NOTE — TELEPHONE ENCOUNTER
Please can you let the patient know that I spoke with the cardiologist and he recommended to decrease the sotalol to 40 mg daily instead of 80 mg daily, to cut in half and to hold on the metoprolol and please make him an appointment to see me back in couple weeks with Birgit to recheck on this.  Thank you

## 2020-07-09 NOTE — PATIENT INSTRUCTIONS
Eating Heart-Healthy Food: Using the DASH Plan    Eating for your heart doesnt have to be hard or boring. You just need to know how to make healthier choices. The DASH eating plan has been developed to help you do just that. DASH stands for Dietary Approaches to Stop Hypertension. It is a plan that has been proven to be healthier for your heart and to lower your risk for high blood pressure. It can also help lower your risk for cancer, heart disease, osteoporosis, and diabetes.  Choosing from each food group  Choose foods from each of the food groups below each day. Try to get the recommended number of servings for each food group. The serving numbers are based on a diet of 2,000 calories a day. Talk to your doctor if youre unsure about your calorie needs. Along with getting the correct servings, the DASH plan also recommends a sodium intake less than 2,300 mg per day.        Grains  Servings: 6 to 8 a day  A serving is:  · 1 slice bread  · 1 ounce dry cereal  · Half a cup cooked rice, pasta or cereal  Best choices: Whole grains and any grains high in fiber. Vegetables  Servings: 4 to 5 a day  A serving is:  · 1 cup raw leafy vegetable  · Half a cup cut-up raw or cooked vegetable  · Half a cup vegetable juice  Best choices: Fresh or frozen vegetables prepared without added salt or fat.   Fruits  Servings: 4 to 5 a day  A serving is:  · 1 medium fruit  · One-quarter cup dried fruit  · Half a cup fresh, frozen, or canned fruit  · Half a cup of 100% fruit juices  Best choices: A variety of fresh fruits of different colors. Whole fruits are a better choice than fruit juices. Low-fat or fat-free dairy  Servings: 2 to 3 a day  A serving is:  · 1 cup milk  · 1 cup yogurt  · One and a half ounces cheese  Best choices: Skim or 1% milk, low-fat or fat-free yogurt or buttermilk, and low-fat cheeses.         Lean meats, poultry, fish  Servings: 6 or fewer a day  A serving is:  · 1 ounce cooked meats, poultry, or fish  · 1  egg  Best choices: Lean poultry and fish. Trim away visible fat. Broil, grill, roast, or boil instead of frying. Remove skin from poultry before eating. Limit how much red meat you eat.  Nuts, seeds, beans  Servings: 4 to 5 a week  A serving is:  · One-third cup nuts (one and a half ounces)  · 2 tablespoons nut butter or seeds  · Half a cup cooked dry beans or legumes  Best choices: Dry roasted nuts with no salt added, lentils, kidney beans, garbanzo beans, and whole mata beans.   Fats and oils  Servings: 2 to 3 a day  A serving is:  · 1 teaspoon vegetable oil  · 1 teaspoon soft margarine  · 1 tablespoon mayonnaise  · 2 tablespoons salad dressing  Best choices: Nut and vegetable oils (nontropical vegetable oils), such as olive and canola oil. Sweets  Servings: 5 a week or fewer  A serving is:  · 1 tablespoon sugar, maple syrup, or honey  · 1 tablespoon jam or jelly  · 1 half-ounce jelly beans (about 15)  · 1 cup lemonade  Best choices: Dried fruit can be a satisfying sweet. Choose low-fat sweets. And watch your serving sizes!      For more on the DASH eating plan, visit:  www.nhlbi.nih.gov/health/health-topics/topics/dash   Date Last Reviewed: 6/1/2016  © 0214-1177 [employer]. [address] [place] [address], [place] [address]. All rights reserved. This information is not intended as a substitute for professional medical care. Always follow your healthcare professional's instructions.

## 2020-07-13 RX ORDER — ATORVASTATIN CALCIUM 10 MG/1
10 TABLET, FILM COATED ORAL NIGHTLY
Qty: 90 TABLET | Refills: 3 | Status: SHIPPED | OUTPATIENT
Start: 2020-07-13 | End: 2022-07-14 | Stop reason: SINTOL

## 2020-07-13 RX ORDER — CYANOCOBALAMIN 1000 UG/ML
1000 INJECTION, SOLUTION INTRAMUSCULAR; SUBCUTANEOUS
Status: DISCONTINUED | OUTPATIENT
Start: 2020-07-14 | End: 2020-08-04

## 2020-07-16 DIAGNOSIS — I25.10 CORONARY ARTERY DISEASE INVOLVING NATIVE CORONARY ARTERY OF NATIVE HEART WITHOUT ANGINA PECTORIS: ICD-10-CM

## 2020-07-16 RX ORDER — CLOPIDOGREL BISULFATE 75 MG/1
75 TABLET ORAL DAILY
Qty: 90 TABLET | Refills: 3 | Status: SHIPPED | OUTPATIENT
Start: 2020-07-16 | End: 2020-08-05 | Stop reason: SDUPTHER

## 2020-07-16 RX ORDER — ISOSORBIDE MONONITRATE 30 MG/1
30 TABLET, EXTENDED RELEASE ORAL DAILY
Qty: 90 TABLET | Refills: 3 | Status: SHIPPED | OUTPATIENT
Start: 2020-07-16 | End: 2020-07-16

## 2020-07-16 NOTE — TELEPHONE ENCOUNTER
Refill Authorization Note    is requesting a refill authorization.    Brief assessment and rationale for refill: APPROVE: prr               Medication reconciliation completed: No                         Comments:      Requested Prescriptions   Signed Prescriptions Disp Refills    isosorbide mononitrate (IMDUR) 30 MG 24 hr tablet 90 tablet 3     Sig: Take 1 tablet (30 mg total) by mouth once daily.       There is no refill protocol information for this order       clopidogreL (PLAVIX) 75 mg tablet 90 tablet 3     Sig: Take 1 tablet (75 mg total) by mouth once daily.       Hematology: Antiplatelets - clopidogrel Passed - 7/16/2020  8:24 AM        Passed - Patient is at least 18 years old        Passed - No contraindicated proton pump inhibitors on active medication list        Passed - Office visit in past 12 months or future 90 days.     Recent Outpatient Visits            1 week ago Pruritus    [facility] Nathalie Salgado MD    1 month ago History of nonmelanoma skin cancer    [facility] Kalyani Anderson MD    3 months ago B12 deficiency    [facility] Nathalie Salgado MD    6 months ago Visit for suture removal    [facility] Danilo Hubbard MD    9 months ago Sore throat    [facility] Nathalie Salgado MD          Future Appointments              In 2 weeks [facility], NURSE [facility], [place]    [facility]    In 2 months Nathalie Salgado MD [facility] [place]                Passed - HCT in normal range and within 360 days     Hematocrit   Date Value Ref Range Status   07/09/2020 47.2 40.0 - 54.0 % Final   03/26/2020 48.9 40.0 - 54.0 % Final   10/09/2019 43.9 40.0 - 54.0 % Final              Passed - HGB in normal range and within 360 days     Hemoglobin   Date Value Ref Range Status   07/09/2020 15.0 14.0 - 18.0 g/dL Final   03/26/2020 15.6 14.0 -  18.0 g/dL Final   10/09/2019 14.4 14.0 - 18.0 g/dL Final              Passed - PLT in normal range and within 360 days     Platelets   Date Value Ref Range Status   07/09/2020 167 150 - 350 K/uL Final   03/26/2020 208 150 - 350 K/uL Final   10/09/2019 174 150 - 350 K/uL Final                  Appointments  past 12m or future 3m with PCP    Date Provider   Last Visit   7/9/2020 Nathalie Salgado MD   Next Visit   8/4/2020 Nathalie Salgado MD   ED visits in past 90 days: [unfilled]     Note composed:1:31 PM 07/16/2020

## 2020-07-21 DIAGNOSIS — L30.8 OTHER ECZEMA: ICD-10-CM

## 2020-07-22 RX ORDER — CYANOCOBALAMIN 1000 UG/ML
1000 INJECTION, SOLUTION INTRAMUSCULAR; SUBCUTANEOUS
Qty: 3 ML | Refills: 3 | Status: SHIPPED | OUTPATIENT
Start: 2020-07-22 | End: 2020-08-04 | Stop reason: SDUPTHER

## 2020-07-22 RX ORDER — TRIAMCINOLONE ACETONIDE 1 MG/G
CREAM TOPICAL 2 TIMES DAILY
Qty: 45 G | Refills: 1 | Status: SHIPPED | OUTPATIENT
Start: 2020-07-22 | End: 2021-04-12

## 2020-07-27 ENCOUNTER — TELEPHONE (OUTPATIENT)
Dept: FAMILY MEDICINE | Facility: CLINIC | Age: 80
End: 2020-07-27

## 2020-07-27 NOTE — TELEPHONE ENCOUNTER
----- Message from Samantha Whitmore sent at 7/27/2020  9:04 AM CDT -----  Regarding: advice  Contact: wife, Jane Lopez  Type: Needs Medical Advice  Who Called:  wifeJane  Symptoms (please be specific):    How long has patient had these symptoms:    Pharmacy name and phone #:    Best Call Back Number: [phone] or [phone]  Additional Information: Patient's wife would like the nurse with [facility] to give him is B 12 injection. She would like to know if you can order Home health for patient. Please call patient's wife. Thanks!

## 2020-08-03 ENCOUNTER — PATIENT OUTREACH (OUTPATIENT)
Dept: ADMINISTRATIVE | Facility: OTHER | Age: 80
End: 2020-08-03

## 2020-08-03 DIAGNOSIS — E78.49 OTHER HYPERLIPIDEMIA: ICD-10-CM

## 2020-08-03 DIAGNOSIS — I25.118 CORONARY ARTERY DISEASE OF NATIVE ARTERY OF NATIVE HEART WITH STABLE ANGINA PECTORIS: Primary | ICD-10-CM

## 2020-08-03 NOTE — PROGRESS NOTES
Requested updates within Care Everywhere.  Patient's chart was reviewed for overdue CASANDRA topics.  Immunizations reconciled.

## 2020-08-04 ENCOUNTER — OFFICE VISIT (OUTPATIENT)
Dept: FAMILY MEDICINE | Facility: CLINIC | Age: 80
End: 2020-08-04
Payer: MEDICARE

## 2020-08-04 ENCOUNTER — LAB VISIT (OUTPATIENT)
Dept: LAB | Facility: HOSPITAL | Age: 80
End: 2020-08-04
Attending: INTERNAL MEDICINE
Payer: MEDICARE

## 2020-08-04 VITALS
TEMPERATURE: 99 F | HEART RATE: 45 BPM | OXYGEN SATURATION: 97 % | DIASTOLIC BLOOD PRESSURE: 76 MMHG | BODY MASS INDEX: 24.73 KG/M2 | SYSTOLIC BLOOD PRESSURE: 130 MMHG | WEIGHT: 182.31 LBS

## 2020-08-04 DIAGNOSIS — I48.0 PAF (PAROXYSMAL ATRIAL FIBRILLATION): ICD-10-CM

## 2020-08-04 DIAGNOSIS — E53.8 B12 DEFICIENCY: Primary | ICD-10-CM

## 2020-08-04 DIAGNOSIS — M1A.0710 CHRONIC IDIOPATHIC GOUT INVOLVING TOE OF RIGHT FOOT WITHOUT TOPHUS: ICD-10-CM

## 2020-08-04 DIAGNOSIS — E78.49 OTHER HYPERLIPIDEMIA: ICD-10-CM

## 2020-08-04 DIAGNOSIS — R00.1 BRADYCARDIA: ICD-10-CM

## 2020-08-04 DIAGNOSIS — E55.9 VITAMIN D DEFICIENCY: ICD-10-CM

## 2020-08-04 DIAGNOSIS — I10 ESSENTIAL HYPERTENSION: ICD-10-CM

## 2020-08-04 DIAGNOSIS — I25.118 CORONARY ARTERY DISEASE OF NATIVE ARTERY OF NATIVE HEART WITH STABLE ANGINA PECTORIS: ICD-10-CM

## 2020-08-04 PROCEDURE — 80061 LIPID PANEL: CPT

## 2020-08-04 PROCEDURE — 99214 PR OFFICE/OUTPT VISIT, EST, LEVL IV, 30-39 MIN: ICD-10-PCS | Mod: S$GLB,,, | Performed by: FAMILY MEDICINE

## 2020-08-04 PROCEDURE — 3078F DIAST BP <80 MM HG: CPT | Mod: CPTII,S$GLB,, | Performed by: FAMILY MEDICINE

## 2020-08-04 PROCEDURE — 3288F FALL RISK ASSESSMENT DOCD: CPT | Mod: CPTII,S$GLB,, | Performed by: FAMILY MEDICINE

## 2020-08-04 PROCEDURE — 3288F PR FALLS RISK ASSESSMENT DOCUMENTED: ICD-10-PCS | Mod: CPTII,S$GLB,, | Performed by: FAMILY MEDICINE

## 2020-08-04 PROCEDURE — 99999 PR PBB SHADOW E&M-EST. PATIENT-LVL IV: CPT | Mod: PBBFAC,,, | Performed by: FAMILY MEDICINE

## 2020-08-04 PROCEDURE — 84550 ASSAY OF BLOOD/URIC ACID: CPT

## 2020-08-04 PROCEDURE — 1100F PR PT FALLS ASSESS DOC 2+ FALLS/FALL W/INJURY/YR: ICD-10-PCS | Mod: CPTII,S$GLB,, | Performed by: FAMILY MEDICINE

## 2020-08-04 PROCEDURE — 3075F PR MOST RECENT SYSTOLIC BLOOD PRESS GE 130-139MM HG: ICD-10-PCS | Mod: CPTII,S$GLB,, | Performed by: FAMILY MEDICINE

## 2020-08-04 PROCEDURE — 1126F PR PAIN SEVERITY QUANTIFIED, NO PAIN PRESENT: ICD-10-PCS | Mod: S$GLB,,, | Performed by: FAMILY MEDICINE

## 2020-08-04 PROCEDURE — 3075F SYST BP GE 130 - 139MM HG: CPT | Mod: CPTII,S$GLB,, | Performed by: FAMILY MEDICINE

## 2020-08-04 PROCEDURE — 1126F AMNT PAIN NOTED NONE PRSNT: CPT | Mod: S$GLB,,, | Performed by: FAMILY MEDICINE

## 2020-08-04 PROCEDURE — 99999 PR PBB SHADOW E&M-EST. PATIENT-LVL IV: ICD-10-PCS | Mod: PBBFAC,,, | Performed by: FAMILY MEDICINE

## 2020-08-04 PROCEDURE — 1159F PR MEDICATION LIST DOCUMENTED IN MEDICAL RECORD: ICD-10-PCS | Mod: S$GLB,,, | Performed by: FAMILY MEDICINE

## 2020-08-04 PROCEDURE — 99214 OFFICE O/P EST MOD 30 MIN: CPT | Mod: S$GLB,,, | Performed by: FAMILY MEDICINE

## 2020-08-04 PROCEDURE — 1100F PTFALLS ASSESS-DOCD GE2>/YR: CPT | Mod: CPTII,S$GLB,, | Performed by: FAMILY MEDICINE

## 2020-08-04 PROCEDURE — 3078F PR MOST RECENT DIASTOLIC BLOOD PRESSURE < 80 MM HG: ICD-10-PCS | Mod: CPTII,S$GLB,, | Performed by: FAMILY MEDICINE

## 2020-08-04 PROCEDURE — 80053 COMPREHEN METABOLIC PANEL: CPT

## 2020-08-04 PROCEDURE — 36415 COLL VENOUS BLD VENIPUNCTURE: CPT | Mod: PO

## 2020-08-04 PROCEDURE — 1159F MED LIST DOCD IN RCRD: CPT | Mod: S$GLB,,, | Performed by: FAMILY MEDICINE

## 2020-08-04 RX ORDER — CYANOCOBALAMIN 1000 UG/ML
1000 INJECTION, SOLUTION INTRAMUSCULAR; SUBCUTANEOUS
Qty: 3 ML | Refills: 3 | Status: SHIPPED | OUTPATIENT
Start: 2020-08-04 | End: 2020-09-12 | Stop reason: SDUPTHER

## 2020-08-04 RX ORDER — SOTALOL HYDROCHLORIDE 80 MG/1
40 TABLET ORAL DAILY
COMMUNITY
End: 2023-12-13 | Stop reason: CLARIF

## 2020-08-04 NOTE — PROGRESS NOTES
Subjective:       Patient ID: Dilip Lopez is a 79 y.o. male.    Chief Complaint: Follow-up    HPI     The patient is coming here today for a follow-up visit.    B12 deficiency:  B12 levels were very low, the patient is taking B12 injections every week, today is the 3rd week of the injections, after a month, she will start taking want a month injections.  The patient stated that since started the B12 injections, he does not feel too much difference in his energy level.  He also start to take vitamin-D because his vitamin-D levels were low.    Afib:  His heart rate was in the 40s, the metoprolol was discontinue and sotalol was decreased to 40 mg daily.  The patient has an appointment to see the cardiologist tomorrow.  His cholesterol levels were low, the patient is currently taking atorvastatin 10 mg at bedtime.    Gout:  The patient stated that he does not have any gouty attacks since a year ago, the uric acid levels were elevated, the patient is currently taking allopurinol 300 mg daily.  He is not taking any colchicine at this time, his kidney function was abnormal in the stage III.  The patient stated that he has been drinking more water.    Hypertension:  The patient is taking his medications as directed, he is taking hydrochlorothiazide also complains of swelling on the lower extremities which is mild.  He is only taking a small dosage of fluid pill, denies any side effects of medication.    Past medical history, past social history was reviewed and discussed with the patient.    Review of Systems   Constitutional: Positive for fatigue. Negative for activity change and appetite change.   HENT: Negative for congestion and ear discharge.    Eyes: Negative for discharge and itching.   Respiratory: Negative for choking and chest tightness.    Cardiovascular: Positive for leg swelling. Negative for chest pain.   Gastrointestinal: Negative for abdominal distention and abdominal pain.   Endocrine: Negative for cold  intolerance and heat intolerance.   Genitourinary: Negative for dysuria and flank pain.   Musculoskeletal: Positive for arthralgias. Negative for back pain.   Skin: Negative for pallor and rash.   Allergic/Immunologic: Negative for environmental allergies and food allergies.   Neurological: Negative for dizziness and facial asymmetry.   Hematological: Negative for adenopathy. Does not bruise/bleed easily.   Psychiatric/Behavioral: Negative for agitation, confusion, decreased concentration and sleep disturbance.       Objective:      Physical Exam  Vitals signs and nursing note reviewed.   Constitutional:       General: He is not in acute distress.     Appearance: He is well-developed. He is not diaphoretic.   HENT:      Head: Normocephalic and atraumatic.      Right Ear: External ear normal.      Left Ear: External ear normal.      Nose: Nose normal.      Mouth/Throat:      Pharynx: No oropharyngeal exudate.   Eyes:      General:         Left eye: No discharge.      Pupils: Pupils are equal, round, and reactive to light.   Neck:      Musculoskeletal: Normal range of motion and neck supple.   Cardiovascular:      Rate and Rhythm: Regular rhythm. Bradycardia present.      Heart sounds: Normal heart sounds. No murmur.   Pulmonary:      Effort: Pulmonary effort is normal. No respiratory distress.      Breath sounds: Normal breath sounds. No wheezing.   Abdominal:      General: Bowel sounds are normal.      Palpations: Abdomen is soft.      Tenderness: There is no abdominal tenderness.   Musculoskeletal:         General: No tenderness.      Right lower leg: Edema (Trace) present.      Left lower leg: Edema (Trace) present.   Skin:     General: Skin is warm and dry.      Coloration: Skin is not pale.   Neurological:      Cranial Nerves: No cranial nerve deficit.      Motor: No abnormal muscle tone.      Coordination: Coordination normal.   Psychiatric:         Behavior: Behavior normal.         Thought Content: Thought  content normal.         Judgment: Judgment normal.         Assessment:       1. B12 deficiency    2. Vitamin D deficiency    3. Chronic idiopathic gout involving toe of right foot without tophus    4. Essential hypertension    5. Bradycardia        Plan:       B12 deficiency:  Improved  -     cyanocobalamin 1,000 mcg/mL injection; Inject 1 mL (1,000 mcg total) into the muscle every 30 days.  Dispense: 3 mL; Refill: 3    Vitamin D deficiency:  Improved    Chronic idiopathic gout involving toe of right foot without tophus:  Worsening  -     Uric acid; Future; Expected date: 08/04/2020    Essential hypertension:  Well controlled    Bradycardia:  Improved    PAF (paroxysmal atrial fibrillation):  Stable    The patient has an appointment to see the cardiologist tomorrow, will continue with B12 injections, continue with vitamin-D over-the-counter.  Will are add uric acid levels, will call the patient after we have the results of the test, continue with allopurinol.  Healthy habits, decrease salt intake was recommended.   Patient agreed with assessment and plan. Patient verbalized understanding.

## 2020-08-04 NOTE — PATIENT INSTRUCTIONS
Eating Heart-Healthy Food: Using the DASH Plan    Eating for your heart doesnt have to be hard or boring. You just need to know how to make healthier choices. The DASH eating plan has been developed to help you do just that. DASH stands for Dietary Approaches to Stop Hypertension. It is a plan that has been proven to be healthier for your heart and to lower your risk for high blood pressure. It can also help lower your risk for cancer, heart disease, osteoporosis, and diabetes.  Choosing from each food group  Choose foods from each of the food groups below each day. Try to get the recommended number of servings for each food group. The serving numbers are based on a diet of 2,000 calories a day. Talk to your doctor if youre unsure about your calorie needs. Along with getting the correct servings, the DASH plan also recommends a sodium intake less than 2,300 mg per day.        Grains  Servings: 6 to 8 a day  A serving is:  · 1 slice bread  · 1 ounce dry cereal  · Half a cup cooked rice, pasta or cereal  Best choices: Whole grains and any grains high in fiber. Vegetables  Servings: 4 to 5 a day  A serving is:  · 1 cup raw leafy vegetable  · Half a cup cut-up raw or cooked vegetable  · Half a cup vegetable juice  Best choices: Fresh or frozen vegetables prepared without added salt or fat.   Fruits  Servings: 4 to 5 a day  A serving is:  · 1 medium fruit  · One-quarter cup dried fruit  · Half a cup fresh, frozen, or canned fruit  · Half a cup of 100% fruit juices  Best choices: A variety of fresh fruits of different colors. Whole fruits are a better choice than fruit juices. Low-fat or fat-free dairy  Servings: 2 to 3 a day  A serving is:  · 1 cup milk  · 1 cup yogurt  · One and a half ounces cheese  Best choices: Skim or 1% milk, low-fat or fat-free yogurt or buttermilk, and low-fat cheeses.         Lean meats, poultry, fish  Servings: 6 or fewer a day  A serving is:  · 1 ounce cooked meats, poultry, or fish  · 1  egg  Best choices: Lean poultry and fish. Trim away visible fat. Broil, grill, roast, or boil instead of frying. Remove skin from poultry before eating. Limit how much red meat you eat.  Nuts, seeds, beans  Servings: 4 to 5 a week  A serving is:  · One-third cup nuts (one and a half ounces)  · 2 tablespoons nut butter or seeds  · Half a cup cooked dry beans or legumes  Best choices: Dry roasted nuts with no salt added, lentils, kidney beans, garbanzo beans, and whole mata beans.   Fats and oils  Servings: 2 to 3 a day  A serving is:  · 1 teaspoon vegetable oil  · 1 teaspoon soft margarine  · 1 tablespoon mayonnaise  · 2 tablespoons salad dressing  Best choices: Nut and vegetable oils (nontropical vegetable oils), such as olive and canola oil. Sweets  Servings: 5 a week or fewer  A serving is:  · 1 tablespoon sugar, maple syrup, or honey  · 1 tablespoon jam or jelly  · 1 half-ounce jelly beans (about 15)  · 1 cup lemonade  Best choices: Dried fruit can be a satisfying sweet. Choose low-fat sweets. And watch your serving sizes!      For more on the DASH eating plan, visit:  www.nhlbi.nih.gov/health/health-topics/topics/dash   Date Last Reviewed: 6/1/2016  © 8975-9359 [employer]. [address] [place] [address], [place] [address]. All rights reserved. This information is not intended as a substitute for professional medical care. Always follow your healthcare professional's instructions.

## 2020-08-05 ENCOUNTER — OFFICE VISIT (OUTPATIENT)
Dept: CARDIOLOGY | Facility: CLINIC | Age: 80
End: 2020-08-05
Payer: MEDICARE

## 2020-08-05 VITALS
SYSTOLIC BLOOD PRESSURE: 112 MMHG | HEART RATE: 57 BPM | BODY MASS INDEX: 24.69 KG/M2 | WEIGHT: 182.31 LBS | OXYGEN SATURATION: 97 % | DIASTOLIC BLOOD PRESSURE: 70 MMHG | HEIGHT: 72 IN

## 2020-08-05 DIAGNOSIS — Z79.02 LONG TERM (CURRENT) USE OF ANTITHROMBOTICS/ANTIPLATELETS: ICD-10-CM

## 2020-08-05 DIAGNOSIS — Z79.899 LONG TERM CURRENT USE OF ANTIARRHYTHMIC DRUG: ICD-10-CM

## 2020-08-05 DIAGNOSIS — I25.10 CORONARY ARTERY DISEASE INVOLVING NATIVE CORONARY ARTERY OF NATIVE HEART WITHOUT ANGINA PECTORIS: ICD-10-CM

## 2020-08-05 DIAGNOSIS — E78.00 HYPERCHOLESTEROLEMIA: ICD-10-CM

## 2020-08-05 DIAGNOSIS — I48.0 PAF (PAROXYSMAL ATRIAL FIBRILLATION): ICD-10-CM

## 2020-08-05 DIAGNOSIS — I10 ESSENTIAL HYPERTENSION: ICD-10-CM

## 2020-08-05 DIAGNOSIS — I25.118 CORONARY ARTERY DISEASE OF NATIVE ARTERY OF NATIVE HEART WITH STABLE ANGINA PECTORIS: Primary | ICD-10-CM

## 2020-08-05 PROBLEM — R01.1 UNDIAGNOSED CARDIAC MURMURS: Status: RESOLVED | Noted: 2019-01-14 | Resolved: 2020-08-05

## 2020-08-05 LAB
ALBUMIN SERPL BCP-MCNC: 4.4 G/DL (ref 3.5–5.2)
ALP SERPL-CCNC: 82 U/L (ref 55–135)
ALT SERPL W/O P-5'-P-CCNC: 10 U/L (ref 10–44)
ANION GAP SERPL CALC-SCNC: 11 MMOL/L (ref 8–16)
AST SERPL-CCNC: 17 U/L (ref 10–40)
BILIRUB SERPL-MCNC: 0.9 MG/DL (ref 0.1–1)
BUN SERPL-MCNC: 23 MG/DL (ref 8–23)
CALCIUM SERPL-MCNC: 10 MG/DL (ref 8.7–10.5)
CHLORIDE SERPL-SCNC: 107 MMOL/L (ref 95–110)
CHOLEST SERPL-MCNC: 99 MG/DL (ref 120–199)
CHOLEST/HDLC SERPL: 2.4 {RATIO} (ref 2–5)
CO2 SERPL-SCNC: 21 MMOL/L (ref 23–29)
CREAT SERPL-MCNC: 1.6 MG/DL (ref 0.5–1.4)
EST. GFR  (AFRICAN AMERICAN): 47 ML/MIN/1.73 M^2
EST. GFR  (NON AFRICAN AMERICAN): 40.7 ML/MIN/1.73 M^2
GLUCOSE SERPL-MCNC: 89 MG/DL (ref 70–110)
HDLC SERPL-MCNC: 41 MG/DL (ref 40–75)
HDLC SERPL: 41.4 % (ref 20–50)
LDLC SERPL CALC-MCNC: 44.8 MG/DL (ref 63–159)
NONHDLC SERPL-MCNC: 58 MG/DL
POTASSIUM SERPL-SCNC: 4.4 MMOL/L (ref 3.5–5.1)
PROT SERPL-MCNC: 7.6 G/DL (ref 6–8.4)
SODIUM SERPL-SCNC: 139 MMOL/L (ref 136–145)
TRIGL SERPL-MCNC: 66 MG/DL (ref 30–150)
URATE SERPL-MCNC: 10.4 MG/DL (ref 3.4–7)

## 2020-08-05 PROCEDURE — 1101F PT FALLS ASSESS-DOCD LE1/YR: CPT | Mod: CPTII,S$GLB,, | Performed by: INTERNAL MEDICINE

## 2020-08-05 PROCEDURE — 1101F PR PT FALLS ASSESS DOC 0-1 FALLS W/OUT INJ PAST YR: ICD-10-PCS | Mod: CPTII,S$GLB,, | Performed by: INTERNAL MEDICINE

## 2020-08-05 PROCEDURE — 1159F PR MEDICATION LIST DOCUMENTED IN MEDICAL RECORD: ICD-10-PCS | Mod: S$GLB,,, | Performed by: INTERNAL MEDICINE

## 2020-08-05 PROCEDURE — 3074F PR MOST RECENT SYSTOLIC BLOOD PRESSURE < 130 MM HG: ICD-10-PCS | Mod: CPTII,S$GLB,, | Performed by: INTERNAL MEDICINE

## 2020-08-05 PROCEDURE — 1126F AMNT PAIN NOTED NONE PRSNT: CPT | Mod: S$GLB,,, | Performed by: INTERNAL MEDICINE

## 2020-08-05 PROCEDURE — 99214 PR OFFICE/OUTPT VISIT, EST, LEVL IV, 30-39 MIN: ICD-10-PCS | Mod: S$GLB,,, | Performed by: INTERNAL MEDICINE

## 2020-08-05 PROCEDURE — 1159F MED LIST DOCD IN RCRD: CPT | Mod: S$GLB,,, | Performed by: INTERNAL MEDICINE

## 2020-08-05 PROCEDURE — 99214 OFFICE O/P EST MOD 30 MIN: CPT | Mod: S$GLB,,, | Performed by: INTERNAL MEDICINE

## 2020-08-05 PROCEDURE — 3078F PR MOST RECENT DIASTOLIC BLOOD PRESSURE < 80 MM HG: ICD-10-PCS | Mod: CPTII,S$GLB,, | Performed by: INTERNAL MEDICINE

## 2020-08-05 PROCEDURE — 3078F DIAST BP <80 MM HG: CPT | Mod: CPTII,S$GLB,, | Performed by: INTERNAL MEDICINE

## 2020-08-05 PROCEDURE — 1126F PR PAIN SEVERITY QUANTIFIED, NO PAIN PRESENT: ICD-10-PCS | Mod: S$GLB,,, | Performed by: INTERNAL MEDICINE

## 2020-08-05 PROCEDURE — 3074F SYST BP LT 130 MM HG: CPT | Mod: CPTII,S$GLB,, | Performed by: INTERNAL MEDICINE

## 2020-08-05 RX ORDER — CLOPIDOGREL BISULFATE 75 MG/1
75 TABLET ORAL DAILY
Qty: 90 TABLET | Refills: 1 | Status: SHIPPED | OUTPATIENT
Start: 2020-08-05 | End: 2021-04-20

## 2020-08-05 NOTE — PROGRESS NOTES
Subjective:    Patient ID:  Dilip Lopez is a 79 y.o. male who presents for Coronary Artery Disease, Medication Management, Hypertension, and Atrial Fibrillation        HPI    DISCUSSED LABS AND GOALS LDL 45, HDL 66, CR 1.60, HR OK WITH DECREASING MEDS, WAS BRADYCARDIC AT [facility] OFFICE, , DOING OK,NORMAL KRUNAL'S,  SEE ROS  Past Medical History:   Diagnosis Date    Atrial fibrillation     CAD (coronary artery disease)     Hyperlipidemia     Hypertension      Past Surgical History:   Procedure Laterality Date    CARDIAC CATHETERIZATION      CARDIAC SURGERY      CORONARY ANGIOPLASTY      CORONARY STENT PLACEMENT  01/29/2018    HIATAL HERNIA REPAIR       Family History   Problem Relation Age of Onset    Hypertension Mother     Diabetes Mother     Heart disease Father 62        MI    Parkinsonism Brother      Social History     Socioeconomic History    Marital status:      Spouse name: Not on file    Number of children: Not on file    Years of education: Not on file    Highest education level: Not on file   Occupational History    Not on file   Social Needs    Financial resource strain: Not on file    Food insecurity     Worry: Not on file     Inability: Not on file    Transportation needs     Medical: Not on file     Non-medical: Not on file   Tobacco Use    Smoking status: Never Smoker    Smokeless tobacco: Never Used   Substance and Sexual Activity    Alcohol use: No     Frequency: Never    Drug use: No    Sexual activity: Not on file   Lifestyle    Physical activity     Days per week: Not on file     Minutes per session: Not on file    Stress: Not on file   Relationships    Social connections     Talks on phone: Not on file     Gets together: Not on file     Attends Jainism service: Not on file     Active member of club or organization: Not on file     Attends meetings of clubs or organizations: Not on file     Relationship status: Not on file   Other Topics Concern    Not on file  "  Social History Narrative    Not on file       Review of patient's allergies indicates:   Allergen Reactions    Bactrim [sulfamethoxazole-trimethoprim]     Cinnamon Other (See Comments)    Ciprofloxacin Hives    Sulfa (sulfonamide antibiotics)        Current Outpatient Medications:     allopurinoL (ZYLOPRIM) 300 MG tablet, TAKE 1 TABLET BY MOUTH ONCE DAILY, Disp: 90 tablet, Rfl: 0    aspirin (ECOTRIN) 325 MG EC tablet, aspirin 325 mg tablet,delayed release  Take 1 tablet every day by oral route., Disp: , Rfl:     atorvastatin (LIPITOR) 10 MG tablet, Take 1 tablet (10 mg total) by mouth every evening., Disp: 90 tablet, Rfl: 3    azelastine (ASTELIN) 137 mcg (0.1 %) nasal spray, 2 sprays (274 mcg total) by Nasal route 2 (two) times daily., Disp: 30 mL, Rfl: 6    clindamycin (CLEOCIN T) 1 % external solution, Apply topically 2 (two) times daily., Disp: 60 mL, Rfl: 1    clopidogreL (PLAVIX) 75 mg tablet, Take 1 tablet (75 mg total) by mouth once daily., Disp: 90 tablet, Rfl: 1    cyanocobalamin 1,000 mcg/mL injection, Inject 1 mL (1,000 mcg total) into the muscle every 30 days. (Patient taking differently: Inject 1,000 mcg into the muscle every 7 days. ), Disp: 3 mL, Rfl: 3    gabapentin (NEURONTIN) 300 MG capsule, TAKE 1 CAPSULE BY MOUTH IN  THE EVENING, Disp: 90 capsule, Rfl: 3    hydrALAZINE (APRESOLINE) 25 MG tablet, hydralazine 25 mg tablet, Disp: , Rfl:     hydroCHLOROthiazide (HYDRODIURIL) 12.5 MG Tab, Take 1 tablet (12.5 mg total) by mouth once daily., Disp: 90 tablet, Rfl: 3    hydrocortisone 2.5 % lotion, Apply topically 2 (two) times daily., Disp: 59 mL, Rfl: 0    ketoconazole (NIZORAL) 2 % shampoo, Wash hair with medicated shampoo at least 2x/week - let sit on scalp at least 5 minutes prior to rinsing, Disp: 120 mL, Rfl: 5    losartan (COZAAR) 100 MG tablet, Take 1 tablet (100 mg total) by mouth once daily., Disp: 90 tablet, Rfl: 3    needle, disp, 25 gauge 25 gauge x 1" Ndzeny, Use as " directed, Disp: 20 each, Rfl: 0    sotalol (BETAPACE) 40 MG tablet, Take 40 mg by mouth once daily., Disp: , Rfl:     triamcinolone acetonide 0.1% (KENALOG) 0.1 % cream, Apply topically 2 (two) times daily. Right foot, Disp: 45 g, Rfl: 1    Review of Systems   Constitution: Negative for chills, diaphoresis, fever, malaise/fatigue and night sweats.   HENT: Negative for congestion, hearing loss and nosebleeds.    Eyes: Negative for blurred vision and visual disturbance.   Cardiovascular: Negative for chest pain, claudication (??), cyanosis, dyspnea on exertion, irregular heartbeat, leg swelling (OCC), near-syncope, orthopnea, palpitations, paroxysmal nocturnal dyspnea and syncope.   Respiratory: Negative for cough, hemoptysis, shortness of breath and wheezing.    Endocrine: Negative for cold intolerance, heat intolerance and polyuria.   Hematologic/Lymphatic: Negative for adenopathy. Does not bruise/bleed easily.   Skin: Negative for color change, itching and nail changes.   Musculoskeletal: Negative for back pain (CHRONIC), falls and joint pain (HIPS).   Gastrointestinal: Negative for bloating, abdominal pain, change in bowel habit, dysphagia, heartburn, hematemesis, jaundice, melena and vomiting.   Genitourinary: Negative for dysuria, flank pain and frequency.   Neurological: Negative for brief paralysis, dizziness, focal weakness, light-headedness, loss of balance (SOME), numbness, paresthesias, seizures, sensory change, tremors and weakness.   Psychiatric/Behavioral: Negative for altered mental status, depression and memory loss. The patient is not nervous/anxious.    Allergic/Immunologic: Negative for hives and persistent infections.        Objective:      Vitals:    08/05/20 1345   BP: 112/70   Pulse: (!) 57   SpO2: 97%   Weight: 82.7 kg (182 lb 5.1 oz)   Height: 6' (1.829 m)   PainSc: 0-No pain     Body mass index is 24.73 kg/m².    Physical Exam   Constitutional: He is oriented to person, place, and time. He  appears well-developed and well-nourished. He is active.   HENT:   Head: Normocephalic and atraumatic.   Mouth/Throat: Oropharynx is clear and moist and mucous membranes are normal.   Eyes: Pupils are equal, round, and reactive to light. Conjunctivae and EOM are normal.   Neck: Normal range of motion. Neck supple. Normal carotid pulses, no hepatojugular reflux and no JVD present. Carotid bruit is not present. No edema and no erythema present. No thyromegaly present.   Cardiovascular: Normal rate and regular rhythm.  No extrasystoles are present. PMI is not displaced. Exam reveals no gallop, no distant heart sounds, no friction rub and no midsystolic click.   Murmur heard.   Systolic murmur is present with a grade of 2/6 at the lower left sternal border.  Pulses:       Carotid pulses are 2+ on the right side and 2+ on the left side.       Radial pulses are 2+ on the right side and 2+ on the left side.        Femoral pulses are 2+ on the right side and 2+ on the left side.       Dorsalis pedis pulses are 2+ on the right side and 2+ on the left side.        Posterior tibial pulses are 2+ on the right side and 2+ on the left side.   Pulmonary/Chest: Effort normal and breath sounds normal. No accessory muscle usage. No tachypnea and no bradypnea. No respiratory distress.   Abdominal: Soft. Bowel sounds are normal. He exhibits no distension and no mass. There is no hepatosplenomegaly. There is no CVA tenderness.   Musculoskeletal: Normal range of motion.         General: No deformity or edema.      Comments: SLOW GAIT, TRACE EDEMA   Lymphadenopathy:     He has no cervical adenopathy.   Neurological: He is alert and oriented to person, place, and time. He has normal strength. He displays no tremor. No cranial nerve deficit. He exhibits normal muscle tone. Coordination normal.   Skin: Skin is warm and dry. No cyanosis or erythema. No pallor.   Psychiatric: He has a normal mood and affect. His speech is normal and behavior  is normal.               ..    Chemistry        Component Value Date/Time     08/04/2020 1331    K 4.4 08/04/2020 1331     08/04/2020 1331    CO2 21 (L) 08/04/2020 1331    BUN 23 08/04/2020 1331    CREATININE 1.6 (H) 08/04/2020 1331    GLU 89 08/04/2020 1331        Component Value Date/Time    CALCIUM 10.0 08/04/2020 1331    ALKPHOS 82 08/04/2020 1331    AST 17 08/04/2020 1331    ALT 10 08/04/2020 1331    BILITOT 0.9 08/04/2020 1331    ESTGFRAFRICA 47.0 (A) 08/04/2020 1331    EGFRNONAA 40.7 (A) 08/04/2020 1331            ..  Lab Results   Component Value Date    CHOL 99 (L) 08/04/2020    CHOL 99 (L) 07/09/2020    CHOL 122 03/26/2020     Lab Results   Component Value Date    HDL 41 08/04/2020    HDL 33 (L) 07/09/2020    HDL 35 (L) 03/26/2020     Lab Results   Component Value Date    LDLCALC 44.8 (L) 08/04/2020    LDLCALC 46.2 (L) 07/09/2020    LDLCALC 71.4 03/26/2020     Lab Results   Component Value Date    TRIG 66 08/04/2020    TRIG 99 07/09/2020    TRIG 78 03/26/2020     Lab Results   Component Value Date    CHOLHDL 41.4 08/04/2020    CHOLHDL 33.3 07/09/2020    CHOLHDL 28.7 03/26/2020     ..  Lab Results   Component Value Date    WBC 5.30 07/09/2020    HGB 15.0 07/09/2020    HCT 47.2 07/09/2020     (H) 07/09/2020     07/09/2020       Test(s) Reviewed  I have reviewed the following in detail:  [] Stress test   [] Angiography   [] Echocardiogram   [x] Labs   [] Other:       Assessment:         ICD-10-CM ICD-9-CM   1. Coronary artery disease of native artery of native heart with stable angina pectoris  I25.118 414.01     413.9   2. Essential hypertension  I10 401.9   3. PAF (paroxysmal atrial fibrillation)  I48.0 427.31   4. Hypercholesterolemia  E78.00 272.0   5. Long term (current) use of antithrombotics/antiplatelets  Z79.02 V58.63   6. Long term current use of antiarrhythmic drug  Z79.899 V58.69   7. Coronary artery disease involving native coronary artery of native heart without angina  pectoris  I25.10 414.01     Problem List Items Addressed This Visit        Cardiac/Vascular    Hypercholesterolemia    Relevant Orders    Comprehensive metabolic panel    Coronary artery disease of native artery of native heart with stable angina pectoris - Primary    Relevant Medications    clopidogreL (PLAVIX) 75 mg tablet    Other Relevant Orders    Holter monitor - 24 hour    Comprehensive metabolic panel    Nuclear Stress - Cardiology Interpreted    Essential hypertension    Relevant Orders    Comprehensive metabolic panel    PAF (paroxysmal atrial fibrillation)    Relevant Orders    Holter monitor - 24 hour    Comprehensive metabolic panel       Hematology    Long term (current) use of antithrombotics/antiplatelets    Relevant Orders    Comprehensive metabolic panel    Hemoglobin       Other    Long term current use of antiarrhythmic drug    Relevant Orders    Comprehensive metabolic panel      Other Visit Diagnoses     Coronary artery disease involving native coronary artery of native heart without angina pectoris        Relevant Medications    clopidogreL (PLAVIX) 75 mg tablet           Plan:     KEEP SOTALOL AT 40 MG, OFF TOPROL, CHECK NUCLEAR STRESS TEST ASSESS FOR CAD PROGRESSION, ALL OTHER CV CLINICALLY STABLE, CLASS 1-2  ANGINA, NO HF, NO TIA, NO CLINICAL ARRHYTHMIA,CONTINUE CURRENT MEDS, EDUCATION, DIET, EXERCISE , RETURN TO CLINIC IN 6 MO WITH LABS AND HOLTER      Coronary artery disease of native artery of native heart with stable angina pectoris  -     Holter monitor - 24 hour; Future  -     Comprehensive metabolic panel; Future; Expected date: 02/05/2021  -     Nuclear Stress - Cardiology Interpreted; Future    Essential hypertension  -     Comprehensive metabolic panel; Future; Expected date: 02/05/2021    PAF (paroxysmal atrial fibrillation)  -     Holter monitor - 24 hour; Future  -     Comprehensive metabolic panel; Future; Expected date: 02/05/2021    Hypercholesterolemia  -     Comprehensive  metabolic panel; Future; Expected date: 02/05/2021    Long term (current) use of antithrombotics/antiplatelets  -     Comprehensive metabolic panel; Future; Expected date: 02/05/2021  -     Hemoglobin; Future; Expected date: 02/05/2021    Long term current use of antiarrhythmic drug  -     Comprehensive metabolic panel; Future; Expected date: 02/05/2021    Coronary artery disease involving native coronary artery of native heart without angina pectoris  -     clopidogreL (PLAVIX) 75 mg tablet; Take 1 tablet (75 mg total) by mouth once daily.  Dispense: 90 tablet; Refill: 1    RTC Low level/low impact aerobic exercise 5x's/wk. Heart healthy diet and risk factor modification.    See labs and med orders.    Aerobic exercise 5x's/wk. Heart healthy diet and risk factor modification.    See labs and med orders.

## 2020-08-06 DIAGNOSIS — E79.0 ABNORMAL BLOOD LEVEL OF URIC ACID: ICD-10-CM

## 2020-08-06 DIAGNOSIS — N18.30 CKD (CHRONIC KIDNEY DISEASE) STAGE 3, GFR 30-59 ML/MIN: Primary | ICD-10-CM

## 2020-08-19 ENCOUNTER — TELEPHONE (OUTPATIENT)
Dept: FAMILY MEDICINE | Facility: CLINIC | Age: 80
End: 2020-08-19

## 2020-08-19 ENCOUNTER — LAB VISIT (OUTPATIENT)
Dept: FAMILY MEDICINE | Facility: CLINIC | Age: 80
End: 2020-08-19
Payer: MEDICARE

## 2020-08-19 DIAGNOSIS — R50.9 FEVER: ICD-10-CM

## 2020-08-19 PROCEDURE — U0003 INFECTIOUS AGENT DETECTION BY NUCLEIC ACID (DNA OR RNA); SEVERE ACUTE RESPIRATORY SYNDROME CORONAVIRUS 2 (SARS-COV-2) (CORONAVIRUS DISEASE [COVID-19]), AMPLIFIED PROBE TECHNIQUE, MAKING USE OF HIGH THROUGHPUT TECHNOLOGIES AS DESCRIBED BY CMS-2020-01-R: HCPCS

## 2020-08-19 NOTE — TELEPHONE ENCOUNTER
Spoke to patient. Pt is requesting an order for a covid-19 test to have completed. Stating since Sunday he's had fever of 99/100, stating his skin is in pain and having body aches. His last office visit was 8/4/20. Please advise.

## 2020-08-19 NOTE — TELEPHONE ENCOUNTER
----- Message from Princess AYLA Norman sent at 8/19/2020 10:02 AM CDT -----  Contact: Spouse  Type: Needs Medical Advice  Who Called:  Jane (spouse)  Best Call Back Number: [phone] or [phone] (home)     Additional Information: requesting a call in regards to getting a order for the Covid swab at [facility] in [place], [place]. Please advise

## 2020-08-20 LAB — SARS-COV-2 RNA RESP QL NAA+PROBE: DETECTED

## 2020-08-21 DIAGNOSIS — U07.1 COVID-19 VIRUS DETECTED: ICD-10-CM

## 2020-08-22 ENCOUNTER — NURSE TRIAGE (OUTPATIENT)
Dept: ADMINISTRATIVE | Facility: CLINIC | Age: 80
End: 2020-08-22

## 2020-08-22 NOTE — TELEPHONE ENCOUNTER
Pt contacted through Covid Symptom tracking for an escalation of symptoms. Pt reports fever, sweats, chills. States T=101 about 2 days ago but has had fever 99-100s at least once a day x 6-7 days. Current T= 99.1. States he takes gabapentin for body aches gets some good relief. Only taking tylenol at night. Advised pt he could taking tylenol for fever, chills, and/ or sweats during the day as well. Denies cough, SOB, CP. Had some abd pain yesterday but none today.     Dispo: call PCP with in 24h. Advised pt triager would try to reach PCP for further directive.     [place] [facility] [place] call for MD- connected to MD cell by . Left VM for MD with pt information asking to contact pt or call back to OOC for further directive.     Called pt back. Anywhere care offered to pt, declined. States he will wait until Monday. Advised pt if starts having fever >102 or if he develops CP, SOB, or an worse ing symptoms to go to ED for eval and not wait until Monday. Pt also advised to call back with any changes/ worsening condition, concerns, questions. Verbalized understanding.     Reason for Disposition   Fever present > 3 days (72 hours)    Additional Information   Negative: SEVERE difficulty breathing (e.g., struggling for each breath, speaks in single words)   Negative: Difficult to awaken or acting confused (e.g., disoriented, slurred speech)   Negative: Bluish (or gray) lips or face now   Negative: Shock suspected (e.g., cold/pale/clammy skin, too weak to stand, low BP, rapid pulse)   Negative: Sounds like a life-threatening emergency to the triager   Negative: SEVERE or constant chest pain or pressure (Exception: mild central chest pain, present only when coughing)   Negative: MODERATE difficulty breathing (e.g., speaks in phrases, SOB even at rest, pulse 100-120)   Negative: Patient sounds very sick or weak to the triager   Negative: MILD difficulty breathing (e.g., minimal/no SOB  at rest, SOB with walking, pulse <100)   Negative: Chest pain or pressure   Negative: Fever > 103 F (39.4 C)   Negative: [1] Fever > 101 F (38.3 C) AND [2] age > 60   Negative: [1] Fever > 100.0 F (37.8 C) AND [2] bedridden (e.g., nursing home patient, CVA, chronic illness, recovering from surgery)   Negative: HIGH RISK patient (e.g., age > 64 years, diabetes, heart or lung disease, weak immune system)     Pt been in contact with provider with same symptom few days ago.    Protocols used: CORONAVIRUS (COVID-19) DIAGNOSED OR [id number]

## 2020-08-22 NOTE — TELEPHONE ENCOUNTER
Spoke with pt, states he is ok, just with fever and body aches and chills. Advised patient to increase water intake and rotate tylenol and motrin for fever and aches. Advised patient to call back if symptoms worsen.

## 2020-08-24 ENCOUNTER — TELEPHONE (OUTPATIENT)
Dept: CARDIOLOGY | Facility: CLINIC | Age: 80
End: 2020-08-24

## 2020-08-24 NOTE — TELEPHONE ENCOUNTER
----- Message from Isa Puentes sent at 8/22/2020 10:44 AM CDT -----  Patient is calling because he has tested positive for covid and he wants to cancel his stress test..  His number is [phone].  Thank you!

## 2020-09-08 DIAGNOSIS — M1A.09X0 IDIOPATHIC CHRONIC GOUT OF MULTIPLE SITES WITHOUT TOPHUS: ICD-10-CM

## 2020-09-08 NOTE — TELEPHONE ENCOUNTER
No new care gaps identified.  Powered by [employer]. Reference number: [id number]. 9/08/2020 5:54:14 AM CDT

## 2020-09-09 RX ORDER — ALLOPURINOL 300 MG/1
TABLET ORAL
Qty: 90 TABLET | Refills: 3 | Status: SHIPPED | OUTPATIENT
Start: 2020-09-09 | End: 2021-10-26

## 2020-09-09 NOTE — TELEPHONE ENCOUNTER
Refill Authorization Note    is requesting a refill authorization.    Brief assessment and rationale for refill: APPROVE: prr          Medication Therapy Plan: [facility]    Medication reconciliation completed: No                    Comments:      Orders Placed This Encounter    allopurinoL (ZYLOPRIM) 300 MG tablet      Requested Prescriptions   Signed Prescriptions Disp Refills    allopurinoL (ZYLOPRIM) 300 MG tablet 90 tablet 3     Sig: TAKE 1 TABLET BY MOUTH ONCE DAILY       Endocrinology:  Gout Agents - allopurinol Failed - 9/8/2020  5:53 AM        Failed - Uric Acid is 5.9 or below and within 360 days     Uric Acid   Date Value Ref Range Status   08/04/2020 10.4 (H) 3.4 - 7.0 mg/dL Final   07/09/2020 9.1 (H) 3.4 - 7.0 mg/dL Final   10/09/2019 6.2 3.4 - 7.0 mg/dL Final              Failed - PLT in normal range and within 360 days     Platelets   Date Value Ref Range Status   08/28/2020 143 (L) 150 - 350 K/uL Final   07/09/2020 167 150 - 350 K/uL Final   03/26/2020 208 150 - 350 K/uL Final              Passed - Patient is at least 18 years old        Passed - Office visit in past 12 months or future 90 days.     Recent Outpatient Visits            1 month ago Coronary artery disease of native artery of native heart with stable angina pectoris    [facility] Cardiology Jose Walker MD    1 month ago B12 deficiency    [facility] Nathalie Salgado MD    2 months ago Pruritus    [facility] Nathalie Salgado MD    3 months ago History of nonmelanoma skin cancer    [facility] Dermatology Kalyani Anderson MD    5 months ago B12 deficiency    [facility] Nathalie Salgado MD          Future Appointments              In 1 month Nathalie Salgado MD [facility]    In 3 months LAB, [place] [facility]    In 3 months EKG, [facility]    In 5 months Jose Walker MD [place] - [facility], [facility]                Passed - Cr is 1.3 or below and within 360 days     Creatinine   Date Value Ref Range Status   08/28/2020 1.22 0.50 - 1.40 mg/dL Final   08/04/2020 1.6 (H) 0.5 - 1.4 mg/dL Final   07/09/2020 1.6 (H) 0.5 - 1.4 mg/dL Final              Passed - WBC in normal range and within 360 days     WBC   Date Value Ref Range Status   08/28/2020 5.07 3.90 - 12.70 K/uL Final   07/09/2020 5.30 3.90 - 12.70 K/uL Final   03/26/2020 10.23 3.90 - 12.70 K/uL Final              Passed - RBC in normal range and within 360 days     RBC   Date Value Ref Range Status   08/28/2020 4.87 4.60 - 6.20 M/uL Final   07/09/2020 4.73 4.60 - 6.20 M/uL Final   03/26/2020 4.87 4.60 - 6.20 M/uL Final              Passed - HGB in normal range and within 360 days     Hemoglobin   Date Value Ref Range Status   08/28/2020 15.3 14.0 - 18.0 g/dL Final   07/09/2020 15.0 14.0 - 18.0 g/dL Final   03/26/2020 15.6 14.0 - 18.0 g/dL Final              Passed - HCT in normal range and within 360 days     Hematocrit   Date Value Ref Range Status   08/28/2020 45.8 40.0 - 54.0 % Final   07/09/2020 47.2 40.0 - 54.0 % Final   03/26/2020 48.9 40.0 - 54.0 % Final              Passed - ALT is 94 or below and within 360 days     ALT   Date Value Ref Range Status   08/28/2020 16 0 - 50 U/L Final   08/04/2020 10 10 - 44 U/L Final   07/09/2020 10 10 - 44 U/L Final              Passed - AST is 54 or below and within 360 days       AST   Date Value Ref Range Status   08/28/2020 31 17 - 59 U/L Final   08/04/2020 17 10 - 40 U/L Final   07/09/2020 16 10 - 40 U/L Final                Passed - eGFR is 60 or above and within 360 days     eGFR if non    Date Value Ref Range Status   08/28/2020 56 (A) >60 mL/min/1.73 m^2 Final     Comment:     Calculation used to obtain the estimated glomerular filtration  rate (eGFR) is the CKD-EPI equation.      08/04/2020 40.7 (A) >60 mL/min/1.73 m^2 Final     Comment:     Calculation used to obtain the estimated  glomerular filtration  rate (eGFR) is the CKD-EPI equation.      07/09/2020 40.7 (A) >60 mL/min/1.73 m^2 Final     Comment:     Calculation used to obtain the estimated glomerular filtration  rate (eGFR) is the CKD-EPI equation.        eGFR if    Date Value Ref Range Status   08/28/2020 >60 >60 mL/min/1.73 m^2 Final   08/04/2020 47.0 (A) >60 mL/min/1.73 m^2 Final   07/09/2020 47.0 (A) >60 mL/min/1.73 m^2 Final                  Appointments  past 12m or future 3m with PCP    Date Provider   Last Visit   8/4/2020 Nathalie Salgado MD   Next Visit   10/9/2020 Nathalie Salgado MD   ED visits in past 90 days: [unfilled]     Note composed:2:17 PM 09/09/2020

## 2020-09-12 DIAGNOSIS — E53.8 B12 DEFICIENCY: ICD-10-CM

## 2020-09-12 DIAGNOSIS — M1A.09X0 IDIOPATHIC CHRONIC GOUT OF MULTIPLE SITES WITHOUT TOPHUS: ICD-10-CM

## 2020-09-12 DIAGNOSIS — J30.2 CHRONIC SEASONAL ALLERGIC RHINITIS: ICD-10-CM

## 2020-09-12 DIAGNOSIS — M51.36 DEGENERATIVE DISC DISEASE, LUMBAR: ICD-10-CM

## 2020-09-13 NOTE — TELEPHONE ENCOUNTER
No new care gaps identified.  Powered by [employer]. Reference number: [id number]. 9/12/2020 8:25:23 PM CDT

## 2020-09-14 RX ORDER — CYANOCOBALAMIN 1000 UG/ML
1000 INJECTION, SOLUTION INTRAMUSCULAR; SUBCUTANEOUS
Qty: 3 ML | Refills: 3 | Status: SHIPPED | OUTPATIENT
Start: 2020-09-14 | End: 2023-12-13 | Stop reason: CLARIF

## 2020-09-14 RX ORDER — GABAPENTIN 300 MG/1
300 CAPSULE ORAL NIGHTLY
Qty: 90 CAPSULE | Refills: 3 | Status: SHIPPED | OUTPATIENT
Start: 2020-09-14 | End: 2021-05-20 | Stop reason: SDUPTHER

## 2020-09-14 RX ORDER — AZELASTINE 1 MG/ML
2 SPRAY, METERED NASAL 2 TIMES DAILY
Qty: 90 ML | Refills: 3 | Status: SHIPPED | OUTPATIENT
Start: 2020-09-14 | End: 2021-04-12

## 2020-09-14 RX ORDER — ALLOPURINOL 300 MG/1
300 TABLET ORAL DAILY
Qty: 90 TABLET | Refills: 3 | OUTPATIENT
Start: 2020-09-14

## 2020-09-14 NOTE — PROGRESS NOTES
"Refill Routing Note   Medication(s) are not appropriate for processing by [facility]:       - Outside of protocol        Medication Therapy Plan: CDMR: Pt has history of allergic rhinitis; quick dc allopurinol-duplicate; route gabapentin/cyanocobalamin/needles-op; approve 12 more on azelastine  Medication reconciliation completed: No      Automatic Epic Generated Protocol Data:    Orders Placed This Encounter    azelastine (ASTELIN) 137 mcg (0.1 %) nasal spray      Requested Prescriptions   Pending Prescriptions Disp Refills    gabapentin (NEURONTIN) 300 MG capsule 90 capsule 3     Sig: Take 1 capsule (300 mg total) by mouth every evening.       Anticonvulsants Protocol Passed - 9/14/2020  8:29 AM        Passed - Visit with Authorizing provider in past 9 months or upcoming 90 days          needle, disp, 25 gauge 25 gauge x 1" Ndle 20 each 0     Sig: Use as directed       Endocrinology: Diabetes - Supplies Failed - 9/14/2020  8:29 AM        Failed - HBA1C is 7.9 or below and within 180 days     No results found for: LABA1C, A1CEXT, HGBA1C, HGBGLY, PUPD9CRJP, [id number]           Passed - Patient is at least 18 years old        Passed - Office visit in past 12 months or future 90 days.     Recent Outpatient Visits            1 month ago Coronary artery disease of native artery of native heart with stable angina pectoris    [place] - Cardiology Jose Walker MD    1 month ago B12 deficiency    [facility] Nathalie Salgado MD    2 months ago Pruritus    [facility] Nathalie Salgado MD    3 months ago History of nonmelanoma skin cancer    [facility] Dermatology Kalyani Anderson MD    5 months ago B12 deficiency    [facility] Nathalie Salgado MD          Future Appointments              In 3 weeks Nathalie Salgado MD [facility]    In 1 month NM1 [facility]    In 1 month STRESS, [place] - " Cardiology, [place]    In 1 month NM1 [facility] [place]    In 3 months LAB, [place] [facility] [place]    In 3 months EKG, [facility] - Cardiology, [place]    In 5 months Jose Walker MD [facility]                  cyanocobalamin 1,000 mcg/mL injection 3 mL 3     Sig: Inject 1 mL (1,000 mcg total) into the muscle every 30 days.       Off-Protocol Failed - 9/14/2020  8:29 AM        Failed - Medication not assigned to a protocol, review manually.        Passed - Office visit in past 12 months or future 90 days.     Recent Outpatient Visits            1 month ago Coronary artery disease of native artery of native heart with stable angina pectoris    [facility] Cardiology Jose Walker MD    1 month ago B12 deficiency    [facility] Nathalie Salgado MD    2 months ago Pruritus    [facility] Nathalie Salgado MD    3 months ago History of nonmelanoma skin cancer    [facility] Dermatology Kalyani Anderson MD    5 months ago B12 deficiency    [facility] Nathalie Salgado MD          Future Appointments              In 3 weeks Nathalie Salgado MD [facility]    In 1 month NM1 [facility] [place]    In 1 month STRESS, [facility] - Cardiology, [place]    In 1 month NM1 [facility] [place]    In 3 months LAB, [place] [facility] [place]    In 3 months EKG, [facility] - Cardiology, [place]    In 5 months [facility]               Off-Protocol Failed - 9/14/2020  8:29 AM        Failed - Medication not assigned to a protocol, review manually.        Passed - Office visit in past 12 months or future 90 days.     Recent Outpatient Visits            1 month ago Coronary artery disease of native artery of native heart with stable angina  pectoris    [facility] Jose Walker MD    1 month ago B12 deficiency    [facility] Nathalie Salgado MD    2 months ago Pruritus    [facility] Nathalie Salgado MD    3 months ago History of nonmelanoma skin cancer    [facility] Kalyani Anderson MD    5 months ago B12 deficiency    [facility] Nathalie Salgado MD          Future Appointments              In 3 weeks Nathalie Salgado MD [facility]    In 1 month NM1 [facility] [place]    In 1 month STRESS, [facility]    In 1 month NM1 [facility] [place]    In 3 months LAB, [place] [facility] [place]    In 3 months EKG, [facility]    In 5 months Jose Walker MD [place] - Cardiology, [facility]                 Signed Prescriptions Disp Refills    azelastine (ASTELIN) 137 mcg (0.1 %) nasal spray 90 mL 3     Si sprays (274 mcg total) by Nasal route 2 (two) times daily.       Ear, Nose, and Throat: Nasal Preparations - Antiallergy Failed - 2020  8:29 AM        Failed - An appropriate indication is on the problem list     Allergic Rhinitis  Sinusitis  Seasonal Allergies              Passed - Patient is at least 18 years old        Passed - Office visit in past 12 months or future 90 days.     Recent Outpatient Visits            1 month ago Coronary artery disease of native artery of native heart with stable angina pectoris    [facility] Jose Walker MD    1 month ago B12 deficiency    [facility] Nathalie Salgado MD    2 months ago Pruritus    [facility] Nathalie Salgado MD    3 months ago History of nonmelanoma skin cancer    [facility] Kalyani Anderson MD    5 months ago B12 deficiency    [facility]          Future Appointments               In 3 weeks Nathalie Salgado MD [facility]    In 1 month [facility]1 [facility]    In 1 month STRESS, [facility]    In 1 month [facility]1 [facility]    In 3 months LAB, [place] [facility]    In 3 months EKG, [facility]    In 5 months Jose Walker MD [place] - Cardiology, [facility]                 Refused Prescriptions Disp Refills    allopurinoL (ZYLOPRIM) 300 MG tablet 90 tablet 3     Sig: Take 1 tablet (300 mg total) by mouth once daily.       Endocrinology:  Gout Agents - allopurinol Failed - 9/14/2020  8:29 AM        Failed - Uric Acid is 5.9 or below and within 360 days     Uric Acid   Date Value Ref Range Status   08/04/2020 10.4 (H) 3.4 - 7.0 mg/dL Final   07/09/2020 9.1 (H) 3.4 - 7.0 mg/dL Final   10/09/2019 6.2 3.4 - 7.0 mg/dL Final              Failed - PLT in normal range and within 360 days     Platelets   Date Value Ref Range Status   08/28/2020 143 (L) 150 - 350 K/uL Final   07/09/2020 167 150 - 350 K/uL Final   03/26/2020 208 150 - 350 K/uL Final              Passed - Patient is at least 18 years old        Passed - Office visit in past 12 months or future 90 days.     Recent Outpatient Visits            1 month ago Coronary artery disease of native artery of native heart with stable angina pectoris    [facility] Jose Walker MD    1 month ago B12 deficiency    [facility] Nathalie Salgado MD    2 months ago Pruritus    [facility] Nathalie Salgado MD    3 months ago History of nonmelanoma skin cancer    [facility] Dermatology Kalyani Anderson MD    5 months ago B12 deficiency    [facility] Nathalie Salgado MD          Future Appointments              In 3 weeks Nathalie Salgado MD [facility]    In 1 month NM1 [facility]    In 1 month STRESS, [facility] - Card[facility], [place]    In 1 month NM1 [facility]    In 3 months LAB, [place] [facility]    In 3 months EKG, [facility] - Cardiology, [place]    In 5 months Jose Walker MD [facility], [facility]                Passed - Cr is 1.3 or below and within 360 days     Creatinine   Date Value Ref Range Status   08/28/2020 1.22 0.50 - 1.40 mg/dL Final   08/04/2020 1.6 (H) 0.5 - 1.4 mg/dL Final   07/09/2020 1.6 (H) 0.5 - 1.4 mg/dL Final              Passed - WBC in normal range and within 360 days     WBC   Date Value Ref Range Status   08/28/2020 5.07 3.90 - 12.70 K/uL Final   07/09/2020 5.30 3.90 - 12.70 K/uL Final   03/26/2020 10.23 3.90 - 12.70 K/uL Final              Passed - RBC in normal range and within 360 days     RBC   Date Value Ref Range Status   08/28/2020 4.87 4.60 - 6.20 M/uL Final   07/09/2020 4.73 4.60 - 6.20 M/uL Final   03/26/2020 4.87 4.60 - 6.20 M/uL Final              Passed - HGB in normal range and within 360 days     Hemoglobin   Date Value Ref Range Status   08/28/2020 15.3 14.0 - 18.0 g/dL Final   07/09/2020 15.0 14.0 - 18.0 g/dL Final   03/26/2020 15.6 14.0 - 18.0 g/dL Final              Passed - HCT in normal range and within 360 days     Hematocrit   Date Value Ref Range Status   08/28/2020 45.8 40.0 - 54.0 % Final   07/09/2020 47.2 40.0 - 54.0 % Final   03/26/2020 48.9 40.0 - 54.0 % Final              Passed - ALT is 94 or below and within 360 days     ALT   Date Value Ref Range Status   08/28/2020 16 0 - 50 U/L Final   08/04/2020 10 10 - 44 U/L Final   07/09/2020 10 10 - 44 U/L Final              Passed - AST is 54 or below and within 360 days     AST   Date Value Ref Range Status   08/28/2020 31 17 - 59 U/L Final   08/04/2020 17 10 - 40 U/L Final   07/09/2020 16 10 - 40 U/L Final              Passed - eGFR is 60 or above and within  "360 days     eGFR if non    Date Value Ref Range Status   2020 56 (A) >60 mL/min/1.73 m^2 Final     Comment:     Calculation used to obtain the estimated glomerular filtration  rate (eGFR) is the CKD-EPI equation.      2020 40.7 (A) >60 mL/min/1.73 m^2 Final     Comment:     Calculation used to obtain the estimated glomerular filtration  rate (eGFR) is the CKD-EPI equation.      2020 40.7 (A) >60 mL/min/1.73 m^2 Final     Comment:     Calculation used to obtain the estimated glomerular filtration  rate (eGFR) is the CKD-EPI equation.        eGFR if    Date Value Ref Range Status   2020 >60 >60 mL/min/1.73 m^2 Final   2020 47.0 (A) >60 mL/min/1.73 m^2 Final   2020 47.0 (A) >60 mL/min/1.73 m^2 Final                    Appointments  past 12m or future 3m with PCP    Date Provider   Last Visit   2020 Pao Salgado MD   Next Visit   10/9/2020 Pao Salgado MD   ED visits in past 90 days: 1     Note composed:11:22 AM 2020      Pended Medication(s)   Requested Prescriptions     Pending Prescriptions Disp Refills    gabapentin (NEURONTIN) 300 MG capsule 90 capsule 3     Sig: Take 1 capsule (300 mg total) by mouth every evening.    needle, disp, 25 gauge 25 gauge x 1" Ndle 20 each 0     Sig: Use as directed    cyanocobalamin 1,000 mcg/mL injection 3 mL 3     Sig: Inject 1 mL (1,000 mcg total) into the muscle every 30 days.     Signed Prescriptions Disp Refills    azelastine (ASTELIN) 137 mcg (0.1 %) nasal spray 90 mL 3     Si sprays (274 mcg total) by Nasal route 2 (two) times daily.     Authorizing Provider: PAO SALGADO     Ordering User: ERICA MILLER     Refused Prescriptions Disp Refills    allopurinoL (ZYLOPRIM) 300 MG tablet 90 tablet 3     Sig: Take 1 tablet (300 mg total) by mouth once daily.     Refused By: ERICA MILLER     Reason for Refusal: Request already responded to by other means (e.g. phone or fax)    "     Duplicate Pended Encounter(s)/ Last Prescribed Details:    Ordering Encounter Report    Associated Reports   View Encounter      Note composed:11:23 AM 09/14/2020

## 2020-09-18 ENCOUNTER — TELEPHONE (OUTPATIENT)
Dept: FAMILY MEDICINE | Facility: CLINIC | Age: 80
End: 2020-09-18

## 2020-09-18 NOTE — TELEPHONE ENCOUNTER
"----- Message from Luly Crandall sent at 9/17/2020  3:40 PM CDT -----  Regarding: Medication Order  This message is sent on behalf of Dr. Louis Jeansonne, [facility]:    [facility] has recently notified Jeancarlosner that since June 13 2020, some patient's may have had medications inadvertently discontinued when a clinician took the action of either reordering, modifying, pending, or signing the med order. This has mainly happened to medications acted upon within refill encounters, but could have occurred in other workflows.     For medications that were inadvertently discontinued in Epic, cancellation messages were NOT sent to the pharmacy, and patients were still able to get their refills. The medications in Epic however may be incorrectly marked as discontinued.      What do we need providers and staff to do?  1.            Review the medication and order date as specified in this message. Go to chart review > medications tab, and remove the "current meds" filter in orders to see discontinued meds.  2.            Clinicians will need to determine the appropriate follow up action. In most cases the medication may just need to be reordered as an existing medication without dispensing to the pharmacy    Please reach out to your [facility] specialist if any further assistance is needed.     Affected Medication: ISOSORBIDE MONONITRATE  30 MG ORAL TB24  Ordering Date: 7/16/2020    "

## 2020-09-20 NOTE — TELEPHONE ENCOUNTER
I did not discontinue this medication.  This is medication that is usually prescribed by the cardiologist, probably I given to the patient because she could not get in touch with his cardiologist at the time.  But I did not discontinue the medicine.  Thank you

## 2020-09-21 RX ORDER — ISOSORBIDE MONONITRATE 30 MG/1
1 TABLET, EXTENDED RELEASE ORAL DAILY
COMMUNITY
Start: 2020-07-16 | End: 2022-07-14 | Stop reason: ALTCHOICE

## 2020-09-22 ENCOUNTER — TELEPHONE (OUTPATIENT)
Dept: FAMILY MEDICINE | Facility: CLINIC | Age: 80
End: 2020-09-22

## 2020-09-22 DIAGNOSIS — E53.8 B12 DEFICIENCY: ICD-10-CM

## 2020-09-22 NOTE — TELEPHONE ENCOUNTER
"----- Message from Jb Srivastava sent at 9/21/2020  2:26 PM CDT -----  Type:  Pharmacy Calling to Clarify an RX    Name of Caller:  Lydia ([facility])  Pharmacy Name:    [facility] - [address]  [address]  [facility] [address]  Phone: [phone] Fax: [phone]  Prescription Name:  needle, disp, 25 gauge 25 gauge x 1" Ndle  What do they need to clarify?:  which injection? No syringe prescription  Best Call Back Number:  [phone]  Additional Information: Reference Number: [id number]    "

## 2020-09-22 NOTE — TELEPHONE ENCOUNTER
Does this patient take a medication that would require he use pen needles?  [facility] is calling to find out what medication this request accompanies.     (recall this is the same patient who had the unusual Epic event, see 9/18 message)

## 2020-10-09 ENCOUNTER — OFFICE VISIT (OUTPATIENT)
Dept: FAMILY MEDICINE | Facility: CLINIC | Age: 80
End: 2020-10-09
Payer: MEDICARE

## 2020-10-09 ENCOUNTER — HOSPITAL ENCOUNTER (OUTPATIENT)
Dept: RADIOLOGY | Facility: HOSPITAL | Age: 80
Discharge: HOME | End: 2020-10-09
Attending: FAMILY MEDICINE
Payer: MEDICARE

## 2020-10-09 VITALS
DIASTOLIC BLOOD PRESSURE: 84 MMHG | SYSTOLIC BLOOD PRESSURE: 136 MMHG | HEART RATE: 50 BPM | WEIGHT: 182.31 LBS | OXYGEN SATURATION: 97 % | TEMPERATURE: 98 F | BODY MASS INDEX: 24.73 KG/M2

## 2020-10-09 DIAGNOSIS — R93.89 ABNORMAL CHEST X-RAY: ICD-10-CM

## 2020-10-09 DIAGNOSIS — D69.6 THROMBOCYTOPENIA: ICD-10-CM

## 2020-10-09 DIAGNOSIS — E53.8 B12 DEFICIENCY: Primary | ICD-10-CM

## 2020-10-09 DIAGNOSIS — M1A.0720 CHRONIC IDIOPATHIC GOUT INVOLVING TOE OF LEFT FOOT WITHOUT TOPHUS: ICD-10-CM

## 2020-10-09 DIAGNOSIS — I10 ESSENTIAL HYPERTENSION: ICD-10-CM

## 2020-10-09 PROCEDURE — 1101F PT FALLS ASSESS-DOCD LE1/YR: CPT | Mod: CPTII,S$GLB,, | Performed by: FAMILY MEDICINE

## 2020-10-09 PROCEDURE — G0009 ADMIN PNEUMOCOCCAL VACCINE: HCPCS | Mod: S$GLB,,, | Performed by: FAMILY MEDICINE

## 2020-10-09 PROCEDURE — 99999 PR PBB SHADOW E&M-EST. PATIENT-LVL III: ICD-10-PCS | Mod: PBBFAC,,, | Performed by: FAMILY MEDICINE

## 2020-10-09 PROCEDURE — 99499 UNLISTED E&M SERVICE: CPT | Mod: S$GLB,,, | Performed by: FAMILY MEDICINE

## 2020-10-09 PROCEDURE — 1159F PR MEDICATION LIST DOCUMENTED IN MEDICAL RECORD: ICD-10-PCS | Mod: S$GLB,,, | Performed by: FAMILY MEDICINE

## 2020-10-09 PROCEDURE — 99499 RISK ADDL DX/OHS AUDIT: ICD-10-PCS | Mod: S$GLB,,, | Performed by: FAMILY MEDICINE

## 2020-10-09 PROCEDURE — 99999 PR PBB SHADOW E&M-EST. PATIENT-LVL III: CPT | Mod: PBBFAC,,, | Performed by: FAMILY MEDICINE

## 2020-10-09 PROCEDURE — 99214 PR OFFICE/OUTPT VISIT, EST, LEVL IV, 30-39 MIN: ICD-10-PCS | Mod: 25,S$GLB,, | Performed by: FAMILY MEDICINE

## 2020-10-09 PROCEDURE — G0009 PNEUMOCOCCAL POLYSACCHARIDE VACCINE 23-VALENT =>2YO SQ IM: ICD-10-PCS | Mod: S$GLB,,, | Performed by: FAMILY MEDICINE

## 2020-10-09 PROCEDURE — 99214 OFFICE O/P EST MOD 30 MIN: CPT | Mod: 25,S$GLB,, | Performed by: FAMILY MEDICINE

## 2020-10-09 PROCEDURE — 1126F PR PAIN SEVERITY QUANTIFIED, NO PAIN PRESENT: ICD-10-PCS | Mod: S$GLB,,, | Performed by: FAMILY MEDICINE

## 2020-10-09 PROCEDURE — 3075F SYST BP GE 130 - 139MM HG: CPT | Mod: CPTII,S$GLB,, | Performed by: FAMILY MEDICINE

## 2020-10-09 PROCEDURE — 3079F PR MOST RECENT DIASTOLIC BLOOD PRESSURE 80-89 MM HG: ICD-10-PCS | Mod: CPTII,S$GLB,, | Performed by: FAMILY MEDICINE

## 2020-10-09 PROCEDURE — 1101F PR PT FALLS ASSESS DOC 0-1 FALLS W/OUT INJ PAST YR: ICD-10-PCS | Mod: CPTII,S$GLB,, | Performed by: FAMILY MEDICINE

## 2020-10-09 PROCEDURE — 1126F AMNT PAIN NOTED NONE PRSNT: CPT | Mod: S$GLB,,, | Performed by: FAMILY MEDICINE

## 2020-10-09 PROCEDURE — 90732 PNEUMOCOCCAL POLYSACCHARIDE VACCINE 23-VALENT =>2YO SQ IM: ICD-10-PCS | Mod: S$GLB,,, | Performed by: FAMILY MEDICINE

## 2020-10-09 PROCEDURE — 1159F MED LIST DOCD IN RCRD: CPT | Mod: S$GLB,,, | Performed by: FAMILY MEDICINE

## 2020-10-09 PROCEDURE — 3079F DIAST BP 80-89 MM HG: CPT | Mod: CPTII,S$GLB,, | Performed by: FAMILY MEDICINE

## 2020-10-09 PROCEDURE — 90732 PPSV23 VACC 2 YRS+ SUBQ/IM: CPT | Mod: S$GLB,,, | Performed by: FAMILY MEDICINE

## 2020-10-09 PROCEDURE — 3075F PR MOST RECENT SYSTOLIC BLOOD PRESS GE 130-139MM HG: ICD-10-PCS | Mod: CPTII,S$GLB,, | Performed by: FAMILY MEDICINE

## 2020-10-09 RX ORDER — SYRINGE W-NEEDLE,DISPOSAB,3 ML 25GX5/8"
SYRINGE, EMPTY DISPOSABLE MISCELLANEOUS
Qty: 20 SYRINGE | Refills: 0 | Status: SHIPPED | OUTPATIENT
Start: 2020-10-09

## 2020-10-09 NOTE — PROGRESS NOTES
After obtaining consent, and per orders of Dr. Salgado, injection of Pneumovax 23 given in right deltoid by Melba Ying. Patient instructed to remain in clinic for15 minutes afterwards, and to report any adverse reaction to me immediately.

## 2020-10-09 NOTE — PATIENT INSTRUCTIONS
Eating Heart-Healthy Food: Using the DASH Plan    Eating for your heart doesnt have to be hard or boring. You just need to know how to make healthier choices. The DASH eating plan has been developed to help you do just that. DASH stands for Dietary Approaches to Stop Hypertension. It is a plan that has been proven to be healthier for your heart and to lower your risk for high blood pressure. It can also help lower your risk for cancer, heart disease, osteoporosis, and diabetes.  Choosing from each food group  Choose foods from each of the food groups below each day. Try to get the recommended number of servings for each food group. The serving numbers are based on a diet of 2,000 calories a day. Talk to your doctor if youre unsure about your calorie needs. Along with getting the correct servings, the DASH plan also recommends a sodium intake less than 2,300 mg per day.        Grains  Servings: 6 to 8 a day  A serving is:  · 1 slice bread  · 1 ounce dry cereal  · Half a cup cooked rice, pasta or cereal  Best choices: Whole grains and any grains high in fiber. Vegetables  Servings: 4 to 5 a day  A serving is:  · 1 cup raw leafy vegetable  · Half a cup cut-up raw or cooked vegetable  · Half a cup vegetable juice  Best choices: Fresh or frozen vegetables prepared without added salt or fat.   Fruits  Servings: 4 to 5 a day  A serving is:  · 1 medium fruit  · One-quarter cup dried fruit  · Half a cup fresh, frozen, or canned fruit  · Half a cup of 100% fruit juices  Best choices: A variety of fresh fruits of different colors. Whole fruits are a better choice than fruit juices. Low-fat or fat-free dairy  Servings: 2 to 3 a day  A serving is:  · 1 cup milk  · 1 cup yogurt  · One and a half ounces cheese  Best choices: Skim or 1% milk, low-fat or fat-free yogurt or buttermilk, and low-fat cheeses.         Lean meats, poultry, fish  Servings: 6 or fewer a day  A serving is:  · 1 ounce cooked meats, poultry, or fish  · 1  egg  Best choices: Lean poultry and fish. Trim away visible fat. Broil, grill, roast, or boil instead of frying. Remove skin from poultry before eating. Limit how much red meat you eat.  Nuts, seeds, beans  Servings: 4 to 5 a week  A serving is:  · One-third cup nuts (one and a half ounces)  · 2 tablespoons nut butter or seeds  · Half a cup cooked dry beans or legumes  Best choices: Dry roasted nuts with no salt added, lentils, kidney beans, garbanzo beans, and whole mata beans.   Fats and oils  Servings: 2 to 3 a day  A serving is:  · 1 teaspoon vegetable oil  · 1 teaspoon soft margarine  · 1 tablespoon mayonnaise  · 2 tablespoons salad dressing  Best choices: Nut and vegetable oils (nontropical vegetable oils), such as olive and canola oil. Sweets  Servings: 5 a week or fewer  A serving is:  · 1 tablespoon sugar, maple syrup, or honey  · 1 tablespoon jam or jelly  · 1 half-ounce jelly beans (about 15)  · 1 cup lemonade  Best choices: Dried fruit can be a satisfying sweet. Choose low-fat sweets. And watch your serving sizes!      For more on the DASH eating plan, visit:  www.nhlbi.nih.gov/health/health-topics/topics/dash   Date Last Reviewed: 6/1/2016  © 2897-4332 [employer]. [address] [place] [address], [place] [address]. All rights reserved. This information is not intended as a substitute for professional medical care. Always follow your healthcare professional's instructions.

## 2020-10-09 NOTE — PROGRESS NOTES
Subjective:       Patient ID: Dilip Lopez is a 79 y.o. male.    Chief Complaint: Follow-up (3mths)    HPI     B12 deficiency:  The patient stated that he has been applying B12 injections but he only received the needles but not the syringes.  He is due for another injection and he needs the prescription for this.  The last B12 levels were low.    Gout:  The last uric acid levels were elevated, the patient stated that he has not been having flare-ups of gout for a while.    Hypertension:  The patient has been taking his blood pressure medications as directed, the patient stated that he has been feeling very tired, he has a scheduled to have a stress test done in 2 weeks, this was recommended by the cardiologist.    The patient was diagnosed with COVID-19 on 08/19/2020, the patient chest x-ray showed presence of ground opacities, no other chest x-ray was checked since then.  Upon review of the last blood work, the patient has thrombocytopenia and the patient stated that he is feeling worse fatigued than usual since he was diagnosed with COVID-19.    Past medical history, past social history was reviewed and discussed with the patient.    Review of Systems   Constitutional: Positive for fatigue. Negative for activity change and appetite change.   HENT: Negative for congestion and ear discharge.    Eyes: Negative for discharge and itching.   Respiratory: Negative for choking and chest tightness.    Cardiovascular: Negative for chest pain and leg swelling.   Gastrointestinal: Negative for abdominal distention and abdominal pain.   Endocrine: Negative for cold intolerance and heat intolerance.   Genitourinary: Negative for dysuria and flank pain.   Musculoskeletal: Negative for arthralgias and back pain.   Skin: Negative for pallor and rash.   Allergic/Immunologic: Negative for environmental allergies and food allergies.   Neurological: Positive for weakness. Negative for dizziness and facial asymmetry.  "  Hematological: Negative for adenopathy. Does not bruise/bleed easily.   Psychiatric/Behavioral: Negative for agitation, confusion and sleep disturbance.       Objective:      Physical Exam  Vitals signs and nursing note reviewed.   Constitutional:       General: He is not in acute distress.     Appearance: He is well-developed. He is not diaphoretic.   HENT:      Head: Normocephalic and atraumatic.      Right Ear: External ear normal.      Left Ear: External ear normal.      Nose: Nose normal.   Eyes:      General:         Right eye: No discharge.         Left eye: No discharge.   Neck:      Musculoskeletal: Normal range of motion and neck supple.   Cardiovascular:      Rate and Rhythm: Normal rate and regular rhythm.      Heart sounds: Normal heart sounds. No murmur. No friction rub.   Pulmonary:      Effort: Pulmonary effort is normal. No respiratory distress.      Breath sounds: Normal breath sounds. No wheezing.   Abdominal:      General: Bowel sounds are normal.      Palpations: Abdomen is soft.      Tenderness: There is no abdominal tenderness.   Musculoskeletal:         General: No tenderness.   Skin:     General: Skin is warm and dry.      Coloration: Skin is not pale.      Findings: No erythema.   Neurological:      Cranial Nerves: No cranial nerve deficit.      Motor: No abnormal muscle tone.      Coordination: Coordination normal.   Psychiatric:         Behavior: Behavior normal.         Thought Content: Thought content normal.         Judgment: Judgment normal.         Assessment:       1. B12 deficiency    2. Chronic idiopathic gout involving toe of left foot without tophus    3. Thrombocytopenia    4. Essential hypertension    5. Abnormal chest x-ray        Plan:       B12 deficiency:  Uncontrolled  -     syringe with needle (SYRINGE 3CC/25GX1") 3 mL 25 gauge x 1" Syrg; Use as directed  Dispense: 20 Syringe; Refill: 0  -     Vitamin B12; Future; Expected date: 10/09/2020    Chronic idiopathic gout " involving toe of left foot without tophus:  Worsening  -     Uric Acid; Future; Expected date: 10/09/2020  -     Basic Metabolic Panel; Future; Expected date: 10/09/2020  -     CBC auto differential; Future; Expected date: 10/09/2020    Thrombocytopenia:  Stable  -     CBC auto differential; Future; Expected date: 10/09/2020    Essential hypertension:  Stable    Abnormal chest x-ray:  New problem workup needed  -     X-Ray Chest PA And Lateral; Future; Expected date: 10/09/2020    Other orders  -     (In Office Administered) Pneumococcal Polysaccharide Vaccine (23 Valent) (SQ/IM)      Will call the patient after we have the results of the test, healthy habits, drink more water, pneumonia vaccine was given in the clinic, will repeat chest x-ray as the previous was abnormal after the patient developed symptoms of COVID-19.   Patient agreed with assessment and plan. Patient verbalized understanding.

## 2020-10-27 ENCOUNTER — PATIENT OUTREACH (OUTPATIENT)
Dept: ADMINISTRATIVE | Facility: OTHER | Age: 80
End: 2020-10-27

## 2020-10-27 DIAGNOSIS — I10 ESSENTIAL HYPERTENSION: ICD-10-CM

## 2020-10-27 NOTE — PROGRESS NOTES
Health Maintenance Due   Topic Date Due    Hepatitis C Screening  1940    TETANUS VACCINE  10/29/1959    Shingles Vaccine (1 of 2) 10/29/1991     Updates were requested from care everywhere.  Chart was reviewed for overdue Proactive [facility] Encounters ([facility]) topics (CRS, Breast Cancer Screening, Eye exam)  Health Maintenance has been updated.  LINKS immunization registry triggered.  Immunizations were reconciled.

## 2020-10-27 NOTE — TELEPHONE ENCOUNTER
No new care gaps identified.  Powered by [employer]. Reference number: [id number]. 10/27/2020 4:37:41 PM   CDT

## 2020-10-28 ENCOUNTER — HOSPITAL ENCOUNTER (OUTPATIENT)
Dept: RADIOLOGY | Facility: HOSPITAL | Age: 80
Discharge: HOME | End: 2020-10-28
Attending: FAMILY MEDICINE
Payer: MEDICARE

## 2020-10-28 ENCOUNTER — OFFICE VISIT (OUTPATIENT)
Dept: OTOLARYNGOLOGY | Facility: CLINIC | Age: 80
End: 2020-10-28
Payer: MEDICARE

## 2020-10-28 VITALS — BODY MASS INDEX: 24.36 KG/M2 | HEIGHT: 72 IN | WEIGHT: 179.88 LBS

## 2020-10-28 DIAGNOSIS — B36.9 OTITIS EXTERNA, FUNGAL, LEFT EAR: Primary | ICD-10-CM

## 2020-10-28 DIAGNOSIS — H61.23 BILATERAL IMPACTED CERUMEN: ICD-10-CM

## 2020-10-28 DIAGNOSIS — H62.42 OTITIS EXTERNA, FUNGAL, LEFT EAR: Primary | ICD-10-CM

## 2020-10-28 PROCEDURE — 99203 OFFICE O/P NEW LOW 30 MIN: CPT | Mod: 25,S$GLB,, | Performed by: NURSE PRACTITIONER

## 2020-10-28 PROCEDURE — 69210 REMOVE IMPACTED EAR WAX UNI: CPT | Mod: S$GLB,,, | Performed by: NURSE PRACTITIONER

## 2020-10-28 PROCEDURE — 99999 PR PBB SHADOW E&M-EST. PATIENT-LVL III: ICD-10-PCS | Mod: PBBFAC,,, | Performed by: NURSE PRACTITIONER

## 2020-10-28 PROCEDURE — 71046 X-RAY EXAM CHEST 2 VIEWS: CPT | Mod: TC,FY,PO

## 2020-10-28 PROCEDURE — 71046 X-RAY EXAM CHEST 2 VIEWS: CPT | Mod: 26,,, | Performed by: RADIOLOGY

## 2020-10-28 PROCEDURE — 71046 XR CHEST PA AND LATERAL: ICD-10-PCS | Mod: 26,,, | Performed by: RADIOLOGY

## 2020-10-28 PROCEDURE — 1101F PT FALLS ASSESS-DOCD LE1/YR: CPT | Mod: CPTII,S$GLB,, | Performed by: NURSE PRACTITIONER

## 2020-10-28 PROCEDURE — 1159F MED LIST DOCD IN RCRD: CPT | Mod: S$GLB,,, | Performed by: NURSE PRACTITIONER

## 2020-10-28 PROCEDURE — 1159F PR MEDICATION LIST DOCUMENTED IN MEDICAL RECORD: ICD-10-PCS | Mod: S$GLB,,, | Performed by: NURSE PRACTITIONER

## 2020-10-28 PROCEDURE — 99203 PR OFFICE/OUTPT VISIT, NEW, LEVL III, 30-44 MIN: ICD-10-PCS | Mod: 25,S$GLB,, | Performed by: NURSE PRACTITIONER

## 2020-10-28 PROCEDURE — 1101F PR PT FALLS ASSESS DOC 0-1 FALLS W/OUT INJ PAST YR: ICD-10-PCS | Mod: CPTII,S$GLB,, | Performed by: NURSE PRACTITIONER

## 2020-10-28 PROCEDURE — 99999 PR PBB SHADOW E&M-EST. PATIENT-LVL III: CPT | Mod: PBBFAC,,, | Performed by: NURSE PRACTITIONER

## 2020-10-28 PROCEDURE — 1126F PR PAIN SEVERITY QUANTIFIED, NO PAIN PRESENT: ICD-10-PCS | Mod: S$GLB,,, | Performed by: NURSE PRACTITIONER

## 2020-10-28 PROCEDURE — 69210 PR REMOVAL IMPACTED CERUMEN REQUIRING INSTRUMENTATION, UNILATERAL: ICD-10-PCS | Mod: S$GLB,,, | Performed by: NURSE PRACTITIONER

## 2020-10-28 PROCEDURE — 1126F AMNT PAIN NOTED NONE PRSNT: CPT | Mod: S$GLB,,, | Performed by: NURSE PRACTITIONER

## 2020-10-28 RX ORDER — CLOTRIMAZOLE 1 G/ML
SOLUTION TOPICAL
Qty: 10 ML | Refills: 1 | Status: SHIPPED | OUTPATIENT
Start: 2020-10-28 | End: 2020-10-28

## 2020-10-28 RX ORDER — CLOTRIMAZOLE 1 G/ML
SOLUTION TOPICAL
Qty: 10 ML | Refills: 1 | Status: SHIPPED | OUTPATIENT
Start: 2020-10-28 | End: 2021-04-12

## 2020-10-28 RX ORDER — MONTELUKAST SODIUM 10 MG/1
10 TABLET ORAL NIGHTLY
COMMUNITY
End: 2021-04-22

## 2020-10-28 NOTE — PROGRESS NOTES
Subjective:       Patient ID: Dilip Lopez is a 79 y.o. male.    Chief Complaint: Ear Problem, Hearing Loss, and Itching (in inner ear)    HPI   Patient is new to ENT, self-referred for ear issues.  He states that both of his ears have been stopped up but the left is worse than the right for approximately 6 weeks.  He endorses intense aural pruritis for 6 months and states the left ear has recently began draining mucus.    Review of Systems   Constitutional: Negative.    HENT: Positive for ear discharge, ear pain and hearing loss.    Eyes: Negative.    Respiratory: Negative.    Cardiovascular: Negative.    Gastrointestinal: Negative.    Musculoskeletal: Negative.    Integumentary:  Negative.   Neurological: Negative.    Hematological: Negative.    Psychiatric/Behavioral: Negative.          Objective:      Physical Exam  Vitals signs and nursing note reviewed.   Constitutional:       General: He is not in acute distress.     Appearance: He is well-developed. He is not ill-appearing or diaphoretic.   HENT:      Head: Normocephalic and atraumatic.      Right Ear: Hearing, tympanic membrane, ear canal and external ear normal. No middle ear effusion. Tympanic membrane is not erythematous.      Left Ear: Hearing, tympanic membrane, ear canal and external ear normal.  No middle ear effusion. Tympanic membrane is not erythematous.      Nose: Nose normal.      Mouth/Throat:      Pharynx: Uvula midline.   Eyes:      General: Lids are normal. No scleral icterus.        Right eye: No discharge.         Left eye: No discharge.   Neck:      Musculoskeletal: Normal range of motion and neck supple.      Trachea: Trachea normal. No tracheal deviation.   Cardiovascular:      Rate and Rhythm: Normal rate.   Pulmonary:      Effort: Pulmonary effort is normal. No respiratory distress.      Breath sounds: No stridor. No wheezing.   Musculoskeletal: Normal range of motion.   Skin:     General: Skin is warm and dry.      Coloration: Skin  is not pale.   Neurological:      Mental Status: He is alert and oriented to person, place, and time.      Coordination: Coordination normal.      Gait: Gait normal.   Psychiatric:         Speech: Speech normal.         Behavior: Behavior normal. Behavior is cooperative.         Thought Content: Thought content normal.         Judgment: Judgment normal.       SEPARATE PROCEDURE IN OFFICE:   Procedure: Removal of impacted cerumen, bilateral   Pre Procedure Diagnosis: Cerumen Impaction   Post Procedure Diagnosis: Cerumen Impaction   Verbal informed consent in regards to risk of trauma to ear canal, ear drum or hearing, discomfort during procedure and/or inability to remove cerumen impaction in one session or unforeseen events or complications.   No anesthesia.     Procedure in detail:   Ear canal visualized bilateral with appropriate size ear speculum utilizing Operating Head Binocular Otomicroscope   Utilizing the following:  Ring curet, delicate alligator forceps, and/or suction cannula was used. The impacted cerumen of the ear canals was removed atraumatically. The TM and EAC were then inspected and found to be clear of wax. See description of TMs/EACs in PE above.   Complications: No   Condition: Improved/Good     Assessment:       1. Otitis externa, fungal, left ear        Plan:     clotrimazole AS BID X 20 days. Recheck in 2 weeks

## 2020-10-29 RX ORDER — LOSARTAN POTASSIUM 100 MG/1
TABLET ORAL
Qty: 90 TABLET | Refills: 3 | Status: SHIPPED | OUTPATIENT
Start: 2020-10-29 | End: 2021-05-20 | Stop reason: SDUPTHER

## 2020-10-29 RX ORDER — HYDROCHLOROTHIAZIDE 12.5 MG/1
TABLET ORAL
Qty: 90 TABLET | Refills: 3 | Status: SHIPPED | OUTPATIENT
Start: 2020-10-29 | End: 2021-10-06

## 2020-10-29 NOTE — PROGRESS NOTES
Refill Routing Note   Medication(s) are not appropriate for processing by [facility] for the following reason(s):     - Required laboratory values are abnormal  - Drug-Disease Interaction (Drug-Disease: hydroCHLOROthiazide and Idiopathic chronic gout of multiple sites without tophus)    [facility] actions taken in this encounter: Defer    Medication-related problems identified: Drug-disease interaction  Medication Therapy Plan: CDMR. SCr>1.4; Drug-Disease: hydroCHLOROthiazide and Idiopathic chronic gout of multiple sites without tophus; Chronic idiopathic gout involving toe of left foot without tophus; Defer to PCP  Medication reconciliation completed: No   Automatic Epic Generated Protocol Data:        Requested Prescriptions   Pending Prescriptions Disp Refills    losartan (COZAAR) 100 MG tablet [Pharmacy Med Name: LOSARTAN  100MG  TAB] 90 tablet 3     Sig: TAKE 1 TABLET BY MOUTH ONCE DAILY       Cardiovascular:  Angiotensin Receptor Blockers Failed - 10/27/2020  4:37 PM        Failed - Cr is 1.4 or below and within 360 days     Creatinine   Date Value Ref Range Status   10/09/2020 1.6 (H) 0.5 - 1.4 mg/dL Final   08/28/2020 1.22 0.50 - 1.40 mg/dL Final   08/04/2020 1.6 (H) 0.5 - 1.4 mg/dL Final              Passed - Patient is at least 18 years old        Passed - Last BP in normal range within 360 days.     BP Readings from Last 3 Encounters:   10/09/20 136/84   08/28/20 116/77   08/05/20 112/70              Passed - Office visit in past 12 months or future 90 days     Recent Outpatient Visits            Yesterday Otitis externa, fungal, left ear    [facility] ENT Haydee Jimenez NP    2 weeks ago B12 deficiency    [facility] Medicine Nathalie Salgado MD    2 months ago Coronary artery disease of native artery of native heart with stable angina pectoris    [place] - Cardiology Jose Walker MD    2 months ago B12 deficiency    [facility] Medicine Nathalie Salgado MD    3 months ago Pruritus     [facility] Nathalie Salgado MD          Future Appointments              In 1 week Haydee Jimenez NP [place] - [place], [place]    In 3 weeks NM1 [facility]    In 3 weeks STRESS, [facility] - Cardiology, [place]    In 3 weeks NM1 [facility]    In 2 months LAB, [place] [facility] [place]    In 2 months EKG, [facility] - Cardiology, [place]    In 2 months Nathalie Salgado MD [facility], [place]    In 3 months Jose Walker MD [facility] Cardiology, [facility]                Passed - K in normal range and within 360 days     Potassium   Date Value Ref Range Status   10/09/2020 4.3 3.5 - 5.1 mmol/L Final   08/28/2020 3.7 3.5 - 5.1 mmol/L Final   08/04/2020 4.4 3.5 - 5.1 mmol/L Final              Passed - eGFR within 360 days     eGFR if non    Date Value Ref Range Status   10/09/2020 40.7 (A) >60 mL/min/1.73 m^2 Final     Comment:     Calculation used to obtain the estimated glomerular filtration  rate (eGFR) is the CKD-EPI equation.      08/28/2020 56 (A) >60 mL/min/1.73 m^2 Final     Comment:     Calculation used to obtain the estimated glomerular filtration  rate (eGFR) is the CKD-EPI equation.      08/04/2020 40.7 (A) >60 mL/min/1.73 m^2 Final     Comment:     Calculation used to obtain the estimated glomerular filtration  rate (eGFR) is the CKD-EPI equation.        eGFR if    Date Value Ref Range Status   10/09/2020 47.0 (A) >60 mL/min/1.73 m^2 Final   08/28/2020 >60 >60 mL/min/1.73 m^2 Final   08/04/2020 47.0 (A) >60 mL/min/1.73 m^2 Final                hydroCHLOROthiazide (HYDRODIURIL) 12.5 MG Tab [Pharmacy Med Name: HYDROCHLOROTHIAZIDE  12.5MG  TAB] 90 tablet 3     Sig: TAKE 1 TABLET BY MOUTH ONCE DAILY       Cardiovascular: Diuretics - Thiazide Failed - 10/27/2020  4:37 PM        Failed - Cr is 1.4 or below and within 360 days      Creatinine   Date Value Ref Range Status   10/09/2020 1.6 (H) 0.5 - 1.4 mg/dL Final   08/28/2020 1.22 0.50 - 1.40 mg/dL Final   08/04/2020 1.6 (H) 0.5 - 1.4 mg/dL Final              Passed - Patient is at least 18 years old        Passed - Last BP in normal range within 360 days.     BP Readings from Last 3 Encounters:   10/09/20 136/84   08/28/20 116/77   08/05/20 112/70              Passed - Office visit in past 12 months or future 90 days     Recent Outpatient Visits            Yesterday Otitis externa, fungal, left ear    [place] - ENT Haydee Jimenez NP    2 weeks ago B12 deficiency    [facility] Nathalie Salgado MD    2 months ago Coronary artery disease of native artery of native heart with stable angina pectoris    [facility] Jose Walker MD    2 months ago B12 deficiency    [facility] Nathalie Salgado MD    3 months ago Pruritus    [facility] Nathalie Salgado MD          Future Appointments              In 1 week Haydee Jimenez NP [place] - [facility], [place]    In 3 weeks NM1 [facility] [place]    In 3 weeks STRESS, [facility] Cardiology, [place]    In 3 weeks NM1 [facility] [place]    In 2 months LAB, [place] [facility]    In 2 months EKG, [facility] - Cardiology, [place]    In 2 months Nathalie Salgado MD [facility]    In 3 months Jose Walker MD [place] - Cardiology, [facility]                Passed - K in normal range and within 360 days     Potassium   Date Value Ref Range Status   10/09/2020 4.3 3.5 - 5.1 mmol/L Final   08/28/2020 3.7 3.5 - 5.1 mmol/L Final   08/04/2020 4.4 3.5 - 5.1 mmol/L Final              Passed - Na is between 130 and 148 and within 360 days     Sodium   Date Value Ref Range Status   10/09/2020 140 136 - 145 mmol/L Final   08/28/2020 134 (L) 136 - 145 mmol/L Final   08/04/2020 139 136 -  145 mmol/L Final              Passed - eGFR within 360 days     eGFR if non    Date Value Ref Range Status   10/09/2020 40.7 (A) >60 mL/min/1.73 m^2 Final     Comment:     Calculation used to obtain the estimated glomerular filtration  rate (eGFR) is the CKD-EPI equation.      08/28/2020 56 (A) >60 mL/min/1.73 m^2 Final     Comment:     Calculation used to obtain the estimated glomerular filtration  rate (eGFR) is the CKD-EPI equation.      08/04/2020 40.7 (A) >60 mL/min/1.73 m^2 Final     Comment:     Calculation used to obtain the estimated glomerular filtration  rate (eGFR) is the CKD-EPI equation.        eGFR if    Date Value Ref Range Status   10/09/2020 47.0 (A) >60 mL/min/1.73 m^2 Final   08/28/2020 >60 >60 mL/min/1.73 m^2 Final   08/04/2020 47.0 (A) >60 mL/min/1.73 m^2 Final                    Appointments  past 12m or future 3m with PCP    Date Provider   Last Visit   10/9/2020 Nathalie Salgado MD   Next Visit   1/12/2021 Nathalie Salgado MD   ED visits in past 90 days: 1        Note composed:9:46 AM 10/29/2020

## 2020-11-11 ENCOUNTER — OFFICE VISIT (OUTPATIENT)
Dept: OTOLARYNGOLOGY | Facility: CLINIC | Age: 80
End: 2020-11-11
Payer: MEDICARE

## 2020-11-11 VITALS — BODY MASS INDEX: 24.36 KG/M2 | HEIGHT: 72 IN | WEIGHT: 179.88 LBS

## 2020-11-11 DIAGNOSIS — H62.42 OTITIS EXTERNA OF LEFT EXTERNAL AUDITORY CANAL DUE TO FUNGUS: Primary | ICD-10-CM

## 2020-11-11 DIAGNOSIS — B36.9 OTITIS EXTERNA OF LEFT EXTERNAL AUDITORY CANAL DUE TO FUNGUS: Primary | ICD-10-CM

## 2020-11-11 DIAGNOSIS — L29.9 ITCHING OF EAR: ICD-10-CM

## 2020-11-11 DIAGNOSIS — H61.22 IMPACTED CERUMEN OF LEFT EAR: ICD-10-CM

## 2020-11-11 PROCEDURE — 99214 PR OFFICE/OUTPT VISIT, EST, LEVL IV, 30-39 MIN: ICD-10-PCS | Mod: 25,S$GLB,, | Performed by: NURSE PRACTITIONER

## 2020-11-11 PROCEDURE — 87070 CULTURE OTHR SPECIMN AEROBIC: CPT

## 2020-11-11 PROCEDURE — 1126F PR PAIN SEVERITY QUANTIFIED, NO PAIN PRESENT: ICD-10-PCS | Mod: S$GLB,,, | Performed by: NURSE PRACTITIONER

## 2020-11-11 PROCEDURE — 99999 PR PBB SHADOW E&M-EST. PATIENT-LVL III: CPT | Mod: PBBFAC,,, | Performed by: NURSE PRACTITIONER

## 2020-11-11 PROCEDURE — 99214 OFFICE O/P EST MOD 30 MIN: CPT | Mod: 25,S$GLB,, | Performed by: NURSE PRACTITIONER

## 2020-11-11 PROCEDURE — 1159F PR MEDICATION LIST DOCUMENTED IN MEDICAL RECORD: ICD-10-PCS | Mod: S$GLB,,, | Performed by: NURSE PRACTITIONER

## 2020-11-11 PROCEDURE — 1159F MED LIST DOCD IN RCRD: CPT | Mod: S$GLB,,, | Performed by: NURSE PRACTITIONER

## 2020-11-11 PROCEDURE — 1101F PT FALLS ASSESS-DOCD LE1/YR: CPT | Mod: CPTII,S$GLB,, | Performed by: NURSE PRACTITIONER

## 2020-11-11 PROCEDURE — 69210 REMOVE IMPACTED EAR WAX UNI: CPT | Mod: S$GLB,,, | Performed by: NURSE PRACTITIONER

## 2020-11-11 PROCEDURE — 99999 PR PBB SHADOW E&M-EST. PATIENT-LVL III: ICD-10-PCS | Mod: PBBFAC,,, | Performed by: NURSE PRACTITIONER

## 2020-11-11 PROCEDURE — 69210 PR REMOVAL IMPACTED CERUMEN REQUIRING INSTRUMENTATION, UNILATERAL: ICD-10-PCS | Mod: S$GLB,,, | Performed by: NURSE PRACTITIONER

## 2020-11-11 PROCEDURE — 1126F AMNT PAIN NOTED NONE PRSNT: CPT | Mod: S$GLB,,, | Performed by: NURSE PRACTITIONER

## 2020-11-11 PROCEDURE — 1101F PR PT FALLS ASSESS DOC 0-1 FALLS W/OUT INJ PAST YR: ICD-10-PCS | Mod: CPTII,S$GLB,, | Performed by: NURSE PRACTITIONER

## 2020-11-11 NOTE — PROGRESS NOTES
Subjective:       Patient ID: Dilip Lopez is a 80 y.o. male.    Chief Complaint: Otitis Media (toy) and Ear Fullness    Otitis Media:    Associated symptoms: ear pain.    Ear Fullness   Associated symptoms include ear discharge and hearing loss.      Patient seen 2 weeks ago for left intense aural pruritis and left ear drainage. This was treated with Clotrimazole gtts. Patient returns today for recheck. Reports no improvement. Still itching and draining AS.     Review of Systems   Constitutional: Negative.    HENT: Positive for ear discharge, ear pain and hearing loss.    Eyes: Negative.    Respiratory: Negative.    Cardiovascular: Negative.    Gastrointestinal: Negative.    Musculoskeletal: Negative.    Integumentary:  Negative.   Neurological: Negative.    Hematological: Negative.    Psychiatric/Behavioral: Negative.          Objective:      Physical Exam  Vitals signs and nursing note reviewed.   Constitutional:       General: He is not in acute distress.     Appearance: He is well-developed. He is not ill-appearing or diaphoretic.   HENT:      Head: Normocephalic and atraumatic.      Right Ear: Hearing, tympanic membrane, ear canal and external ear normal. No middle ear effusion. Tympanic membrane is not erythematous.      Left Ear: Hearing, tympanic membrane, ear canal and external ear normal.  No middle ear effusion. Tympanic membrane is not erythematous.      Nose: Nose normal.      Mouth/Throat:      Pharynx: Uvula midline.   Eyes:      General: Lids are normal. No scleral icterus.        Right eye: No discharge.         Left eye: No discharge.   Neck:      Musculoskeletal: Normal range of motion and neck supple.      Trachea: Trachea normal. No tracheal deviation.   Cardiovascular:      Rate and Rhythm: Normal rate.   Pulmonary:      Effort: Pulmonary effort is normal. No respiratory distress.      Breath sounds: No stridor. No wheezing.   Musculoskeletal: Normal range of motion.   Skin:     General:  Skin is warm and dry.      Coloration: Skin is not pale.   Neurological:      Mental Status: He is alert and oriented to person, place, and time.      Coordination: Coordination normal.      Gait: Gait normal.   Psychiatric:         Speech: Speech normal.         Behavior: Behavior normal. Behavior is cooperative.         Thought Content: Thought content normal.         Judgment: Judgment normal.       SEPARATE PROCEDURE IN OFFICE:   Procedure: Removal of impacted cerumen, LEFT   Pre Procedure Diagnosis: Cerumen Impaction   Post Procedure Diagnosis: Cerumen Impaction   Verbal informed consent in regards to risk of trauma to ear canal, ear drum or hearing, discomfort during procedure and/or inability to remove cerumen impaction in one session or unforeseen events or complications.   No anesthesia.     Procedure in detail:   Ear canal visualized bilateral with appropriate size ear speculum utilizing Operating Head Binocular Otomicroscope   Utilizing the following:  Suction cannula was used. The impacted cerumen of the ear canals was removed atraumatically. The TM and EAC were then inspected and found to be clear of wax. See description of TMs/EACs in PE above.   Complications: No   Condition: Improved/Good     Assessment:       1. Otitis externa of left external auditory canal due to fungus        Plan:     CSF powder AS QD X 2 weeks, then QOD X 2 weeks. Recheck in 2 weeks.     Pt develops allergic hives with sulfa; therefore make CSF powder without sulfa ingredient.   Keep ear dry. Return in 2 weeks

## 2020-11-13 LAB — BACTERIA SPEC AEROBE CULT: NORMAL

## 2020-11-24 ENCOUNTER — CLINICAL SUPPORT (OUTPATIENT)
Dept: CARDIOLOGY | Facility: CLINIC | Age: 80
End: 2020-11-24
Attending: INTERNAL MEDICINE
Payer: MEDICARE

## 2020-11-24 ENCOUNTER — HOSPITAL ENCOUNTER (OUTPATIENT)
Dept: RADIOLOGY | Facility: HOSPITAL | Age: 80
Discharge: HOME | End: 2020-11-24
Attending: INTERNAL MEDICINE
Payer: MEDICARE

## 2020-11-24 VITALS — BODY MASS INDEX: 24.24 KG/M2 | HEIGHT: 72 IN | WEIGHT: 179 LBS

## 2020-11-24 DIAGNOSIS — I25.118 CORONARY ARTERY DISEASE OF NATIVE ARTERY OF NATIVE HEART WITH STABLE ANGINA PECTORIS: ICD-10-CM

## 2020-11-24 LAB
CV STRESS BASE HR: 47 BPM
DIASTOLIC BLOOD PRESSURE: 80 MMHG
NUC REST EJECTION FRACTION: 64
OHS CV CPX 1 MINUTE RECOVERY HEART RATE: 61 BPM
OHS CV CPX 85 PERCENT MAX PREDICTED HEART RATE MALE: 120
OHS CV CPX MAX PREDICTED HEART RATE: 141
OHS CV CPX PATIENT IS FEMALE: 0
OHS CV CPX PATIENT IS MALE: 1
OHS CV CPX PEAK DIASTOLIC BLOOD PRESSURE: 80 MMHG
OHS CV CPX PEAK HEAR RATE: 61 BPM
OHS CV CPX PEAK RATE PRESSURE PRODUCT: 6161
OHS CV CPX PEAK SYSTOLIC BLOOD PRESSURE: 101 MMHG
OHS CV CPX PERCENT MAX PREDICTED HEART RATE ACHIEVED: 43
OHS CV CPX RATE PRESSURE PRODUCT PRESENTING: 4747
SYSTOLIC BLOOD PRESSURE: 101 MMHG

## 2020-11-24 PROCEDURE — 78452 HT MUSCLE IMAGE SPECT MULT: CPT | Mod: 26,,, | Performed by: INTERNAL MEDICINE

## 2020-11-24 PROCEDURE — 93018 CV STRESS TEST I&R ONLY: CPT | Mod: ,,, | Performed by: INTERNAL MEDICINE

## 2020-11-24 PROCEDURE — 93018 PR CARDIAC STRESS TST,INTERP/REPT ONLY: ICD-10-PCS | Mod: ,,, | Performed by: INTERNAL MEDICINE

## 2020-11-24 PROCEDURE — 78452 STRESS TEST WITH MYOCARDIAL PERFUSION (CUPID ONLY): ICD-10-PCS | Mod: 26,,, | Performed by: INTERNAL MEDICINE

## 2020-11-24 PROCEDURE — 93017 CV STRESS TEST TRACING ONLY: CPT | Mod: PO

## 2020-11-24 PROCEDURE — 99999 PR PBB SHADOW E&M-EST. PATIENT-LVL I: CPT | Mod: PBBFAC,,,

## 2020-11-24 PROCEDURE — 99999 PR PBB SHADOW E&M-EST. PATIENT-LVL I: ICD-10-PCS | Mod: PBBFAC,,,

## 2020-11-24 PROCEDURE — 93016 STRESS TEST WITH MYOCARDIAL PERFUSION (CUPID ONLY): ICD-10-PCS | Mod: ,,, | Performed by: INTERNAL MEDICINE

## 2020-11-24 PROCEDURE — 93016 CV STRESS TEST SUPVJ ONLY: CPT | Mod: ,,, | Performed by: INTERNAL MEDICINE

## 2020-12-07 ENCOUNTER — TELEPHONE (OUTPATIENT)
Dept: OTOLARYNGOLOGY | Facility: CLINIC | Age: 80
End: 2020-12-07

## 2020-12-07 NOTE — TELEPHONE ENCOUNTER
S/w and advised that his co-pay is set by his insurance not the provider. Pt states he needs a f/u appt because the rx Haydee gave him for his fungal infection is not working. Booked appt for Wed. At 820am. Pt confirmed.

## 2020-12-07 NOTE — TELEPHONE ENCOUNTER
----- Message from Nerissa Villatoro sent at 12/7/2020  2:51 PM CST -----  Regarding: CO PAY  Type: Needs Medical Advice  Who Called:  PT    Best Call Back Number: [phone]  Additional Information: pt is requesting a call back in regard to  wanting to know if he has to pay 50.00 co pay to see jacquelyn

## 2020-12-08 ENCOUNTER — PATIENT OUTREACH (OUTPATIENT)
Dept: ADMINISTRATIVE | Facility: OTHER | Age: 80
End: 2020-12-08

## 2020-12-08 NOTE — PROGRESS NOTES
Health Maintenance Due   Topic Date Due    Hepatitis C Screening  1940    TETANUS VACCINE  10/29/1959    Shingles Vaccine (1 of 2) 10/29/1991     Updates were requested from care everywhere.  Chart was reviewed for overdue [facility] En[facility] ([facility]) topics (CRS, Breast Cancer Screening, Eye exam)  Health Maintenance has been updated.  LINKS immunization registry triggered.  LINKS not responding.

## 2020-12-09 ENCOUNTER — OFFICE VISIT (OUTPATIENT)
Dept: OTOLARYNGOLOGY | Facility: CLINIC | Age: 80
End: 2020-12-09
Payer: MEDICARE

## 2020-12-09 VITALS — WEIGHT: 179 LBS | HEIGHT: 72 IN | BODY MASS INDEX: 24.24 KG/M2

## 2020-12-09 DIAGNOSIS — L29.9 ITCHING OF EAR: ICD-10-CM

## 2020-12-09 DIAGNOSIS — H61.22 IMPACTED CERUMEN OF LEFT EAR: Primary | ICD-10-CM

## 2020-12-09 PROCEDURE — 1159F MED LIST DOCD IN RCRD: CPT | Mod: S$GLB,,, | Performed by: NURSE PRACTITIONER

## 2020-12-09 PROCEDURE — 1159F PR MEDICATION LIST DOCUMENTED IN MEDICAL RECORD: ICD-10-PCS | Mod: S$GLB,,, | Performed by: NURSE PRACTITIONER

## 2020-12-09 PROCEDURE — 99999 PR PBB SHADOW E&M-EST. PATIENT-LVL III: ICD-10-PCS | Mod: PBBFAC,,, | Performed by: NURSE PRACTITIONER

## 2020-12-09 PROCEDURE — 3288F PR FALLS RISK ASSESSMENT DOCUMENTED: ICD-10-PCS | Mod: CPTII,S$GLB,, | Performed by: NURSE PRACTITIONER

## 2020-12-09 PROCEDURE — 1101F PR PT FALLS ASSESS DOC 0-1 FALLS W/OUT INJ PAST YR: ICD-10-PCS | Mod: CPTII,S$GLB,, | Performed by: NURSE PRACTITIONER

## 2020-12-09 PROCEDURE — 3288F FALL RISK ASSESSMENT DOCD: CPT | Mod: CPTII,S$GLB,, | Performed by: NURSE PRACTITIONER

## 2020-12-09 PROCEDURE — 1126F AMNT PAIN NOTED NONE PRSNT: CPT | Mod: S$GLB,,, | Performed by: NURSE PRACTITIONER

## 2020-12-09 PROCEDURE — 99999 PR PBB SHADOW E&M-EST. PATIENT-LVL III: CPT | Mod: PBBFAC,,, | Performed by: NURSE PRACTITIONER

## 2020-12-09 PROCEDURE — 1126F PR PAIN SEVERITY QUANTIFIED, NO PAIN PRESENT: ICD-10-PCS | Mod: S$GLB,,, | Performed by: NURSE PRACTITIONER

## 2020-12-09 PROCEDURE — 1101F PT FALLS ASSESS-DOCD LE1/YR: CPT | Mod: CPTII,S$GLB,, | Performed by: NURSE PRACTITIONER

## 2020-12-09 PROCEDURE — 99213 OFFICE O/P EST LOW 20 MIN: CPT | Mod: 25,S$GLB,, | Performed by: NURSE PRACTITIONER

## 2020-12-09 PROCEDURE — 69210 REMOVE IMPACTED EAR WAX UNI: CPT | Mod: S$GLB,,, | Performed by: NURSE PRACTITIONER

## 2020-12-09 PROCEDURE — 69210 PR REMOVAL IMPACTED CERUMEN REQUIRING INSTRUMENTATION, UNILATERAL: ICD-10-PCS | Mod: S$GLB,,, | Performed by: NURSE PRACTITIONER

## 2020-12-09 PROCEDURE — 99213 PR OFFICE/OUTPT VISIT, EST, LEVL III, 20-29 MIN: ICD-10-PCS | Mod: 25,S$GLB,, | Performed by: NURSE PRACTITIONER

## 2020-12-09 NOTE — PROGRESS NOTES
Subjective:       Patient ID: Dilip Lopez is a 80 y.o. male.    Chief Complaint: Follow-up (ear fungus)    Otitis Media:    Associated symptoms: ear pain.    Ear Fullness   Associated symptoms include ear discharge and hearing loss.   Follow-up    Patient treated for intense left aural pruritis and drainage with Clotrimazole gtts in October, then with CSF powder in November. Patient returns today for recheck. Reports no improvement. Still itching and feels like it is draining AS. Culture grew only normal skin chaitanya without a predominant or infectious organism.     Review of Systems   Constitutional: Negative.    HENT: Positive for ear discharge, ear pain and hearing loss.    Eyes: Negative.    Respiratory: Negative.    Cardiovascular: Negative.    Gastrointestinal: Negative.    Musculoskeletal: Negative.    Integumentary:  Negative.   Neurological: Negative.    Hematological: Negative.    Psychiatric/Behavioral: Negative.          Objective:      Physical Exam  Vitals signs and nursing note reviewed.   Constitutional:       General: He is not in acute distress.     Appearance: He is well-developed. He is not ill-appearing or diaphoretic.   HENT:      Head: Normocephalic and atraumatic.      Right Ear: Hearing, tympanic membrane, ear canal and external ear normal. No middle ear effusion. Tympanic membrane is not erythematous.      Left Ear: Hearing, tympanic membrane, ear canal and external ear normal.  No middle ear effusion. Tympanic membrane is not erythematous.      Nose: Nose normal.      Mouth/Throat:      Pharynx: Uvula midline.   Eyes:      General: Lids are normal. No scleral icterus.        Right eye: No discharge.         Left eye: No discharge.   Neck:      Musculoskeletal: Normal range of motion and neck supple.      Trachea: Trachea normal. No tracheal deviation.   Cardiovascular:      Rate and Rhythm: Normal rate.   Pulmonary:      Effort: Pulmonary effort is normal. No respiratory distress.       Breath sounds: No stridor. No wheezing.   Musculoskeletal: Normal range of motion.   Skin:     General: Skin is warm and dry.      Coloration: Skin is not pale.   Neurological:      Mental Status: He is alert and oriented to person, place, and time.      Coordination: Coordination normal.      Gait: Gait normal.   Psychiatric:         Speech: Speech normal.         Behavior: Behavior normal. Behavior is cooperative.         Thought Content: Thought content normal.         Judgment: Judgment normal.       SEPARATE PROCEDURE IN OFFICE:   Procedure: Removal of impacted cerumen, LEFT   Pre Procedure Diagnosis: Cerumen Impaction   Post Procedure Diagnosis: Cerumen Impaction   Verbal informed consent in regards to risk of trauma to ear canal, ear drum or hearing, discomfort during procedure and/or inability to remove cerumen impaction in one session or unforeseen events or complications.   No anesthesia.     Procedure in detail:   Ear canal visualized bilateral with appropriate size ear speculum utilizing Operating Head Binocular Otomicroscope   Utilizing the following:  Suction cannula was used. The impacted cerumen of the ear canals was removed atraumatically. The TM and EAC were then inspected and found to be clear of wax. See description of TMs/EACs in PE above.   Complications: No   Condition: Improved/Good     Assessment:     Left cerumen impaction removed    No evidence of acute fungal or bacterial infection  Negative culture AS  Plan:     Encouraged to continue CSF powder AS once weekly and recheck in one month.     Encouraged to keep ear completely dry.

## 2021-01-04 ENCOUNTER — LAB VISIT (OUTPATIENT)
Dept: LAB | Facility: HOSPITAL | Age: 81
End: 2021-01-04
Attending: INTERNAL MEDICINE
Payer: MEDICARE

## 2021-01-04 ENCOUNTER — CLINICAL SUPPORT (OUTPATIENT)
Dept: CARDIOLOGY | Facility: CLINIC | Age: 81
End: 2021-01-04
Attending: INTERNAL MEDICINE
Payer: MEDICARE

## 2021-01-04 DIAGNOSIS — I48.0 PAF (PAROXYSMAL ATRIAL FIBRILLATION): ICD-10-CM

## 2021-01-04 DIAGNOSIS — Z79.02 LONG TERM (CURRENT) USE OF ANTITHROMBOTICS/ANTIPLATELETS: ICD-10-CM

## 2021-01-04 DIAGNOSIS — I10 ESSENTIAL HYPERTENSION: ICD-10-CM

## 2021-01-04 DIAGNOSIS — E78.00 HYPERCHOLESTEROLEMIA: ICD-10-CM

## 2021-01-04 DIAGNOSIS — Z79.899 LONG TERM CURRENT USE OF ANTIARRHYTHMIC DRUG: ICD-10-CM

## 2021-01-04 DIAGNOSIS — I25.118 CORONARY ARTERY DISEASE OF NATIVE ARTERY OF NATIVE HEART WITH STABLE ANGINA PECTORIS: ICD-10-CM

## 2021-01-04 LAB
ALBUMIN SERPL BCP-MCNC: 4 G/DL (ref 3.5–5.2)
ALP SERPL-CCNC: 77 U/L (ref 55–135)
ALT SERPL W/O P-5'-P-CCNC: 13 U/L (ref 10–44)
ANION GAP SERPL CALC-SCNC: 8 MMOL/L (ref 8–16)
AST SERPL-CCNC: 13 U/L (ref 10–40)
BILIRUB SERPL-MCNC: 0.7 MG/DL (ref 0.1–1)
BUN SERPL-MCNC: 24 MG/DL (ref 8–23)
CALCIUM SERPL-MCNC: 8.6 MG/DL (ref 8.7–10.5)
CHLORIDE SERPL-SCNC: 108 MMOL/L (ref 95–110)
CO2 SERPL-SCNC: 22 MMOL/L (ref 23–29)
CREAT SERPL-MCNC: 1.7 MG/DL (ref 0.5–1.4)
EST. GFR  (AFRICAN AMERICAN): 43.4 ML/MIN/1.73 M^2
EST. GFR  (NON AFRICAN AMERICAN): 37.5 ML/MIN/1.73 M^2
GLUCOSE SERPL-MCNC: 167 MG/DL (ref 70–110)
HGB BLD-MCNC: 14.7 G/DL (ref 14–18)
POTASSIUM SERPL-SCNC: 4.3 MMOL/L (ref 3.5–5.1)
PROT SERPL-MCNC: 7.1 G/DL (ref 6–8.4)
SODIUM SERPL-SCNC: 138 MMOL/L (ref 136–145)

## 2021-01-04 PROCEDURE — 36415 COLL VENOUS BLD VENIPUNCTURE: CPT | Mod: PO

## 2021-01-04 PROCEDURE — 93224 XTRNL ECG REC UP TO 48 HRS: CPT | Mod: S$GLB,,, | Performed by: INTERNAL MEDICINE

## 2021-01-04 PROCEDURE — 80053 COMPREHEN METABOLIC PANEL: CPT

## 2021-01-04 PROCEDURE — 85018 HEMOGLOBIN: CPT

## 2021-01-04 PROCEDURE — 93224 HOLTER MONITOR - 24 HOUR (CUPID ONLY): ICD-10-PCS | Mod: S$GLB,,, | Performed by: INTERNAL MEDICINE

## 2021-01-12 ENCOUNTER — IMMUNIZATION (OUTPATIENT)
Dept: FAMILY MEDICINE | Facility: CLINIC | Age: 81
End: 2021-01-12
Payer: MEDICARE

## 2021-01-12 ENCOUNTER — OFFICE VISIT (OUTPATIENT)
Dept: FAMILY MEDICINE | Facility: CLINIC | Age: 81
End: 2021-01-12
Payer: MEDICARE

## 2021-01-12 VITALS
HEART RATE: 95 BPM | SYSTOLIC BLOOD PRESSURE: 120 MMHG | BODY MASS INDEX: 25.08 KG/M2 | DIASTOLIC BLOOD PRESSURE: 72 MMHG | OXYGEN SATURATION: 96 % | WEIGHT: 185.19 LBS | HEIGHT: 72 IN

## 2021-01-12 DIAGNOSIS — G62.9 NEUROPATHY: ICD-10-CM

## 2021-01-12 DIAGNOSIS — N18.30 STAGE 3 CHRONIC KIDNEY DISEASE, UNSPECIFIED WHETHER STAGE 3A OR 3B CKD: ICD-10-CM

## 2021-01-12 DIAGNOSIS — I10 ESSENTIAL HYPERTENSION: ICD-10-CM

## 2021-01-12 DIAGNOSIS — B36.9 EXTERNAL OTITIS OF LEFT EAR DUE TO FUNGUS: ICD-10-CM

## 2021-01-12 DIAGNOSIS — E53.8 LOW SERUM VITAMIN B12: ICD-10-CM

## 2021-01-12 DIAGNOSIS — E78.5 DYSLIPIDEMIA: ICD-10-CM

## 2021-01-12 DIAGNOSIS — H66.42 SUPPURATIVE OTITIS MEDIA OF LEFT EAR, UNSPECIFIED CHRONICITY: ICD-10-CM

## 2021-01-12 DIAGNOSIS — Z23 NEED FOR VACCINATION: ICD-10-CM

## 2021-01-12 DIAGNOSIS — Z00.00 ROUTINE PHYSICAL EXAMINATION: Primary | ICD-10-CM

## 2021-01-12 DIAGNOSIS — H62.42 EXTERNAL OTITIS OF LEFT EAR DUE TO FUNGUS: ICD-10-CM

## 2021-01-12 PROCEDURE — 99499 UNLISTED E&M SERVICE: CPT | Mod: S$GLB,,, | Performed by: INTERNAL MEDICINE

## 2021-01-12 PROCEDURE — 3074F SYST BP LT 130 MM HG: CPT | Mod: CPTII,S$GLB,, | Performed by: INTERNAL MEDICINE

## 2021-01-12 PROCEDURE — 91300 COVID-19, MRNA, LNP-S, PF, 30 MCG/0.3 ML DOSE VACCINE: CPT | Mod: PBBFAC | Performed by: FAMILY MEDICINE

## 2021-01-12 PROCEDURE — 1159F PR MEDICATION LIST DOCUMENTED IN MEDICAL RECORD: ICD-10-PCS | Mod: S$GLB,,, | Performed by: INTERNAL MEDICINE

## 2021-01-12 PROCEDURE — 99214 PR OFFICE/OUTPT VISIT, EST, LEVL IV, 30-39 MIN: ICD-10-PCS | Mod: S$GLB,,, | Performed by: INTERNAL MEDICINE

## 2021-01-12 PROCEDURE — 99214 OFFICE O/P EST MOD 30 MIN: CPT | Mod: S$GLB,,, | Performed by: INTERNAL MEDICINE

## 2021-01-12 PROCEDURE — 1101F PR PT FALLS ASSESS DOC 0-1 FALLS W/OUT INJ PAST YR: ICD-10-PCS | Mod: CPTII,S$GLB,, | Performed by: INTERNAL MEDICINE

## 2021-01-12 PROCEDURE — 99499 RISK ADDL DX/OHS AUDIT: ICD-10-PCS | Mod: S$GLB,,, | Performed by: INTERNAL MEDICINE

## 2021-01-12 PROCEDURE — 3078F PR MOST RECENT DIASTOLIC BLOOD PRESSURE < 80 MM HG: ICD-10-PCS | Mod: CPTII,S$GLB,, | Performed by: INTERNAL MEDICINE

## 2021-01-12 PROCEDURE — 1159F MED LIST DOCD IN RCRD: CPT | Mod: S$GLB,,, | Performed by: INTERNAL MEDICINE

## 2021-01-12 PROCEDURE — 1101F PT FALLS ASSESS-DOCD LE1/YR: CPT | Mod: CPTII,S$GLB,, | Performed by: INTERNAL MEDICINE

## 2021-01-12 PROCEDURE — 99999 PR PBB SHADOW E&M-EST. PATIENT-LVL IV: CPT | Mod: PBBFAC,,, | Performed by: INTERNAL MEDICINE

## 2021-01-12 PROCEDURE — 3288F PR FALLS RISK ASSESSMENT DOCUMENTED: ICD-10-PCS | Mod: CPTII,S$GLB,, | Performed by: INTERNAL MEDICINE

## 2021-01-12 PROCEDURE — 99999 PR PBB SHADOW E&M-EST. PATIENT-LVL IV: ICD-10-PCS | Mod: PBBFAC,,, | Performed by: INTERNAL MEDICINE

## 2021-01-12 PROCEDURE — 3074F PR MOST RECENT SYSTOLIC BLOOD PRESSURE < 130 MM HG: ICD-10-PCS | Mod: CPTII,S$GLB,, | Performed by: INTERNAL MEDICINE

## 2021-01-12 PROCEDURE — 3078F DIAST BP <80 MM HG: CPT | Mod: CPTII,S$GLB,, | Performed by: INTERNAL MEDICINE

## 2021-01-12 PROCEDURE — 3288F FALL RISK ASSESSMENT DOCD: CPT | Mod: CPTII,S$GLB,, | Performed by: INTERNAL MEDICINE

## 2021-01-12 RX ORDER — CEFUROXIME AXETIL 500 MG/1
500 TABLET ORAL 2 TIMES DAILY
Qty: 20 TABLET | Refills: 0 | Status: SHIPPED | OUTPATIENT
Start: 2021-01-12 | End: 2021-01-22

## 2021-01-12 RX ORDER — FLUCONAZOLE 150 MG/1
150 TABLET ORAL DAILY
Qty: 7 TABLET | Refills: 0 | Status: SHIPPED | OUTPATIENT
Start: 2021-01-12 | End: 2021-01-19

## 2021-01-26 ENCOUNTER — TELEPHONE (OUTPATIENT)
Dept: CARDIOLOGY | Facility: CLINIC | Age: 81
End: 2021-01-26

## 2021-02-02 ENCOUNTER — TELEPHONE (OUTPATIENT)
Dept: CARDIOLOGY | Facility: CLINIC | Age: 81
End: 2021-02-02

## 2021-02-02 ENCOUNTER — IMMUNIZATION (OUTPATIENT)
Dept: FAMILY MEDICINE | Facility: CLINIC | Age: 81
End: 2021-02-02
Payer: MEDICARE

## 2021-02-02 DIAGNOSIS — Z23 NEED FOR VACCINATION: Primary | ICD-10-CM

## 2021-02-02 PROCEDURE — 0002A COVID-19, MRNA, LNP-S, PF, 30 MCG/0.3 ML DOSE VACCINE: CPT | Mod: PBBFAC | Performed by: FAMILY MEDICINE

## 2021-02-02 PROCEDURE — 91300 COVID-19, MRNA, LNP-S, PF, 30 MCG/0.3 ML DOSE VACCINE: CPT | Mod: PBBFAC | Performed by: FAMILY MEDICINE

## 2021-02-08 ENCOUNTER — OFFICE VISIT (OUTPATIENT)
Dept: FAMILY MEDICINE | Facility: CLINIC | Age: 81
End: 2021-02-08
Payer: MEDICARE

## 2021-02-08 ENCOUNTER — LAB VISIT (OUTPATIENT)
Dept: FAMILY MEDICINE | Facility: CLINIC | Age: 81
End: 2021-02-08
Payer: MEDICARE

## 2021-02-08 VITALS
HEART RATE: 82 BPM | HEIGHT: 72 IN | DIASTOLIC BLOOD PRESSURE: 62 MMHG | WEIGHT: 185.63 LBS | BODY MASS INDEX: 25.14 KG/M2 | SYSTOLIC BLOOD PRESSURE: 116 MMHG

## 2021-02-08 DIAGNOSIS — R21 RASH AND NONSPECIFIC SKIN ERUPTION: ICD-10-CM

## 2021-02-08 DIAGNOSIS — R73.09 ABNORMAL GLUCOSE: ICD-10-CM

## 2021-02-08 DIAGNOSIS — I73.9 CLAUDICATION OF BOTH LOWER EXTREMITIES: ICD-10-CM

## 2021-02-08 DIAGNOSIS — R21 RASH AND NONSPECIFIC SKIN ERUPTION: Primary | ICD-10-CM

## 2021-02-08 DIAGNOSIS — I25.118 CORONARY ARTERY DISEASE OF NATIVE ARTERY OF NATIVE HEART WITH STABLE ANGINA PECTORIS: ICD-10-CM

## 2021-02-08 DIAGNOSIS — I48.0 PAF (PAROXYSMAL ATRIAL FIBRILLATION): ICD-10-CM

## 2021-02-08 LAB
BASOPHILS # BLD AUTO: 0.03 K/UL (ref 0–0.2)
BASOPHILS NFR BLD: 0.5 % (ref 0–1.9)
DIFFERENTIAL METHOD: ABNORMAL
EOSINOPHIL # BLD AUTO: 0.5 K/UL (ref 0–0.5)
EOSINOPHIL NFR BLD: 8.7 % (ref 0–8)
ERYTHROCYTE [DISTWIDTH] IN BLOOD BY AUTOMATED COUNT: 14 % (ref 11.5–14.5)
HCT VFR BLD AUTO: 45.1 % (ref 40–54)
HGB BLD-MCNC: 14.8 G/DL (ref 14–18)
IMM GRANULOCYTES # BLD AUTO: 0.02 K/UL (ref 0–0.04)
IMM GRANULOCYTES NFR BLD AUTO: 0.4 % (ref 0–0.5)
LYMPHOCYTES # BLD AUTO: 1.9 K/UL (ref 1–4.8)
LYMPHOCYTES NFR BLD: 33.6 % (ref 18–48)
MCH RBC QN AUTO: 31.4 PG (ref 27–31)
MCHC RBC AUTO-ENTMCNC: 32.8 G/DL (ref 32–36)
MCV RBC AUTO: 96 FL (ref 82–98)
MONOCYTES # BLD AUTO: 0.6 K/UL (ref 0.3–1)
MONOCYTES NFR BLD: 11.3 % (ref 4–15)
NEUTROPHILS # BLD AUTO: 2.5 K/UL (ref 1.8–7.7)
NEUTROPHILS NFR BLD: 45.5 % (ref 38–73)
NRBC BLD-RTO: 0 /100 WBC
PLATELET # BLD AUTO: 178 K/UL (ref 150–350)
PMV BLD AUTO: 10.9 FL (ref 9.2–12.9)
RBC # BLD AUTO: 4.71 M/UL (ref 4.6–6.2)
WBC # BLD AUTO: 5.5 K/UL (ref 3.9–12.7)

## 2021-02-08 PROCEDURE — 80053 COMPREHEN METABOLIC PANEL: CPT

## 2021-02-08 PROCEDURE — 1159F MED LIST DOCD IN RCRD: CPT | Mod: S$GLB,,, | Performed by: FAMILY MEDICINE

## 2021-02-08 PROCEDURE — 1101F PR PT FALLS ASSESS DOC 0-1 FALLS W/OUT INJ PAST YR: ICD-10-PCS | Mod: CPTII,S$GLB,, | Performed by: FAMILY MEDICINE

## 2021-02-08 PROCEDURE — 99214 OFFICE O/P EST MOD 30 MIN: CPT | Mod: S$GLB,,, | Performed by: FAMILY MEDICINE

## 2021-02-08 PROCEDURE — 1126F AMNT PAIN NOTED NONE PRSNT: CPT | Mod: S$GLB,,, | Performed by: FAMILY MEDICINE

## 2021-02-08 PROCEDURE — 3078F DIAST BP <80 MM HG: CPT | Mod: CPTII,S$GLB,, | Performed by: FAMILY MEDICINE

## 2021-02-08 PROCEDURE — 99999 PR PBB SHADOW E&M-EST. PATIENT-LVL IV: ICD-10-PCS | Mod: PBBFAC,,, | Performed by: FAMILY MEDICINE

## 2021-02-08 PROCEDURE — 99499 RISK ADDL DX/OHS AUDIT: ICD-10-PCS | Mod: S$GLB,,, | Performed by: FAMILY MEDICINE

## 2021-02-08 PROCEDURE — 36415 PR COLLECTION VENOUS BLOOD,VENIPUNCTURE: ICD-10-PCS | Mod: S$GLB,,, | Performed by: FAMILY MEDICINE

## 2021-02-08 PROCEDURE — 36415 COLL VENOUS BLD VENIPUNCTURE: CPT | Mod: S$GLB,,, | Performed by: FAMILY MEDICINE

## 2021-02-08 PROCEDURE — 83525 ASSAY OF INSULIN: CPT

## 2021-02-08 PROCEDURE — 3288F PR FALLS RISK ASSESSMENT DOCUMENTED: ICD-10-PCS | Mod: CPTII,S$GLB,, | Performed by: FAMILY MEDICINE

## 2021-02-08 PROCEDURE — 1159F PR MEDICATION LIST DOCUMENTED IN MEDICAL RECORD: ICD-10-PCS | Mod: S$GLB,,, | Performed by: FAMILY MEDICINE

## 2021-02-08 PROCEDURE — 99214 PR OFFICE/OUTPT VISIT, EST, LEVL IV, 30-39 MIN: ICD-10-PCS | Mod: S$GLB,,, | Performed by: FAMILY MEDICINE

## 2021-02-08 PROCEDURE — 99499 UNLISTED E&M SERVICE: CPT | Mod: S$GLB,,, | Performed by: FAMILY MEDICINE

## 2021-02-08 PROCEDURE — 1126F PR PAIN SEVERITY QUANTIFIED, NO PAIN PRESENT: ICD-10-PCS | Mod: S$GLB,,, | Performed by: FAMILY MEDICINE

## 2021-02-08 PROCEDURE — 83036 HEMOGLOBIN GLYCOSYLATED A1C: CPT

## 2021-02-08 PROCEDURE — 3078F PR MOST RECENT DIASTOLIC BLOOD PRESSURE < 80 MM HG: ICD-10-PCS | Mod: CPTII,S$GLB,, | Performed by: FAMILY MEDICINE

## 2021-02-08 PROCEDURE — 3074F PR MOST RECENT SYSTOLIC BLOOD PRESSURE < 130 MM HG: ICD-10-PCS | Mod: CPTII,S$GLB,, | Performed by: FAMILY MEDICINE

## 2021-02-08 PROCEDURE — 85025 COMPLETE CBC W/AUTO DIFF WBC: CPT

## 2021-02-08 PROCEDURE — 99999 PR PBB SHADOW E&M-EST. PATIENT-LVL IV: CPT | Mod: PBBFAC,,, | Performed by: FAMILY MEDICINE

## 2021-02-08 PROCEDURE — 3074F SYST BP LT 130 MM HG: CPT | Mod: CPTII,S$GLB,, | Performed by: FAMILY MEDICINE

## 2021-02-08 PROCEDURE — 1101F PT FALLS ASSESS-DOCD LE1/YR: CPT | Mod: CPTII,S$GLB,, | Performed by: FAMILY MEDICINE

## 2021-02-08 PROCEDURE — 3288F FALL RISK ASSESSMENT DOCD: CPT | Mod: CPTII,S$GLB,, | Performed by: FAMILY MEDICINE

## 2021-02-08 RX ORDER — NYSTATIN 100000 U/G
OINTMENT TOPICAL 2 TIMES DAILY
Qty: 30 G | Refills: 1 | Status: SHIPPED | OUTPATIENT
Start: 2021-02-08 | End: 2021-04-12

## 2021-02-08 RX ORDER — NYSTATIN 100000 U/G
OINTMENT TOPICAL 2 TIMES DAILY
Qty: 30 G | Refills: 1 | Status: SHIPPED | OUTPATIENT
Start: 2021-02-08 | End: 2021-02-08 | Stop reason: SDUPTHER

## 2021-02-09 DIAGNOSIS — N18.32 STAGE 3B CHRONIC KIDNEY DISEASE: Primary | ICD-10-CM

## 2021-02-09 LAB
ALBUMIN SERPL BCP-MCNC: 4.1 G/DL (ref 3.5–5.2)
ALP SERPL-CCNC: 74 U/L (ref 55–135)
ALT SERPL W/O P-5'-P-CCNC: 13 U/L (ref 10–44)
ANION GAP SERPL CALC-SCNC: 11 MMOL/L (ref 8–16)
AST SERPL-CCNC: 19 U/L (ref 10–40)
BILIRUB SERPL-MCNC: 0.6 MG/DL (ref 0.1–1)
BUN SERPL-MCNC: 24 MG/DL (ref 8–23)
CALCIUM SERPL-MCNC: 8.6 MG/DL (ref 8.7–10.5)
CHLORIDE SERPL-SCNC: 107 MMOL/L (ref 95–110)
CO2 SERPL-SCNC: 20 MMOL/L (ref 23–29)
CREAT SERPL-MCNC: 1.8 MG/DL (ref 0.5–1.4)
EST. GFR  (AFRICAN AMERICAN): 40.5 ML/MIN/1.73 M^2
EST. GFR  (NON AFRICAN AMERICAN): 35 ML/MIN/1.73 M^2
ESTIMATED AVG GLUCOSE: 117 MG/DL (ref 68–131)
GLUCOSE SERPL-MCNC: 102 MG/DL (ref 70–110)
HBA1C MFR BLD: 5.7 % (ref 4–5.6)
INSULIN COLLECTION INTERVAL: NORMAL
INSULIN SERPL-ACNC: 11.2 UU/ML
POTASSIUM SERPL-SCNC: 4.6 MMOL/L (ref 3.5–5.1)
PROT SERPL-MCNC: 7.2 G/DL (ref 6–8.4)
SODIUM SERPL-SCNC: 138 MMOL/L (ref 136–145)

## 2021-04-12 ENCOUNTER — HOSPITAL ENCOUNTER (OUTPATIENT)
Dept: RADIOLOGY | Facility: HOSPITAL | Age: 81
Discharge: HOME | End: 2021-04-12
Attending: FAMILY MEDICINE
Payer: MEDICARE

## 2021-04-12 ENCOUNTER — OFFICE VISIT (OUTPATIENT)
Dept: FAMILY MEDICINE | Facility: CLINIC | Age: 81
End: 2021-04-12
Payer: MEDICARE

## 2021-04-12 VITALS
DIASTOLIC BLOOD PRESSURE: 80 MMHG | OXYGEN SATURATION: 97 % | TEMPERATURE: 99 F | SYSTOLIC BLOOD PRESSURE: 126 MMHG | HEIGHT: 72 IN | WEIGHT: 186.5 LBS | HEART RATE: 64 BPM | BODY MASS INDEX: 25.26 KG/M2

## 2021-04-12 DIAGNOSIS — R10.11 RIGHT UPPER QUADRANT PAIN: ICD-10-CM

## 2021-04-12 DIAGNOSIS — N28.1 KIDNEY CYST, ACQUIRED: ICD-10-CM

## 2021-04-12 DIAGNOSIS — N18.32 STAGE 3B CHRONIC KIDNEY DISEASE: ICD-10-CM

## 2021-04-12 DIAGNOSIS — I10 ESSENTIAL HYPERTENSION: Primary | ICD-10-CM

## 2021-04-12 DIAGNOSIS — R73.03 PRE-DIABETES: ICD-10-CM

## 2021-04-12 DIAGNOSIS — E78.49 OTHER HYPERLIPIDEMIA: ICD-10-CM

## 2021-04-12 PROCEDURE — 3288F PR FALLS RISK ASSESSMENT DOCUMENTED: ICD-10-PCS | Mod: CPTII,S$GLB,, | Performed by: FAMILY MEDICINE

## 2021-04-12 PROCEDURE — 76700 US ABDOMEN COMPLETE: ICD-10-PCS | Mod: 26,,, | Performed by: RADIOLOGY

## 2021-04-12 PROCEDURE — 3288F FALL RISK ASSESSMENT DOCD: CPT | Mod: CPTII,S$GLB,, | Performed by: FAMILY MEDICINE

## 2021-04-12 PROCEDURE — 76700 US EXAM ABDOM COMPLETE: CPT | Mod: 26,,, | Performed by: RADIOLOGY

## 2021-04-12 PROCEDURE — 99214 PR OFFICE/OUTPT VISIT, EST, LEVL IV, 30-39 MIN: ICD-10-PCS | Mod: S$GLB,,, | Performed by: FAMILY MEDICINE

## 2021-04-12 PROCEDURE — 99499 UNLISTED E&M SERVICE: CPT | Mod: S$GLB,,, | Performed by: FAMILY MEDICINE

## 2021-04-12 PROCEDURE — 3079F DIAST BP 80-89 MM HG: CPT | Mod: CPTII,S$GLB,, | Performed by: FAMILY MEDICINE

## 2021-04-12 PROCEDURE — 99499 RISK ADDL DX/OHS AUDIT: ICD-10-PCS | Mod: S$GLB,,, | Performed by: FAMILY MEDICINE

## 2021-04-12 PROCEDURE — 74018 XR ABDOMEN AP 1 VIEW: ICD-10-PCS | Mod: 26,,, | Performed by: RADIOLOGY

## 2021-04-12 PROCEDURE — 1100F PTFALLS ASSESS-DOCD GE2>/YR: CPT | Mod: CPTII,S$GLB,, | Performed by: FAMILY MEDICINE

## 2021-04-12 PROCEDURE — 1159F MED LIST DOCD IN RCRD: CPT | Mod: S$GLB,,, | Performed by: FAMILY MEDICINE

## 2021-04-12 PROCEDURE — 3074F SYST BP LT 130 MM HG: CPT | Mod: CPTII,S$GLB,, | Performed by: FAMILY MEDICINE

## 2021-04-12 PROCEDURE — 99214 OFFICE O/P EST MOD 30 MIN: CPT | Mod: S$GLB,,, | Performed by: FAMILY MEDICINE

## 2021-04-12 PROCEDURE — 74018 RADEX ABDOMEN 1 VIEW: CPT | Mod: TC,FY,PO

## 2021-04-12 PROCEDURE — 1126F PR PAIN SEVERITY QUANTIFIED, NO PAIN PRESENT: ICD-10-PCS | Mod: S$GLB,,, | Performed by: FAMILY MEDICINE

## 2021-04-12 PROCEDURE — 3079F PR MOST RECENT DIASTOLIC BLOOD PRESSURE 80-89 MM HG: ICD-10-PCS | Mod: CPTII,S$GLB,, | Performed by: FAMILY MEDICINE

## 2021-04-12 PROCEDURE — 76700 US EXAM ABDOM COMPLETE: CPT | Mod: TC,PO

## 2021-04-12 PROCEDURE — 99999 PR PBB SHADOW E&M-EST. PATIENT-LVL V: CPT | Mod: PBBFAC,,, | Performed by: FAMILY MEDICINE

## 2021-04-12 PROCEDURE — 3074F PR MOST RECENT SYSTOLIC BLOOD PRESSURE < 130 MM HG: ICD-10-PCS | Mod: CPTII,S$GLB,, | Performed by: FAMILY MEDICINE

## 2021-04-12 PROCEDURE — 1159F PR MEDICATION LIST DOCUMENTED IN MEDICAL RECORD: ICD-10-PCS | Mod: S$GLB,,, | Performed by: FAMILY MEDICINE

## 2021-04-12 PROCEDURE — 74018 RADEX ABDOMEN 1 VIEW: CPT | Mod: 26,,, | Performed by: RADIOLOGY

## 2021-04-12 PROCEDURE — 99999 PR PBB SHADOW E&M-EST. PATIENT-LVL V: ICD-10-PCS | Mod: PBBFAC,,, | Performed by: FAMILY MEDICINE

## 2021-04-12 PROCEDURE — 1100F PR PT FALLS ASSESS DOC 2+ FALLS/FALL W/INJURY/YR: ICD-10-PCS | Mod: CPTII,S$GLB,, | Performed by: FAMILY MEDICINE

## 2021-04-12 PROCEDURE — 1126F AMNT PAIN NOTED NONE PRSNT: CPT | Mod: S$GLB,,, | Performed by: FAMILY MEDICINE

## 2021-04-20 ENCOUNTER — TELEPHONE (OUTPATIENT)
Dept: FAMILY MEDICINE | Facility: CLINIC | Age: 81
End: 2021-04-20

## 2021-04-20 DIAGNOSIS — R14.0 ABDOMINAL DISTENSION (GASEOUS): ICD-10-CM

## 2021-04-20 DIAGNOSIS — R10.11 RIGHT UPPER QUADRANT PAIN: Primary | ICD-10-CM

## 2021-04-22 RX ORDER — MONTELUKAST SODIUM 10 MG/1
TABLET ORAL
Qty: 90 TABLET | Refills: 3 | Status: SHIPPED | OUTPATIENT
Start: 2021-04-22 | End: 2021-05-20 | Stop reason: SDUPTHER

## 2021-04-23 NOTE — PROGRESS NOTES
Subjective:       Patient ID: Dilip Lopez is a 78 y.o. White male who presents for new evaluation of Chronic Kidney Disease    HPI     Referred for elevated Cr of 1.8, pt aware of CKD      Review of Systems   Constitutional: Negative for activity change, appetite change, fatigue and unexpected weight change.   HENT: Negative for facial swelling.    Respiratory: Negative for cough and shortness of breath.    Cardiovascular: Negative for chest pain and leg swelling.   Gastrointestinal: Negative for abdominal distention.   Genitourinary: Negative for difficulty urinating, dysuria, frequency, hematuria and urgency.   Musculoskeletal: Negative for arthralgias.   Neurological: Negative for weakness and headaches.   Hematological: Does not bruise/bleed easily.   Psychiatric/Behavioral: Negative for decreased concentration.       Objective:      Physical Exam   Constitutional: He appears well-developed and well-nourished.   Neck: No JVD present.   Cardiovascular: S1 normal and S2 normal. Exam reveals no friction rub.   Pulmonary/Chest: Breath sounds normal.   Abdominal: Soft.   Neurological: He is alert.   Skin: Skin is warm and dry.   Psychiatric: He has a normal mood and affect.       Assessment:       1. CKD (chronic kidney disease) stage 3, GFR 30-59 ml/min    2. Gout, unspecified cause, unspecified chronicity, unspecified site        Plan:              CKD with multiple risk factors    Continue RAAS blockade for renal preservation    HTN is controlled    DM--needs screen    Patient was advised to avoid NSAIDs, continue a low sodium diet, and ensure hydration      RTC 5mo  
independent

## 2021-05-04 ENCOUNTER — OFFICE VISIT (OUTPATIENT)
Dept: FAMILY MEDICINE | Facility: CLINIC | Age: 81
End: 2021-05-04
Payer: MEDICARE

## 2021-05-04 VITALS
HEIGHT: 72 IN | WEIGHT: 186 LBS | SYSTOLIC BLOOD PRESSURE: 132 MMHG | DIASTOLIC BLOOD PRESSURE: 64 MMHG | BODY MASS INDEX: 25.19 KG/M2

## 2021-05-04 DIAGNOSIS — L50.9 URTICARIA: Primary | ICD-10-CM

## 2021-05-04 DIAGNOSIS — R10.11 RIGHT UPPER QUADRANT ABDOMINAL PAIN: ICD-10-CM

## 2021-05-04 PROCEDURE — 99999 PR PBB SHADOW E&M-EST. PATIENT-LVL IV: ICD-10-PCS | Mod: PBBFAC,,, | Performed by: PHYSICIAN ASSISTANT

## 2021-05-04 PROCEDURE — 1159F PR MEDICATION LIST DOCUMENTED IN MEDICAL RECORD: ICD-10-PCS | Mod: S$GLB,,, | Performed by: PHYSICIAN ASSISTANT

## 2021-05-04 PROCEDURE — 96372 THER/PROPH/DIAG INJ SC/IM: CPT | Mod: S$GLB,,, | Performed by: PHYSICIAN ASSISTANT

## 2021-05-04 PROCEDURE — 1126F AMNT PAIN NOTED NONE PRSNT: CPT | Mod: S$GLB,,, | Performed by: PHYSICIAN ASSISTANT

## 2021-05-04 PROCEDURE — 99214 OFFICE O/P EST MOD 30 MIN: CPT | Mod: 25,S$GLB,, | Performed by: PHYSICIAN ASSISTANT

## 2021-05-04 PROCEDURE — 99214 PR OFFICE/OUTPT VISIT, EST, LEVL IV, 30-39 MIN: ICD-10-PCS | Mod: 25,S$GLB,, | Performed by: PHYSICIAN ASSISTANT

## 2021-05-04 PROCEDURE — 1126F PR PAIN SEVERITY QUANTIFIED, NO PAIN PRESENT: ICD-10-PCS | Mod: S$GLB,,, | Performed by: PHYSICIAN ASSISTANT

## 2021-05-04 PROCEDURE — 1159F MED LIST DOCD IN RCRD: CPT | Mod: S$GLB,,, | Performed by: PHYSICIAN ASSISTANT

## 2021-05-04 PROCEDURE — 96372 PR INJECTION,THERAP/PROPH/DIAG2ST, IM OR SUBCUT: ICD-10-PCS | Mod: S$GLB,,, | Performed by: PHYSICIAN ASSISTANT

## 2021-05-04 PROCEDURE — 99999 PR PBB SHADOW E&M-EST. PATIENT-LVL IV: CPT | Mod: PBBFAC,,, | Performed by: PHYSICIAN ASSISTANT

## 2021-05-04 RX ORDER — DEXAMETHASONE SODIUM PHOSPHATE 4 MG/ML
8 INJECTION, SOLUTION INTRA-ARTICULAR; INTRALESIONAL; INTRAMUSCULAR; INTRAVENOUS; SOFT TISSUE ONCE
Status: COMPLETED | OUTPATIENT
Start: 2021-05-04 | End: 2021-05-04

## 2021-05-04 RX ORDER — PREDNISONE 20 MG/1
40 TABLET ORAL DAILY
Qty: 6 TABLET | Refills: 0 | Status: SHIPPED | OUTPATIENT
Start: 2021-05-04 | End: 2021-05-07

## 2021-05-04 RX ADMIN — DEXAMETHASONE SODIUM PHOSPHATE 8 MG: 4 INJECTION, SOLUTION INTRA-ARTICULAR; INTRALESIONAL; INTRAMUSCULAR; INTRAVENOUS; SOFT TISSUE at 01:05

## 2021-05-05 ENCOUNTER — HOSPITAL ENCOUNTER (OUTPATIENT)
Dept: RADIOLOGY | Facility: HOSPITAL | Age: 81
Discharge: HOME | End: 2021-05-05
Attending: FAMILY MEDICINE
Payer: MEDICARE

## 2021-05-05 DIAGNOSIS — R10.11 RIGHT UPPER QUADRANT PAIN: ICD-10-CM

## 2021-05-05 DIAGNOSIS — R14.0 ABDOMINAL DISTENSION (GASEOUS): ICD-10-CM

## 2021-05-05 PROCEDURE — 74176 CT ABD & PELVIS W/O CONTRAST: CPT | Mod: TC,PO

## 2021-05-05 PROCEDURE — 74176 CT ABDOMEN PELVIS WITHOUT CONTRAST: ICD-10-PCS | Mod: 26,,, | Performed by: RADIOLOGY

## 2021-05-05 PROCEDURE — 25500020 PHARM REV CODE 255: Mod: PO | Performed by: FAMILY MEDICINE

## 2021-05-05 PROCEDURE — A9698 NON-RAD CONTRAST MATERIALNOC: HCPCS | Mod: PO | Performed by: FAMILY MEDICINE

## 2021-05-05 PROCEDURE — 74176 CT ABD & PELVIS W/O CONTRAST: CPT | Mod: 26,,, | Performed by: RADIOLOGY

## 2021-05-05 RX ADMIN — IOHEXOL 1000 ML: 9 SOLUTION ORAL at 01:05

## 2021-05-06 DIAGNOSIS — Z12.5 SCREENING FOR PROSTATE CANCER: Primary | ICD-10-CM

## 2021-05-06 DIAGNOSIS — R10.10 PAIN OF UPPER ABDOMEN: ICD-10-CM

## 2021-05-06 DIAGNOSIS — R91.1 SOLITARY PULMONARY NODULE: ICD-10-CM

## 2021-05-06 DIAGNOSIS — R93.5 ABNORMAL CT OF THE ABDOMEN: ICD-10-CM

## 2021-05-06 RX ORDER — PANTOPRAZOLE SODIUM 20 MG/1
20 TABLET, DELAYED RELEASE ORAL DAILY
Qty: 30 TABLET | Refills: 11 | Status: SHIPPED | OUTPATIENT
Start: 2021-05-06 | End: 2021-05-20 | Stop reason: SDUPTHER

## 2021-05-07 ENCOUNTER — OFFICE VISIT (OUTPATIENT)
Dept: GASTROENTEROLOGY | Facility: CLINIC | Age: 81
End: 2021-05-07
Payer: MEDICARE

## 2021-05-07 VITALS — BODY MASS INDEX: 25.5 KG/M2 | HEIGHT: 72 IN | WEIGHT: 188.25 LBS

## 2021-05-07 DIAGNOSIS — R93.5 ABNORMAL CT OF THE ABDOMEN: ICD-10-CM

## 2021-05-07 DIAGNOSIS — Z01.818 PRE-OP TESTING: ICD-10-CM

## 2021-05-07 DIAGNOSIS — R10.10 PAIN OF UPPER ABDOMEN: ICD-10-CM

## 2021-05-07 PROCEDURE — 3288F PR FALLS RISK ASSESSMENT DOCUMENTED: ICD-10-PCS | Mod: CPTII,S$GLB,, | Performed by: INTERNAL MEDICINE

## 2021-05-07 PROCEDURE — 99999 PR PBB SHADOW E&M-EST. PATIENT-LVL III: ICD-10-PCS | Mod: PBBFAC,,, | Performed by: INTERNAL MEDICINE

## 2021-05-07 PROCEDURE — 1101F PR PT FALLS ASSESS DOC 0-1 FALLS W/OUT INJ PAST YR: ICD-10-PCS | Mod: CPTII,S$GLB,, | Performed by: INTERNAL MEDICINE

## 2021-05-07 PROCEDURE — 1159F MED LIST DOCD IN RCRD: CPT | Mod: S$GLB,,, | Performed by: INTERNAL MEDICINE

## 2021-05-07 PROCEDURE — 1125F AMNT PAIN NOTED PAIN PRSNT: CPT | Mod: S$GLB,,, | Performed by: INTERNAL MEDICINE

## 2021-05-07 PROCEDURE — 1101F PT FALLS ASSESS-DOCD LE1/YR: CPT | Mod: CPTII,S$GLB,, | Performed by: INTERNAL MEDICINE

## 2021-05-07 PROCEDURE — 99999 PR PBB SHADOW E&M-EST. PATIENT-LVL III: CPT | Mod: PBBFAC,,, | Performed by: INTERNAL MEDICINE

## 2021-05-07 PROCEDURE — 99204 PR OFFICE/OUTPT VISIT, NEW, LEVL IV, 45-59 MIN: ICD-10-PCS | Mod: S$GLB,,, | Performed by: INTERNAL MEDICINE

## 2021-05-07 PROCEDURE — 1125F PR PAIN SEVERITY QUANTIFIED, PAIN PRESENT: ICD-10-PCS | Mod: S$GLB,,, | Performed by: INTERNAL MEDICINE

## 2021-05-07 PROCEDURE — 3288F FALL RISK ASSESSMENT DOCD: CPT | Mod: CPTII,S$GLB,, | Performed by: INTERNAL MEDICINE

## 2021-05-07 PROCEDURE — 99204 OFFICE O/P NEW MOD 45 MIN: CPT | Mod: S$GLB,,, | Performed by: INTERNAL MEDICINE

## 2021-05-07 PROCEDURE — 1159F PR MEDICATION LIST DOCUMENTED IN MEDICAL RECORD: ICD-10-PCS | Mod: S$GLB,,, | Performed by: INTERNAL MEDICINE

## 2021-05-10 ENCOUNTER — TELEPHONE (OUTPATIENT)
Dept: GASTROENTEROLOGY | Facility: CLINIC | Age: 81
End: 2021-05-10

## 2021-05-11 ENCOUNTER — LAB VISIT (OUTPATIENT)
Dept: FAMILY MEDICINE | Facility: CLINIC | Age: 81
End: 2021-05-11
Payer: MEDICARE

## 2021-05-11 DIAGNOSIS — Z01.818 PRE-OP TESTING: ICD-10-CM

## 2021-05-11 PROCEDURE — U0005 INFEC AGEN DETEC AMPLI PROBE: HCPCS | Performed by: INTERNAL MEDICINE

## 2021-05-11 PROCEDURE — U0003 INFECTIOUS AGENT DETECTION BY NUCLEIC ACID (DNA OR RNA); SEVERE ACUTE RESPIRATORY SYNDROME CORONAVIRUS 2 (SARS-COV-2) (CORONAVIRUS DISEASE [COVID-19]), AMPLIFIED PROBE TECHNIQUE, MAKING USE OF HIGH THROUGHPUT TECHNOLOGIES AS DESCRIBED BY CMS-2020-01-R: HCPCS | Performed by: INTERNAL MEDICINE

## 2021-05-12 LAB — SARS-COV-2 RNA RESP QL NAA+PROBE: NOT DETECTED

## 2021-05-13 ENCOUNTER — HOSPITAL ENCOUNTER (OUTPATIENT)
Dept: RADIOLOGY | Facility: HOSPITAL | Age: 81
Discharge: HOME | End: 2021-05-13
Attending: FAMILY MEDICINE
Payer: MEDICARE

## 2021-05-13 DIAGNOSIS — R91.1 SOLITARY PULMONARY NODULE: ICD-10-CM

## 2021-05-13 PROCEDURE — 71250 CT CHEST WITHOUT CONTRAST: ICD-10-PCS | Mod: 26,,, | Performed by: RADIOLOGY

## 2021-05-13 PROCEDURE — 71250 CT THORAX DX C-: CPT | Mod: 26,,, | Performed by: RADIOLOGY

## 2021-05-13 PROCEDURE — 71250 CT THORAX DX C-: CPT | Mod: TC,PO

## 2021-05-14 ENCOUNTER — ANESTHESIA (OUTPATIENT)
Dept: ENDOSCOPY | Facility: HOSPITAL | Age: 81
End: 2021-05-14
Payer: MEDICARE

## 2021-05-14 ENCOUNTER — HOSPITAL ENCOUNTER (OUTPATIENT)
Facility: HOSPITAL | Age: 81
Discharge: HOME | End: 2021-05-14
Attending: INTERNAL MEDICINE | Admitting: INTERNAL MEDICINE
Payer: MEDICARE

## 2021-05-14 ENCOUNTER — ANESTHESIA EVENT (OUTPATIENT)
Dept: ENDOSCOPY | Facility: HOSPITAL | Age: 81
End: 2021-05-14
Payer: MEDICARE

## 2021-05-14 VITALS
OXYGEN SATURATION: 99 % | DIASTOLIC BLOOD PRESSURE: 100 MMHG | SYSTOLIC BLOOD PRESSURE: 166 MMHG | HEIGHT: 72 IN | HEART RATE: 50 BPM | RESPIRATION RATE: 16 BRPM | BODY MASS INDEX: 25.47 KG/M2 | WEIGHT: 188 LBS | TEMPERATURE: 98 F

## 2021-05-14 DIAGNOSIS — R97.20 INCREASED PROSTATE SPECIFIC ANTIGEN (PSA) VELOCITY: Primary | ICD-10-CM

## 2021-05-14 DIAGNOSIS — R93.3 ABNORMAL FINDING ON GI TRACT IMAGING: Primary | ICD-10-CM

## 2021-05-14 PROCEDURE — 27201089 HC SNARE, DISP (ANY): Mod: PO | Performed by: INTERNAL MEDICINE

## 2021-05-14 PROCEDURE — 45385 COLONOSCOPY W/LESION REMOVAL: CPT | Mod: PO | Performed by: INTERNAL MEDICINE

## 2021-05-14 PROCEDURE — 44361 SMALL BOWEL ENDOSCOPY/BIOPSY: CPT | Mod: 51,,, | Performed by: INTERNAL MEDICINE

## 2021-05-14 PROCEDURE — 45385 COLONOSCOPY W/LESION REMOVAL: CPT | Mod: ,,, | Performed by: INTERNAL MEDICINE

## 2021-05-14 PROCEDURE — D9220A PRA ANESTHESIA: ICD-10-PCS | Mod: ANES,,, | Performed by: ANESTHESIOLOGY

## 2021-05-14 PROCEDURE — 25000003 PHARM REV CODE 250: Mod: PO | Performed by: NURSE ANESTHETIST, CERTIFIED REGISTERED

## 2021-05-14 PROCEDURE — 63600175 PHARM REV CODE 636 W HCPCS: Mod: PO | Performed by: INTERNAL MEDICINE

## 2021-05-14 PROCEDURE — D9220A PRA ANESTHESIA: ICD-10-PCS | Mod: CRNA,,, | Performed by: NURSE ANESTHETIST, CERTIFIED REGISTERED

## 2021-05-14 PROCEDURE — 45380 COLONOSCOPY AND BIOPSY: CPT | Mod: PO | Performed by: INTERNAL MEDICINE

## 2021-05-14 PROCEDURE — 88305 TISSUE EXAM BY PATHOLOGIST: CPT | Mod: 59 | Performed by: PATHOLOGY

## 2021-05-14 PROCEDURE — 37000009 HC ANESTHESIA EA ADD 15 MINS: Mod: PO | Performed by: INTERNAL MEDICINE

## 2021-05-14 PROCEDURE — 44361 PR SMALL BOWEL ENDOSCOPY,BIOPSY: ICD-10-PCS | Mod: 51,,, | Performed by: INTERNAL MEDICINE

## 2021-05-14 PROCEDURE — 88342 IMHCHEM/IMCYTCHM 1ST ANTB: CPT | Performed by: PATHOLOGY

## 2021-05-14 PROCEDURE — 88342 IMHCHEM/IMCYTCHM 1ST ANTB: CPT | Mod: 26,,, | Performed by: PATHOLOGY

## 2021-05-14 PROCEDURE — 45385 PR COLONOSCOPY,REMV LESN,SNARE: ICD-10-PCS | Mod: ,,, | Performed by: INTERNAL MEDICINE

## 2021-05-14 PROCEDURE — 45381 COLONOSCOPY SUBMUCOUS NJX: CPT | Mod: PO | Performed by: INTERNAL MEDICINE

## 2021-05-14 PROCEDURE — 43239 EGD BIOPSY SINGLE/MULTIPLE: CPT | Mod: PO | Performed by: INTERNAL MEDICINE

## 2021-05-14 PROCEDURE — 44361 SMALL BOWEL ENDOSCOPY/BIOPSY: CPT | Mod: PO | Performed by: INTERNAL MEDICINE

## 2021-05-14 PROCEDURE — 63600175 PHARM REV CODE 636 W HCPCS: Mod: PO | Performed by: NURSE ANESTHETIST, CERTIFIED REGISTERED

## 2021-05-14 PROCEDURE — 27202363 HC INJECTION AGENT, SUBMUCOSAL, ANY: Mod: PO | Performed by: INTERNAL MEDICINE

## 2021-05-14 PROCEDURE — 88305 TISSUE EXAM BY PATHOLOGIST: ICD-10-PCS | Mod: 26,,, | Performed by: PATHOLOGY

## 2021-05-14 PROCEDURE — 27201028 HC NEEDLE, SCLERO: Mod: PO | Performed by: INTERNAL MEDICINE

## 2021-05-14 PROCEDURE — 45381 COLONOSCOPY SUBMUCOUS NJX: CPT | Mod: 51,,, | Performed by: INTERNAL MEDICINE

## 2021-05-14 PROCEDURE — 88342 CHG IMMUNOCYTOCHEMISTRY: ICD-10-PCS | Mod: 26,,, | Performed by: PATHOLOGY

## 2021-05-14 PROCEDURE — 37000008 HC ANESTHESIA 1ST 15 MINUTES: Mod: PO | Performed by: INTERNAL MEDICINE

## 2021-05-14 PROCEDURE — 45380 COLONOSCOPY AND BIOPSY: CPT | Mod: 59,,, | Performed by: INTERNAL MEDICINE

## 2021-05-14 PROCEDURE — 45380 PR COLONOSCOPY,BIOPSY: ICD-10-PCS | Mod: 59,,, | Performed by: INTERNAL MEDICINE

## 2021-05-14 PROCEDURE — 27201012 HC FORCEPS, HOT/COLD, DISP: Mod: PO | Performed by: INTERNAL MEDICINE

## 2021-05-14 PROCEDURE — 45381 PR COLONOSCPY,FLEX,W/DIR SUBMUC INJECT: ICD-10-PCS | Mod: 51,,, | Performed by: INTERNAL MEDICINE

## 2021-05-14 PROCEDURE — D9220A PRA ANESTHESIA: Mod: ANES,,, | Performed by: ANESTHESIOLOGY

## 2021-05-14 PROCEDURE — D9220A PRA ANESTHESIA: Mod: CRNA,,, | Performed by: NURSE ANESTHETIST, CERTIFIED REGISTERED

## 2021-05-14 PROCEDURE — 88305 TISSUE EXAM BY PATHOLOGIST: CPT | Mod: 26,,, | Performed by: PATHOLOGY

## 2021-05-14 RX ORDER — LIDOCAINE HCL/PF 100 MG/5ML
SYRINGE (ML) INTRAVENOUS
Status: DISCONTINUED | OUTPATIENT
Start: 2021-05-14 | End: 2021-05-14

## 2021-05-14 RX ORDER — PANTOPRAZOLE SODIUM 40 MG/1
40 TABLET, DELAYED RELEASE ORAL DAILY
Qty: 90 TABLET | Refills: 3 | Status: SHIPPED | OUTPATIENT
Start: 2021-05-14 | End: 2021-05-27

## 2021-05-14 RX ORDER — PROPOFOL 10 MG/ML
VIAL (ML) INTRAVENOUS
Status: DISCONTINUED | OUTPATIENT
Start: 2021-05-14 | End: 2021-05-14

## 2021-05-14 RX ORDER — SODIUM CHLORIDE, SODIUM LACTATE, POTASSIUM CHLORIDE, CALCIUM CHLORIDE 600; 310; 30; 20 MG/100ML; MG/100ML; MG/100ML; MG/100ML
INJECTION, SOLUTION INTRAVENOUS CONTINUOUS
Status: DISCONTINUED | OUTPATIENT
Start: 2021-05-14 | End: 2021-05-14 | Stop reason: HOSPADM

## 2021-05-14 RX ORDER — SODIUM CHLORIDE 0.9 % (FLUSH) 0.9 %
10 SYRINGE (ML) INJECTION EVERY 6 HOURS PRN
Status: DISCONTINUED | OUTPATIENT
Start: 2021-05-14 | End: 2021-05-14 | Stop reason: HOSPADM

## 2021-05-14 RX ADMIN — PROPOFOL 25 MG: 10 INJECTION, EMULSION INTRAVENOUS at 09:05

## 2021-05-14 RX ADMIN — PROPOFOL 20 MG: 10 INJECTION, EMULSION INTRAVENOUS at 09:05

## 2021-05-14 RX ADMIN — PROPOFOL 50 MG: 10 INJECTION, EMULSION INTRAVENOUS at 09:05

## 2021-05-14 RX ADMIN — PROPOFOL 100 MG: 10 INJECTION, EMULSION INTRAVENOUS at 09:05

## 2021-05-14 RX ADMIN — SODIUM CHLORIDE, SODIUM LACTATE, POTASSIUM CHLORIDE, AND CALCIUM CHLORIDE: .6; .31; .03; .02 INJECTION, SOLUTION INTRAVENOUS at 08:05

## 2021-05-14 RX ADMIN — LIDOCAINE HYDROCHLORIDE 100 MG: 20 INJECTION, SOLUTION INTRAVENOUS at 09:05

## 2021-05-19 ENCOUNTER — LAB VISIT (OUTPATIENT)
Dept: LAB | Facility: HOSPITAL | Age: 81
End: 2021-05-19
Attending: FAMILY MEDICINE
Payer: MEDICARE

## 2021-05-19 DIAGNOSIS — R97.20 INCREASED PROSTATE SPECIFIC ANTIGEN (PSA) VELOCITY: ICD-10-CM

## 2021-05-19 LAB
BILIRUB UR QL STRIP: NEGATIVE
CLARITY UR: CLEAR
COLOR UR: YELLOW
GLUCOSE UR QL STRIP: NEGATIVE
HGB UR QL STRIP: NEGATIVE
KETONES UR QL STRIP: NEGATIVE
LEUKOCYTE ESTERASE UR QL STRIP: NEGATIVE
NITRITE UR QL STRIP: NEGATIVE
PH UR STRIP: 6 [PH] (ref 5–8)
PROT UR QL STRIP: NEGATIVE
SP GR UR STRIP: 1.02 (ref 1–1.03)
URN SPEC COLLECT METH UR: NORMAL

## 2021-05-19 PROCEDURE — 81003 URINALYSIS AUTO W/O SCOPE: CPT | Mod: PO | Performed by: FAMILY MEDICINE

## 2021-05-20 DIAGNOSIS — I10 ESSENTIAL HYPERTENSION: Primary | ICD-10-CM

## 2021-05-20 DIAGNOSIS — L50.9 URTICARIA: ICD-10-CM

## 2021-05-20 DIAGNOSIS — M51.36 DEGENERATIVE DISC DISEASE, LUMBAR: ICD-10-CM

## 2021-05-21 RX ORDER — PREDNISONE 20 MG/1
40 TABLET ORAL DAILY
Qty: 6 TABLET | Refills: 0 | OUTPATIENT
Start: 2021-05-21 | End: 2021-05-24

## 2021-05-24 RX ORDER — MONTELUKAST SODIUM 10 MG/1
10 TABLET ORAL NIGHTLY
Qty: 90 TABLET | Refills: 3 | Status: SHIPPED | OUTPATIENT
Start: 2021-05-24 | End: 2022-01-10 | Stop reason: SDUPTHER

## 2021-05-27 LAB
FINAL PATHOLOGIC DIAGNOSIS: NORMAL
GROSS: NORMAL
Lab: NORMAL
MICROSCOPIC EXAM: NORMAL

## 2021-05-27 RX ORDER — LOSARTAN POTASSIUM 100 MG/1
100 TABLET ORAL DAILY
Qty: 90 TABLET | Refills: 3 | Status: SHIPPED | OUTPATIENT
Start: 2021-05-27 | End: 2022-05-09

## 2021-05-27 RX ORDER — GABAPENTIN 300 MG/1
300 CAPSULE ORAL NIGHTLY
Qty: 90 CAPSULE | Refills: 3 | Status: SHIPPED | OUTPATIENT
Start: 2021-05-27 | End: 2021-10-12 | Stop reason: DRUGHIGH

## 2021-05-27 RX ORDER — PANTOPRAZOLE SODIUM 20 MG/1
20 TABLET, DELAYED RELEASE ORAL DAILY
Qty: 90 TABLET | Refills: 3 | Status: SHIPPED | OUTPATIENT
Start: 2021-05-27 | End: 2022-01-10 | Stop reason: SDUPTHER

## 2021-05-31 ENCOUNTER — TELEPHONE (OUTPATIENT)
Dept: FAMILY MEDICINE | Facility: CLINIC | Age: 81
End: 2021-05-31

## 2021-05-31 DIAGNOSIS — I10 ESSENTIAL HYPERTENSION: Primary | ICD-10-CM

## 2021-06-01 ENCOUNTER — TELEPHONE (OUTPATIENT)
Dept: FAMILY MEDICINE | Facility: CLINIC | Age: 81
End: 2021-06-01

## 2021-07-01 ENCOUNTER — PATIENT OUTREACH (OUTPATIENT)
Dept: ADMINISTRATIVE | Facility: OTHER | Age: 81
End: 2021-07-01

## 2021-07-02 ENCOUNTER — OFFICE VISIT (OUTPATIENT)
Dept: UROLOGY | Facility: CLINIC | Age: 81
End: 2021-07-02
Payer: MEDICARE

## 2021-07-02 ENCOUNTER — LAB VISIT (OUTPATIENT)
Dept: LAB | Facility: HOSPITAL | Age: 81
End: 2021-07-02
Attending: UROLOGY
Payer: MEDICARE

## 2021-07-02 VITALS — WEIGHT: 188.06 LBS | BODY MASS INDEX: 25.47 KG/M2 | HEIGHT: 72 IN

## 2021-07-02 DIAGNOSIS — R97.20 ELEVATED PSA: Primary | ICD-10-CM

## 2021-07-02 DIAGNOSIS — R97.20 ELEVATED PSA: ICD-10-CM

## 2021-07-02 LAB — COMPLEXED PSA SERPL-MCNC: 22.1 NG/ML (ref 0–4)

## 2021-07-02 PROCEDURE — 36415 COLL VENOUS BLD VENIPUNCTURE: CPT | Mod: PO | Performed by: UROLOGY

## 2021-07-02 PROCEDURE — 99203 PR OFFICE/OUTPT VISIT, NEW, LEVL III, 30-44 MIN: ICD-10-PCS | Mod: S$GLB,,, | Performed by: UROLOGY

## 2021-07-02 PROCEDURE — 1159F MED LIST DOCD IN RCRD: CPT | Mod: S$GLB,,, | Performed by: UROLOGY

## 2021-07-02 PROCEDURE — 1159F PR MEDICATION LIST DOCUMENTED IN MEDICAL RECORD: ICD-10-PCS | Mod: S$GLB,,, | Performed by: UROLOGY

## 2021-07-02 PROCEDURE — 1101F PR PT FALLS ASSESS DOC 0-1 FALLS W/OUT INJ PAST YR: ICD-10-PCS | Mod: CPTII,S$GLB,, | Performed by: UROLOGY

## 2021-07-02 PROCEDURE — 99999 PR PBB SHADOW E&M-EST. PATIENT-LVL III: CPT | Mod: PBBFAC,,, | Performed by: UROLOGY

## 2021-07-02 PROCEDURE — 1126F AMNT PAIN NOTED NONE PRSNT: CPT | Mod: S$GLB,,, | Performed by: UROLOGY

## 2021-07-02 PROCEDURE — 1101F PT FALLS ASSESS-DOCD LE1/YR: CPT | Mod: CPTII,S$GLB,, | Performed by: UROLOGY

## 2021-07-02 PROCEDURE — 99203 OFFICE O/P NEW LOW 30 MIN: CPT | Mod: S$GLB,,, | Performed by: UROLOGY

## 2021-07-02 PROCEDURE — 3288F FALL RISK ASSESSMENT DOCD: CPT | Mod: CPTII,S$GLB,, | Performed by: UROLOGY

## 2021-07-02 PROCEDURE — 1126F PR PAIN SEVERITY QUANTIFIED, NO PAIN PRESENT: ICD-10-PCS | Mod: S$GLB,,, | Performed by: UROLOGY

## 2021-07-02 PROCEDURE — 84153 ASSAY OF PSA TOTAL: CPT | Performed by: UROLOGY

## 2021-07-02 PROCEDURE — 3288F PR FALLS RISK ASSESSMENT DOCUMENTED: ICD-10-PCS | Mod: CPTII,S$GLB,, | Performed by: UROLOGY

## 2021-07-02 PROCEDURE — 99999 PR PBB SHADOW E&M-EST. PATIENT-LVL III: ICD-10-PCS | Mod: PBBFAC,,, | Performed by: UROLOGY

## 2021-07-06 ENCOUNTER — TELEPHONE (OUTPATIENT)
Dept: UROLOGY | Facility: CLINIC | Age: 81
End: 2021-07-06

## 2021-07-06 RX ORDER — AMOXICILLIN AND CLAVULANATE POTASSIUM 875; 125 MG/1; MG/1
1 TABLET, FILM COATED ORAL 2 TIMES DAILY
Qty: 6 TABLET | Refills: 0 | Status: SHIPPED | OUTPATIENT
Start: 2021-07-06 | End: 2021-07-09 | Stop reason: SDUPTHER

## 2021-07-09 ENCOUNTER — TELEPHONE (OUTPATIENT)
Dept: UROLOGY | Facility: CLINIC | Age: 81
End: 2021-07-09

## 2021-07-09 ENCOUNTER — OFFICE VISIT (OUTPATIENT)
Dept: UROLOGY | Facility: CLINIC | Age: 81
End: 2021-07-09
Payer: MEDICARE

## 2021-07-09 VITALS — WEIGHT: 188.06 LBS | HEIGHT: 72 IN | BODY MASS INDEX: 25.47 KG/M2

## 2021-07-09 DIAGNOSIS — R97.20 ELEVATED PSA: Primary | ICD-10-CM

## 2021-07-09 PROCEDURE — 1126F PR PAIN SEVERITY QUANTIFIED, NO PAIN PRESENT: ICD-10-PCS | Mod: S$GLB,,, | Performed by: UROLOGY

## 2021-07-09 PROCEDURE — 3288F PR FALLS RISK ASSESSMENT DOCUMENTED: ICD-10-PCS | Mod: CPTII,S$GLB,, | Performed by: UROLOGY

## 2021-07-09 PROCEDURE — 1126F AMNT PAIN NOTED NONE PRSNT: CPT | Mod: S$GLB,,, | Performed by: UROLOGY

## 2021-07-09 PROCEDURE — 99214 OFFICE O/P EST MOD 30 MIN: CPT | Mod: S$GLB,,, | Performed by: UROLOGY

## 2021-07-09 PROCEDURE — 99999 PR PBB SHADOW E&M-EST. PATIENT-LVL IV: CPT | Mod: PBBFAC,,, | Performed by: UROLOGY

## 2021-07-09 PROCEDURE — 1159F MED LIST DOCD IN RCRD: CPT | Mod: S$GLB,,, | Performed by: UROLOGY

## 2021-07-09 PROCEDURE — 3288F FALL RISK ASSESSMENT DOCD: CPT | Mod: CPTII,S$GLB,, | Performed by: UROLOGY

## 2021-07-09 PROCEDURE — 1101F PT FALLS ASSESS-DOCD LE1/YR: CPT | Mod: CPTII,S$GLB,, | Performed by: UROLOGY

## 2021-07-09 PROCEDURE — 99214 PR OFFICE/OUTPT VISIT, EST, LEVL IV, 30-39 MIN: ICD-10-PCS | Mod: S$GLB,,, | Performed by: UROLOGY

## 2021-07-09 PROCEDURE — 99999 PR PBB SHADOW E&M-EST. PATIENT-LVL IV: ICD-10-PCS | Mod: PBBFAC,,, | Performed by: UROLOGY

## 2021-07-09 PROCEDURE — 1159F PR MEDICATION LIST DOCUMENTED IN MEDICAL RECORD: ICD-10-PCS | Mod: S$GLB,,, | Performed by: UROLOGY

## 2021-07-09 PROCEDURE — 1101F PR PT FALLS ASSESS DOC 0-1 FALLS W/OUT INJ PAST YR: ICD-10-PCS | Mod: CPTII,S$GLB,, | Performed by: UROLOGY

## 2021-07-09 RX ORDER — AMOXICILLIN AND CLAVULANATE POTASSIUM 875; 125 MG/1; MG/1
1 TABLET, FILM COATED ORAL 2 TIMES DAILY
Qty: 6 TABLET | Refills: 0 | Status: SHIPPED | OUTPATIENT
Start: 2021-07-09 | End: 2021-07-12

## 2021-07-13 ENCOUNTER — TELEPHONE (OUTPATIENT)
Dept: UROLOGY | Facility: CLINIC | Age: 81
End: 2021-07-13

## 2021-07-19 ENCOUNTER — ANESTHESIA EVENT (OUTPATIENT)
Dept: SURGERY | Facility: HOSPITAL | Age: 81
End: 2021-07-19
Payer: MEDICARE

## 2021-07-20 ENCOUNTER — ANESTHESIA (OUTPATIENT)
Dept: SURGERY | Facility: HOSPITAL | Age: 81
End: 2021-07-20
Payer: MEDICARE

## 2021-07-20 ENCOUNTER — HOSPITAL ENCOUNTER (OUTPATIENT)
Facility: HOSPITAL | Age: 81
Discharge: HOME | End: 2021-07-20
Attending: UROLOGY | Admitting: UROLOGY
Payer: MEDICARE

## 2021-07-20 DIAGNOSIS — R97.20 ELEVATED PSA: Primary | ICD-10-CM

## 2021-07-20 PROCEDURE — 71000033 HC RECOVERY, INTIAL HOUR: Mod: PO | Performed by: UROLOGY

## 2021-07-20 PROCEDURE — 88305 TISSUE EXAM BY PATHOLOGIST: ICD-10-PCS | Mod: 26,,, | Performed by: PATHOLOGY

## 2021-07-20 PROCEDURE — 63600175 PHARM REV CODE 636 W HCPCS: Mod: PO | Performed by: ANESTHESIOLOGY

## 2021-07-20 PROCEDURE — 63600175 PHARM REV CODE 636 W HCPCS: Mod: PO | Performed by: NURSE ANESTHETIST, CERTIFIED REGISTERED

## 2021-07-20 PROCEDURE — 55700 PR BIOPSY OF PROSTATE,NEEDLE/PUNCH: CPT | Mod: ,,, | Performed by: UROLOGY

## 2021-07-20 PROCEDURE — 36000705 HC OR TIME LEV I EA ADD 15 MIN: Mod: PO | Performed by: UROLOGY

## 2021-07-20 PROCEDURE — 36000704 HC OR TIME LEV I 1ST 15 MIN: Mod: PO | Performed by: UROLOGY

## 2021-07-20 PROCEDURE — 76872 PR US TRANSRECTAL: ICD-10-PCS | Mod: 26,,, | Performed by: UROLOGY

## 2021-07-20 PROCEDURE — 76872 US TRANSRECTAL: CPT | Mod: 26,,, | Performed by: UROLOGY

## 2021-07-20 PROCEDURE — 25000003 PHARM REV CODE 250: Mod: PO | Performed by: NURSE ANESTHETIST, CERTIFIED REGISTERED

## 2021-07-20 PROCEDURE — 71000015 HC POSTOP RECOV 1ST HR: Mod: PO | Performed by: UROLOGY

## 2021-07-20 PROCEDURE — 88305 TISSUE EXAM BY PATHOLOGIST: CPT | Mod: 26,,, | Performed by: PATHOLOGY

## 2021-07-20 PROCEDURE — D9220A PRA ANESTHESIA: ICD-10-PCS | Mod: CRNA,,, | Performed by: NURSE ANESTHETIST, CERTIFIED REGISTERED

## 2021-07-20 PROCEDURE — D9220A PRA ANESTHESIA: Mod: CRNA,,, | Performed by: NURSE ANESTHETIST, CERTIFIED REGISTERED

## 2021-07-20 PROCEDURE — 37000009 HC ANESTHESIA EA ADD 15 MINS: Mod: PO | Performed by: UROLOGY

## 2021-07-20 PROCEDURE — D9220A PRA ANESTHESIA: ICD-10-PCS | Mod: ANES,,, | Performed by: ANESTHESIOLOGY

## 2021-07-20 PROCEDURE — 37000008 HC ANESTHESIA 1ST 15 MINUTES: Mod: PO | Performed by: UROLOGY

## 2021-07-20 PROCEDURE — 55700 PR BIOPSY OF PROSTATE,NEEDLE/PUNCH: ICD-10-PCS | Mod: ,,, | Performed by: UROLOGY

## 2021-07-20 PROCEDURE — D9220A PRA ANESTHESIA: Mod: ANES,,, | Performed by: ANESTHESIOLOGY

## 2021-07-20 PROCEDURE — 25000003 PHARM REV CODE 250: Mod: PO | Performed by: UROLOGY

## 2021-07-20 PROCEDURE — 88305 TISSUE EXAM BY PATHOLOGIST: CPT | Mod: 59 | Performed by: PATHOLOGY

## 2021-07-20 RX ORDER — EPHEDRINE SULFATE 50 MG/ML
INJECTION, SOLUTION INTRAVENOUS
Status: DISCONTINUED | OUTPATIENT
Start: 2021-07-20 | End: 2021-07-20

## 2021-07-20 RX ORDER — GENTAMICIN SULFATE 80 MG/100ML
INJECTION, SOLUTION INTRAVENOUS
Status: DISCONTINUED | OUTPATIENT
Start: 2021-07-20 | End: 2021-07-20

## 2021-07-20 RX ORDER — OXYCODONE HYDROCHLORIDE 5 MG/1
5 TABLET ORAL ONCE AS NEEDED
Status: DISCONTINUED | OUTPATIENT
Start: 2021-07-20 | End: 2021-07-20 | Stop reason: HOSPADM

## 2021-07-20 RX ORDER — LIDOCAINE HYDROCHLORIDE 10 MG/ML
INJECTION INFILTRATION; PERINEURAL
Status: DISCONTINUED | OUTPATIENT
Start: 2021-07-20 | End: 2021-07-20 | Stop reason: HOSPADM

## 2021-07-20 RX ORDER — FENTANYL CITRATE 50 UG/ML
25 INJECTION, SOLUTION INTRAMUSCULAR; INTRAVENOUS EVERY 5 MIN PRN
Status: DISCONTINUED | OUTPATIENT
Start: 2021-07-20 | End: 2021-07-20 | Stop reason: HOSPADM

## 2021-07-20 RX ORDER — LIDOCAINE HCL/PF 100 MG/5ML
SYRINGE (ML) INTRAVENOUS
Status: DISCONTINUED | OUTPATIENT
Start: 2021-07-20 | End: 2021-07-20

## 2021-07-20 RX ORDER — SODIUM CHLORIDE 0.9 % (FLUSH) 0.9 %
3 SYRINGE (ML) INJECTION
Status: DISCONTINUED | OUTPATIENT
Start: 2021-07-20 | End: 2021-07-20 | Stop reason: HOSPADM

## 2021-07-20 RX ORDER — GENTAMICIN SULFATE 80 MG/100ML
80 INJECTION, SOLUTION INTRAVENOUS
Status: CANCELLED | OUTPATIENT
Start: 2021-07-20

## 2021-07-20 RX ORDER — FENTANYL CITRATE 50 UG/ML
INJECTION, SOLUTION INTRAMUSCULAR; INTRAVENOUS
Status: DISCONTINUED | OUTPATIENT
Start: 2021-07-20 | End: 2021-07-20

## 2021-07-20 RX ORDER — METOCLOPRAMIDE HYDROCHLORIDE 5 MG/ML
10 INJECTION INTRAMUSCULAR; INTRAVENOUS EVERY 10 MIN PRN
Status: DISCONTINUED | OUTPATIENT
Start: 2021-07-20 | End: 2021-07-20 | Stop reason: HOSPADM

## 2021-07-20 RX ORDER — LIDOCAINE HYDROCHLORIDE 10 MG/ML
1 INJECTION, SOLUTION EPIDURAL; INFILTRATION; INTRACAUDAL; PERINEURAL ONCE
Status: DISCONTINUED | OUTPATIENT
Start: 2021-07-20 | End: 2021-07-20 | Stop reason: HOSPADM

## 2021-07-20 RX ORDER — MIDAZOLAM HYDROCHLORIDE 1 MG/ML
INJECTION INTRAMUSCULAR; INTRAVENOUS
Status: DISCONTINUED | OUTPATIENT
Start: 2021-07-20 | End: 2021-07-20

## 2021-07-20 RX ORDER — PROPOFOL 10 MG/ML
VIAL (ML) INTRAVENOUS
Status: DISCONTINUED | OUTPATIENT
Start: 2021-07-20 | End: 2021-07-20

## 2021-07-20 RX ORDER — PROPOFOL 10 MG/ML
VIAL (ML) INTRAVENOUS CONTINUOUS PRN
Status: DISCONTINUED | OUTPATIENT
Start: 2021-07-20 | End: 2021-07-20

## 2021-07-20 RX ORDER — SODIUM CHLORIDE, SODIUM LACTATE, POTASSIUM CHLORIDE, CALCIUM CHLORIDE 600; 310; 30; 20 MG/100ML; MG/100ML; MG/100ML; MG/100ML
INJECTION, SOLUTION INTRAVENOUS CONTINUOUS
Status: DISCONTINUED | OUTPATIENT
Start: 2021-07-20 | End: 2021-07-20 | Stop reason: HOSPADM

## 2021-07-20 RX ADMIN — SODIUM CHLORIDE, SODIUM LACTATE, POTASSIUM CHLORIDE, AND CALCIUM CHLORIDE: .6; .31; .03; .02 INJECTION, SOLUTION INTRAVENOUS at 06:07

## 2021-07-20 RX ADMIN — MIDAZOLAM HYDROCHLORIDE 0.5 MG: 1 INJECTION, SOLUTION INTRAMUSCULAR; INTRAVENOUS at 07:07

## 2021-07-20 RX ADMIN — LIDOCAINE HYDROCHLORIDE 100 MG: 20 INJECTION, SOLUTION INTRAVENOUS at 07:07

## 2021-07-20 RX ADMIN — EPHEDRINE SULFATE 10 MG: 50 INJECTION INTRAVENOUS at 07:07

## 2021-07-20 RX ADMIN — GLYCOPYRROLATE 0.2 MG: 0.2 INJECTION, SOLUTION INTRAMUSCULAR; INTRAVITREAL at 07:07

## 2021-07-20 RX ADMIN — MIDAZOLAM HYDROCHLORIDE 0.5 MG: 1 INJECTION, SOLUTION INTRAMUSCULAR; INTRAVENOUS at 06:07

## 2021-07-20 RX ADMIN — PROPOFOL 40 MG: 10 INJECTION, EMULSION INTRAVENOUS at 07:07

## 2021-07-20 RX ADMIN — PROPOFOL 50 MCG/KG/MIN: 10 INJECTION, EMULSION INTRAVENOUS at 07:07

## 2021-07-20 RX ADMIN — FENTANYL CITRATE 50 MCG: 50 INJECTION, SOLUTION INTRAMUSCULAR; INTRAVENOUS at 07:07

## 2021-07-20 RX ADMIN — GENTAMICIN SULFATE 80 MG: 80 INJECTION, SOLUTION INTRAVENOUS at 07:07

## 2021-07-21 VITALS
BODY MASS INDEX: 25.47 KG/M2 | SYSTOLIC BLOOD PRESSURE: 100 MMHG | OXYGEN SATURATION: 94 % | TEMPERATURE: 97 F | WEIGHT: 188 LBS | DIASTOLIC BLOOD PRESSURE: 57 MMHG | HEIGHT: 72 IN | RESPIRATION RATE: 15 BRPM | HEART RATE: 75 BPM

## 2021-07-27 ENCOUNTER — TELEPHONE (OUTPATIENT)
Dept: UROLOGY | Facility: CLINIC | Age: 81
End: 2021-07-27

## 2021-07-28 LAB
FINAL PATHOLOGIC DIAGNOSIS: ABNORMAL
GROSS: ABNORMAL
Lab: ABNORMAL

## 2021-08-02 ENCOUNTER — TELEPHONE (OUTPATIENT)
Dept: FAMILY MEDICINE | Facility: CLINIC | Age: 81
End: 2021-08-02

## 2021-08-02 ENCOUNTER — OFFICE VISIT (OUTPATIENT)
Dept: FAMILY MEDICINE | Facility: CLINIC | Age: 81
End: 2021-08-02
Payer: MEDICARE

## 2021-08-02 ENCOUNTER — TELEPHONE (OUTPATIENT)
Dept: DERMATOLOGY | Facility: CLINIC | Age: 81
End: 2021-08-02

## 2021-08-02 VITALS
BODY MASS INDEX: 25.38 KG/M2 | HEART RATE: 75 BPM | OXYGEN SATURATION: 99 % | WEIGHT: 187.19 LBS | DIASTOLIC BLOOD PRESSURE: 76 MMHG | SYSTOLIC BLOOD PRESSURE: 128 MMHG

## 2021-08-02 DIAGNOSIS — R91.8 PULMONARY NODULES: ICD-10-CM

## 2021-08-02 DIAGNOSIS — L08.9 SKIN LESION, INFECTED: Primary | ICD-10-CM

## 2021-08-02 DIAGNOSIS — C61 PRIMARY PROSTATE ADENOCARCINOMA: ICD-10-CM

## 2021-08-02 PROCEDURE — 3078F PR MOST RECENT DIASTOLIC BLOOD PRESSURE < 80 MM HG: ICD-10-PCS | Mod: CPTII,S$GLB,, | Performed by: FAMILY MEDICINE

## 2021-08-02 PROCEDURE — 99214 OFFICE O/P EST MOD 30 MIN: CPT | Mod: S$GLB,,, | Performed by: FAMILY MEDICINE

## 2021-08-02 PROCEDURE — 99214 PR OFFICE/OUTPT VISIT, EST, LEVL IV, 30-39 MIN: ICD-10-PCS | Mod: S$GLB,,, | Performed by: FAMILY MEDICINE

## 2021-08-02 PROCEDURE — 1159F MED LIST DOCD IN RCRD: CPT | Mod: CPTII,S$GLB,, | Performed by: FAMILY MEDICINE

## 2021-08-02 PROCEDURE — 3078F DIAST BP <80 MM HG: CPT | Mod: CPTII,S$GLB,, | Performed by: FAMILY MEDICINE

## 2021-08-02 PROCEDURE — 3074F PR MOST RECENT SYSTOLIC BLOOD PRESSURE < 130 MM HG: ICD-10-PCS | Mod: CPTII,S$GLB,, | Performed by: FAMILY MEDICINE

## 2021-08-02 PROCEDURE — 87075 CULTR BACTERIA EXCEPT BLOOD: CPT | Performed by: FAMILY MEDICINE

## 2021-08-02 PROCEDURE — 1160F RVW MEDS BY RX/DR IN RCRD: CPT | Mod: CPTII,S$GLB,, | Performed by: FAMILY MEDICINE

## 2021-08-02 PROCEDURE — 1159F PR MEDICATION LIST DOCUMENTED IN MEDICAL RECORD: ICD-10-PCS | Mod: CPTII,S$GLB,, | Performed by: FAMILY MEDICINE

## 2021-08-02 PROCEDURE — 3288F PR FALLS RISK ASSESSMENT DOCUMENTED: ICD-10-PCS | Mod: CPTII,S$GLB,, | Performed by: FAMILY MEDICINE

## 2021-08-02 PROCEDURE — 1126F PR PAIN SEVERITY QUANTIFIED, NO PAIN PRESENT: ICD-10-PCS | Mod: CPTII,S$GLB,, | Performed by: FAMILY MEDICINE

## 2021-08-02 PROCEDURE — 87070 CULTURE OTHR SPECIMN AEROBIC: CPT | Performed by: FAMILY MEDICINE

## 2021-08-02 PROCEDURE — 1126F AMNT PAIN NOTED NONE PRSNT: CPT | Mod: CPTII,S$GLB,, | Performed by: FAMILY MEDICINE

## 2021-08-02 PROCEDURE — 1160F PR REVIEW ALL MEDS BY PRESCRIBER/CLIN PHARMACIST DOCUMENTED: ICD-10-PCS | Mod: CPTII,S$GLB,, | Performed by: FAMILY MEDICINE

## 2021-08-02 PROCEDURE — 99999 PR PBB SHADOW E&M-EST. PATIENT-LVL IV: ICD-10-PCS | Mod: PBBFAC,,, | Performed by: FAMILY MEDICINE

## 2021-08-02 PROCEDURE — 3074F SYST BP LT 130 MM HG: CPT | Mod: CPTII,S$GLB,, | Performed by: FAMILY MEDICINE

## 2021-08-02 PROCEDURE — 1101F PT FALLS ASSESS-DOCD LE1/YR: CPT | Mod: CPTII,S$GLB,, | Performed by: FAMILY MEDICINE

## 2021-08-02 PROCEDURE — 3288F FALL RISK ASSESSMENT DOCD: CPT | Mod: CPTII,S$GLB,, | Performed by: FAMILY MEDICINE

## 2021-08-02 PROCEDURE — 99999 PR PBB SHADOW E&M-EST. PATIENT-LVL IV: CPT | Mod: PBBFAC,,, | Performed by: FAMILY MEDICINE

## 2021-08-02 PROCEDURE — 1101F PR PT FALLS ASSESS DOC 0-1 FALLS W/OUT INJ PAST YR: ICD-10-PCS | Mod: CPTII,S$GLB,, | Performed by: FAMILY MEDICINE

## 2021-08-02 RX ORDER — MUPIROCIN CALCIUM 20 MG/G
CREAM TOPICAL 3 TIMES DAILY
Qty: 30 G | Refills: 0 | Status: SHIPPED | OUTPATIENT
Start: 2021-08-02 | End: 2021-08-12

## 2021-08-03 DIAGNOSIS — C61 PROSTATE CANCER: Primary | ICD-10-CM

## 2021-08-05 ENCOUNTER — TELEPHONE (OUTPATIENT)
Dept: UROLOGY | Facility: CLINIC | Age: 81
End: 2021-08-05

## 2021-08-05 ENCOUNTER — PATIENT MESSAGE (OUTPATIENT)
Dept: UROLOGY | Facility: CLINIC | Age: 81
End: 2021-08-05

## 2021-08-05 LAB — BACTERIA SPEC AEROBE CULT: NORMAL

## 2021-08-09 LAB — BACTERIA SPEC ANAEROBE CULT: NORMAL

## 2021-08-11 ENCOUNTER — HOSPITAL ENCOUNTER (OUTPATIENT)
Dept: RADIOLOGY | Facility: HOSPITAL | Age: 81
Discharge: HOME | End: 2021-08-11
Attending: UROLOGY
Payer: MEDICARE

## 2021-08-11 DIAGNOSIS — C61 PROSTATE CANCER: ICD-10-CM

## 2021-08-11 PROCEDURE — 78306 BONE IMAGING WHOLE BODY: CPT | Mod: TC,PO

## 2021-08-11 PROCEDURE — 78306 NM BONE SCAN WHOLE BODY: ICD-10-PCS | Mod: 26,,, | Performed by: RADIOLOGY

## 2021-08-11 PROCEDURE — 78306 BONE IMAGING WHOLE BODY: CPT | Mod: 26,,, | Performed by: RADIOLOGY

## 2021-08-11 PROCEDURE — A9503 TC99M MEDRONATE: HCPCS | Mod: PO

## 2021-08-16 ENCOUNTER — OFFICE VISIT (OUTPATIENT)
Dept: UROLOGY | Facility: CLINIC | Age: 81
End: 2021-08-16
Payer: MEDICARE

## 2021-08-16 VITALS — BODY MASS INDEX: 25.35 KG/M2 | HEIGHT: 72 IN | WEIGHT: 187.19 LBS

## 2021-08-16 DIAGNOSIS — C61 PROSTATE CANCER: Primary | ICD-10-CM

## 2021-08-16 PROCEDURE — 1101F PT FALLS ASSESS-DOCD LE1/YR: CPT | Mod: CPTII,S$GLB,, | Performed by: UROLOGY

## 2021-08-16 PROCEDURE — 99999 PR PBB SHADOW E&M-EST. PATIENT-LVL III: CPT | Mod: PBBFAC,,, | Performed by: UROLOGY

## 2021-08-16 PROCEDURE — 1160F RVW MEDS BY RX/DR IN RCRD: CPT | Mod: CPTII,S$GLB,, | Performed by: UROLOGY

## 2021-08-16 PROCEDURE — 99214 PR OFFICE/OUTPT VISIT, EST, LEVL IV, 30-39 MIN: ICD-10-PCS | Mod: S$GLB,,, | Performed by: UROLOGY

## 2021-08-16 PROCEDURE — 1160F PR REVIEW ALL MEDS BY PRESCRIBER/CLIN PHARMACIST DOCUMENTED: ICD-10-PCS | Mod: CPTII,S$GLB,, | Performed by: UROLOGY

## 2021-08-16 PROCEDURE — 3288F PR FALLS RISK ASSESSMENT DOCUMENTED: ICD-10-PCS | Mod: CPTII,S$GLB,, | Performed by: UROLOGY

## 2021-08-16 PROCEDURE — 99499 UNLISTED E&M SERVICE: CPT | Mod: S$GLB,,, | Performed by: UROLOGY

## 2021-08-16 PROCEDURE — 1159F MED LIST DOCD IN RCRD: CPT | Mod: CPTII,S$GLB,, | Performed by: UROLOGY

## 2021-08-16 PROCEDURE — 99499 RISK ADDL DX/OHS AUDIT: ICD-10-PCS | Mod: S$GLB,,, | Performed by: UROLOGY

## 2021-08-16 PROCEDURE — 1126F AMNT PAIN NOTED NONE PRSNT: CPT | Mod: CPTII,S$GLB,, | Performed by: UROLOGY

## 2021-08-16 PROCEDURE — 1101F PR PT FALLS ASSESS DOC 0-1 FALLS W/OUT INJ PAST YR: ICD-10-PCS | Mod: CPTII,S$GLB,, | Performed by: UROLOGY

## 2021-08-16 PROCEDURE — 1159F PR MEDICATION LIST DOCUMENTED IN MEDICAL RECORD: ICD-10-PCS | Mod: CPTII,S$GLB,, | Performed by: UROLOGY

## 2021-08-16 PROCEDURE — 1126F PR PAIN SEVERITY QUANTIFIED, NO PAIN PRESENT: ICD-10-PCS | Mod: CPTII,S$GLB,, | Performed by: UROLOGY

## 2021-08-16 PROCEDURE — 3288F FALL RISK ASSESSMENT DOCD: CPT | Mod: CPTII,S$GLB,, | Performed by: UROLOGY

## 2021-08-16 PROCEDURE — 99999 PR PBB SHADOW E&M-EST. PATIENT-LVL III: ICD-10-PCS | Mod: PBBFAC,,, | Performed by: UROLOGY

## 2021-08-16 PROCEDURE — 99214 OFFICE O/P EST MOD 30 MIN: CPT | Mod: S$GLB,,, | Performed by: UROLOGY

## 2021-08-16 RX ORDER — MUPIROCIN 20 MG/G
OINTMENT TOPICAL 3 TIMES DAILY
COMMUNITY
Start: 2021-08-02 | End: 2022-07-14 | Stop reason: ALTCHOICE

## 2021-08-23 ENCOUNTER — TELEPHONE (OUTPATIENT)
Dept: UROLOGY | Facility: CLINIC | Age: 81
End: 2021-08-23

## 2021-08-23 DIAGNOSIS — C61 PROSTATE CANCER: Primary | ICD-10-CM

## 2021-08-30 ENCOUNTER — PATIENT OUTREACH (OUTPATIENT)
Dept: ADMINISTRATIVE | Facility: OTHER | Age: 81
End: 2021-08-30

## 2021-09-01 ENCOUNTER — PATIENT MESSAGE (OUTPATIENT)
Dept: UROLOGY | Facility: CLINIC | Age: 81
End: 2021-09-01

## 2021-09-01 ENCOUNTER — TELEPHONE (OUTPATIENT)
Dept: UROLOGY | Facility: CLINIC | Age: 81
End: 2021-09-01

## 2021-09-13 ENCOUNTER — OFFICE VISIT (OUTPATIENT)
Dept: UROLOGY | Facility: CLINIC | Age: 81
End: 2021-09-13
Payer: MEDICARE

## 2021-09-13 VITALS — HEIGHT: 72 IN | BODY MASS INDEX: 25.35 KG/M2 | WEIGHT: 187.19 LBS

## 2021-09-13 DIAGNOSIS — C61 PROSTATE CANCER: Primary | ICD-10-CM

## 2021-09-13 PROCEDURE — 1159F MED LIST DOCD IN RCRD: CPT | Mod: CPTII,S$GLB,, | Performed by: UROLOGY

## 2021-09-13 PROCEDURE — 1159F PR MEDICATION LIST DOCUMENTED IN MEDICAL RECORD: ICD-10-PCS | Mod: CPTII,S$GLB,, | Performed by: UROLOGY

## 2021-09-13 PROCEDURE — 1101F PR PT FALLS ASSESS DOC 0-1 FALLS W/OUT INJ PAST YR: ICD-10-PCS | Mod: CPTII,S$GLB,, | Performed by: UROLOGY

## 2021-09-13 PROCEDURE — 99499 UNLISTED E&M SERVICE: CPT | Mod: S$GLB,,, | Performed by: UROLOGY

## 2021-09-13 PROCEDURE — 1160F RVW MEDS BY RX/DR IN RCRD: CPT | Mod: CPTII,S$GLB,, | Performed by: UROLOGY

## 2021-09-13 PROCEDURE — 96402 CHEMO HORMON ANTINEOPL SQ/IM: CPT | Mod: S$GLB,,, | Performed by: UROLOGY

## 2021-09-13 PROCEDURE — 1101F PT FALLS ASSESS-DOCD LE1/YR: CPT | Mod: CPTII,S$GLB,, | Performed by: UROLOGY

## 2021-09-13 PROCEDURE — 3288F PR FALLS RISK ASSESSMENT DOCUMENTED: ICD-10-PCS | Mod: CPTII,S$GLB,, | Performed by: UROLOGY

## 2021-09-13 PROCEDURE — 99499 NO LOS: ICD-10-PCS | Mod: S$GLB,,, | Performed by: UROLOGY

## 2021-09-13 PROCEDURE — 99999 PR PBB SHADOW E&M-EST. PATIENT-LVL III: ICD-10-PCS | Mod: PBBFAC,,, | Performed by: UROLOGY

## 2021-09-13 PROCEDURE — 1126F AMNT PAIN NOTED NONE PRSNT: CPT | Mod: CPTII,S$GLB,, | Performed by: UROLOGY

## 2021-09-13 PROCEDURE — 99999 PR PBB SHADOW E&M-EST. PATIENT-LVL III: CPT | Mod: PBBFAC,,, | Performed by: UROLOGY

## 2021-09-13 PROCEDURE — 1126F PR PAIN SEVERITY QUANTIFIED, NO PAIN PRESENT: ICD-10-PCS | Mod: CPTII,S$GLB,, | Performed by: UROLOGY

## 2021-09-13 PROCEDURE — 96402 PR CHEMOTHER HORMON ANTINEOPL SUB-Q/IM: ICD-10-PCS | Mod: S$GLB,,, | Performed by: UROLOGY

## 2021-09-13 PROCEDURE — 1160F PR REVIEW ALL MEDS BY PRESCRIBER/CLIN PHARMACIST DOCUMENTED: ICD-10-PCS | Mod: CPTII,S$GLB,, | Performed by: UROLOGY

## 2021-09-13 PROCEDURE — 3288F FALL RISK ASSESSMENT DOCD: CPT | Mod: CPTII,S$GLB,, | Performed by: UROLOGY

## 2021-09-20 ENCOUNTER — IMMUNIZATION (OUTPATIENT)
Dept: FAMILY MEDICINE | Facility: CLINIC | Age: 81
End: 2021-09-20
Payer: MEDICARE

## 2021-09-20 DIAGNOSIS — Z23 NEED FOR VACCINATION: Primary | ICD-10-CM

## 2021-09-20 PROCEDURE — 91300 COVID-19, MRNA, LNP-S, PF, 30 MCG/0.3 ML DOSE VACCINE: CPT | Mod: PBBFAC | Performed by: FAMILY MEDICINE

## 2021-09-20 PROCEDURE — 0003A COVID-19, MRNA, LNP-S, PF, 30 MCG/0.3 ML DOSE VACCINE: CPT | Mod: PBBFAC | Performed by: FAMILY MEDICINE

## 2021-10-05 ENCOUNTER — LAB VISIT (OUTPATIENT)
Dept: FAMILY MEDICINE | Facility: CLINIC | Age: 81
End: 2021-10-05
Attending: FAMILY MEDICINE
Payer: MEDICARE

## 2021-10-05 ENCOUNTER — HOSPITAL ENCOUNTER (OUTPATIENT)
Dept: RADIOLOGY | Facility: HOSPITAL | Age: 81
Discharge: HOME | End: 2021-10-05
Attending: FAMILY MEDICINE
Payer: MEDICARE

## 2021-10-05 DIAGNOSIS — N18.32 STAGE 3B CHRONIC KIDNEY DISEASE: ICD-10-CM

## 2021-10-05 DIAGNOSIS — N28.1 KIDNEY CYST, ACQUIRED: ICD-10-CM

## 2021-10-05 DIAGNOSIS — R91.8 PULMONARY NODULES: ICD-10-CM

## 2021-10-05 DIAGNOSIS — I10 ESSENTIAL HYPERTENSION: ICD-10-CM

## 2021-10-05 DIAGNOSIS — R73.03 PRE-DIABETES: ICD-10-CM

## 2021-10-05 PROCEDURE — 80053 COMPREHEN METABOLIC PANEL: CPT | Performed by: FAMILY MEDICINE

## 2021-10-05 PROCEDURE — 36415 PR COLLECTION VENOUS BLOOD,VENIPUNCTURE: ICD-10-PCS | Mod: S$GLB,,, | Performed by: FAMILY MEDICINE

## 2021-10-05 PROCEDURE — 71250 CT CHEST WITHOUT CONTRAST: ICD-10-PCS | Mod: 26,,, | Performed by: RADIOLOGY

## 2021-10-05 PROCEDURE — 83036 HEMOGLOBIN GLYCOSYLATED A1C: CPT | Performed by: FAMILY MEDICINE

## 2021-10-05 PROCEDURE — 80061 LIPID PANEL: CPT | Performed by: FAMILY MEDICINE

## 2021-10-05 PROCEDURE — 85025 COMPLETE CBC W/AUTO DIFF WBC: CPT | Performed by: FAMILY MEDICINE

## 2021-10-05 PROCEDURE — 71250 CT THORAX DX C-: CPT | Mod: 26,,, | Performed by: RADIOLOGY

## 2021-10-05 PROCEDURE — 71250 CT THORAX DX C-: CPT | Mod: TC,PO

## 2021-10-05 PROCEDURE — 36415 COLL VENOUS BLD VENIPUNCTURE: CPT | Mod: S$GLB,,, | Performed by: FAMILY MEDICINE

## 2021-10-06 LAB
ALBUMIN SERPL BCP-MCNC: 3.9 G/DL (ref 3.5–5.2)
ALP SERPL-CCNC: 80 U/L (ref 55–135)
ALT SERPL W/O P-5'-P-CCNC: 14 U/L (ref 10–44)
ANION GAP SERPL CALC-SCNC: 13 MMOL/L (ref 8–16)
AST SERPL-CCNC: 19 U/L (ref 10–40)
BASOPHILS # BLD AUTO: 0.03 K/UL (ref 0–0.2)
BASOPHILS NFR BLD: 0.5 % (ref 0–1.9)
BILIRUB SERPL-MCNC: 0.8 MG/DL (ref 0.1–1)
BUN SERPL-MCNC: 25 MG/DL (ref 8–23)
CALCIUM SERPL-MCNC: 8.9 MG/DL (ref 8.7–10.5)
CHLORIDE SERPL-SCNC: 106 MMOL/L (ref 95–110)
CHOLEST SERPL-MCNC: 142 MG/DL (ref 120–199)
CHOLEST/HDLC SERPL: 3.6 {RATIO} (ref 2–5)
CO2 SERPL-SCNC: 20 MMOL/L (ref 23–29)
CREAT SERPL-MCNC: 1.3 MG/DL (ref 0.5–1.4)
DIFFERENTIAL METHOD: ABNORMAL
EOSINOPHIL # BLD AUTO: 0.4 K/UL (ref 0–0.5)
EOSINOPHIL NFR BLD: 7.5 % (ref 0–8)
ERYTHROCYTE [DISTWIDTH] IN BLOOD BY AUTOMATED COUNT: 13.8 % (ref 11.5–14.5)
EST. GFR  (AFRICAN AMERICAN): 60 ML/MIN/1.73 M^2
EST. GFR  (NON AFRICAN AMERICAN): 51.9 ML/MIN/1.73 M^2
ESTIMATED AVG GLUCOSE: 114 MG/DL (ref 68–131)
GLUCOSE SERPL-MCNC: 96 MG/DL (ref 70–110)
HBA1C MFR BLD: 5.6 % (ref 4–5.6)
HCT VFR BLD AUTO: 44.6 % (ref 40–54)
HDLC SERPL-MCNC: 40 MG/DL (ref 40–75)
HDLC SERPL: 28.2 % (ref 20–50)
HGB BLD-MCNC: 15.6 G/DL (ref 14–18)
IMM GRANULOCYTES # BLD AUTO: 0.02 K/UL (ref 0–0.04)
IMM GRANULOCYTES NFR BLD AUTO: 0.4 % (ref 0–0.5)
LDLC SERPL CALC-MCNC: 82.4 MG/DL (ref 63–159)
LYMPHOCYTES # BLD AUTO: 1.6 K/UL (ref 1–4.8)
LYMPHOCYTES NFR BLD: 28.2 % (ref 18–48)
MCH RBC QN AUTO: 32.1 PG (ref 27–31)
MCHC RBC AUTO-ENTMCNC: 35 G/DL (ref 32–36)
MCV RBC AUTO: 92 FL (ref 82–98)
MONOCYTES # BLD AUTO: 0.6 K/UL (ref 0.3–1)
MONOCYTES NFR BLD: 10.8 % (ref 4–15)
NEUTROPHILS # BLD AUTO: 3 K/UL (ref 1.8–7.7)
NEUTROPHILS NFR BLD: 52.6 % (ref 38–73)
NONHDLC SERPL-MCNC: 102 MG/DL
NRBC BLD-RTO: 0 /100 WBC
PLATELET # BLD AUTO: 172 K/UL (ref 150–450)
PMV BLD AUTO: 10.5 FL (ref 9.2–12.9)
POTASSIUM SERPL-SCNC: 3.8 MMOL/L (ref 3.5–5.1)
PROT SERPL-MCNC: 7 G/DL (ref 6–8.4)
RBC # BLD AUTO: 4.86 M/UL (ref 4.6–6.2)
SODIUM SERPL-SCNC: 139 MMOL/L (ref 136–145)
TRIGL SERPL-MCNC: 98 MG/DL (ref 30–150)
WBC # BLD AUTO: 5.63 K/UL (ref 3.9–12.7)

## 2021-10-06 RX ORDER — HYDROCHLOROTHIAZIDE 12.5 MG/1
TABLET ORAL
Qty: 90 TABLET | Refills: 3 | Status: SHIPPED | OUTPATIENT
Start: 2021-10-06 | End: 2022-08-02

## 2021-10-12 ENCOUNTER — OFFICE VISIT (OUTPATIENT)
Dept: FAMILY MEDICINE | Facility: CLINIC | Age: 81
End: 2021-10-12
Payer: MEDICARE

## 2021-10-12 ENCOUNTER — TELEPHONE (OUTPATIENT)
Dept: FAMILY MEDICINE | Facility: CLINIC | Age: 81
End: 2021-10-12

## 2021-10-12 VITALS
OXYGEN SATURATION: 97 % | WEIGHT: 185.19 LBS | SYSTOLIC BLOOD PRESSURE: 120 MMHG | HEART RATE: 78 BPM | DIASTOLIC BLOOD PRESSURE: 80 MMHG | BODY MASS INDEX: 25.12 KG/M2

## 2021-10-12 DIAGNOSIS — E78.49 OTHER HYPERLIPIDEMIA: ICD-10-CM

## 2021-10-12 DIAGNOSIS — I10 ESSENTIAL HYPERTENSION: Primary | ICD-10-CM

## 2021-10-12 DIAGNOSIS — M51.36 DEGENERATIVE DISC DISEASE, LUMBAR: ICD-10-CM

## 2021-10-12 DIAGNOSIS — M54.16 LUMBAR RADICULOPATHY: ICD-10-CM

## 2021-10-12 DIAGNOSIS — R21 RASH: ICD-10-CM

## 2021-10-12 DIAGNOSIS — R53.1 WEAKNESS: ICD-10-CM

## 2021-10-12 DIAGNOSIS — N18.31 STAGE 3A CHRONIC KIDNEY DISEASE: ICD-10-CM

## 2021-10-12 DIAGNOSIS — R53.1 WEAKNESS: Primary | ICD-10-CM

## 2021-10-12 PROCEDURE — 3074F PR MOST RECENT SYSTOLIC BLOOD PRESSURE < 130 MM HG: ICD-10-PCS | Mod: CPTII,S$GLB,, | Performed by: FAMILY MEDICINE

## 2021-10-12 PROCEDURE — 3079F DIAST BP 80-89 MM HG: CPT | Mod: CPTII,S$GLB,, | Performed by: FAMILY MEDICINE

## 2021-10-12 PROCEDURE — 99214 OFFICE O/P EST MOD 30 MIN: CPT | Mod: S$GLB,,, | Performed by: FAMILY MEDICINE

## 2021-10-12 PROCEDURE — 3074F SYST BP LT 130 MM HG: CPT | Mod: CPTII,S$GLB,, | Performed by: FAMILY MEDICINE

## 2021-10-12 PROCEDURE — 99999 PR PBB SHADOW E&M-EST. PATIENT-LVL IV: CPT | Mod: PBBFAC,,, | Performed by: FAMILY MEDICINE

## 2021-10-12 PROCEDURE — 3079F PR MOST RECENT DIASTOLIC BLOOD PRESSURE 80-89 MM HG: ICD-10-PCS | Mod: CPTII,S$GLB,, | Performed by: FAMILY MEDICINE

## 2021-10-12 PROCEDURE — 3288F PR FALLS RISK ASSESSMENT DOCUMENTED: ICD-10-PCS | Mod: CPTII,S$GLB,, | Performed by: FAMILY MEDICINE

## 2021-10-12 PROCEDURE — 3288F FALL RISK ASSESSMENT DOCD: CPT | Mod: CPTII,S$GLB,, | Performed by: FAMILY MEDICINE

## 2021-10-12 PROCEDURE — 1126F PR PAIN SEVERITY QUANTIFIED, NO PAIN PRESENT: ICD-10-PCS | Mod: CPTII,S$GLB,, | Performed by: FAMILY MEDICINE

## 2021-10-12 PROCEDURE — 1101F PT FALLS ASSESS-DOCD LE1/YR: CPT | Mod: CPTII,S$GLB,, | Performed by: FAMILY MEDICINE

## 2021-10-12 PROCEDURE — 99999 PR PBB SHADOW E&M-EST. PATIENT-LVL IV: ICD-10-PCS | Mod: PBBFAC,,, | Performed by: FAMILY MEDICINE

## 2021-10-12 PROCEDURE — 90694 VACC AIIV4 NO PRSRV 0.5ML IM: CPT | Mod: S$GLB,,, | Performed by: FAMILY MEDICINE

## 2021-10-12 PROCEDURE — 1101F PR PT FALLS ASSESS DOC 0-1 FALLS W/OUT INJ PAST YR: ICD-10-PCS | Mod: CPTII,S$GLB,, | Performed by: FAMILY MEDICINE

## 2021-10-12 PROCEDURE — 1159F MED LIST DOCD IN RCRD: CPT | Mod: CPTII,S$GLB,, | Performed by: FAMILY MEDICINE

## 2021-10-12 PROCEDURE — 1160F PR REVIEW ALL MEDS BY PRESCRIBER/CLIN PHARMACIST DOCUMENTED: ICD-10-PCS | Mod: CPTII,S$GLB,, | Performed by: FAMILY MEDICINE

## 2021-10-12 PROCEDURE — 1160F RVW MEDS BY RX/DR IN RCRD: CPT | Mod: CPTII,S$GLB,, | Performed by: FAMILY MEDICINE

## 2021-10-12 PROCEDURE — 1126F AMNT PAIN NOTED NONE PRSNT: CPT | Mod: CPTII,S$GLB,, | Performed by: FAMILY MEDICINE

## 2021-10-12 PROCEDURE — G0008 FLU VACCINE - QUADRIVALENT - ADJUVANTED: ICD-10-PCS | Mod: S$GLB,,, | Performed by: FAMILY MEDICINE

## 2021-10-12 PROCEDURE — 99214 PR OFFICE/OUTPT VISIT, EST, LEVL IV, 30-39 MIN: ICD-10-PCS | Mod: S$GLB,,, | Performed by: FAMILY MEDICINE

## 2021-10-12 PROCEDURE — 99499 UNLISTED E&M SERVICE: CPT | Mod: S$GLB,,, | Performed by: FAMILY MEDICINE

## 2021-10-12 PROCEDURE — G0008 ADMIN INFLUENZA VIRUS VAC: HCPCS | Mod: S$GLB,,, | Performed by: FAMILY MEDICINE

## 2021-10-12 PROCEDURE — 90694 FLU VACCINE - QUADRIVALENT - ADJUVANTED: ICD-10-PCS | Mod: S$GLB,,, | Performed by: FAMILY MEDICINE

## 2021-10-12 PROCEDURE — 99499 RISK ADDL DX/OHS AUDIT: ICD-10-PCS | Mod: S$GLB,,, | Performed by: FAMILY MEDICINE

## 2021-10-12 PROCEDURE — 1159F PR MEDICATION LIST DOCUMENTED IN MEDICAL RECORD: ICD-10-PCS | Mod: CPTII,S$GLB,, | Performed by: FAMILY MEDICINE

## 2021-10-12 RX ORDER — GABAPENTIN 300 MG/1
300 CAPSULE ORAL 2 TIMES DAILY
COMMUNITY
End: 2022-01-12 | Stop reason: SDUPTHER

## 2021-10-12 RX ORDER — KETOCONAZOLE 20 MG/ML
SHAMPOO, SUSPENSION TOPICAL
Qty: 120 ML | Refills: 2 | Status: SHIPPED | OUTPATIENT
Start: 2021-10-14 | End: 2022-07-14

## 2021-10-19 ENCOUNTER — PES CALL (OUTPATIENT)
Dept: ADMINISTRATIVE | Facility: CLINIC | Age: 81
End: 2021-10-19

## 2021-10-26 ENCOUNTER — TELEPHONE (OUTPATIENT)
Dept: FAMILY MEDICINE | Facility: CLINIC | Age: 81
End: 2021-10-26
Payer: MEDICARE

## 2021-10-26 DIAGNOSIS — R21 RASH: Primary | ICD-10-CM

## 2021-10-26 RX ORDER — METHYLPREDNISOLONE 4 MG/1
TABLET ORAL
Qty: 1 EACH | Refills: 0 | Status: SHIPPED | OUTPATIENT
Start: 2021-10-26 | End: 2022-04-12

## 2021-10-26 RX ORDER — HYDROXYZINE HYDROCHLORIDE 25 MG/1
25 TABLET, FILM COATED ORAL 3 TIMES DAILY PRN
Qty: 30 TABLET | Refills: 0 | Status: SHIPPED | OUTPATIENT
Start: 2021-10-26 | End: 2021-11-18 | Stop reason: SDUPTHER

## 2021-11-02 ENCOUNTER — TELEPHONE (OUTPATIENT)
Dept: FAMILY MEDICINE | Facility: CLINIC | Age: 81
End: 2021-11-02
Payer: MEDICARE

## 2021-11-18 DIAGNOSIS — R21 RASH: ICD-10-CM

## 2021-11-18 RX ORDER — HYDROXYZINE HYDROCHLORIDE 25 MG/1
25 TABLET, FILM COATED ORAL 3 TIMES DAILY PRN
Qty: 30 TABLET | Refills: 0 | Status: SHIPPED | OUTPATIENT
Start: 2021-11-18 | End: 2021-12-19 | Stop reason: SDUPTHER

## 2021-12-13 ENCOUNTER — LAB VISIT (OUTPATIENT)
Dept: FAMILY MEDICINE | Facility: CLINIC | Age: 81
End: 2021-12-13
Attending: UROLOGY
Payer: MEDICARE

## 2021-12-13 DIAGNOSIS — C61 PROSTATE CANCER: ICD-10-CM

## 2021-12-13 PROCEDURE — 36415 COLL VENOUS BLD VENIPUNCTURE: CPT | Mod: S$GLB,,, | Performed by: UROLOGY

## 2021-12-13 PROCEDURE — 84153 ASSAY OF PSA TOTAL: CPT | Performed by: UROLOGY

## 2021-12-13 PROCEDURE — 36415 PR COLLECTION VENOUS BLOOD,VENIPUNCTURE: ICD-10-PCS | Mod: S$GLB,,, | Performed by: UROLOGY

## 2021-12-14 ENCOUNTER — PATIENT MESSAGE (OUTPATIENT)
Dept: UROLOGY | Facility: CLINIC | Age: 81
End: 2021-12-14
Payer: MEDICARE

## 2021-12-14 ENCOUNTER — TELEPHONE (OUTPATIENT)
Dept: UROLOGY | Facility: CLINIC | Age: 81
End: 2021-12-14
Payer: MEDICARE

## 2021-12-14 LAB — COMPLEXED PSA SERPL-MCNC: 2.5 NG/ML (ref 0–4)

## 2021-12-15 ENCOUNTER — PES CALL (OUTPATIENT)
Dept: ADMINISTRATIVE | Facility: CLINIC | Age: 81
End: 2021-12-15
Payer: MEDICARE

## 2022-01-10 RX ORDER — MONTELUKAST SODIUM 10 MG/1
10 TABLET ORAL NIGHTLY
Qty: 90 TABLET | Refills: 3 | Status: SHIPPED | OUTPATIENT
Start: 2022-01-10 | End: 2022-03-19 | Stop reason: SDUPTHER

## 2022-01-10 RX ORDER — PANTOPRAZOLE SODIUM 20 MG/1
20 TABLET, DELAYED RELEASE ORAL DAILY
Qty: 90 TABLET | Refills: 3 | Status: SHIPPED | OUTPATIENT
Start: 2022-01-10 | End: 2022-07-14 | Stop reason: ALTCHOICE

## 2022-01-10 NOTE — TELEPHONE ENCOUNTER
No new care gaps identified.  Powered by [employer]. Reference number: [id number].   1/10/2022 9:21:50 AM CST

## 2022-01-12 ENCOUNTER — PATIENT MESSAGE (OUTPATIENT)
Dept: FAMILY MEDICINE | Facility: CLINIC | Age: 82
End: 2022-01-12
Payer: MEDICARE

## 2022-01-12 DIAGNOSIS — M51.36 DEGENERATIVE DISC DISEASE, LUMBAR: ICD-10-CM

## 2022-01-12 RX ORDER — GABAPENTIN 300 MG/1
300 CAPSULE ORAL 2 TIMES DAILY
Qty: 180 CAPSULE | Refills: 1 | Status: SHIPPED | OUTPATIENT
Start: 2022-01-12 | End: 2022-03-02 | Stop reason: SDUPTHER

## 2022-03-02 ENCOUNTER — PATIENT MESSAGE (OUTPATIENT)
Dept: FAMILY MEDICINE | Facility: CLINIC | Age: 82
End: 2022-03-02
Payer: MEDICARE

## 2022-03-02 DIAGNOSIS — M51.36 DEGENERATIVE DISC DISEASE, LUMBAR: ICD-10-CM

## 2022-03-02 RX ORDER — GABAPENTIN 300 MG/1
300 CAPSULE ORAL 2 TIMES DAILY
Qty: 180 CAPSULE | Refills: 1 | Status: SHIPPED | OUTPATIENT
Start: 2022-03-02 | End: 2022-07-21

## 2022-03-02 NOTE — TELEPHONE ENCOUNTER
No new care gaps identified.  Powered by [employer] by [employer]. Reference number: [id number].   3/02/2022 9:27:50 AM CST

## 2022-03-09 ENCOUNTER — PATIENT OUTREACH (OUTPATIENT)
Dept: ADMINISTRATIVE | Facility: OTHER | Age: 82
End: 2022-03-09
Payer: MEDICARE

## 2022-03-14 ENCOUNTER — OFFICE VISIT (OUTPATIENT)
Dept: UROLOGY | Facility: CLINIC | Age: 82
End: 2022-03-14
Payer: MEDICARE

## 2022-03-14 DIAGNOSIS — Z85.46 HISTORY OF PROSTATE CANCER: Primary | ICD-10-CM

## 2022-03-14 PROCEDURE — 1126F PR PAIN SEVERITY QUANTIFIED, NO PAIN PRESENT: ICD-10-PCS | Mod: CPTII,S$GLB,, | Performed by: UROLOGY

## 2022-03-14 PROCEDURE — 99999 PR PBB SHADOW E&M-EST. PATIENT-LVL III: ICD-10-PCS | Mod: PBBFAC,,, | Performed by: UROLOGY

## 2022-03-14 PROCEDURE — 3288F PR FALLS RISK ASSESSMENT DOCUMENTED: ICD-10-PCS | Mod: CPTII,S$GLB,, | Performed by: UROLOGY

## 2022-03-14 PROCEDURE — 1126F AMNT PAIN NOTED NONE PRSNT: CPT | Mod: CPTII,S$GLB,, | Performed by: UROLOGY

## 2022-03-14 PROCEDURE — 1159F MED LIST DOCD IN RCRD: CPT | Mod: CPTII,S$GLB,, | Performed by: UROLOGY

## 2022-03-14 PROCEDURE — 1160F RVW MEDS BY RX/DR IN RCRD: CPT | Mod: CPTII,S$GLB,, | Performed by: UROLOGY

## 2022-03-14 PROCEDURE — 1101F PT FALLS ASSESS-DOCD LE1/YR: CPT | Mod: CPTII,S$GLB,, | Performed by: UROLOGY

## 2022-03-14 PROCEDURE — 99999 PR PBB SHADOW E&M-EST. PATIENT-LVL III: CPT | Mod: PBBFAC,,, | Performed by: UROLOGY

## 2022-03-14 PROCEDURE — 1101F PR PT FALLS ASSESS DOC 0-1 FALLS W/OUT INJ PAST YR: ICD-10-PCS | Mod: CPTII,S$GLB,, | Performed by: UROLOGY

## 2022-03-14 PROCEDURE — 99213 OFFICE O/P EST LOW 20 MIN: CPT | Mod: 25,S$GLB,, | Performed by: UROLOGY

## 2022-03-14 PROCEDURE — 99213 PR OFFICE/OUTPT VISIT, EST, LEVL III, 20-29 MIN: ICD-10-PCS | Mod: 25,S$GLB,, | Performed by: UROLOGY

## 2022-03-14 PROCEDURE — 96402 PR CHEMOTHER HORMON ANTINEOPL SUB-Q/IM: ICD-10-PCS | Mod: S$GLB,,, | Performed by: UROLOGY

## 2022-03-14 PROCEDURE — 99499 RISK ADDL DX/OHS AUDIT: ICD-10-PCS | Mod: S$GLB,,, | Performed by: UROLOGY

## 2022-03-14 PROCEDURE — 1159F PR MEDICATION LIST DOCUMENTED IN MEDICAL RECORD: ICD-10-PCS | Mod: CPTII,S$GLB,, | Performed by: UROLOGY

## 2022-03-14 PROCEDURE — 96402 CHEMO HORMON ANTINEOPL SQ/IM: CPT | Mod: S$GLB,,, | Performed by: UROLOGY

## 2022-03-14 PROCEDURE — 99499 UNLISTED E&M SERVICE: CPT | Mod: S$GLB,,, | Performed by: UROLOGY

## 2022-03-14 PROCEDURE — 3288F FALL RISK ASSESSMENT DOCD: CPT | Mod: CPTII,S$GLB,, | Performed by: UROLOGY

## 2022-03-14 PROCEDURE — 1160F PR REVIEW ALL MEDS BY PRESCRIBER/CLIN PHARMACIST DOCUMENTED: ICD-10-PCS | Mod: CPTII,S$GLB,, | Performed by: UROLOGY

## 2022-03-14 RX ORDER — TEMAZEPAM 30 MG/1
30 CAPSULE ORAL NIGHTLY PRN
Qty: 20 CAPSULE | Refills: 0 | Status: SHIPPED | OUTPATIENT
Start: 2022-03-14 | End: 2022-04-12 | Stop reason: SDUPTHER

## 2022-03-14 NOTE — PROGRESS NOTES
Subjective:       Patient ID: Dilip Lopez is a 81 y.o. male.    Chief Complaint: Other (Lupron injection )    HPI     81-year-old with T2c, Isac's 4+5 prostate cancer.  His diagnostic PSA was 22.  Ten of 12 cores were positive.  Bone scan is did not show any evidence of metastatic disease.   Has high volume high risk prostate cancer.  We began androgen deprivation about 6 months ago.  He is overall doing well.  He does have hot flashes but these are not overly bothersome.  He has no bothersome urinary symptoms.  His repeat PSA in 3 months ago was 2.5.  He has pain in his lower extremities likely neuropathic in origin.  This is preventing him from sleeping well.  He is taking temazepam in the past for insomnia.  He request a prescription.    Component PSA Diagnostic   Latest Ref Rng & Units 0.00 - 4.00 ng/mL   12/13/2021 2.5   7/2/2021 22.1 (H)   5/13/2021 16.2 (H)       Review of Systems   Constitutional: Negative for fever.   Genitourinary: Negative for dysuria and hematuria.       Objective:      Physical Exam  Vitals reviewed.   Constitutional:       Appearance: He is well-developed.   Pulmonary:      Effort: Pulmonary effort is normal.   Skin:     Findings: No rash.   Neurological:      Mental Status: He is alert and oriented to person, place, and time.         I administered Lupron 45 mg to left glut.  No complication.     Assessment:       1. History of prostate cancer        Plan:       History of prostate cancer  -     Prostate Specific Antigen, Diagnostic; Future; Expected date: 03/14/2022  -     Prior authorization Order    Other orders  -     temazepam (RESTORIL) 30 mg capsule; Take 1 capsule (30 mg total) by mouth nightly as needed for Insomnia.  Dispense: 20 capsule; Refill: 0  -     leuprolide (6 month) injection 45 mg      short course of to Restoril.  I recommend he discuss this with his primary care doctor.  Lupron today and follow up 6 months

## 2022-03-19 NOTE — TELEPHONE ENCOUNTER
No new care gaps identified.  Powered by [employer]. Reference number: [id number].   3/19/2022 9:21:13 AM CDT

## 2022-03-21 RX ORDER — MONTELUKAST SODIUM 10 MG/1
10 TABLET ORAL NIGHTLY
Qty: 90 TABLET | Refills: 3 | Status: SHIPPED | OUTPATIENT
Start: 2022-03-21 | End: 2022-06-12 | Stop reason: SDUPTHER

## 2022-03-28 ENCOUNTER — LAB VISIT (OUTPATIENT)
Dept: FAMILY MEDICINE | Facility: CLINIC | Age: 82
End: 2022-03-28
Payer: MEDICARE

## 2022-03-28 DIAGNOSIS — Z85.46 HISTORY OF PROSTATE CANCER: ICD-10-CM

## 2022-03-28 PROCEDURE — 36415 COLL VENOUS BLD VENIPUNCTURE: CPT | Mod: S$GLB,,, | Performed by: UROLOGY

## 2022-03-28 PROCEDURE — 84153 ASSAY OF PSA TOTAL: CPT | Performed by: UROLOGY

## 2022-03-28 PROCEDURE — 36415 PR COLLECTION VENOUS BLOOD,VENIPUNCTURE: ICD-10-PCS | Mod: S$GLB,,, | Performed by: UROLOGY

## 2022-03-28 NOTE — PROGRESS NOTES
Venipuncture performed with 23 gauge butterfly, x's 1 attempt,  to R Antecubital vein.  Specimens collected per orders.      Pressure dressing applied to site, instructed patient to remove dressing in 10-15 minutes, OK to re-adjust dressing if pressure causing any discomfort, to observe closely for numbness and/or discoloration to hand or fingers, and to notify provider if bleeding persists after applying constant pressure lasting 30 minutes.

## 2022-03-29 ENCOUNTER — PATIENT MESSAGE (OUTPATIENT)
Dept: UROLOGY | Facility: CLINIC | Age: 82
End: 2022-03-29
Payer: MEDICARE

## 2022-03-29 LAB — COMPLEXED PSA SERPL-MCNC: 1.1 NG/ML (ref 0–4)

## 2022-04-12 ENCOUNTER — HOSPITAL ENCOUNTER (OUTPATIENT)
Dept: RADIOLOGY | Facility: HOSPITAL | Age: 82
Discharge: HOME | End: 2022-04-12
Attending: FAMILY MEDICINE
Payer: MEDICARE

## 2022-04-12 ENCOUNTER — OFFICE VISIT (OUTPATIENT)
Dept: FAMILY MEDICINE | Facility: CLINIC | Age: 82
End: 2022-04-12
Payer: MEDICARE

## 2022-04-12 VITALS
DIASTOLIC BLOOD PRESSURE: 82 MMHG | OXYGEN SATURATION: 97 % | HEART RATE: 81 BPM | HEIGHT: 72 IN | WEIGHT: 200.38 LBS | BODY MASS INDEX: 27.14 KG/M2 | SYSTOLIC BLOOD PRESSURE: 110 MMHG

## 2022-04-12 DIAGNOSIS — M51.36 DEGENERATIVE DISC DISEASE, LUMBAR: ICD-10-CM

## 2022-04-12 DIAGNOSIS — E53.8 B12 DEFICIENCY: ICD-10-CM

## 2022-04-12 DIAGNOSIS — I48.0 PAROXYSMAL ATRIAL FIBRILLATION: ICD-10-CM

## 2022-04-12 DIAGNOSIS — M25.462 BILATERAL KNEE SWELLING: ICD-10-CM

## 2022-04-12 DIAGNOSIS — M25.461 BILATERAL KNEE SWELLING: ICD-10-CM

## 2022-04-12 DIAGNOSIS — I73.9 PERIPHERAL VASCULAR DISEASE, UNSPECIFIED: ICD-10-CM

## 2022-04-12 DIAGNOSIS — R20.0 NUMBNESS: ICD-10-CM

## 2022-04-12 DIAGNOSIS — M25.362 INSTABILITY OF LEFT KNEE JOINT: ICD-10-CM

## 2022-04-12 DIAGNOSIS — N18.31 STAGE 3A CHRONIC KIDNEY DISEASE: ICD-10-CM

## 2022-04-12 DIAGNOSIS — G47.09 OTHER INSOMNIA: ICD-10-CM

## 2022-04-12 DIAGNOSIS — C61 PROSTATE CANCER: ICD-10-CM

## 2022-04-12 DIAGNOSIS — I25.118 ATHEROSCLEROTIC HEART DISEASE OF NATIVE CORONARY ARTERY WITH OTHER FORMS OF ANGINA PECTORIS: Primary | ICD-10-CM

## 2022-04-12 DIAGNOSIS — R73.03 PREDIABETES: ICD-10-CM

## 2022-04-12 PROCEDURE — 73560 X-RAY EXAM OF KNEE 1 OR 2: CPT | Mod: TC,50,FY,PO

## 2022-04-12 PROCEDURE — 3288F PR FALLS RISK ASSESSMENT DOCUMENTED: ICD-10-PCS | Mod: CPTII,S$GLB,, | Performed by: FAMILY MEDICINE

## 2022-04-12 PROCEDURE — 1126F AMNT PAIN NOTED NONE PRSNT: CPT | Mod: CPTII,S$GLB,, | Performed by: FAMILY MEDICINE

## 2022-04-12 PROCEDURE — 72100 XR LUMBAR SPINE AP AND LATERAL: ICD-10-PCS | Mod: 26,,, | Performed by: RADIOLOGY

## 2022-04-12 PROCEDURE — 3288F FALL RISK ASSESSMENT DOCD: CPT | Mod: CPTII,S$GLB,, | Performed by: FAMILY MEDICINE

## 2022-04-12 PROCEDURE — 3074F SYST BP LT 130 MM HG: CPT | Mod: CPTII,S$GLB,, | Performed by: FAMILY MEDICINE

## 2022-04-12 PROCEDURE — 99214 PR OFFICE/OUTPT VISIT, EST, LEVL IV, 30-39 MIN: ICD-10-PCS | Mod: S$GLB,,, | Performed by: FAMILY MEDICINE

## 2022-04-12 PROCEDURE — 1126F PR PAIN SEVERITY QUANTIFIED, NO PAIN PRESENT: ICD-10-PCS | Mod: CPTII,S$GLB,, | Performed by: FAMILY MEDICINE

## 2022-04-12 PROCEDURE — 3079F PR MOST RECENT DIASTOLIC BLOOD PRESSURE 80-89 MM HG: ICD-10-PCS | Mod: CPTII,S$GLB,, | Performed by: FAMILY MEDICINE

## 2022-04-12 PROCEDURE — 1101F PT FALLS ASSESS-DOCD LE1/YR: CPT | Mod: CPTII,S$GLB,, | Performed by: FAMILY MEDICINE

## 2022-04-12 PROCEDURE — 73560 X-RAY EXAM OF KNEE 1 OR 2: CPT | Mod: 26,50,, | Performed by: RADIOLOGY

## 2022-04-12 PROCEDURE — 99214 OFFICE O/P EST MOD 30 MIN: CPT | Mod: S$GLB,,, | Performed by: FAMILY MEDICINE

## 2022-04-12 PROCEDURE — 1101F PR PT FALLS ASSESS DOC 0-1 FALLS W/OUT INJ PAST YR: ICD-10-PCS | Mod: CPTII,S$GLB,, | Performed by: FAMILY MEDICINE

## 2022-04-12 PROCEDURE — 99499 RISK ADDL DX/OHS AUDIT: ICD-10-PCS | Mod: S$GLB,,, | Performed by: FAMILY MEDICINE

## 2022-04-12 PROCEDURE — 73560 XR KNEE 1 OR 2 VIEW BILATERAL: ICD-10-PCS | Mod: 26,50,, | Performed by: RADIOLOGY

## 2022-04-12 PROCEDURE — 72100 X-RAY EXAM L-S SPINE 2/3 VWS: CPT | Mod: 26,,, | Performed by: RADIOLOGY

## 2022-04-12 PROCEDURE — 99999 PR PBB SHADOW E&M-EST. PATIENT-LVL IV: ICD-10-PCS | Mod: PBBFAC,,, | Performed by: FAMILY MEDICINE

## 2022-04-12 PROCEDURE — 3079F DIAST BP 80-89 MM HG: CPT | Mod: CPTII,S$GLB,, | Performed by: FAMILY MEDICINE

## 2022-04-12 PROCEDURE — 99999 PR PBB SHADOW E&M-EST. PATIENT-LVL IV: CPT | Mod: PBBFAC,,, | Performed by: FAMILY MEDICINE

## 2022-04-12 PROCEDURE — 3074F PR MOST RECENT SYSTOLIC BLOOD PRESSURE < 130 MM HG: ICD-10-PCS | Mod: CPTII,S$GLB,, | Performed by: FAMILY MEDICINE

## 2022-04-12 PROCEDURE — 99499 UNLISTED E&M SERVICE: CPT | Mod: S$GLB,,, | Performed by: FAMILY MEDICINE

## 2022-04-12 PROCEDURE — 72100 X-RAY EXAM L-S SPINE 2/3 VWS: CPT | Mod: TC,FY,PO

## 2022-04-12 RX ORDER — TEMAZEPAM 30 MG/1
30 CAPSULE ORAL NIGHTLY PRN
Qty: 30 CAPSULE | Refills: 2 | Status: SHIPPED | OUTPATIENT
Start: 2022-04-12 | End: 2022-06-20

## 2022-04-12 RX ORDER — SYRINGE WITH NEEDLE, 1 ML 25GX5/8"
SYRINGE, EMPTY DISPOSABLE MISCELLANEOUS
Qty: 3 EACH | Refills: 3 | Status: SHIPPED | OUTPATIENT
Start: 2022-04-12

## 2022-04-13 ENCOUNTER — TELEPHONE (OUTPATIENT)
Dept: FAMILY MEDICINE | Facility: CLINIC | Age: 82
End: 2022-04-13
Payer: MEDICARE

## 2022-04-13 DIAGNOSIS — D64.9 LOW HEMOGLOBIN: Primary | ICD-10-CM

## 2022-04-17 NOTE — PROGRESS NOTES
"Assessment:       1. Atherosclerotic heart disease of native coronary artery with other forms of angina pectoris    2. Prostate cancer    3. Paroxysmal atrial fibrillation    4. Peripheral vascular disease, unspecified    5. Stage 3a chronic kidney disease    6. Instability of left knee joint    7. Bilateral knee swelling    8. Degenerative disc disease, lumbar    9. Prediabetes    10. Numbness    11. B12 deficiency        Plan:       Atherosclerotic heart disease of native coronary artery with other forms of angina pectoris:  Stable    Prostate cancer:  The patient is seen a urologist    Paroxysmal atrial fibrillation:  Stable  -     Comprehensive Metabolic Panel; Future; Expected date: 04/12/2022  -     CBC Auto Differential; Future; Expected date: 04/12/2022    Peripheral vascular disease, unspecified::  Stable    Stage 3a chronic kidney disease:  Stable  -     Microalbumin/Creatinine Ratio, Urine; Future; Expected date: 04/12/2022    Instability of left knee joint:  New problem workup needed  -     X-Ray Knee 1 or 2 View Bilateral; Future; Expected date: 04/12/2022  -     Ambulatory referral/consult to Orthopedics; Future; Expected date: 04/19/2022    Bilateral knee swelling:  New problem workup needed  -     X-Ray Knee 1 or 2 View Bilateral; Future; Expected date: 04/12/2022    Degenerative disc disease, lumbar:  Worsening  -     X-Ray Lumbar Spine AP And Lateral; Future; Expected date: 04/12/2022    Prediabetes:  Stable  -     Hemoglobin A1C; Future; Expected date: 04/12/2022    Numbness:  Worsening  -     EMG W/ ULTRASOUND AND NERVE CONDUCTION TEST 2 Extremities; Future  -     Folate; Future; Expected date: 04/12/2022    B12 deficiency:  Uncontrolled  -     Vitamin B12; Future; Expected date: 04/12/2022  -     syringe with needle (BD LUER-PADMINI SYRINGE) 3 mL 25 x 1 1/2 " Syrg; Use once a month to inject B12 injection.  Dispense: 3 each; Refill: 3    Other insomnia:  Improved  -     temazepam (RESTORIL) 30 mg " capsule; Take 1 capsule (30 mg total) by mouth nightly as needed for Insomnia.  Dispense: 30 capsule; Refill: 2      [place] prescription monitoring program was checked and okay, temazepam was prescribed for the patient, will call the patient after we have the results of the test, will start patient on B12 injections again.  The patient's BMI has been recorded in the chart. The patient has been provided educational materials regarding the benefits of attaining and maintaining a normal weight. We will continue to address and follow this issue during follow up visits.   Patient agreed with assessment and plan. Patient verbalized understanding.     Subjective:       Patient ID: Dilip Lopez is a 81 y.o. male.    Chief Complaint: Follow-up (Legs feeling restless), Insomnia, and Medication Refill (Temazepam )    HPI     Knee discomfort:  The patient complains of pain discomfort on bilateral knees, the left knee feels unstable and not able to bend it completely, the symptoms started more than 4 weeks ago and are getting worse.  The patient is coming here for assessment.  The patient also complains of lower back pain and numbness and tingling sensation on bilateral legs, the patient B12 levels were also low, the patient is not been able to take the B12 injections because he does not have syringes.    Insomnia:  The patient stated that urologist prescribed medication for insomnia temazepam and is working, he would like to have a refill of the medication as he was able to sleep.    Afib:  The patient is currently seen the cardiologist, the patient also has atherosclerosis of the arteries and peripheral vascular disease, currently taking cholesterol medication.  The last cholesterol levels were therapeutic, her kidney function was not stage III of chronic kidney disease.    Past medical history, past social history was reviewed and discussed with the patient.    Review of Systems   Constitutional: Positive for activity  change. Negative for appetite change and chills.   HENT: Negative for congestion and ear discharge.    Eyes: Negative for discharge and itching.   Respiratory: Negative for choking and chest tightness.    Cardiovascular: Negative for chest pain and leg swelling.   Gastrointestinal: Negative for abdominal distention and abdominal pain.   Endocrine: Negative for cold intolerance and heat intolerance.   Genitourinary: Negative for dysuria and flank pain.   Musculoskeletal: Positive for arthralgias and back pain.   Skin: Negative for pallor and rash.   Allergic/Immunologic: Negative for environmental allergies and food allergies.   Neurological: Positive for numbness. Negative for dizziness, facial asymmetry and headaches.   Hematological: Negative for adenopathy. Does not bruise/bleed easily.   Psychiatric/Behavioral: Positive for sleep disturbance. Negative for agitation and confusion.       Objective:      Physical Exam  Vitals and nursing note reviewed.   Constitutional:       General: He is not in acute distress.     Appearance: Normal appearance. He is well-developed. He is not diaphoretic.      Comments: Limited ambulation, the patient is using a cane for mobilization.   HENT:      Head: Normocephalic and atraumatic.      Right Ear: External ear normal.      Left Ear: External ear normal.      Nose: Nose normal.   Eyes:      General: No scleral icterus.        Left eye: No discharge.   Cardiovascular:      Rate and Rhythm: Normal rate and regular rhythm.      Heart sounds: Normal heart sounds. No murmur heard.    No friction rub.   Pulmonary:      Effort: Pulmonary effort is normal. No respiratory distress.      Breath sounds: Normal breath sounds. No wheezing or rales.   Chest:      Chest wall: No tenderness.   Musculoskeletal:         General: Swelling (Left knee) and tenderness (Bilateral knees) present.      Cervical back: Normal range of motion and neck supple.      Comments: Tenderness on the lower back    Skin:     General: Skin is warm and dry.      Coloration: Skin is not pale.      Findings: No erythema.   Neurological:      Mental Status: He is alert.      Cranial Nerves: No cranial nerve deficit.      Motor: No abnormal muscle tone.      Coordination: Coordination normal.   Psychiatric:         Behavior: Behavior normal.         Thought Content: Thought content normal.         Judgment: Judgment normal.

## 2022-05-07 DIAGNOSIS — I10 ESSENTIAL HYPERTENSION: ICD-10-CM

## 2022-05-08 NOTE — TELEPHONE ENCOUNTER
No new care gaps identified.  [facility] Embedded Care Gaps. Reference number: [id number]. 5/07/2022   9:32:44 PM CDT

## 2022-05-09 ENCOUNTER — PATIENT MESSAGE (OUTPATIENT)
Dept: SMOKING CESSATION | Facility: CLINIC | Age: 82
End: 2022-05-09
Payer: MEDICARE

## 2022-05-09 RX ORDER — LOSARTAN POTASSIUM 100 MG/1
TABLET ORAL
Qty: 90 TABLET | Refills: 3 | Status: SHIPPED | OUTPATIENT
Start: 2022-05-09 | End: 2023-03-16

## 2022-05-09 NOTE — TELEPHONE ENCOUNTER
Refill Routing Note   Medication(s) are not appropriate for processing by [facility] for the following reason(s):      - Required laboratory values are abnormal    [facility] action(s):  Defer          Medication reconciliation completed: No     Appointments  past 12m or future 3m with PCP    Date Provider   Last Visit   4/12/2022 Nathalie Salgado MD   Next Visit   7/14/2022 Nathalie Salgado MD   ED visits in past 90 days: 0        Note composed:10:35 AM 05/09/2022

## 2022-06-21 ENCOUNTER — PES CALL (OUTPATIENT)
Dept: ADMINISTRATIVE | Facility: CLINIC | Age: 82
End: 2022-06-21
Payer: MEDICARE

## 2022-06-29 DIAGNOSIS — M25.562 ACUTE PAIN OF LEFT KNEE: Primary | ICD-10-CM

## 2022-07-14 ENCOUNTER — OFFICE VISIT (OUTPATIENT)
Dept: FAMILY MEDICINE | Facility: CLINIC | Age: 82
End: 2022-07-14
Payer: MEDICARE

## 2022-07-14 ENCOUNTER — LAB VISIT (OUTPATIENT)
Dept: LAB | Facility: HOSPITAL | Age: 82
End: 2022-07-14
Attending: FAMILY MEDICINE
Payer: MEDICARE

## 2022-07-14 VITALS
OXYGEN SATURATION: 96 % | SYSTOLIC BLOOD PRESSURE: 110 MMHG | HEIGHT: 72 IN | WEIGHT: 198 LBS | BODY MASS INDEX: 26.82 KG/M2 | DIASTOLIC BLOOD PRESSURE: 74 MMHG | HEART RATE: 87 BPM

## 2022-07-14 DIAGNOSIS — M79.604 PAIN IN BOTH LOWER EXTREMITIES: ICD-10-CM

## 2022-07-14 DIAGNOSIS — N18.31 STAGE 3A CHRONIC KIDNEY DISEASE: ICD-10-CM

## 2022-07-14 DIAGNOSIS — I10 ESSENTIAL HYPERTENSION: ICD-10-CM

## 2022-07-14 DIAGNOSIS — G47.09 OTHER INSOMNIA: ICD-10-CM

## 2022-07-14 DIAGNOSIS — N18.31 STAGE 3A CHRONIC KIDNEY DISEASE: Primary | ICD-10-CM

## 2022-07-14 DIAGNOSIS — M79.605 PAIN IN BOTH LOWER EXTREMITIES: ICD-10-CM

## 2022-07-14 LAB
ANION GAP SERPL CALC-SCNC: 10 MMOL/L (ref 8–16)
BASOPHILS # BLD AUTO: 0.03 K/UL (ref 0–0.2)
BASOPHILS NFR BLD: 0.4 % (ref 0–1.9)
BUN SERPL-MCNC: 27 MG/DL (ref 8–23)
CALCIUM SERPL-MCNC: 9.3 MG/DL (ref 8.7–10.5)
CHLORIDE SERPL-SCNC: 106 MMOL/L (ref 95–110)
CO2 SERPL-SCNC: 24 MMOL/L (ref 23–29)
CREAT SERPL-MCNC: 1.7 MG/DL (ref 0.5–1.4)
DIFFERENTIAL METHOD: ABNORMAL
EOSINOPHIL # BLD AUTO: 0.5 K/UL (ref 0–0.5)
EOSINOPHIL NFR BLD: 7.4 % (ref 0–8)
ERYTHROCYTE [DISTWIDTH] IN BLOOD BY AUTOMATED COUNT: 13.8 % (ref 11.5–14.5)
EST. GFR  (AFRICAN AMERICAN): 43.1 ML/MIN/1.73 M^2
EST. GFR  (NON AFRICAN AMERICAN): 37.3 ML/MIN/1.73 M^2
GLUCOSE SERPL-MCNC: 126 MG/DL (ref 70–110)
HCT VFR BLD AUTO: 38 % (ref 40–54)
HGB BLD-MCNC: 12.6 G/DL (ref 14–18)
IMM GRANULOCYTES # BLD AUTO: 0.02 K/UL (ref 0–0.04)
IMM GRANULOCYTES NFR BLD AUTO: 0.3 % (ref 0–0.5)
LYMPHOCYTES # BLD AUTO: 1.6 K/UL (ref 1–4.8)
LYMPHOCYTES NFR BLD: 22.7 % (ref 18–48)
MCH RBC QN AUTO: 31.1 PG (ref 27–31)
MCHC RBC AUTO-ENTMCNC: 33.2 G/DL (ref 32–36)
MCV RBC AUTO: 94 FL (ref 82–98)
MONOCYTES # BLD AUTO: 0.5 K/UL (ref 0.3–1)
MONOCYTES NFR BLD: 7.8 % (ref 4–15)
NEUTROPHILS # BLD AUTO: 4.2 K/UL (ref 1.8–7.7)
NEUTROPHILS NFR BLD: 61.4 % (ref 38–73)
NRBC BLD-RTO: 0 /100 WBC
PLATELET # BLD AUTO: 194 K/UL (ref 150–450)
PMV BLD AUTO: 10.5 FL (ref 9.2–12.9)
POTASSIUM SERPL-SCNC: 3.8 MMOL/L (ref 3.5–5.1)
RBC # BLD AUTO: 4.05 M/UL (ref 4.6–6.2)
SODIUM SERPL-SCNC: 140 MMOL/L (ref 136–145)
WBC # BLD AUTO: 6.91 K/UL (ref 3.9–12.7)

## 2022-07-14 PROCEDURE — 99999 PR PBB SHADOW E&M-EST. PATIENT-LVL IV: CPT | Mod: PBBFAC,,, | Performed by: FAMILY MEDICINE

## 2022-07-14 PROCEDURE — 1159F PR MEDICATION LIST DOCUMENTED IN MEDICAL RECORD: ICD-10-PCS | Mod: CPTII,S$GLB,, | Performed by: FAMILY MEDICINE

## 2022-07-14 PROCEDURE — 3078F DIAST BP <80 MM HG: CPT | Mod: CPTII,S$GLB,, | Performed by: FAMILY MEDICINE

## 2022-07-14 PROCEDURE — 3288F FALL RISK ASSESSMENT DOCD: CPT | Mod: CPTII,S$GLB,, | Performed by: FAMILY MEDICINE

## 2022-07-14 PROCEDURE — 99999 PR PBB SHADOW E&M-EST. PATIENT-LVL IV: ICD-10-PCS | Mod: PBBFAC,,, | Performed by: FAMILY MEDICINE

## 2022-07-14 PROCEDURE — 99214 OFFICE O/P EST MOD 30 MIN: CPT | Mod: S$GLB,,, | Performed by: FAMILY MEDICINE

## 2022-07-14 PROCEDURE — 36415 COLL VENOUS BLD VENIPUNCTURE: CPT | Mod: PO | Performed by: FAMILY MEDICINE

## 2022-07-14 PROCEDURE — 1159F MED LIST DOCD IN RCRD: CPT | Mod: CPTII,S$GLB,, | Performed by: FAMILY MEDICINE

## 2022-07-14 PROCEDURE — 99214 PR OFFICE/OUTPT VISIT, EST, LEVL IV, 30-39 MIN: ICD-10-PCS | Mod: S$GLB,,, | Performed by: FAMILY MEDICINE

## 2022-07-14 PROCEDURE — 1101F PR PT FALLS ASSESS DOC 0-1 FALLS W/OUT INJ PAST YR: ICD-10-PCS | Mod: CPTII,S$GLB,, | Performed by: FAMILY MEDICINE

## 2022-07-14 PROCEDURE — 1101F PT FALLS ASSESS-DOCD LE1/YR: CPT | Mod: CPTII,S$GLB,, | Performed by: FAMILY MEDICINE

## 2022-07-14 PROCEDURE — 3074F PR MOST RECENT SYSTOLIC BLOOD PRESSURE < 130 MM HG: ICD-10-PCS | Mod: CPTII,S$GLB,, | Performed by: FAMILY MEDICINE

## 2022-07-14 PROCEDURE — 85025 COMPLETE CBC W/AUTO DIFF WBC: CPT | Performed by: FAMILY MEDICINE

## 2022-07-14 PROCEDURE — 3288F PR FALLS RISK ASSESSMENT DOCUMENTED: ICD-10-PCS | Mod: CPTII,S$GLB,, | Performed by: FAMILY MEDICINE

## 2022-07-14 PROCEDURE — 1126F AMNT PAIN NOTED NONE PRSNT: CPT | Mod: CPTII,S$GLB,, | Performed by: FAMILY MEDICINE

## 2022-07-14 PROCEDURE — 3074F SYST BP LT 130 MM HG: CPT | Mod: CPTII,S$GLB,, | Performed by: FAMILY MEDICINE

## 2022-07-14 PROCEDURE — 1126F PR PAIN SEVERITY QUANTIFIED, NO PAIN PRESENT: ICD-10-PCS | Mod: CPTII,S$GLB,, | Performed by: FAMILY MEDICINE

## 2022-07-14 PROCEDURE — 3078F PR MOST RECENT DIASTOLIC BLOOD PRESSURE < 80 MM HG: ICD-10-PCS | Mod: CPTII,S$GLB,, | Performed by: FAMILY MEDICINE

## 2022-07-14 PROCEDURE — 80048 BASIC METABOLIC PNL TOTAL CA: CPT | Performed by: FAMILY MEDICINE

## 2022-07-19 DIAGNOSIS — M54.16 LUMBAR RADICULOPATHY: ICD-10-CM

## 2022-07-19 DIAGNOSIS — M51.36 DEGENERATIVE DISC DISEASE, LUMBAR: Primary | ICD-10-CM

## 2022-07-19 DIAGNOSIS — N18.31 STAGE 3A CHRONIC KIDNEY DISEASE: ICD-10-CM

## 2022-07-19 NOTE — PROGRESS NOTES
Assessment:       1. Stage 3a chronic kidney disease    2. Essential hypertension    3. Pain in both lower extremities    4. Other insomnia        Plan:       Stage 3a chronic kidney disease:  Stable  -     Basic Metabolic Panel; Future; Expected date: 07/14/2022  -     CBC Auto Differential; Future; Expected date: 07/14/2022    Essential hypertension:  Stable    Pain in both lower extremities:  Worsening    Other insomnia:  Stable      Will call the patient after we have the results of the test, the patient was advised to drink more water, avoid taking any anti-inflammatories over-the-counter including Aleve, Motrin, ibuprofen.  The patient's BMI has been recorded in the chart. The patient has been provided educational materials regarding the benefits of attaining and maintaining a normal weight. We will continue to address and follow this issue during follow up visits.   Patient agreed with assessment and plan. Patient verbalized understanding.     Subjective:       Patient ID: Dilip Lopez is a 81 y.o. male.    Chief Complaint: Follow-up hypertension    HPI     Hypertension:  The patient has been taking his blood pressure medicines as directed, the patient is currently taking hydrochlorothiazide 12.5 mg daily, the last kidney function was in the stage III of chronic kidney disease.  The patient did not bring a log of the blood pressure readings from home.    Lower extremity pain:  Complains of lower extremity pain bilaterally, the symptoms are getting worse, the patient also has back problems.  The patient stated that is difficult to walk due to the pain.    Insomnia:  The patient is taking temazepam, he does not need refills at this time.  The patient denies any side effects of the medication.    Past medical history, past social history was reviewed and discussed with the patient.    Review of Systems   Constitutional: Positive for activity change. Negative for appetite change.   HENT: Negative for congestion  and ear discharge.    Eyes: Negative for discharge and itching.   Respiratory: Negative for choking and chest tightness.    Cardiovascular: Negative for chest pain and palpitations.   Gastrointestinal: Negative for abdominal distention and abdominal pain.   Endocrine: Negative for cold intolerance and heat intolerance.   Genitourinary: Negative for dysuria and flank pain.   Musculoskeletal: Positive for arthralgias and back pain.   Skin: Negative for pallor and rash.   Allergic/Immunologic: Negative for environmental allergies and food allergies.   Neurological: Negative for dizziness and facial asymmetry.   Hematological: Negative for adenopathy. Does not bruise/bleed easily.   Psychiatric/Behavioral: Positive for sleep disturbance. Negative for agitation and confusion.       Objective:      Physical Exam  Vitals and nursing note reviewed.   Constitutional:       General: He is not in acute distress.     Appearance: Normal appearance. He is well-developed. He is not diaphoretic.      Comments: Limited ambulation, the patient is using a cane for mobilization   HENT:      Head: Normocephalic and atraumatic.      Right Ear: External ear normal.      Left Ear: External ear normal.      Nose: Nose normal.      Mouth/Throat:      Pharynx: No oropharyngeal exudate.   Eyes:      General: No scleral icterus.        Left eye: No discharge.   Cardiovascular:      Rate and Rhythm: Normal rate and regular rhythm.      Heart sounds: Normal heart sounds.   Pulmonary:      Effort: Pulmonary effort is normal. No respiratory distress.      Breath sounds: Normal breath sounds. No wheezing.   Musculoskeletal:         General: No tenderness.      Cervical back: Normal range of motion and neck supple.   Skin:     General: Skin is warm and dry.      Coloration: Skin is not pale.   Neurological:      Mental Status: He is alert.      Cranial Nerves: No cranial nerve deficit.      Motor: No abnormal muscle tone.      Coordination:  Coordination normal.   Psychiatric:         Behavior: Behavior normal.         Thought Content: Thought content normal.         Judgment: Judgment normal.

## 2022-07-29 ENCOUNTER — TELEPHONE (OUTPATIENT)
Dept: FAMILY MEDICINE | Facility: CLINIC | Age: 82
End: 2022-07-29
Payer: MEDICARE

## 2022-07-29 DIAGNOSIS — U07.1 COVID: ICD-10-CM

## 2022-07-29 DIAGNOSIS — U07.1 COVID-19: Primary | ICD-10-CM

## 2022-07-29 NOTE — TELEPHONE ENCOUNTER
----- Message from Josefa Meehan sent at 7/29/2022  9:02 AM CDT -----  Contact: pt at [phone]  Type: Needs Medical Advice  Who Called:  pt   Best Call Back Number: [phone]    Additional Information: pt wife  called in for her  and said he tested positive for Covid and wants to get a med's called in for him or wants to know what she should do

## 2022-08-04 ENCOUNTER — PES CALL (OUTPATIENT)
Dept: ADMINISTRATIVE | Facility: CLINIC | Age: 82
End: 2022-08-04
Payer: MEDICARE

## 2022-09-12 ENCOUNTER — PES CALL (OUTPATIENT)
Dept: ADMINISTRATIVE | Facility: CLINIC | Age: 82
End: 2022-09-12
Payer: MEDICARE

## 2022-09-15 ENCOUNTER — OFFICE VISIT (OUTPATIENT)
Dept: UROLOGY | Facility: CLINIC | Age: 82
End: 2022-09-15
Payer: MEDICARE

## 2022-09-15 ENCOUNTER — LAB VISIT (OUTPATIENT)
Dept: LAB | Facility: HOSPITAL | Age: 82
End: 2022-09-15
Attending: UROLOGY
Payer: MEDICARE

## 2022-09-15 DIAGNOSIS — C61 PROSTATE CANCER: Primary | ICD-10-CM

## 2022-09-15 DIAGNOSIS — C61 PROSTATE CANCER: ICD-10-CM

## 2022-09-15 LAB — COMPLEXED PSA SERPL-MCNC: 0.62 NG/ML (ref 0–4)

## 2022-09-15 PROCEDURE — 1159F MED LIST DOCD IN RCRD: CPT | Mod: CPTII,S$GLB,, | Performed by: UROLOGY

## 2022-09-15 PROCEDURE — 1160F PR REVIEW ALL MEDS BY PRESCRIBER/CLIN PHARMACIST DOCUMENTED: ICD-10-PCS | Mod: CPTII,S$GLB,, | Performed by: UROLOGY

## 2022-09-15 PROCEDURE — 99213 PR OFFICE/OUTPT VISIT, EST, LEVL III, 20-29 MIN: ICD-10-PCS | Mod: S$GLB,,, | Performed by: UROLOGY

## 2022-09-15 PROCEDURE — 36415 COLL VENOUS BLD VENIPUNCTURE: CPT | Mod: PO | Performed by: UROLOGY

## 2022-09-15 PROCEDURE — 99999 PR PBB SHADOW E&M-EST. PATIENT-LVL III: ICD-10-PCS | Mod: PBBFAC,,, | Performed by: UROLOGY

## 2022-09-15 PROCEDURE — 99213 OFFICE O/P EST LOW 20 MIN: CPT | Mod: S$GLB,,, | Performed by: UROLOGY

## 2022-09-15 PROCEDURE — 3288F FALL RISK ASSESSMENT DOCD: CPT | Mod: CPTII,S$GLB,, | Performed by: UROLOGY

## 2022-09-15 PROCEDURE — 1126F AMNT PAIN NOTED NONE PRSNT: CPT | Mod: CPTII,S$GLB,, | Performed by: UROLOGY

## 2022-09-15 PROCEDURE — 1101F PT FALLS ASSESS-DOCD LE1/YR: CPT | Mod: CPTII,S$GLB,, | Performed by: UROLOGY

## 2022-09-15 PROCEDURE — 99999 PR PBB SHADOW E&M-EST. PATIENT-LVL III: CPT | Mod: PBBFAC,,, | Performed by: UROLOGY

## 2022-09-15 PROCEDURE — 1126F PR PAIN SEVERITY QUANTIFIED, NO PAIN PRESENT: ICD-10-PCS | Mod: CPTII,S$GLB,, | Performed by: UROLOGY

## 2022-09-15 PROCEDURE — 1160F RVW MEDS BY RX/DR IN RCRD: CPT | Mod: CPTII,S$GLB,, | Performed by: UROLOGY

## 2022-09-15 PROCEDURE — 1101F PR PT FALLS ASSESS DOC 0-1 FALLS W/OUT INJ PAST YR: ICD-10-PCS | Mod: CPTII,S$GLB,, | Performed by: UROLOGY

## 2022-09-15 PROCEDURE — 1159F PR MEDICATION LIST DOCUMENTED IN MEDICAL RECORD: ICD-10-PCS | Mod: CPTII,S$GLB,, | Performed by: UROLOGY

## 2022-09-15 PROCEDURE — 84153 ASSAY OF PSA TOTAL: CPT | Performed by: UROLOGY

## 2022-09-15 PROCEDURE — 3288F PR FALLS RISK ASSESSMENT DOCUMENTED: ICD-10-PCS | Mod: CPTII,S$GLB,, | Performed by: UROLOGY

## 2022-09-15 NOTE — PROGRESS NOTES
Subjective:       Patient ID: Dilip Lopez is a 81 y.o. male.    Chief Complaint: Other (Lupron )    HPI    81-year-old with T2c, Isac's 4+5 prostate cancer.  His diagnostic PSA was 22.  Ten of 12 cores were positive.  Bone scan is did not show any evidence of metastatic disease.   Has high volume high risk prostate cancer.  We began androgen deprivation Sept 2021.  He is overall doing well.  He does have hot flashes but these are not overly bothersome.  He has no bothersome urinary symptoms.  His repeat PSA in 6 months ago was 1.1.       Component PSA Diagnostic   Latest Ref Rng & Units 0.00 - 4.00 ng/mL   3/28/2022 1.1   12/13/2021 2.5   7/2/2021 22.1 (H)   5/13/2021 16.2 (H)       Review of Systems   Constitutional:  Negative for fever.   Genitourinary:  Negative for dysuria and hematuria.     Objective:      Physical Exam  Vitals reviewed.   Constitutional:       Appearance: He is well-developed.   Pulmonary:      Effort: Pulmonary effort is normal.   Skin:     Findings: No rash.   Neurological:      Mental Status: He is alert and oriented to person, place, and time.       I administered Lupron 45 mg to right glut.  No complication.       Assessment:       1. Prostate cancer        Plan:       Prostate cancer  -     Prostate Specific Antigen, Diagnostic; Future; Expected date: 09/15/2022  -     Prior authorization Order    Other orders  -     leuprolide acetate (6 month) injection 45 mg      Update PSA today.  Follow up 6 months for another Lupron

## 2022-11-01 ENCOUNTER — LAB VISIT (OUTPATIENT)
Dept: LAB | Facility: HOSPITAL | Age: 82
End: 2022-11-01
Attending: FAMILY MEDICINE
Payer: MEDICARE

## 2022-11-01 ENCOUNTER — OFFICE VISIT (OUTPATIENT)
Dept: FAMILY MEDICINE | Facility: CLINIC | Age: 82
End: 2022-11-01
Payer: MEDICARE

## 2022-11-01 VITALS
SYSTOLIC BLOOD PRESSURE: 118 MMHG | WEIGHT: 188.69 LBS | HEIGHT: 72 IN | DIASTOLIC BLOOD PRESSURE: 72 MMHG | BODY MASS INDEX: 25.56 KG/M2 | HEART RATE: 81 BPM | OXYGEN SATURATION: 95 %

## 2022-11-01 DIAGNOSIS — R06.02 SHORTNESS OF BREATH: ICD-10-CM

## 2022-11-01 DIAGNOSIS — R53.83 OTHER FATIGUE: ICD-10-CM

## 2022-11-01 DIAGNOSIS — I10 ESSENTIAL HYPERTENSION: ICD-10-CM

## 2022-11-01 DIAGNOSIS — E78.49 OTHER HYPERLIPIDEMIA: ICD-10-CM

## 2022-11-01 DIAGNOSIS — R05.8 DRY COUGH: ICD-10-CM

## 2022-11-01 DIAGNOSIS — D64.9 LOW HEMOGLOBIN: ICD-10-CM

## 2022-11-01 DIAGNOSIS — G47.09 OTHER INSOMNIA: Primary | ICD-10-CM

## 2022-11-01 DIAGNOSIS — G62.9 NEUROPATHY: ICD-10-CM

## 2022-11-01 DIAGNOSIS — R73.03 PREDIABETES: ICD-10-CM

## 2022-11-01 LAB
ALBUMIN SERPL BCP-MCNC: 3.9 G/DL (ref 3.5–5.2)
ALP SERPL-CCNC: 87 U/L (ref 55–135)
ALT SERPL W/O P-5'-P-CCNC: 12 U/L (ref 10–44)
ANION GAP SERPL CALC-SCNC: 7 MMOL/L (ref 8–16)
AST SERPL-CCNC: 16 U/L (ref 10–40)
BASOPHILS # BLD AUTO: 0.03 K/UL (ref 0–0.2)
BASOPHILS NFR BLD: 0.5 % (ref 0–1.9)
BILIRUB SERPL-MCNC: 0.5 MG/DL (ref 0.1–1)
BNP SERPL-MCNC: 25 PG/ML (ref 0–99)
BUN SERPL-MCNC: 18 MG/DL (ref 8–23)
CALCIUM SERPL-MCNC: 9.4 MG/DL (ref 8.7–10.5)
CHLORIDE SERPL-SCNC: 105 MMOL/L (ref 95–110)
CHOLEST SERPL-MCNC: 145 MG/DL (ref 120–199)
CHOLEST/HDLC SERPL: 3.6 {RATIO} (ref 2–5)
CO2 SERPL-SCNC: 26 MMOL/L (ref 23–29)
CREAT SERPL-MCNC: 1.5 MG/DL (ref 0.5–1.4)
DIFFERENTIAL METHOD: ABNORMAL
EOSINOPHIL # BLD AUTO: 0.4 K/UL (ref 0–0.5)
EOSINOPHIL NFR BLD: 6.6 % (ref 0–8)
ERYTHROCYTE [DISTWIDTH] IN BLOOD BY AUTOMATED COUNT: 14.2 % (ref 11.5–14.5)
EST. GFR  (NO RACE VARIABLE): 46.5 ML/MIN/1.73 M^2
ESTIMATED AVG GLUCOSE: 114 MG/DL (ref 68–131)
FERRITIN SERPL-MCNC: 349 NG/ML (ref 20–300)
GLUCOSE SERPL-MCNC: 100 MG/DL (ref 70–110)
HBA1C MFR BLD: 5.6 % (ref 4–5.6)
HCT VFR BLD AUTO: 37 % (ref 40–54)
HDLC SERPL-MCNC: 40 MG/DL (ref 40–75)
HDLC SERPL: 27.6 % (ref 20–50)
HGB BLD-MCNC: 12.3 G/DL (ref 14–18)
IMM GRANULOCYTES # BLD AUTO: 0.02 K/UL (ref 0–0.04)
IMM GRANULOCYTES NFR BLD AUTO: 0.3 % (ref 0–0.5)
IRON SERPL-MCNC: 76 UG/DL (ref 45–160)
LDLC SERPL CALC-MCNC: 86.6 MG/DL (ref 63–159)
LYMPHOCYTES # BLD AUTO: 1.5 K/UL (ref 1–4.8)
LYMPHOCYTES NFR BLD: 26.3 % (ref 18–48)
MCH RBC QN AUTO: 31.7 PG (ref 27–31)
MCHC RBC AUTO-ENTMCNC: 33.2 G/DL (ref 32–36)
MCV RBC AUTO: 95 FL (ref 82–98)
MONOCYTES # BLD AUTO: 0.6 K/UL (ref 0.3–1)
MONOCYTES NFR BLD: 10.9 % (ref 4–15)
NEUTROPHILS # BLD AUTO: 3.2 K/UL (ref 1.8–7.7)
NEUTROPHILS NFR BLD: 55.4 % (ref 38–73)
NONHDLC SERPL-MCNC: 105 MG/DL
NRBC BLD-RTO: 0 /100 WBC
PLATELET # BLD AUTO: 220 K/UL (ref 150–450)
PMV BLD AUTO: 10 FL (ref 9.2–12.9)
POTASSIUM SERPL-SCNC: 3.9 MMOL/L (ref 3.5–5.1)
PROT SERPL-MCNC: 7 G/DL (ref 6–8.4)
RBC # BLD AUTO: 3.88 M/UL (ref 4.6–6.2)
SATURATED IRON: 30 % (ref 20–50)
SODIUM SERPL-SCNC: 138 MMOL/L (ref 136–145)
TOTAL IRON BINDING CAPACITY: 256 UG/DL (ref 250–450)
TRANSFERRIN SERPL-MCNC: 173 MG/DL (ref 200–375)
TRIGL SERPL-MCNC: 92 MG/DL (ref 30–150)
TSH SERPL DL<=0.005 MIU/L-ACNC: 2.54 UIU/ML (ref 0.4–4)
WBC # BLD AUTO: 5.77 K/UL (ref 3.9–12.7)

## 2022-11-01 PROCEDURE — 85025 COMPLETE CBC W/AUTO DIFF WBC: CPT | Performed by: FAMILY MEDICINE

## 2022-11-01 PROCEDURE — 3074F SYST BP LT 130 MM HG: CPT | Mod: CPTII,S$GLB,, | Performed by: FAMILY MEDICINE

## 2022-11-01 PROCEDURE — 3074F PR MOST RECENT SYSTOLIC BLOOD PRESSURE < 130 MM HG: ICD-10-PCS | Mod: CPTII,S$GLB,, | Performed by: FAMILY MEDICINE

## 2022-11-01 PROCEDURE — 99214 PR OFFICE/OUTPT VISIT, EST, LEVL IV, 30-39 MIN: ICD-10-PCS | Mod: S$GLB,,, | Performed by: FAMILY MEDICINE

## 2022-11-01 PROCEDURE — 90694 VACC AIIV4 NO PRSRV 0.5ML IM: CPT | Mod: S$GLB,,, | Performed by: FAMILY MEDICINE

## 2022-11-01 PROCEDURE — 1101F PR PT FALLS ASSESS DOC 0-1 FALLS W/OUT INJ PAST YR: ICD-10-PCS | Mod: CPTII,S$GLB,, | Performed by: FAMILY MEDICINE

## 2022-11-01 PROCEDURE — 83036 HEMOGLOBIN GLYCOSYLATED A1C: CPT | Performed by: FAMILY MEDICINE

## 2022-11-01 PROCEDURE — 99214 OFFICE O/P EST MOD 30 MIN: CPT | Mod: S$GLB,,, | Performed by: FAMILY MEDICINE

## 2022-11-01 PROCEDURE — 84466 ASSAY OF TRANSFERRIN: CPT | Performed by: FAMILY MEDICINE

## 2022-11-01 PROCEDURE — G0008 FLU VACCINE - QUADRIVALENT - ADJUVANTED: ICD-10-PCS | Mod: S$GLB,,, | Performed by: FAMILY MEDICINE

## 2022-11-01 PROCEDURE — 1159F MED LIST DOCD IN RCRD: CPT | Mod: CPTII,S$GLB,, | Performed by: FAMILY MEDICINE

## 2022-11-01 PROCEDURE — 99999 PR PBB SHADOW E&M-EST. PATIENT-LVL IV: CPT | Mod: PBBFAC,,, | Performed by: FAMILY MEDICINE

## 2022-11-01 PROCEDURE — 3288F FALL RISK ASSESSMENT DOCD: CPT | Mod: CPTII,S$GLB,, | Performed by: FAMILY MEDICINE

## 2022-11-01 PROCEDURE — 84443 ASSAY THYROID STIM HORMONE: CPT | Performed by: FAMILY MEDICINE

## 2022-11-01 PROCEDURE — 99999 PR PBB SHADOW E&M-EST. PATIENT-LVL IV: ICD-10-PCS | Mod: PBBFAC,,, | Performed by: FAMILY MEDICINE

## 2022-11-01 PROCEDURE — 80061 LIPID PANEL: CPT | Performed by: FAMILY MEDICINE

## 2022-11-01 PROCEDURE — 1126F AMNT PAIN NOTED NONE PRSNT: CPT | Mod: CPTII,S$GLB,, | Performed by: FAMILY MEDICINE

## 2022-11-01 PROCEDURE — 1126F PR PAIN SEVERITY QUANTIFIED, NO PAIN PRESENT: ICD-10-PCS | Mod: CPTII,S$GLB,, | Performed by: FAMILY MEDICINE

## 2022-11-01 PROCEDURE — 3288F PR FALLS RISK ASSESSMENT DOCUMENTED: ICD-10-PCS | Mod: CPTII,S$GLB,, | Performed by: FAMILY MEDICINE

## 2022-11-01 PROCEDURE — 1101F PT FALLS ASSESS-DOCD LE1/YR: CPT | Mod: CPTII,S$GLB,, | Performed by: FAMILY MEDICINE

## 2022-11-01 PROCEDURE — 1159F PR MEDICATION LIST DOCUMENTED IN MEDICAL RECORD: ICD-10-PCS | Mod: CPTII,S$GLB,, | Performed by: FAMILY MEDICINE

## 2022-11-01 PROCEDURE — 82728 ASSAY OF FERRITIN: CPT | Performed by: FAMILY MEDICINE

## 2022-11-01 PROCEDURE — 80053 COMPREHEN METABOLIC PANEL: CPT | Performed by: FAMILY MEDICINE

## 2022-11-01 PROCEDURE — 3078F PR MOST RECENT DIASTOLIC BLOOD PRESSURE < 80 MM HG: ICD-10-PCS | Mod: CPTII,S$GLB,, | Performed by: FAMILY MEDICINE

## 2022-11-01 PROCEDURE — 36415 COLL VENOUS BLD VENIPUNCTURE: CPT | Mod: PO | Performed by: FAMILY MEDICINE

## 2022-11-01 PROCEDURE — 83880 ASSAY OF NATRIURETIC PEPTIDE: CPT | Performed by: FAMILY MEDICINE

## 2022-11-01 PROCEDURE — 90694 FLU VACCINE - QUADRIVALENT - ADJUVANTED: ICD-10-PCS | Mod: S$GLB,,, | Performed by: FAMILY MEDICINE

## 2022-11-01 PROCEDURE — G0008 ADMIN INFLUENZA VIRUS VAC: HCPCS | Mod: S$GLB,,, | Performed by: FAMILY MEDICINE

## 2022-11-01 PROCEDURE — 3078F DIAST BP <80 MM HG: CPT | Mod: CPTII,S$GLB,, | Performed by: FAMILY MEDICINE

## 2022-11-01 RX ORDER — ALCOHOL 2.38 KG/3.79L
1 GEL TOPICAL 2 TIMES DAILY
Qty: 180 CAPSULE | Refills: 3 | Status: SHIPPED | OUTPATIENT
Start: 2022-11-01 | End: 2023-12-13 | Stop reason: CLARIF

## 2022-11-01 NOTE — PROGRESS NOTES
Assessment:       1. Other insomnia    2. Essential hypertension    3. Other hyperlipidemia    4. Dry cough    5. Other fatigue    6. Prediabetes    7. Low hemoglobin    8. Shortness of breath    9. Neuropathy          Plan:       Other insomnia:  Stable    Essential hypertension:  Stable  -     Lipid Panel; Future; Expected date: 11/01/2022    Other hyperlipidemia:  Stable  -     Lipid Panel; Future; Expected date: 11/01/2022    Dry cough:  New problem, next visit workup    Other fatigue:  Stable  -     CBC Auto Differential; Future; Expected date: 11/01/2022  -     Comprehensive Metabolic Panel; Future; Expected date: 11/01/2022  -     TSH; Future; Expected date: 11/01/2022    Prediabetes:  Improved  -     Hemoglobin A1C; Future; Expected date: 11/01/2022    Low hemoglobin:  Worsening  -     Iron and TIBC; Future; Expected date: 11/01/2022  -     FERRITIN; Future; Expected date: 11/01/2022    Shortness of breath:  Stable  -     BNP; Future; Expected date: 11/01/2022    Neuropathy:  Worsening  -     [id number]-algal oil (METANX/FOLTANX RF) 3 mg-35 mg-2 mg -90.314 mg Cap; Take 1 tablet by mouth 2 (two) times a day.  Dispense: 180 capsule; Refill: 3       Patient has an appointment to see the pain management specialist today, will start patient on Metanx.  [place] prescription monitoring program was checked and okay.  The patient's BMI has been recorded in the chart. The patient has been provided educational materials regarding the benefits of attaining and maintaining a normal weight. We will continue to address and follow this issue during follow up visits.   Patient agreed with assessment and plan. Patient verbalized understanding.     Subjective:       Patient ID: Dilip Lopez is a 82 y.o. male.    Chief Complaint: Follow-up dry cough    HPI    Dry cough:  The patient complains of symptoms of dry cough, has some upper respiratory symptoms for about 3 weeks, after that most of his symptoms went away  but the cough symptoms still present, the patient still feeling shortness of breath, denies any worsening of the symptoms at this time.  The patient stated that he feels weak, fatigued every time that he received the injections for prostate and he will probably stop receiving it.  Upon review of the last blood work, the patient has lower hemoglobin, he also feels weak.    Neuropathy:  The patient is coming here today complaining of neuropathy symptoms, the symptoms are getting worse.  The patient is using B12 injections for B12 deficiency and also taking gabapentin, the patient stated that is back is hurting, [facility], he had an accident when he was very young and since then his back is not being the same.     Hypertension:  The patient is currently taking losartan 100 mg daily and hydrochlorothiazide 12.5 mg daily, the patient did not bring a log of the blood pressure readings from home.  He also takes hydralazine.  The patient is taking fenofibrate, the last cholesterol levels were therapeutic.    The patient also takes temazepam as needed for insomnia, he does not need refills today.    Past medical history, past social history was reviewed and discussed with the patient.    Review of Systems   Constitutional:  Positive for activity change and fatigue. Negative for appetite change.   HENT:  Negative for ear discharge.    Eyes:  Negative for discharge and itching.   Respiratory:  Positive for cough and shortness of breath. Negative for choking and chest tightness.    Cardiovascular:  Positive for leg swelling. Negative for chest pain.   Gastrointestinal:  Negative for abdominal distention and constipation.   Endocrine: Negative for cold intolerance and heat intolerance.   Genitourinary:  Negative for dysuria and flank pain.   Musculoskeletal:  Positive for arthralgias, back pain and gait problem.   Skin:  Negative for pallor.   Allergic/Immunologic: Negative for  environmental allergies and food allergies.   Neurological:  Positive for weakness. Negative for dizziness and facial asymmetry.   Hematological:  Negative for adenopathy.   Psychiatric/Behavioral:  Negative for agitation and confusion.      Objective:      Physical Exam  Vitals and nursing note reviewed.   Constitutional:       General: He is not in acute distress.     Appearance: Normal appearance. He is well-developed. He is not diaphoretic.   HENT:      Head: Normocephalic and atraumatic.      Right Ear: External ear normal.      Left Ear: External ear normal.      Nose: Nose normal.   Eyes:      General: No scleral icterus.        Right eye: No discharge.         Left eye: No discharge.   Cardiovascular:      Rate and Rhythm: Normal rate and regular rhythm.      Heart sounds: Normal heart sounds.   Pulmonary:      Effort: Pulmonary effort is normal. No respiratory distress.      Breath sounds: Normal breath sounds. No wheezing.   Musculoskeletal:      Cervical back: Normal range of motion and neck supple.      Right lower leg: Edema (Trace) present.      Left lower leg: Edema (Trace) present.   Skin:     General: Skin is warm and dry.      Coloration: Skin is not pale.      Findings: No erythema.   Neurological:      Mental Status: He is alert.      Cranial Nerves: No cranial nerve deficit.      Motor: No abnormal muscle tone.   Psychiatric:         Behavior: Behavior normal.         Thought Content: Thought content normal.         Judgment: Judgment normal.

## 2022-12-08 ENCOUNTER — TELEPHONE (OUTPATIENT)
Dept: FAMILY MEDICINE | Facility: CLINIC | Age: 82
End: 2022-12-08
Payer: MEDICARE

## 2023-01-17 ENCOUNTER — OFFICE VISIT (OUTPATIENT)
Dept: FAMILY MEDICINE | Facility: CLINIC | Age: 83
End: 2023-01-17
Payer: MEDICARE

## 2023-01-17 VITALS
WEIGHT: 185.63 LBS | SYSTOLIC BLOOD PRESSURE: 122 MMHG | DIASTOLIC BLOOD PRESSURE: 78 MMHG | OXYGEN SATURATION: 97 % | HEART RATE: 88 BPM | BODY MASS INDEX: 25.18 KG/M2 | RESPIRATION RATE: 18 BRPM

## 2023-01-17 DIAGNOSIS — R20.0 NUMBNESS AND TINGLING: ICD-10-CM

## 2023-01-17 DIAGNOSIS — R20.2 NUMBNESS AND TINGLING: ICD-10-CM

## 2023-01-17 DIAGNOSIS — I10 ESSENTIAL HYPERTENSION: ICD-10-CM

## 2023-01-17 DIAGNOSIS — K21.9 GERD WITHOUT ESOPHAGITIS: ICD-10-CM

## 2023-01-17 DIAGNOSIS — N18.31 STAGE 3A CHRONIC KIDNEY DISEASE: ICD-10-CM

## 2023-01-17 DIAGNOSIS — L98.9 SKIN LESION: ICD-10-CM

## 2023-01-17 DIAGNOSIS — I73.9 PERIPHERAL VASCULAR DISEASE, UNSPECIFIED: ICD-10-CM

## 2023-01-17 DIAGNOSIS — C61 PROSTATE CANCER: Primary | ICD-10-CM

## 2023-01-17 DIAGNOSIS — I25.118 ATHEROSCLEROTIC HEART DISEASE OF NATIVE CORONARY ARTERY WITH OTHER FORMS OF ANGINA PECTORIS: ICD-10-CM

## 2023-01-17 DIAGNOSIS — M1A.09X0 IDIOPATHIC CHRONIC GOUT OF MULTIPLE SITES WITHOUT TOPHUS: ICD-10-CM

## 2023-01-17 DIAGNOSIS — R73.09 ABNORMAL GLUCOSE: ICD-10-CM

## 2023-01-17 DIAGNOSIS — I48.0 PAROXYSMAL ATRIAL FIBRILLATION: ICD-10-CM

## 2023-01-17 DIAGNOSIS — R79.89 HIGH SERUM FERRITIN: ICD-10-CM

## 2023-01-17 DIAGNOSIS — M79.10 MUSCLE PAIN: ICD-10-CM

## 2023-01-17 PROCEDURE — 1101F PR PT FALLS ASSESS DOC 0-1 FALLS W/OUT INJ PAST YR: ICD-10-PCS | Mod: CPTII,S$GLB,, | Performed by: FAMILY MEDICINE

## 2023-01-17 PROCEDURE — 1101F PT FALLS ASSESS-DOCD LE1/YR: CPT | Mod: CPTII,S$GLB,, | Performed by: FAMILY MEDICINE

## 2023-01-17 PROCEDURE — 3288F FALL RISK ASSESSMENT DOCD: CPT | Mod: CPTII,S$GLB,, | Performed by: FAMILY MEDICINE

## 2023-01-17 PROCEDURE — 3074F SYST BP LT 130 MM HG: CPT | Mod: CPTII,S$GLB,, | Performed by: FAMILY MEDICINE

## 2023-01-17 PROCEDURE — 1159F PR MEDICATION LIST DOCUMENTED IN MEDICAL RECORD: ICD-10-PCS | Mod: CPTII,S$GLB,, | Performed by: FAMILY MEDICINE

## 2023-01-17 PROCEDURE — 3078F DIAST BP <80 MM HG: CPT | Mod: CPTII,S$GLB,, | Performed by: FAMILY MEDICINE

## 2023-01-17 PROCEDURE — 99999 PR PBB SHADOW E&M-EST. PATIENT-LVL V: CPT | Mod: PBBFAC,,, | Performed by: FAMILY MEDICINE

## 2023-01-17 PROCEDURE — 1159F MED LIST DOCD IN RCRD: CPT | Mod: CPTII,S$GLB,, | Performed by: FAMILY MEDICINE

## 2023-01-17 PROCEDURE — 3288F PR FALLS RISK ASSESSMENT DOCUMENTED: ICD-10-PCS | Mod: CPTII,S$GLB,, | Performed by: FAMILY MEDICINE

## 2023-01-17 PROCEDURE — 1126F AMNT PAIN NOTED NONE PRSNT: CPT | Mod: CPTII,S$GLB,, | Performed by: FAMILY MEDICINE

## 2023-01-17 PROCEDURE — 99214 OFFICE O/P EST MOD 30 MIN: CPT | Mod: S$GLB,,, | Performed by: FAMILY MEDICINE

## 2023-01-17 PROCEDURE — 99214 PR OFFICE/OUTPT VISIT, EST, LEVL IV, 30-39 MIN: ICD-10-PCS | Mod: S$GLB,,, | Performed by: FAMILY MEDICINE

## 2023-01-17 PROCEDURE — 3078F PR MOST RECENT DIASTOLIC BLOOD PRESSURE < 80 MM HG: ICD-10-PCS | Mod: CPTII,S$GLB,, | Performed by: FAMILY MEDICINE

## 2023-01-17 PROCEDURE — 3074F PR MOST RECENT SYSTOLIC BLOOD PRESSURE < 130 MM HG: ICD-10-PCS | Mod: CPTII,S$GLB,, | Performed by: FAMILY MEDICINE

## 2023-01-17 PROCEDURE — 99999 PR PBB SHADOW E&M-EST. PATIENT-LVL V: ICD-10-PCS | Mod: PBBFAC,,, | Performed by: FAMILY MEDICINE

## 2023-01-17 PROCEDURE — 1126F PR PAIN SEVERITY QUANTIFIED, NO PAIN PRESENT: ICD-10-PCS | Mod: CPTII,S$GLB,, | Performed by: FAMILY MEDICINE

## 2023-01-17 RX ORDER — PANTOPRAZOLE SODIUM 20 MG/1
20 TABLET, DELAYED RELEASE ORAL DAILY
COMMUNITY
Start: 2022-12-03 | End: 2023-12-13 | Stop reason: CLARIF

## 2023-01-17 NOTE — PROGRESS NOTES
Assessment:       1. Prostate cancer    2. Numbness and tingling    3. Atherosclerotic heart disease of native coronary artery with other forms of angina pectoris    4. Paroxysmal atrial fibrillation    5. Peripheral vascular disease, unspecified    6. Stage 3a chronic kidney disease    7. Essential hypertension    8. Idiopathic chronic gout of multiple sites without tophus    9. Abnormal glucose    10. High serum ferritin    11. Muscle pain    12. Skin lesion    13. GERD without esophagitis          Plan:       Prostate cancer:  Stable  -     Ambulatory referral/consult to Oncology; Future; Expected date: 01/24/2023    Numbness and tingling: Stable  -     Vitamin B12; Future; Expected date: 01/17/2023  -     Folate; Future; Expected date: 01/17/2023    Atherosclerotic heart disease of native coronary artery with other forms of angina pectoris: Stable  -     Comprehensive Metabolic Panel; Future; Expected date: 01/17/2023  -     Lipid Panel; Future; Expected date: 01/17/2023    Paroxysmal atrial fibrillation: Stable    Peripheral vascular disease, unspecified: Stable    Stage 3a chronic kidney disease:  Improved    Essential hypertension:  Well controlled  -     Microalbumin/Creatinine Ratio, Urine; Future; Expected date: 01/17/2023    Idiopathic chronic gout of multiple sites without tophus:  Stable  -     Uric Acid; Future; Expected date: 01/17/2023    Abnormal glucose  -     Hemoglobin A1C; Future; Expected date: 01/17/2023    High serum ferritin:  Uncontrolled  -     Iron and TIBC; Future; Expected date: 01/17/2023  -     Ferritin; Future; Expected date: 01/17/2023  -     CBC Auto Differential; Future; Expected date: 01/17/2023    Muscle pain:  Worsening  -     CK; Future; Expected date: 01/17/2023    Skin lesion:  New problem workup needed  -     Ambulatory referral/consult to Dermatology; Future; Expected date: 01/24/2023    GERD without esophagitis:  Stable         The patient was advised to start taking B12  injections as directed every month.  Also the patient was advised to start taking magnesium carbonate over-the-counter at bedtime.  To drink plenty water, will discontinue cholesterol medication because of mild just.  Avoid sodas, sugary drinks.  Will repeat blood work prior to her office visit in 3 months.  The patient's BMI has been recorded in the chart. The patient has been provided educational materials regarding the benefits of attaining and maintaining a normal weight. We will continue to address and follow this issue during follow up visits.   Patient agreed with assessment and plan. Patient verbalized understanding.     Subjective:       Patient ID: Dilip Lopez is a 82 y.o. male.    Chief Complaint: Follow-up hypertension    HPI    Hypertension:  The patient has been taking hydralazine and hydrochlorothiazide, also losartan.  The patient did not bring a log of the blood pressure readings from home.  The patient has AFib, peripheral vascular disease and also plaque formation in the arteries, currently taking Plavix, the patient did not see the cardiologist since 2020 and he will make an appointment as soon as possible.  He also complains of a lesion on the hand, he will also make an appointment to see the dermatologist.  Upon review of the last blood work, the patient has hyper eating levels, abnormal glucose levels and chronic kidney disease stage 3.  He is currently taking pantoprazole for acid reflux symptoms.    Hyperlipidemia:  The last cholesterol levels were therapeutic, the patient is taking fenofibrate.    Prostate cancer:  The patient was diagnosed with prostate cancer, currently seen the urologist, the patient stated that he wants to see oncologist and he is asking for referral.    Neuropathy:  The patient complains numbness, tingling, burning sensation on bilateral lower extremities, the symptoms are getting worse.  The patient stop taking B12 injections but he will restart soon.  The patient  also complains of muscle pain, he is currently taking fenofibrate for cholesterol, the last cholesterol levels were therapeutic.  He is taking gabapentin.    Past medical history, past social history was reviewed and discussed with the patient.    Review of Systems   Constitutional:  Positive for activity change. Negative for appetite change.   HENT:  Negative for ear discharge.    Eyes:  Negative for discharge and itching.   Respiratory:  Negative for choking and chest tightness.    Cardiovascular:  Negative for leg swelling.   Gastrointestinal:  Negative for abdominal distention.   Endocrine: Negative for cold intolerance and heat intolerance.   Genitourinary:  Negative for dysuria and flank pain.   Musculoskeletal:  Positive for back pain and gait problem.   Skin:  Negative for pallor.        Skin lesion   Allergic/Immunologic: Negative for environmental allergies and food allergies.   Neurological:  Negative for dizziness and facial asymmetry.   Hematological:  Negative for adenopathy. Does not bruise/bleed easily.   Psychiatric/Behavioral:  Positive for sleep disturbance. Negative for agitation, confusion and decreased concentration.      Objective:      Physical Exam  Vitals and nursing note reviewed.   Constitutional:       General: He is not in acute distress.     Appearance: Normal appearance. He is well-developed. He is not diaphoretic.   HENT:      Head: Normocephalic and atraumatic.      Right Ear: External ear normal.      Left Ear: External ear normal.      Nose: Nose normal.      Mouth/Throat:      Pharynx: No oropharyngeal exudate.   Eyes:      General: No scleral icterus.        Left eye: No discharge.   Cardiovascular:      Rate and Rhythm: Normal rate and regular rhythm.      Pulses:           Dorsalis pedis pulses are 1+ on the right side and 1+ on the left side.        Posterior tibial pulses are 1+ on the right side and 1+ on the left side.      Heart sounds: Normal heart sounds.   Pulmonary:       Effort: Pulmonary effort is normal. No respiratory distress.      Breath sounds: Normal breath sounds. No wheezing.   Musculoskeletal:      Cervical back: Normal range of motion and neck supple.   Feet:      Right foot:      Protective Sensation: 7 sites tested.  5 sites sensed.      Skin integrity: Callus present.      Left foot:      Protective Sensation: 7 sites tested.  5 sites sensed.      Skin integrity: Callus present.   Skin:     General: Skin is warm and dry.      Coloration: Skin is not pale.      Findings: Lesion (Right hand) present. No erythema.   Neurological:      Mental Status: He is alert.      Cranial Nerves: No cranial nerve deficit.      Motor: No abnormal muscle tone.   Psychiatric:         Behavior: Behavior normal.         Thought Content: Thought content normal.         Judgment: Judgment normal.

## 2023-01-19 ENCOUNTER — TELEPHONE (OUTPATIENT)
Dept: HEMATOLOGY/ONCOLOGY | Facility: CLINIC | Age: 83
End: 2023-01-19
Payer: MEDICARE

## 2023-01-19 NOTE — NURSING
Spoke with patient.  He would like to see an oncologist to determine if he needs to continue to lupron injections.  He was diagnosed in 2021.  All testing and scans have been done at [facility].  Patient is not having any issues, he wants another opinion regarding the injections.  Patient provided date, time and location then verbalized understanding.

## 2023-01-23 NOTE — PROGRESS NOTES
Subjective:       Name: Dilip Lopez  : 1940  MRN: [id number]    Chief Complaint   Patient presents with    Prostate Cancer     New Patient      The patient is presented to the clinic with his wife.    HPI: Dilip Lopez is a 82 y.o. male presents for evaluation of Prostate Cancer (New Patient)    The patient has been followed and treated by Dr. Thomason.  His diagnostic PSA was 22 in 2021.     He underwent an ultrasound guided prostate biopsy on 2021 that showed:  1- left base:  Prostate adenocarcinoma [place] grade 3 + 4 equals 7 40% patent for involving  2- left mid:  Prostate adenocarcinoma [place] 4+5=9 involving 40% total of 1 core.  3- Left apex:  Prostate adenocarcinoma [place] 4 + 3 equals 780% pattern for involving 60% total of the 2 of the 2 cores  4-right base:  Prostate adenocarcinoma [place] grade 4 + 3 equals 77 5% pattern for involving 25% total of the 2/2 cores.  5-right mid:  Prostate adenocarcinoma [place] 3+4=7 10% pattern for involving 10% total of the 2 of the 2 cores.  6-right apex:  Prostate adenocarcinoma [place] grade 4+5=9 involving 20% of the total 1 of the 2 cores.    He underwent on 2021 a total body bone scan that failed to show signs of distant metastatic disease.    He underwent a CT chest without contrast on 2021 showed;  1. Bilateral calcified and noncalcified pulmonary nodules without significant interval change as compared to the prior exam.  As stated on prior report, [employer] society guidelines recommend follow-up noncontrast chest CT at 18-24 months after discovery.  2. CT findings suggestive of prior granulomatous disease.    He was started on androgen deprivation therapy since 2021.  He has been tolerating well his therapy.  He has been experiencing hot flashes that are mild and some urinary symptoms.  His PSA done on 2022 was 1.1. The last injection of Lupron is in 2022.    He is here  today requesting a 2nd medical opinion concerning his treatment.  His daughter works as an oncology group in [place] and has been stressing on the fact that he needs to see a medical oncologist.      He had brother was diagnosed with prostate cancer in his 50's and  at the age of 76. He had another brother had prostate cancer at the age of 75 and he is still living. He had another brother with prostate cancer at the age of 68 and he is alive at the age of 75. Hi son had prostate cancer at the age of 50, he is 57 yo now.    The patient is reporting a personal history of Factor 5 leiden gene mutation most likely heterozygous he was diagnosed after his family members also were found to carry this abnormal gene.  He developed spontaneously according to him bilateral lower extremity DVT and he was started on Eliquis. After undergoing a cardiac cath, he was switched Plavix.    The patient is reporting lower back pain associated with bilateral lower extremity neuropathy.  He also is having unsteady gait/ankle weakness for which he has been using a cane.    Oncology History    No history exists.        Past Medical History:   Diagnosis Date    Atrial fibrillation     CAD (coronary artery disease)     Hyperlipidemia     Hypertension     Prostate cancer 2022       Past Surgical History:   Procedure Laterality Date    CARDIAC CATHETERIZATION      CARDIAC SURGERY      COLONOSCOPY      COLONOSCOPY N/A 2021    Procedure: COLONOSCOPY;  Surgeon: Leopoldo Mendoza MD;  Location: [facility];  Service: Endoscopy;  Laterality: N/A;    CORONARY ANGIOPLASTY      CORONARY STENT PLACEMENT  2018    ESOPHAGOGASTRODUODENOSCOPY N/A 2021    Procedure: EGD (ESOPHAGOGASTRODUODENOSCOPY);  Surgeon: Leopoldo Mendoza MD;  Location: [facility];  Service: Endoscopy;  Laterality: N/A;  with push enteroscopy    HIATAL HERNIA REPAIR      TRANSRECTAL BIOPSY OF PROSTATE WITH ULTRASOUND GUIDANCE N/A 2021    Procedure: BIOPSY,  PROSTATE, RECTAL APPROACH, WITH US GUIDANCE;  Surgeon: SARAI Thomason MD;  Location: [facility];  Service: Urology;  Laterality: N/A;       Family History   Problem Relation Age of Onset    Hypertension Mother     Diabetes Mother     Heart disease Father 62        MI    Parkinsonism Brother        Social History     Socioeconomic History    Marital status:    Tobacco Use    Smoking status: Never    Smokeless tobacco: Never   Substance and Sexual Activity    Alcohol use: No    Drug use: No       Review of patient's allergies indicates:   Allergen Reactions    Doxycycline hcl Rash    Bactrim [sulfamethoxazole-trimethoprim]     Cinnamon Other (See Comments)    Ciprofloxacin Hives    Sulfa (sulfonamide antibiotics)        Review of Systems   Constitutional:  Positive for diaphoresis. Negative for appetite change, fatigue and fever.   HENT:  Negative for facial swelling, mouth sores, postnasal drip and sore throat.    Eyes:  Negative for photophobia and visual disturbance.   Respiratory:  Negative for cough, chest tightness and shortness of breath.    Cardiovascular:  Negative for chest pain and leg swelling.   Gastrointestinal:  Negative for abdominal pain, blood in stool, constipation, nausea and vomiting.   Endocrine: Negative for cold intolerance and polyuria.   Genitourinary:  Negative for dysuria, flank pain and hematuria.   Musculoskeletal:  Positive for arthralgias and gait problem. Negative for back pain and joint swelling.   Integumentary:  Negative for color change and mole/lesion.   Neurological:  Positive for weakness and numbness. Negative for dizziness, headaches and memory loss.   Hematological:  Negative for adenopathy. Does not bruise/bleed easily.   Psychiatric/Behavioral:  Negative for behavioral problems, decreased concentration and sleep disturbance. The patient is not nervous/anxious.           Objective:     Vitals:    01/26/23 1405   BP: 122/72   BP Location: Right arm   Patient Position:  "Sitting   BP Method: Medium (Manual)   Pulse: 76   Resp: 16   Temp: 97.3 °F (36.3 °C)   TempSrc: Temporal   SpO2: 97%   Weight: 86.3 kg (190 lb 4.1 oz)   Height: 5' 10" (1.778 m)        Physical Exam  Vitals reviewed.   Constitutional:       Appearance: Normal appearance.   HENT:      Head: Normocephalic and atraumatic.      Mouth/Throat:      Mouth: Mucous membranes are moist.      Pharynx: No oropharyngeal exudate.   Eyes:      General: No scleral icterus.     Pupils: Pupils are equal, round, and reactive to light.   Cardiovascular:      Rate and Rhythm: Normal rate and regular rhythm.      Pulses: Normal pulses.      Heart sounds: Normal heart sounds.   Pulmonary:      Effort: Pulmonary effort is normal.      Breath sounds: Normal breath sounds. No wheezing.   Abdominal:      General: Bowel sounds are normal. There is no distension.      Tenderness: There is no abdominal tenderness.   Musculoskeletal:         General: No swelling or tenderness.      Cervical back: Neck supple.   Lymphadenopathy:      Cervical: No cervical adenopathy.   Skin:     Coloration: Skin is not jaundiced.      Findings: No bruising or rash.   Neurological:      General: No focal deficit present.      Mental Status: He is alert and oriented to person, place, and time.      Cranial Nerves: No cranial nerve deficit.      Motor: No weakness.      Gait: Gait normal.   Psychiatric:         Mood and Affect: Mood normal.         Behavior: Behavior normal.              Current Outpatient Medications on File Prior to Visit   Medication Sig    allopurinoL (ZYLOPRIM) 300 MG tablet TAKE 1 TABLET BY MOUTH ONCE DAILY    aspirin (ECOTRIN) 325 MG EC tablet once daily. Pt instructed to hold for 5 days prior to procedure on 05/14/21    clopidogreL (PLAVIX) 75 mg tablet TAKE 1 TABLET BY MOUTH ONCE DAILY    cyanocobalamin 1,000 mcg/mL injection Inject 1 mL (1,000 mcg total) into the muscle every 30 days.    gabapentin (NEURONTIN) 300 MG capsule TAKE 1 CAPSULE " "BY MOUTH  TWICE DAILY    hydrALAZINE (APRESOLINE) 25 MG tablet hydralazine 25 mg tablet    hydroCHLOROthiazide (HYDRODIURIL) 12.5 MG Tab TAKE 1 TABLET BY MOUTH ONCE DAILY    hydrOXYzine HCL (ATARAX) 25 MG tablet TAKE ONE TABLET THREE TIMES A DAY AS NEEDED FOR ITCHING    [id number]-algal oil (METANX/FOLTANX RF) 3 mg-35 mg-2 mg -90.314 mg Cap Take 1 tablet by mouth 2 (two) times a day.    losartan (COZAAR) 100 MG tablet TAKE 1 TABLET BY MOUTH ONCE DAILY    montelukast (SINGULAIR) 10 mg tablet Take 1 tablet (10 mg total) by mouth every evening.    needle, disp, 25 gauge 25 gauge x 1" Ndle Use as directed    pantoprazole (PROTONIX) 20 MG tablet Take 20 mg by mouth.    sotalol (BETAPACE) 40 MG tablet Take 40 mg by mouth once daily.    syringe with needle (BD LUER-PADMINI SYRINGE) 3 mL 25 x 1 1/2 " Syrg Use once a month to inject B12 injection.    syringe with needle (SYRINGE 3CC/25GX1") 3 mL 25 gauge x 1" Syrg Use as directed    temazepam (RESTORIL) 30 mg capsule TAKE 1 CAPSULE BY MOUTH NIGHTLY AS NEEDED FOR INSOMNIA.     No current facility-administered medications on file prior to visit.       CBC:  Lab Results   Component Value Date    WBC 5.77 11/01/2022    HGB 12.3 (L) 11/01/2022    HCT 37.0 (L) 11/01/2022    MCV 95 11/01/2022     11/01/2022         CMP:  Sodium   Date Value Ref Range Status   11/01/2022 138 136 - 145 mmol/L Final     Potassium   Date Value Ref Range Status   11/01/2022 3.9 3.5 - 5.1 mmol/L Final     Chloride   Date Value Ref Range Status   11/01/2022 105 95 - 110 mmol/L Final     CO2   Date Value Ref Range Status   11/01/2022 26 23 - 29 mmol/L Final     Glucose   Date Value Ref Range Status   11/01/2022 100 70 - 110 mg/dL Final     BUN   Date Value Ref Range Status   11/01/2022 18 8 - 23 mg/dL Final     Creatinine   Date Value Ref Range Status   11/01/2022 1.5 (H) 0.5 - 1.4 mg/dL Final     Calcium   Date Value Ref Range Status   11/01/2022 9.4 8.7 - 10.5 mg/dL Final     Total Protein "   Date Value Ref Range Status   11/01/2022 7.0 6.0 - 8.4 g/dL Final     Albumin   Date Value Ref Range Status   11/01/2022 3.9 3.5 - 5.2 g/dL Final     Total Bilirubin   Date Value Ref Range Status   11/01/2022 0.5 0.1 - 1.0 mg/dL Final     Comment:     For infants and newborns, interpretation of results should be based  on gestational age, weight and in agreement with clinical  observations.    Premature Infant recommended reference ranges:  Up to 24 hours.............<8.0 mg/dL  Up to 48 hours............<12.0 mg/dL  3-5 days..................<15.0 mg/dL  6-29 days.................<15.0 mg/dL       Alkaline Phosphatase   Date Value Ref Range Status   11/01/2022 87 55 - 135 U/L Final     AST   Date Value Ref Range Status   11/01/2022 16 10 - 40 U/L Final     ALT   Date Value Ref Range Status   11/01/2022 12 10 - 44 U/L Final     Anion Gap   Date Value Ref Range Status   11/01/2022 7 (L) 8 - 16 mmol/L Final     eGFR if    Date Value Ref Range Status   07/14/2022 43.1 (A) >60 mL/min/1.73 m^2 Final     eGFR if non    Date Value Ref Range Status   07/14/2022 37.3 (A) >60 mL/min/1.73 m^2 Final     Comment:     Calculation used to obtain the estimated glomerular filtration  rate (eGFR) is the CKD-EPI equation.          X-Ray Knee 1 or 2 View Bilateral  Narrative: EXAMINATION:  XR KNEE 1 OR 2 VIEW BILATERAL    CLINICAL HISTORY:  Other instability, left knee    TECHNIQUE:  AP standing and lateral views of both knees were obtained.    COMPARISON:  04/10/2019    FINDINGS:  There is degenerative arthrosis of the medial and lateral compartments of both knees with joint space narrowing.  The patellofemoral articulations are relatively well maintained.  No fracture or subluxation are identified.  There are no signs of joint effusion.  Impression: Degenerative arthrosis of both knees.    Electronically signed by: Wojciech Hess MD  Date:    04/12/2022  Time:    14:55  X-Ray Lumbar Spine AP And  Lateral  Narrative: EXAMINATION:  XR LUMBAR SPINE AP AND LATERAL    CLINICAL HISTORY:  Other intervertebral disc degeneration, lumbar region    TECHNIQUE:  AP, lateral and spot images were performed of the lumbar spine.    COMPARISON:  Plain films of the lumbar spine dated 01/14/2019    FINDINGS:  The bones are osteopenic.  The vertebral bodies maintain normal height and alignment.  There is facet joint arthropathy in the lower lumbar spine and disc space narrowing at the L5-S1 level.  There is multilevel endplate osteophyte formation.  There is no significant change compared to the prior study.  Impression: As above    Electronically signed by: Ron Yang MD  Date:    04/12/2022  Time:    14:47       ECOG SCORE    1 - Restricted in strenuous activity-ambulatory and able to carry out work of a light nature              Assessment/Plan:       1-Prostate cancer T2c very high high risk:  -I reviewed independently the patient radiologic and pathologic findings.      I reviewed with him the NCCN guidelines concerning his disease.  I discussed with him his stage prognosis and plan of care.  The patient in view of his high stage on presence of lung nodules on his previous CT scan without contrast she will require to undergo a PET PSMA for  staging purposes.  -as well he will require a to have genetic testing in view of his personal history of prostate cancer and his extensive family history of prostate cancer.  -he will have today blood workup drawn for CBC CMP PSA and testosterone.      According to the NCCN guidelines and in view of his very high risk category and in case his workup showed that he has locally  regional nonmetastatic disease the treatment usually include local treatment with radiation +ADT plus Abiraterone for 2 years.    - STAMPEDE trial  published in N Engl J Med. 2017;377(4):338. Epub 2017 Jd 3.   -The primary endpoint of the trial was overall survival, and the coprimary endpoint was  failure-free survival. Results were presented at a median follow-up of 14 months.  Overall survival was significantly increased with the addition of abiraterone (three-year survival 83 versus 76 percent with ADT alone, HR 0.63, 95% CI 0.52-0.76). Results were similar for those with nonmetastatic and metastatic disease (HR 0.75 and 0.61, respectively).  Failure-free survival was also significantly increased in the ADT plus abiraterone arm of the trial (three-year failure-free survival rate 75 versus 45 percent for ADT alone, HR 0.29, 95% CI 0.25-0.34). Improvement in failure-free survival was noted in males with metastatic disease and those with locally   advanced nonmetastatic disease.  -this could be an optimal treatment for him if he had a better performance status LS comorbidities.    -in case his disease shows distant metastatic disease further recommendation will be given.    2- Lung nodules:  -worrisome for possibly distant metastatic disease.  The patient will be scheduled to undergo a PET for further evaluation of this abnormality.    3- Osteopenia:  -for evaluation of his osteopenia that could have worsened after starting ADT.    4- Back pain:  -status post a fall injury.  He is currently having bilateral peripheral neuropathy.  -he has been seen and the currently being managed by his primary care physician    5- CAD/AFIB  -stable on medical management.    6- Chronic kidney disease stage 3   -will continue to follow up with repeat BMP.           Med Onc Chart Routing      Follow up with physician 2 weeks. to discuss the results of his blood work-up: CBC CMP  PSA genetic testing by [facility] and testosterone. He will be scheduled to have a PSMA PET. TATIANNA fried be scheduled to have a bone density   Follow up with [facility]    Infusion scheduling note    Injection scheduling note    Labs    Imaging    Pharmacy appointment    Other referrals         Plan was discussed with the patient at length, and he verbalized  understanding. Dilip was given an opportunity to ask questions that were answered to his satisfaction, and he was advised to call in the interval if any problems or questions arise.  Signed:  Adelina Levi MD   Hematology and Oncology  [facility] - HEMATOLOGY ONCOLOGY  [facility], [place]

## 2023-01-26 ENCOUNTER — LAB VISIT (OUTPATIENT)
Dept: LAB | Facility: HOSPITAL | Age: 83
End: 2023-01-26
Attending: INTERNAL MEDICINE
Payer: MEDICARE

## 2023-01-26 ENCOUNTER — OFFICE VISIT (OUTPATIENT)
Dept: HEMATOLOGY/ONCOLOGY | Facility: CLINIC | Age: 83
End: 2023-01-26
Payer: MEDICARE

## 2023-01-26 VITALS
WEIGHT: 190.25 LBS | RESPIRATION RATE: 16 BRPM | SYSTOLIC BLOOD PRESSURE: 122 MMHG | BODY MASS INDEX: 27.24 KG/M2 | HEIGHT: 70 IN | OXYGEN SATURATION: 97 % | HEART RATE: 76 BPM | DIASTOLIC BLOOD PRESSURE: 72 MMHG | TEMPERATURE: 97 F

## 2023-01-26 DIAGNOSIS — C61 PROSTATE CANCER: ICD-10-CM

## 2023-01-26 DIAGNOSIS — Z80.42 FAMILY HISTORY OF PROSTATE CANCER: ICD-10-CM

## 2023-01-26 DIAGNOSIS — I10 ESSENTIAL HYPERTENSION: ICD-10-CM

## 2023-01-26 DIAGNOSIS — Z79.02 LONG TERM (CURRENT) USE OF ANTITHROMBOTICS/ANTIPLATELETS: ICD-10-CM

## 2023-01-26 DIAGNOSIS — G89.29 CHRONIC BILATERAL LOW BACK PAIN WITHOUT SCIATICA: ICD-10-CM

## 2023-01-26 DIAGNOSIS — M54.50 CHRONIC BILATERAL LOW BACK PAIN WITHOUT SCIATICA: ICD-10-CM

## 2023-01-26 DIAGNOSIS — R53.1 WEAKNESS: ICD-10-CM

## 2023-01-26 DIAGNOSIS — N18.31 STAGE 3A CHRONIC KIDNEY DISEASE: Primary | ICD-10-CM

## 2023-01-26 DIAGNOSIS — R91.8 LUNG NODULE, MULTIPLE: ICD-10-CM

## 2023-01-26 DIAGNOSIS — D68.51 FACTOR 5 LEIDEN MUTATION, HETEROZYGOUS: ICD-10-CM

## 2023-01-26 DIAGNOSIS — M81.0 AGE-RELATED OSTEOPOROSIS WITHOUT CURRENT PATHOLOGICAL FRACTURE: ICD-10-CM

## 2023-01-26 DIAGNOSIS — I48.0 PAF (PAROXYSMAL ATRIAL FIBRILLATION): ICD-10-CM

## 2023-01-26 DIAGNOSIS — Z86.718 HISTORY OF DVT OF LOWER EXTREMITY: ICD-10-CM

## 2023-01-26 LAB
ALBUMIN SERPL BCP-MCNC: 4 G/DL (ref 3.5–5.2)
ALP SERPL-CCNC: 80 U/L (ref 55–135)
ALT SERPL W/O P-5'-P-CCNC: 21 U/L (ref 10–44)
ANION GAP SERPL CALC-SCNC: 12 MMOL/L (ref 8–16)
AST SERPL-CCNC: 17 U/L (ref 10–40)
BASOPHILS # BLD AUTO: 0.02 K/UL (ref 0–0.2)
BASOPHILS NFR BLD: 0.3 % (ref 0–1.9)
BILIRUB SERPL-MCNC: 0.3 MG/DL (ref 0.1–1)
BUN SERPL-MCNC: 21 MG/DL (ref 8–23)
CALCIUM SERPL-MCNC: 9.1 MG/DL (ref 8.7–10.5)
CHLORIDE SERPL-SCNC: 105 MMOL/L (ref 95–110)
CO2 SERPL-SCNC: 24 MMOL/L (ref 23–29)
COMPLEXED PSA SERPL-MCNC: 0.33 NG/ML (ref 0–4)
CREAT SERPL-MCNC: 1.4 MG/DL (ref 0.5–1.4)
DIFFERENTIAL METHOD: ABNORMAL
EOSINOPHIL # BLD AUTO: 0.3 K/UL (ref 0–0.5)
EOSINOPHIL NFR BLD: 5.1 % (ref 0–8)
ERYTHROCYTE [DISTWIDTH] IN BLOOD BY AUTOMATED COUNT: 13.5 % (ref 11.5–14.5)
EST. GFR  (NO RACE VARIABLE): 50.5 ML/MIN/1.73 M^2
GLUCOSE SERPL-MCNC: 94 MG/DL (ref 70–110)
HCT VFR BLD AUTO: 38.8 % (ref 40–54)
HGB BLD-MCNC: 13.3 G/DL (ref 14–18)
IMM GRANULOCYTES # BLD AUTO: 0.02 K/UL (ref 0–0.04)
IMM GRANULOCYTES NFR BLD AUTO: 0.3 % (ref 0–0.5)
LYMPHOCYTES # BLD AUTO: 1.8 K/UL (ref 1–4.8)
LYMPHOCYTES NFR BLD: 26.1 % (ref 18–48)
MCH RBC QN AUTO: 31.2 PG (ref 27–31)
MCHC RBC AUTO-ENTMCNC: 34.3 G/DL (ref 32–36)
MCV RBC AUTO: 91 FL (ref 82–98)
MONOCYTES # BLD AUTO: 0.7 K/UL (ref 0.3–1)
MONOCYTES NFR BLD: 10.3 % (ref 4–15)
NEUTROPHILS # BLD AUTO: 3.9 K/UL (ref 1.8–7.7)
NEUTROPHILS NFR BLD: 57.9 % (ref 38–73)
NRBC BLD-RTO: 0 /100 WBC
PLATELET # BLD AUTO: 200 K/UL (ref 150–450)
PMV BLD AUTO: 9 FL (ref 9.2–12.9)
POTASSIUM SERPL-SCNC: 4.2 MMOL/L (ref 3.5–5.1)
PROT SERPL-MCNC: 7.3 G/DL (ref 6–8.4)
RBC # BLD AUTO: 4.26 M/UL (ref 4.6–6.2)
SODIUM SERPL-SCNC: 141 MMOL/L (ref 136–145)
TESTOST SERPL-MCNC: 14 NG/DL (ref 304–1227)
WBC # BLD AUTO: 6.7 K/UL (ref 3.9–12.7)

## 2023-01-26 PROCEDURE — 1159F MED LIST DOCD IN RCRD: CPT | Mod: CPTII,S$GLB,, | Performed by: INTERNAL MEDICINE

## 2023-01-26 PROCEDURE — 1159F PR MEDICATION LIST DOCUMENTED IN MEDICAL RECORD: ICD-10-PCS | Mod: CPTII,S$GLB,, | Performed by: INTERNAL MEDICINE

## 2023-01-26 PROCEDURE — 3078F PR MOST RECENT DIASTOLIC BLOOD PRESSURE < 80 MM HG: ICD-10-PCS | Mod: CPTII,S$GLB,, | Performed by: INTERNAL MEDICINE

## 2023-01-26 PROCEDURE — 3288F FALL RISK ASSESSMENT DOCD: CPT | Mod: CPTII,S$GLB,, | Performed by: INTERNAL MEDICINE

## 2023-01-26 PROCEDURE — 1126F PR PAIN SEVERITY QUANTIFIED, NO PAIN PRESENT: ICD-10-PCS | Mod: CPTII,S$GLB,, | Performed by: INTERNAL MEDICINE

## 2023-01-26 PROCEDURE — 84153 ASSAY OF PSA TOTAL: CPT | Performed by: INTERNAL MEDICINE

## 2023-01-26 PROCEDURE — 80053 COMPREHEN METABOLIC PANEL: CPT | Mod: PN | Performed by: INTERNAL MEDICINE

## 2023-01-26 PROCEDURE — 1126F AMNT PAIN NOTED NONE PRSNT: CPT | Mod: CPTII,S$GLB,, | Performed by: INTERNAL MEDICINE

## 2023-01-26 PROCEDURE — 84403 ASSAY OF TOTAL TESTOSTERONE: CPT | Performed by: INTERNAL MEDICINE

## 2023-01-26 PROCEDURE — 85025 COMPLETE CBC W/AUTO DIFF WBC: CPT | Mod: PN | Performed by: INTERNAL MEDICINE

## 2023-01-26 PROCEDURE — 3074F SYST BP LT 130 MM HG: CPT | Mod: CPTII,S$GLB,, | Performed by: INTERNAL MEDICINE

## 2023-01-26 PROCEDURE — 99999 PR PBB SHADOW E&M-EST. PATIENT-LVL V: ICD-10-PCS | Mod: PBBFAC,,, | Performed by: INTERNAL MEDICINE

## 2023-01-26 PROCEDURE — 99999 PR PBB SHADOW E&M-EST. PATIENT-LVL V: CPT | Mod: PBBFAC,,, | Performed by: INTERNAL MEDICINE

## 2023-01-26 PROCEDURE — 99499 RISK ADDL DX/OHS AUDIT: ICD-10-PCS | Mod: S$GLB,,, | Performed by: INTERNAL MEDICINE

## 2023-01-26 PROCEDURE — 1101F PR PT FALLS ASSESS DOC 0-1 FALLS W/OUT INJ PAST YR: ICD-10-PCS | Mod: CPTII,S$GLB,, | Performed by: INTERNAL MEDICINE

## 2023-01-26 PROCEDURE — 1160F RVW MEDS BY RX/DR IN RCRD: CPT | Mod: CPTII,S$GLB,, | Performed by: INTERNAL MEDICINE

## 2023-01-26 PROCEDURE — 1101F PT FALLS ASSESS-DOCD LE1/YR: CPT | Mod: CPTII,S$GLB,, | Performed by: INTERNAL MEDICINE

## 2023-01-26 PROCEDURE — 3078F DIAST BP <80 MM HG: CPT | Mod: CPTII,S$GLB,, | Performed by: INTERNAL MEDICINE

## 2023-01-26 PROCEDURE — 99205 PR OFFICE/OUTPT VISIT, NEW, LEVL V, 60-74 MIN: ICD-10-PCS | Mod: S$GLB,,, | Performed by: INTERNAL MEDICINE

## 2023-01-26 PROCEDURE — 3288F PR FALLS RISK ASSESSMENT DOCUMENTED: ICD-10-PCS | Mod: CPTII,S$GLB,, | Performed by: INTERNAL MEDICINE

## 2023-01-26 PROCEDURE — 99499 UNLISTED E&M SERVICE: CPT | Mod: S$GLB,,, | Performed by: INTERNAL MEDICINE

## 2023-01-26 PROCEDURE — 3074F PR MOST RECENT SYSTOLIC BLOOD PRESSURE < 130 MM HG: ICD-10-PCS | Mod: CPTII,S$GLB,, | Performed by: INTERNAL MEDICINE

## 2023-01-26 PROCEDURE — 99205 OFFICE O/P NEW HI 60 MIN: CPT | Mod: S$GLB,,, | Performed by: INTERNAL MEDICINE

## 2023-01-26 PROCEDURE — 1160F PR REVIEW ALL MEDS BY PRESCRIBER/CLIN PHARMACIST DOCUMENTED: ICD-10-PCS | Mod: CPTII,S$GLB,, | Performed by: INTERNAL MEDICINE

## 2023-01-26 PROCEDURE — 36415 COLL VENOUS BLD VENIPUNCTURE: CPT | Mod: PN | Performed by: INTERNAL MEDICINE

## 2023-02-01 ENCOUNTER — OFFICE VISIT (OUTPATIENT)
Dept: CARDIOLOGY | Facility: CLINIC | Age: 83
End: 2023-02-01
Payer: MEDICARE

## 2023-02-01 VITALS
DIASTOLIC BLOOD PRESSURE: 74 MMHG | SYSTOLIC BLOOD PRESSURE: 118 MMHG | WEIGHT: 188.25 LBS | HEIGHT: 72 IN | BODY MASS INDEX: 25.5 KG/M2 | HEART RATE: 79 BPM

## 2023-02-01 DIAGNOSIS — E78.00 HYPERCHOLESTEROLEMIA: Chronic | ICD-10-CM

## 2023-02-01 DIAGNOSIS — R55 NEAR SYNCOPE: ICD-10-CM

## 2023-02-01 DIAGNOSIS — Z79.899 LONG TERM CURRENT USE OF ANTIARRHYTHMIC DRUG: Chronic | ICD-10-CM

## 2023-02-01 DIAGNOSIS — Z79.02 LONG TERM (CURRENT) USE OF ANTITHROMBOTICS/ANTIPLATELETS: Chronic | ICD-10-CM

## 2023-02-01 DIAGNOSIS — I10 ESSENTIAL HYPERTENSION: Chronic | ICD-10-CM

## 2023-02-01 DIAGNOSIS — N18.31 STAGE 3A CHRONIC KIDNEY DISEASE: Chronic | ICD-10-CM

## 2023-02-01 DIAGNOSIS — I25.118 CORONARY ARTERY DISEASE OF NATIVE ARTERY OF NATIVE HEART WITH STABLE ANGINA PECTORIS: Primary | Chronic | ICD-10-CM

## 2023-02-01 PROCEDURE — 1159F MED LIST DOCD IN RCRD: CPT | Mod: CPTII,S$GLB,, | Performed by: INTERNAL MEDICINE

## 2023-02-01 PROCEDURE — 3288F PR FALLS RISK ASSESSMENT DOCUMENTED: ICD-10-PCS | Mod: CPTII,S$GLB,, | Performed by: INTERNAL MEDICINE

## 2023-02-01 PROCEDURE — 3074F PR MOST RECENT SYSTOLIC BLOOD PRESSURE < 130 MM HG: ICD-10-PCS | Mod: CPTII,S$GLB,, | Performed by: INTERNAL MEDICINE

## 2023-02-01 PROCEDURE — 1101F PR PT FALLS ASSESS DOC 0-1 FALLS W/OUT INJ PAST YR: ICD-10-PCS | Mod: CPTII,S$GLB,, | Performed by: INTERNAL MEDICINE

## 2023-02-01 PROCEDURE — 99214 PR OFFICE/OUTPT VISIT, EST, LEVL IV, 30-39 MIN: ICD-10-PCS | Mod: S$GLB,,, | Performed by: INTERNAL MEDICINE

## 2023-02-01 PROCEDURE — 3078F DIAST BP <80 MM HG: CPT | Mod: CPTII,S$GLB,, | Performed by: INTERNAL MEDICINE

## 2023-02-01 PROCEDURE — 3074F SYST BP LT 130 MM HG: CPT | Mod: CPTII,S$GLB,, | Performed by: INTERNAL MEDICINE

## 2023-02-01 PROCEDURE — 3288F FALL RISK ASSESSMENT DOCD: CPT | Mod: CPTII,S$GLB,, | Performed by: INTERNAL MEDICINE

## 2023-02-01 PROCEDURE — 99214 OFFICE O/P EST MOD 30 MIN: CPT | Mod: S$GLB,,, | Performed by: INTERNAL MEDICINE

## 2023-02-01 PROCEDURE — 1126F PR PAIN SEVERITY QUANTIFIED, NO PAIN PRESENT: ICD-10-PCS | Mod: CPTII,S$GLB,, | Performed by: INTERNAL MEDICINE

## 2023-02-01 PROCEDURE — 3078F PR MOST RECENT DIASTOLIC BLOOD PRESSURE < 80 MM HG: ICD-10-PCS | Mod: CPTII,S$GLB,, | Performed by: INTERNAL MEDICINE

## 2023-02-01 PROCEDURE — 1159F PR MEDICATION LIST DOCUMENTED IN MEDICAL RECORD: ICD-10-PCS | Mod: CPTII,S$GLB,, | Performed by: INTERNAL MEDICINE

## 2023-02-01 PROCEDURE — 1101F PT FALLS ASSESS-DOCD LE1/YR: CPT | Mod: CPTII,S$GLB,, | Performed by: INTERNAL MEDICINE

## 2023-02-01 PROCEDURE — 1126F AMNT PAIN NOTED NONE PRSNT: CPT | Mod: CPTII,S$GLB,, | Performed by: INTERNAL MEDICINE

## 2023-02-01 NOTE — PROGRESS NOTES
Subjective:    Patient ID:  Dilip Lopez is a 82 y.o. male who presents for Coronary Artery Disease, Irregular Heart Beat, Atrial Fibrillation, and Loss of Consciousness        Coronary Artery Disease  Pertinent negatives include no chest pain, leg swelling or palpitations.   Atrial Fibrillation  Symptoms are negative for chest pain, palpitations and syncope. Past medical history includes atrial fibrillation and CAD.   Loss of Consciousness  Pertinent negatives include no abdominal pain, chest pain, diaphoresis, fever, focal weakness, light-headedness, malaise/fatigue, nausea or palpitations. His past medical history is significant for CAD.   NO F/U SINCE 8/2020.CMP GFR 50, PSA OK, TO HAVE BONE SCAN, DOING OK, HEAD COLD, ONE EPISODE OF NEAR SYNCOPE, WALKING AROUND NO OTHER SX, NO PALP, , SEE ROS    Past Medical History:   Diagnosis Date    Atrial fibrillation     CAD (coronary artery disease)     Hyperlipidemia     Hypertension     Prostate cancer 4/12/2022     Past Surgical History:   Procedure Laterality Date    CARDIAC CATHETERIZATION      CARDIAC SURGERY      COLONOSCOPY      COLONOSCOPY N/A 5/14/2021    Procedure: COLONOSCOPY;  Surgeon: Leopoldo Mendoza MD;  Location: [facility] ENDO;  Service: Endoscopy;  Laterality: N/A;    CORONARY ANGIOPLASTY      CORONARY STENT PLACEMENT  01/29/2018    ESOPHAGOGASTRODUODENOSCOPY N/A 5/14/2021    Procedure: EGD (ESOPHAGOGASTRODUODENOSCOPY);  Surgeon: Leopoldo Mendoza MD;  Location: [facility];  Service: Endoscopy;  Laterality: N/A;  with push enteroscopy    HIATAL HERNIA REPAIR      TRANSRECTAL BIOPSY OF PROSTATE WITH ULTRASOUND GUIDANCE N/A 7/20/2021    Procedure: BIOPSY, PROSTATE, RECTAL APPROACH, WITH US GUIDANCE;  Surgeon: SARAI Thomason MD;  Location: [facility];  Service: Urology;  Laterality: N/A;     Family History   Problem Relation Age of Onset    Hypertension Mother     Diabetes Mother     Heart disease Father 62        MI    Parkinsonism Brother      Social  "History     Socioeconomic History    Marital status:    Tobacco Use    Smoking status: Never    Smokeless tobacco: Never   Substance and Sexual Activity    Alcohol use: No    Drug use: No       Review of patient's allergies indicates:   Allergen Reactions    Doxycycline hcl Rash    Bactrim [sulfamethoxazole-trimethoprim]     Cinnamon Other (See Comments)    Ciprofloxacin Hives    Sulfa (sulfonamide antibiotics)        Current Outpatient Medications:     allopurinoL (ZYLOPRIM) 300 MG tablet, TAKE 1 TABLET BY MOUTH ONCE DAILY, Disp: 90 tablet, Rfl: 3    aspirin (ECOTRIN) 325 MG EC tablet, once daily. Pt instructed to hold for 5 days prior to procedure on 05/14/21, Disp: , Rfl:     clopidogreL (PLAVIX) 75 mg tablet, TAKE 1 TABLET BY MOUTH ONCE DAILY, Disp: 90 tablet, Rfl: 0    cyanocobalamin 1,000 mcg/mL injection, Inject 1 mL (1,000 mcg total) into the muscle every 30 days., Disp: 3 mL, Rfl: 3    gabapentin (NEURONTIN) 300 MG capsule, TAKE 1 CAPSULE BY MOUTH  TWICE DAILY, Disp: 180 capsule, Rfl: 3    hydrALAZINE (APRESOLINE) 25 MG tablet, hydralazine 25 mg tablet, Disp: , Rfl:     hydroCHLOROthiazide (HYDRODIURIL) 12.5 MG Tab, TAKE 1 TABLET BY MOUTH ONCE DAILY, Disp: 90 tablet, Rfl: 3    hydrOXYzine HCL (ATARAX) 25 MG tablet, TAKE ONE TABLET THREE TIMES A DAY AS NEEDED FOR ITCHING, Disp: 30 tablet, Rfl: 5    [id number]-[id number]-algal oil (METANX/FOLTANX RF) 3 mg-35 mg-2 mg -90.314 mg Cap, Take 1 tablet by mouth 2 (two) times a day., Disp: 180 capsule, Rfl: 3    losartan (COZAAR) 100 MG tablet, TAKE 1 TABLET BY MOUTH ONCE DAILY, Disp: 90 tablet, Rfl: 3    montelukast (SINGULAIR) 10 mg tablet, Take 1 tablet (10 mg total) by mouth every evening., Disp: 90 tablet, Rfl: 3    needle, disp, 25 gauge 25 gauge x 1" Ndle, Use as directed, Disp: 20 each, Rfl: 0    pantoprazole (PROTONIX) 20 MG tablet, Take 20 mg by mouth., Disp: , Rfl:     sotalol (BETAPACE) 40 MG tablet, Take 40 mg by mouth once daily., Disp: , Rfl:     " "syringe with needle (BD LUER-PADMINI SYRINGE) 3 mL 25 x 1 1/2 " Syrg, Use once a month to inject B12 injection., Disp: 3 each, Rfl: 3    syringe with needle (SYRINGE 3CC/25GX1") 3 mL 25 gauge x 1" Syrg, Use as directed, Disp: 20 Syringe, Rfl: 0    temazepam (RESTORIL) 30 mg capsule, TAKE 1 CAPSULE BY MOUTH NIGHTLY AS NEEDED FOR INSOMNIA., Disp: 30 capsule, Rfl: 2    Review of Systems   Constitutional: Negative for chills, decreased appetite, diaphoresis, fever and malaise/fatigue.   HENT:  Positive for congestion and nosebleeds.    Cardiovascular:  Positive for near-syncope. Negative for chest pain, claudication, cyanosis, dyspnea on exertion (MILD), irregular heartbeat, leg swelling, orthopnea, palpitations, paroxysmal nocturnal dyspnea and syncope.   Hematologic/Lymphatic: Negative for adenopathy. Does not bruise/bleed easily.   Musculoskeletal:  Positive for arthritis. Negative for falls.   Gastrointestinal:  Negative for abdominal pain, dysphagia, jaundice, melena and nausea.   Genitourinary:  Negative for dysuria and flank pain.   Neurological:  Negative for brief paralysis, focal weakness, light-headedness, loss of balance and paresthesias.   Psychiatric/Behavioral:  Negative for altered mental status and depression.    Allergic/Immunologic: Negative for hives and persistent infections.      Objective:      Vitals:    02/01/23 1034   BP: 118/74   Pulse: 79   Weight: 85.4 kg (188 lb 4.4 oz)   Height: 6' (1.829 m)   PainSc: 0-No pain     Body mass index is 25.53 kg/m².    Physical Exam  Constitutional:       Appearance: Normal appearance.   HENT:      Head: Normocephalic and atraumatic.   Eyes:      General: No scleral icterus.     Extraocular Movements: Extraocular movements intact.   Neck:      Vascular: No carotid bruit or JVD.   Cardiovascular:      Rate and Rhythm: Normal rate and regular rhythm. No extrasystoles are present.     Pulses: Normal pulses.           Carotid pulses are 2+ on the right side and 2+ on " the left side.       Radial pulses are 2+ on the right side and 2+ on the left side.        Femoral pulses are 2+ on the right side and 2+ on the left side.       Posterior tibial pulses are 2+ on the right side and 2+ on the left side.      Heart sounds:     No friction rub. No gallop.   Pulmonary:      Effort: Pulmonary effort is normal.      Breath sounds: Normal breath sounds and air entry.   Abdominal:      Palpations: Abdomen is soft.      Tenderness: There is no abdominal tenderness.   Musculoskeletal:      Cervical back: Neck supple.      Right lower leg: No edema.      Left lower leg: No edema.      Comments: LEANING FORWARDS, USES A CANE   Skin:     General: Skin is warm and dry.      Capillary Refill: Capillary refill takes less than 2 seconds.   Neurological:      General: No focal deficit present.      Mental Status: He is alert and oriented to person, place, and time.   Psychiatric:         Mood and Affect: Mood normal.         Speech: Speech normal.         Behavior: Behavior normal.             ..    Chemistry        Component Value Date/Time     01/26/2023 1505    K 4.2 01/26/2023 1505     01/26/2023 1505    CO2 24 01/26/2023 1505    BUN 21 01/26/2023 1505    CREATININE 1.4 01/26/2023 1505    GLU 94 01/26/2023 1505        Component Value Date/Time    CALCIUM 9.1 01/26/2023 1505    ALKPHOS 80 01/26/2023 1505    AST 17 01/26/2023 1505    ALT 21 01/26/2023 1505    BILITOT 0.3 01/26/2023 1505    ESTGFRAFRICA 43.1 (A) 07/14/2022 1418    EGFRNONAA 37.3 (A) 07/14/2022 1418            ..  Lab Results   Component Value Date    CHOL 145 11/01/2022    CHOL 142 10/05/2021    CHOL 99 (L) 08/04/2020     Lab Results   Component Value Date    HDL 40 11/01/2022    HDL 40 10/05/2021    HDL 41 08/04/2020     Lab Results   Component Value Date    LDLCALC 86.6 11/01/2022    LDLCALC 82.4 10/05/2021    LDLCALC 44.8 (L) 08/04/2020     Lab Results   Component Value Date    TRIG 92 11/01/2022    TRIG 98  10/05/2021    TRIG 66 08/04/2020     Lab Results   Component Value Date    CHOLHDL 27.6 11/01/2022    CHOLHDL 28.2 10/05/2021    CHOLHDL 41.4 08/04/2020     ..  Lab Results   Component Value Date    WBC 6.70 01/26/2023    HGB 13.3 (L) 01/26/2023    HCT 38.8 (L) 01/26/2023    MCV 91 01/26/2023     01/26/2023       Test(s) Reviewed  I have reviewed the following in detail:  [] Stress test   [] Angiography   [] Echocardiogram   [] Labs   [] Other:       Assessment:         ICD-10-CM ICD-9-CM   1. Coronary artery disease of native artery of native heart with stable angina pectoris  I25.118 414.01     413.9   2. Near syncope  R55 780.2   3. Long term current use of antiarrhythmic drug  Z79.899 V58.69   4. Stage 3a chronic kidney disease  N18.31 585.3   5. Hypercholesterolemia  E78.00 272.0   6. Essential hypertension  I10 401.9   7. Long term (current) use of antithrombotics/antiplatelets  Z79.02 V58.63     Problem List Items Addressed This Visit          Cardiac/Vascular    Hypercholesterolemia    Coronary artery disease of native artery of native heart with stable angina pectoris - Primary    Relevant Orders    Echo    Nuclear Stress - Cardiology Interpreted    Essential hypertension    Long term current use of antiarrhythmic drug    Relevant Orders    Cardiac event monitor       Renal/    CKD (chronic kidney disease) stage 3, GFR 30-59 ml/min       Hematology    Long term (current) use of antithrombotics/antiplatelets       Other    Near syncope    Relevant Orders    CV Ultrasound Bilateral Doppler Carotid    Cardiac event monitor    Echo    Nuclear Stress - Cardiology Interpreted        Plan:     WILL NEED FURTHER EVALUATION, CHECK ECHO CAROTID, STRESS TEST TO ASSESS FOR ISCHEMIA EVENT MONITOR, CLASS 1-2 ANGINA NO OVERT HEART FAILURE ASSESS FOR ARRHYTHMIA, DIET EXERCISE STAY WELL HYDRATED WATCH BLOOD PRESSURE AND HEART RATE, RETURN TO CLINIC IN 3 MO      Coronary artery disease of native artery of native  heart with stable angina pectoris  -     Echo  -     Nuclear Stress - Cardiology Interpreted; Future    Near syncope  Comments:  WORKUP  Orders:  -     CV Ultrasound Bilateral Doppler Carotid; Future  -     Cardiac event monitor; Future  -     Echo  -     Nuclear Stress - Cardiology Interpreted; Future    Long term current use of antiarrhythmic drug  -     Cardiac event monitor; Future    Stage 3a chronic kidney disease    Hypercholesterolemia    Essential hypertension  Comments:  CONTROLLED    Long term (current) use of antithrombotics/antiplatelets    RTC Low level/low impact aerobic exercise 5x's/wk. Heart healthy diet and risk factor modification.    See labs and med orders.    Aerobic exercise 5x's/wk. Heart healthy diet and risk factor modification.    See labs and med orders.

## 2023-02-06 ENCOUNTER — HOSPITAL ENCOUNTER (OUTPATIENT)
Dept: RADIOLOGY | Facility: HOSPITAL | Age: 83
Discharge: HOME | End: 2023-02-06
Attending: INTERNAL MEDICINE
Payer: MEDICARE

## 2023-02-06 DIAGNOSIS — C61 PROSTATE CANCER: ICD-10-CM

## 2023-02-06 DIAGNOSIS — M54.50 CHRONIC BILATERAL LOW BACK PAIN WITHOUT SCIATICA: ICD-10-CM

## 2023-02-06 DIAGNOSIS — M81.0 AGE-RELATED OSTEOPOROSIS WITHOUT CURRENT PATHOLOGICAL FRACTURE: ICD-10-CM

## 2023-02-06 DIAGNOSIS — G89.29 CHRONIC BILATERAL LOW BACK PAIN WITHOUT SCIATICA: ICD-10-CM

## 2023-02-06 PROCEDURE — 78815 PET IMAGE W/CT SKULL-THIGH: CPT | Mod: 26,PS,, | Performed by: STUDENT IN AN ORGANIZED HEALTH CARE EDUCATION/TRAINING PROGRAM

## 2023-02-06 PROCEDURE — 78815 NM PET CT F 18 PYL PSMA, MIDTHIGH TO VERTEX: ICD-10-PCS | Mod: 26,PS,, | Performed by: STUDENT IN AN ORGANIZED HEALTH CARE EDUCATION/TRAINING PROGRAM

## 2023-02-06 PROCEDURE — 77080 DEXA BONE DENSITY SPINE HIP: ICD-10-PCS | Mod: 26,,, | Performed by: RADIOLOGY

## 2023-02-06 PROCEDURE — 77080 DXA BONE DENSITY AXIAL: CPT | Mod: 26,,, | Performed by: RADIOLOGY

## 2023-02-06 PROCEDURE — 77080 DXA BONE DENSITY AXIAL: CPT | Mod: TC,PO

## 2023-02-06 PROCEDURE — 78815 PET IMAGE W/CT SKULL-THIGH: CPT | Mod: TC,PN

## 2023-02-06 NOTE — PROGRESS NOTES
PET Imaging Questionnaire    Are you a Diabetic? Recent Blood Sugar level? No    Are you anemic? Bone Marrow Stimulation Meds? No    Have you had a CT Scan, if so when & where was your last one? Yes -     Have you had a PET Scan, if so when & where was your last one? No    Chemotherapy or currently on Chemotherapy? No    Radiation therapy? No    Surgical History:   Past Surgical History:   Procedure Laterality Date    CARDIAC CATHETERIZATION      CARDIAC SURGERY      COLONOSCOPY      COLONOSCOPY N/A 5/14/2021    Procedure: COLONOSCOPY;  Surgeon: Leopoldo Mendoza MD;  Location: [facility] ENDO;  Service: Endoscopy;  Laterality: N/A;    CORONARY ANGIOPLASTY      CORONARY STENT PLACEMENT  01/29/2018    ESOPHAGOGASTRODUODENOSCOPY N/A 5/14/2021    Procedure: EGD (ESOPHAGOGASTRODUODENOSCOPY);  Surgeon: Leopoldo Mendoza MD;  Location: [facility] ENDO;  Service: Endoscopy;  Laterality: N/A;  with push enteroscopy    HIATAL HERNIA REPAIR      TRANSRECTAL BIOPSY OF PROSTATE WITH ULTRASOUND GUIDANCE N/A 7/20/2021    Procedure: BIOPSY, PROSTATE, RECTAL APPROACH, WITH US GUIDANCE;  Surgeon: SARAI Thomason MD;  Location: [facility] OR;  Service: Urology;  Laterality: N/A;        Have you been fasting for at least 6 hours? Yes    Is there any chance you may be pregnant or breastfeeding? No    Assay: 10.57 MCi@:11:58   Injection Site:RT AC    Residual: 1.247 mCi@: 12:01   Technologist: Genaro Chaudhry Injected:9.32mCi

## 2023-02-08 NOTE — PROGRESS NOTES
Subjective:       Name: Dilip Lopez  : 1940  MRN: [id number]    Chief Complaint   Patient presents with    Prostate Cancer      The patient presented to the clinic with his wife.    HPI: Dilip Lopez is a 82 y.o. male presents for evaluation of Prostate Cancer      The patient is here to discuss the results of his work-up done to assess his prostate cancer T2 very high risk grade 5 isac (4+5).    His genetic testing done through [facility] came back negative. His bone density showed osteoporosis.    His PET scan done on 2023 showed no evidence of metastatic disease.  His repeat PSA done on 2023 came back 0.33 compared to 0.62 4 month ago, his testosterone was 14. His CBC showed a  Hgb 13.3 and his CMP was normal.    He denies any new complaints.    The patient has been followed and treated by Dr. Thomason.  His diagnostic PSA was 22 in 2021.     He underwent an ultrasound guided prostate biopsy on 2021 that showed:  1- left base:  Prostate adenocarcinoma Isac grade 3 + 4 equals 7 40% patent for involving the biopsy.  2- left mid:  Prostate adenocarcinoma [place] 4+5=9 involving 40% total of 1 core.  3- Left apex:  Prostate adenocarcinoma [place] 4 + 3 equals 7 80% pattern for involving 60% total of the 2 of the 2 cores  4-right base:  Prostate adenocarcinoma Isac grade 4 + 3 equals 77 5% pattern for involving 25% total of the 2/2 cores.  5-right mid:  Prostate adenocarcinoma Isac 3+4=7 10% pattern for involving 10% total of the 2 of the 2 cores.  6-right apex:  Prostate adenocarcinoma Isac grade 4+5=9 involving 20% of the total 1 of the 2 cores.    He underwent on 2021 a total body bone scan that failed to show signs of distant metastatic disease.    He underwent a CT chest without contrast on 2021 showed;  1. Bilateral calcified and noncalcified pulmonary nodules without significant interval change as compared to the prior  exam.  As stated on prior report, [employer]ischer society guidelines recommend follow-up noncontrast chest CT at 18-24 months after discovery.  2. CT findings suggestive of prior granulomatous disease.    He was started on androgen deprivation therapy since September 2021.  He has been experiencing hot flashes that are mild and some urinary symptoms.  His PSA done on March 28, 2022 was 1.1. The last injection of Lupron is in September 2022. After his last injection he felt weak in his  legs and tired.        Oncology History   Prostate cancer   4/12/2022 Initial Diagnosis    Prostate cancer     2/9/2023 Cancer Staged    Staging form: Prostate, AJCC 8th Edition  - Clinical: Stage IIIC (cT2c, cN0, cM0, PSA: 22, Grade Group: 5)       2/9/2023 -  Chemotherapy    Treatment Summary   Plan Name: OP PROSTATE LEUPROLIDE (LUPRON) 6 MONTH  Treatment Goal: Palliative  Status: Active  Start Date: 2/9/2023 (Planned)  End Date: 1/11/2024 (Planned)  Provider: Adelina Levi MD  Chemotherapy: leuprolide acetate (6 month) injection 45 mg, 45 mg, Intramuscular, Clinic/HOD 1 time, 0 of 3 cycles            Past Medical History:   Diagnosis Date    Atrial fibrillation     CAD (coronary artery disease)     Hyperlipidemia     Hypertension     Prostate cancer 4/12/2022       Past Surgical History:   Procedure Laterality Date    CARDIAC CATHETERIZATION      CARDIAC SURGERY      COLONOSCOPY      COLONOSCOPY N/A 5/14/2021    Procedure: COLONOSCOPY;  Surgeon: Leopoldo Mendoza MD;  Location: [facility];  Service: Endoscopy;  Laterality: N/A;    CORONARY ANGIOPLASTY      CORONARY STENT PLACEMENT  01/29/2018    ESOPHAGOGASTRODUODENOSCOPY N/A 5/14/2021    Procedure: EGD (ESOPHAGOGASTRODUODENOSCOPY);  Surgeon: Leopoldo Mendoza MD;  Location: [facility];  Service: Endoscopy;  Laterality: N/A;  with push enteroscopy    HIATAL HERNIA REPAIR      TRANSRECTAL BIOPSY OF PROSTATE WITH ULTRASOUND GUIDANCE N/A 7/20/2021    Procedure: BIOPSY, PROSTATE, RECTAL  APPROACH, WITH US GUIDANCE;  Surgeon: SARAI Thomason MD;  Location: [facility] OR;  Service: Urology;  Laterality: N/A;       Family History   Problem Relation Age of Onset    Hypertension Mother     Diabetes Mother     Heart disease Father 62        MI    Parkinsonism Brother        Social History     Socioeconomic History    Marital status:    Tobacco Use    Smoking status: Never    Smokeless tobacco: Never   Substance and Sexual Activity    Alcohol use: No    Drug use: No       Review of patient's allergies indicates:   Allergen Reactions    Doxycycline hcl Rash    Bactrim [sulfamethoxazole-trimethoprim]     Cinnamon Other (See Comments)    Ciprofloxacin Hives    Sulfa (sulfonamide antibiotics)        Review of Systems   Constitutional:  Positive for diaphoresis. Negative for appetite change, fatigue and fever.   HENT:  Negative for facial swelling, mouth sores, postnasal drip and sore throat.    Eyes:  Negative for photophobia and visual disturbance.   Respiratory:  Negative for cough, chest tightness and shortness of breath.    Cardiovascular:  Negative for chest pain and leg swelling.   Gastrointestinal:  Negative for abdominal pain, blood in stool, constipation, nausea and vomiting.   Endocrine: Negative for cold intolerance and polyuria.   Genitourinary:  Negative for dysuria, flank pain and hematuria.   Musculoskeletal:  Positive for arthralgias and gait problem. Negative for back pain and joint swelling.   Integumentary:  Negative for color change and mole/lesion.   Neurological:  Positive for weakness and numbness. Negative for dizziness, headaches and memory loss.   Hematological:  Negative for adenopathy. Does not bruise/bleed easily.   Psychiatric/Behavioral:  Negative for behavioral problems, decreased concentration and sleep disturbance. The patient is not nervous/anxious.           Objective:     Vitals:    02/09/23 1408   BP: 127/81   BP Location: Left arm   Patient Position: Sitting   BP  Method: Medium (Automatic)   Pulse: 91   Resp: 16   Temp: 97.9 °F (36.6 °C)   TempSrc: Temporal   SpO2: 98%   Weight: 86 kg (189 lb 9.5 oz)   Height: 6' (1.829 m)          Physical Exam  Vitals reviewed.   Constitutional:       Appearance: Normal appearance.   HENT:      Head: Normocephalic and atraumatic.      Mouth/Throat:      Mouth: Mucous membranes are moist.      Pharynx: No oropharyngeal exudate.   Eyes:      General: No scleral icterus.     Pupils: Pupils are equal, round, and reactive to light.   Cardiovascular:      Rate and Rhythm: Normal rate and regular rhythm.      Pulses: Normal pulses.      Heart sounds: Normal heart sounds.   Pulmonary:      Effort: Pulmonary effort is normal.      Breath sounds: Normal breath sounds. No wheezing.   Abdominal:      General: Bowel sounds are normal. There is no distension.      Tenderness: There is no abdominal tenderness.   Musculoskeletal:         General: No swelling or tenderness.      Cervical back: Neck supple.   Lymphadenopathy:      Cervical: No cervical adenopathy.   Skin:     Coloration: Skin is not jaundiced.      Findings: No bruising or rash.   Neurological:      General: No focal deficit present.      Mental Status: He is alert and oriented to person, place, and time.      Cranial Nerves: No cranial nerve deficit.      Motor: No weakness.      Gait: Gait normal.   Psychiatric:         Mood and Affect: Mood normal.         Behavior: Behavior normal.              Current Outpatient Medications on File Prior to Visit   Medication Sig    allopurinoL (ZYLOPRIM) 300 MG tablet TAKE 1 TABLET BY MOUTH ONCE DAILY    aspirin (ECOTRIN) 325 MG EC tablet once daily. Pt instructed to hold for 5 days prior to procedure on 05/14/21    clopidogreL (PLAVIX) 75 mg tablet TAKE 1 TABLET BY MOUTH ONCE DAILY    cyanocobalamin 1,000 mcg/mL injection Inject 1 mL (1,000 mcg total) into the muscle every 30 days.    gabapentin (NEURONTIN) 300 MG capsule TAKE 1 CAPSULE BY MOUTH   "TWICE DAILY    hydrALAZINE (APRESOLINE) 25 MG tablet hydralazine 25 mg tablet    hydroCHLOROthiazide (HYDRODIURIL) 12.5 MG Tab TAKE 1 TABLET BY MOUTH ONCE DAILY    hydrOXYzine HCL (ATARAX) 25 MG tablet TAKE ONE TABLET THREE TIMES A DAY AS NEEDED FOR ITCHING    occdsstel-[id number]-algal oil (METANX/FOLTANX RF) 3 mg-35 mg-2 mg -90.314 mg Cap Take 1 tablet by mouth 2 (two) times a day.    losartan (COZAAR) 100 MG tablet TAKE 1 TABLET BY MOUTH ONCE DAILY    montelukast (SINGULAIR) 10 mg tablet Take 1 tablet (10 mg total) by mouth every evening.    needle, disp, 25 gauge 25 gauge x 1" Ndle Use as directed    pantoprazole (PROTONIX) 20 MG tablet Take 20 mg by mouth.    sotalol (BETAPACE) 40 MG tablet Take 40 mg by mouth once daily.    syringe with needle (BD LUER-PADMINI SYRINGE) 3 mL 25 x 1 1/2 " Syrg Use once a month to inject B12 injection.    syringe with needle (SYRINGE 3CC/25GX1") 3 mL 25 gauge x 1" Syrg Use as directed    temazepam (RESTORIL) 30 mg capsule TAKE 1 CAPSULE BY MOUTH NIGHTLY AS NEEDED FOR INSOMNIA.     No current facility-administered medications on file prior to visit.       CBC:  Lab Results   Component Value Date    WBC 6.70 01/26/2023    HGB 13.3 (L) 01/26/2023    HCT 38.8 (L) 01/26/2023    MCV 91 01/26/2023     01/26/2023         CMP:  Sodium   Date Value Ref Range Status   01/26/2023 141 136 - 145 mmol/L Final     Potassium   Date Value Ref Range Status   01/26/2023 4.2 3.5 - 5.1 mmol/L Final     Chloride   Date Value Ref Range Status   01/26/2023 105 95 - 110 mmol/L Final     CO2   Date Value Ref Range Status   01/26/2023 24 23 - 29 mmol/L Final     Glucose   Date Value Ref Range Status   01/26/2023 94 70 - 110 mg/dL Final     BUN   Date Value Ref Range Status   01/26/2023 21 8 - 23 mg/dL Final     Creatinine   Date Value Ref Range Status   01/26/2023 1.4 0.5 - 1.4 mg/dL Final     Calcium   Date Value Ref Range Status   01/26/2023 9.1 8.7 - 10.5 mg/dL Final     Total Protein   Date Value Ref " Range Status   01/26/2023 7.3 6.0 - 8.4 g/dL Final     Albumin   Date Value Ref Range Status   01/26/2023 4.0 3.5 - 5.2 g/dL Final     Total Bilirubin   Date Value Ref Range Status   01/26/2023 0.3 0.1 - 1.0 mg/dL Final     Comment:     For infants and newborns, interpretation of results should be based  on gestational age, weight and in agreement with clinical  observations.    Premature Infant recommended reference ranges:  Up to 24 hours.............<8.0 mg/dL  Up to 48 hours............<12.0 mg/dL  3-5 days..................<15.0 mg/dL  6-29 days.................<15.0 mg/dL       Alkaline Phosphatase   Date Value Ref Range Status   01/26/2023 80 55 - 135 U/L Final     AST   Date Value Ref Range Status   01/26/2023 17 10 - 40 U/L Final     ALT   Date Value Ref Range Status   01/26/2023 21 10 - 44 U/L Final     Anion Gap   Date Value Ref Range Status   01/26/2023 12 8 - 16 mmol/L Final     eGFR if    Date Value Ref Range Status   07/14/2022 43.1 (A) >60 mL/min/1.73 m^2 Final     eGFR if non    Date Value Ref Range Status   07/14/2022 37.3 (A) >60 mL/min/1.73 m^2 Final     Comment:     Calculation used to obtain the estimated glomerular filtration  rate (eGFR) is the CKD-EPI equation.          Bone Density Spine And Hip  Narrative: EXAMINATION:  DEXA BONE DENSITY SPINE HIP    CLINICAL HISTORY:  bone pain; Malignant neoplasm of prostate    TECHNIQUE:  DXA scanning was performed over the left hip and lumbar spine.  Review of the images confirms satisfactory positioning and technique.    COMPARISON:  None    FINDINGS:  The L1 to L4 vertebral bone mineral density is equal to 0.718 g/cm squared with a T score of -3.4.    The left femoral neck bone mineral density is equal to 0.568 g/cm squared with a T score of -2.7.    There is a 12% risk of a major osteoporotic fracture and a 5.8% risk of hip fracture in the next 10 years (FRAX).  Impression: Osteoporosis    Consider FDA approved  medical therapies in postmenopausal women and men aged 50 years and older, based on the following:    *A hip or vertebral (clinical or morphometric) fracture  *T score less than or equal to -2.5 at the femoral neck or spine after appropriate evaluation to exclude secondary causes.  *Low bone mass -- also known as osteopenia (T score between -1.0 and -2.5 at the femoral neck or spine) and a 10 year probability of hip fracture greater than or equal to 3% or a 10 year probability of major osteoporosis-related fracture greater than or equal to 20% based on the [place]-adapted WHO algorithm.  *Clinicians judgment and/or patient preference may indicate treatment for people with 10 year fracture probabilities is above or below these levels.    Electronically signed by: Priya Ramon MD  Date:    02/06/2023  Time:    17:46  NM PET CT F 18 PYL PSMA, Midthigh to Vertex  Narrative: EXAMINATION:  NM PET CT F 18 PYL PSMA, MIDTHIGH TO VERTEX    CLINICAL HISTORY:  prostate cancer high risk judd 9; Malignant neoplasm of prostate    TECHNIQUE:  9.3 mCi of Piflufolastat F18 was administered intravenously in the right antecubital vein.  PET images were acquired from the skull vertex to the mid thigh. Transmission images were acquired to correct for attenuation using a whole body low-dose CT scan without contrast with the arms positioned above the head.    COMPARISON:  None.    FINDINGS:  Head and neck: There is physiologic distribution radiotracer throughout the salivary glands.  There is no avid cervical lymphadenopathy.    Chest: There is no avid mediastinal or hilar lymphadenopathy.  There is no avid pulmonary nodule or mass.  There is no pleural effusion.    Abdomen and pelvis: There is physiologic radiotracer in the liver, spleen, and collecting system.  There is no avid pelvic or retroperitoneal lymphadenopathy.    Musculoskeletal: There is no avid lytic or blastic osseous lesion.  Impression: No evidence of PET avid metastatic  disease on this exam.    Electronically signed by: eSrgey Salcedo MD  Date:    02/06/2023  Time:    14:09       ECOG SCORE    1 - Restricted in strenuous activity-ambulatory and able to carry out work of a light nature              Assessment/Plan:       1-Prostate cancer T2c very high high risk:  -I reviewed independently the patient laboratory,radiology and pathology findings.  He has been responding well to his treatment. His PSA has been trending down to 0.33.    - His  undergo a PET PSMA for  staging purposes showed no signs of distant metastatic disease.  - The genetic testing in view of his personal history of prostate cancer and his extensive family history of prostate cancer came back negative    According to the NCCN guidelines and in view of his very high risk category and since his workup showing he has locally regional nonmetastatic disease the treatment usually include local treatment with radiation +ADT plus Abiraterone for 2 years for a patient with a good performance status and a life expectancy > 10 years.    If the patient is frail and has a life expectancy < 5 years he will benefit from observation vs ADT vs RT. These options were discussed with the patient.  He was offered in case he is concerned about side effects of the ADT to proceed with radiation therapy he declined.  Since his PSA is trending down he might benefit from continuing the same management until it becomes undetectable.  At that time he will receive a treatment holiday.  The patient was agreeable with my current recommendations..      The side effects of ADT   -ADT is associated with a wide range of side effects that can significantly impair quality of life.  Important and/or frequent side effects include: Loss of lean body mass, increased body fat, and decreased muscle strength, sexual dysfunction, loss of bone mineral density, which can result in bone fracture due to osteoporosis, vasomotor instability, which is manifested by hot  flashes, gynecomastia, decreased body hair, and smaller penile and/or testicular size, fatigue, behavioral and neurologic effects, cardiovascular and metabolic abnormalities.  -Dietary calcium intake (food and supplements) of 1000 to 1200 mg daily, supplemental vitamin D 800 to 1000 international units daily, and lifestyle modifications (weight-bearing exercise, decreased alcohol consumption, smoking discontinuation) are indicated for all males beginning ADT.    -he will be scheduled to receive his Lupron at a dose of 45 mg subcu in mid March.  - The patient will be seen back again in mid September before receiving his Lupron injection.  He will also have blood workup drawn for CBC CMP PSA and testosterone level.      2- Lung nodules:  -no uptake seen on his PET scan.  Most likely of a benign etiology.      3- Osteoporosis:  -his bone density confirmed the diagnosis of osteoporosis.  The patient will require to be treated for this condition that will worsen as he is on ADT.  He will be started on Prolia q.6 months.  The side effects of this treatment were discussed at length.  The patient was agreeable to proceed with this treatment as long it is not very expensive.  In case he is not able to afford it he will be switched to Fosamax.  He will receive her his 1st dose of Prolia at the same time of his Lupron injection.  He was advised to take calcium and vitamin-D on a daily basis.    4- Back pain:  -status post a fall injury.  He is currently having bilateral peripheral neuropathy.  -he has been seen and the currently being managed by his primary care physician    5- CAD/AFIB  -stable on medical management.    6- Chronic kidney disease stage 3   -will continue to follow up with repeat BMP.    Advance Care Planning During this visit, I engaged the patient in the advance care planning process.  The patient and I reviewed the role for advance directives and their purpose in directing future healthcare if the patients  unable to speak for him/herself.  At this point in time, the patient does have full decision-making capacity.  We discussed different extreme health states that he could experience, and reviewed what kind of medical care he would want in those situations.  The patient communicated that if he were comatose and had little chance of a meaningful recovery, he would not want machines/life-sustaining treatments used. .  The patient has not completed a living will to reflect these preferences.  The patient  has not already designated a healthcare power of  to make decisions on his behalf.  He will bring the paperwork before his next visit to have it on file.  I spent a total of 25 minutes engaging the patient in this advance care planning discussion.             Med Onc Chart Routing      Follow up with physician Other. in mid September 2023 with repeat CBC CMP PSA and testosterone. He needs to be scheduled to recieve at that time LUPRON AND PROLIA. He will need to be scheduled to have his LUPRON and PROlIA in MID MARCH.   Follow up with [facility]    Infusion scheduling note    Injection scheduling note    Labs    Imaging    Pharmacy appointment    Other referrals         Plan was discussed with the patient at length, and he verbalized understanding. Dilip was given an opportunity to ask questions that were answered to his satisfaction, and he was advised to call in the interval if any problems or questions arise.  Signed:  Adelina Levi MD   Hematology and Oncology  [facility] - HEMATOLOGY ONCOLOGY  [facility], [place]

## 2023-02-09 ENCOUNTER — OFFICE VISIT (OUTPATIENT)
Dept: HEMATOLOGY/ONCOLOGY | Facility: CLINIC | Age: 83
End: 2023-02-09
Payer: MEDICARE

## 2023-02-09 VITALS
SYSTOLIC BLOOD PRESSURE: 127 MMHG | BODY MASS INDEX: 25.68 KG/M2 | HEIGHT: 72 IN | RESPIRATION RATE: 16 BRPM | HEART RATE: 91 BPM | DIASTOLIC BLOOD PRESSURE: 81 MMHG | TEMPERATURE: 98 F | OXYGEN SATURATION: 98 % | WEIGHT: 189.63 LBS

## 2023-02-09 DIAGNOSIS — C61 PROSTATE CANCER: Primary | ICD-10-CM

## 2023-02-09 DIAGNOSIS — D68.51 FACTOR 5 LEIDEN MUTATION, HETEROZYGOUS: ICD-10-CM

## 2023-02-09 DIAGNOSIS — N18.31 STAGE 3A CHRONIC KIDNEY DISEASE: ICD-10-CM

## 2023-02-09 DIAGNOSIS — M81.0 AGE-RELATED OSTEOPOROSIS WITHOUT CURRENT PATHOLOGICAL FRACTURE: ICD-10-CM

## 2023-02-09 PROCEDURE — 99999 PR PBB SHADOW E&M-EST. PATIENT-LVL IV: ICD-10-PCS | Mod: PBBFAC,,, | Performed by: INTERNAL MEDICINE

## 2023-02-09 PROCEDURE — 3288F PR FALLS RISK ASSESSMENT DOCUMENTED: ICD-10-PCS | Mod: CPTII,S$GLB,, | Performed by: INTERNAL MEDICINE

## 2023-02-09 PROCEDURE — 3079F DIAST BP 80-89 MM HG: CPT | Mod: CPTII,S$GLB,, | Performed by: INTERNAL MEDICINE

## 2023-02-09 PROCEDURE — 1126F PR PAIN SEVERITY QUANTIFIED, NO PAIN PRESENT: ICD-10-PCS | Mod: CPTII,S$GLB,, | Performed by: INTERNAL MEDICINE

## 2023-02-09 PROCEDURE — 1160F PR REVIEW ALL MEDS BY PRESCRIBER/CLIN PHARMACIST DOCUMENTED: ICD-10-PCS | Mod: CPTII,S$GLB,, | Performed by: INTERNAL MEDICINE

## 2023-02-09 PROCEDURE — 1101F PT FALLS ASSESS-DOCD LE1/YR: CPT | Mod: CPTII,S$GLB,, | Performed by: INTERNAL MEDICINE

## 2023-02-09 PROCEDURE — 99215 PR OFFICE/OUTPT VISIT, EST, LEVL V, 40-54 MIN: ICD-10-PCS | Mod: S$GLB,,, | Performed by: INTERNAL MEDICINE

## 2023-02-09 PROCEDURE — 3074F SYST BP LT 130 MM HG: CPT | Mod: CPTII,S$GLB,, | Performed by: INTERNAL MEDICINE

## 2023-02-09 PROCEDURE — 99499 UNLISTED E&M SERVICE: CPT | Mod: S$GLB,,, | Performed by: INTERNAL MEDICINE

## 2023-02-09 PROCEDURE — 1160F RVW MEDS BY RX/DR IN RCRD: CPT | Mod: CPTII,S$GLB,, | Performed by: INTERNAL MEDICINE

## 2023-02-09 PROCEDURE — 99215 OFFICE O/P EST HI 40 MIN: CPT | Mod: S$GLB,,, | Performed by: INTERNAL MEDICINE

## 2023-02-09 PROCEDURE — 3079F PR MOST RECENT DIASTOLIC BLOOD PRESSURE 80-89 MM HG: ICD-10-PCS | Mod: CPTII,S$GLB,, | Performed by: INTERNAL MEDICINE

## 2023-02-09 PROCEDURE — 1159F MED LIST DOCD IN RCRD: CPT | Mod: CPTII,S$GLB,, | Performed by: INTERNAL MEDICINE

## 2023-02-09 PROCEDURE — 1126F AMNT PAIN NOTED NONE PRSNT: CPT | Mod: CPTII,S$GLB,, | Performed by: INTERNAL MEDICINE

## 2023-02-09 PROCEDURE — 3288F FALL RISK ASSESSMENT DOCD: CPT | Mod: CPTII,S$GLB,, | Performed by: INTERNAL MEDICINE

## 2023-02-09 PROCEDURE — 3074F PR MOST RECENT SYSTOLIC BLOOD PRESSURE < 130 MM HG: ICD-10-PCS | Mod: CPTII,S$GLB,, | Performed by: INTERNAL MEDICINE

## 2023-02-09 PROCEDURE — 1159F PR MEDICATION LIST DOCUMENTED IN MEDICAL RECORD: ICD-10-PCS | Mod: CPTII,S$GLB,, | Performed by: INTERNAL MEDICINE

## 2023-02-09 PROCEDURE — 1101F PR PT FALLS ASSESS DOC 0-1 FALLS W/OUT INJ PAST YR: ICD-10-PCS | Mod: CPTII,S$GLB,, | Performed by: INTERNAL MEDICINE

## 2023-02-09 PROCEDURE — 99499 RISK ADDL DX/OHS AUDIT: ICD-10-PCS | Mod: S$GLB,,, | Performed by: INTERNAL MEDICINE

## 2023-02-09 PROCEDURE — 99999 PR PBB SHADOW E&M-EST. PATIENT-LVL IV: CPT | Mod: PBBFAC,,, | Performed by: INTERNAL MEDICINE

## 2023-02-17 DIAGNOSIS — Z80.42 FAMILY HISTORY OF PROSTATE CANCER: Primary | ICD-10-CM

## 2023-02-17 DIAGNOSIS — N18.31 STAGE 3A CHRONIC KIDNEY DISEASE: ICD-10-CM

## 2023-02-17 DIAGNOSIS — C61 PROSTATE CANCER: ICD-10-CM

## 2023-02-20 ENCOUNTER — TELEPHONE (OUTPATIENT)
Dept: HEMATOLOGY/ONCOLOGY | Facility: CLINIC | Age: 83
End: 2023-02-20
Payer: MEDICARE

## 2023-02-20 ENCOUNTER — PATIENT MESSAGE (OUTPATIENT)
Dept: INFUSION THERAPY | Facility: HOSPITAL | Age: 83
End: 2023-02-20
Payer: MEDICARE

## 2023-02-20 NOTE — TELEPHONE ENCOUNTER
Spoke with the patient regards to him not being scheduled with Dr. Levi for his Lupron injections. Informed the wife that I would have to look into it and give her a call back. Wife verbalized understanding.

## 2023-02-20 NOTE — TELEPHONE ENCOUNTER
Spoke with pt regarding his appt is at the cancer center for the lupron injections. Pt verbalized understanding.

## 2023-02-28 ENCOUNTER — CLINICAL SUPPORT (OUTPATIENT)
Dept: CARDIOLOGY | Facility: HOSPITAL | Age: 83
End: 2023-02-28
Attending: INTERNAL MEDICINE
Payer: MEDICARE

## 2023-02-28 ENCOUNTER — HOSPITAL ENCOUNTER (OUTPATIENT)
Dept: RADIOLOGY | Facility: HOSPITAL | Age: 83
Discharge: HOME | End: 2023-02-28
Attending: INTERNAL MEDICINE
Payer: MEDICARE

## 2023-02-28 VITALS — BODY MASS INDEX: 25.6 KG/M2 | HEIGHT: 72 IN | WEIGHT: 189 LBS

## 2023-02-28 DIAGNOSIS — R55 NEAR SYNCOPE: ICD-10-CM

## 2023-02-28 DIAGNOSIS — I25.118 CORONARY ARTERY DISEASE OF NATIVE ARTERY OF NATIVE HEART WITH STABLE ANGINA PECTORIS: Chronic | ICD-10-CM

## 2023-02-28 LAB
CV PHARM DOSE: 0.4 MG
CV STRESS BASE HR: 71 BPM
DIASTOLIC BLOOD PRESSURE: 88 MMHG
NUC REST EJECTION FRACTION: 82
OHS CV CPX 1 MINUTE RECOVERY HEART RATE: 86 BPM
OHS CV CPX 85 PERCENT MAX PREDICTED HEART RATE MALE: 118
OHS CV CPX MAX PREDICTED HEART RATE: 139
OHS CV CPX PATIENT IS FEMALE: 0
OHS CV CPX PATIENT IS MALE: 1
OHS CV CPX PEAK DIASTOLIC BLOOD PRESSURE: 88 MMHG
OHS CV CPX PEAK HEAR RATE: 94 BPM
OHS CV CPX PEAK RATE PRESSURE PRODUCT: NORMAL
OHS CV CPX PEAK SYSTOLIC BLOOD PRESSURE: 145 MMHG
OHS CV CPX PERCENT MAX PREDICTED HEART RATE ACHIEVED: 68
OHS CV CPX RATE PRESSURE PRODUCT PRESENTING: NORMAL
OHS CV PHARM TIME: 1343 MIN
SYSTOLIC BLOOD PRESSURE: 145 MMHG

## 2023-02-28 PROCEDURE — 78452 HT MUSCLE IMAGE SPECT MULT: CPT | Mod: 26,,, | Performed by: INTERNAL MEDICINE

## 2023-02-28 PROCEDURE — 93018 CV STRESS TEST I&R ONLY: CPT | Mod: ,,, | Performed by: INTERNAL MEDICINE

## 2023-02-28 PROCEDURE — 93018 PR CARDIAC STRESS TST,INTERP/REPT ONLY: ICD-10-PCS | Mod: ,,, | Performed by: INTERNAL MEDICINE

## 2023-02-28 PROCEDURE — 63600175 PHARM REV CODE 636 W HCPCS: Mod: PO | Performed by: INTERNAL MEDICINE

## 2023-02-28 PROCEDURE — 93016 CV STRESS TEST SUPVJ ONLY: CPT | Mod: ,,, | Performed by: INTERNAL MEDICINE

## 2023-02-28 PROCEDURE — A9502 TC99M TETROFOSMIN: HCPCS | Mod: PO

## 2023-02-28 PROCEDURE — 78452 HT MUSCLE IMAGE SPECT MULT: CPT | Mod: PO

## 2023-02-28 PROCEDURE — 78452 NUCLEAR STRESS - CARDIOLOGY INTERPRETED (CUPID ONLY): ICD-10-PCS | Mod: 26,,, | Performed by: INTERNAL MEDICINE

## 2023-02-28 PROCEDURE — 93017 CV STRESS TEST TRACING ONLY: CPT | Mod: PO

## 2023-02-28 PROCEDURE — 93016 NUCLEAR STRESS - CARDIOLOGY INTERPRETED (CUPID ONLY): ICD-10-PCS | Mod: ,,, | Performed by: INTERNAL MEDICINE

## 2023-02-28 RX ORDER — REGADENOSON 0.08 MG/ML
0.4 INJECTION, SOLUTION INTRAVENOUS
Status: COMPLETED | OUTPATIENT
Start: 2023-02-28 | End: 2023-02-28

## 2023-02-28 RX ADMIN — REGADENOSON 0.4 MG: 0.08 INJECTION, SOLUTION INTRAVENOUS at 01:02

## 2023-03-07 ENCOUNTER — TELEPHONE (OUTPATIENT)
Dept: CARDIOLOGY | Facility: CLINIC | Age: 83
End: 2023-03-07
Payer: MEDICARE

## 2023-03-08 ENCOUNTER — TELEPHONE (OUTPATIENT)
Dept: CARDIOLOGY | Facility: CLINIC | Age: 83
End: 2023-03-08
Payer: MEDICARE

## 2023-03-08 DIAGNOSIS — R94.39 ABNORMAL NUCLEAR STRESS TEST: Primary | ICD-10-CM

## 2023-03-08 RX ORDER — SODIUM CHLORIDE 0.9 % (FLUSH) 0.9 %
10 SYRINGE (ML) INJECTION
Status: DISCONTINUED | OUTPATIENT
Start: 2023-03-08 | End: 2023-07-27

## 2023-03-08 RX ORDER — SODIUM CHLORIDE 9 MG/ML
INJECTION, SOLUTION INTRAVENOUS ONCE
Status: CANCELLED | OUTPATIENT
Start: 2023-03-08 | End: 2023-03-08

## 2023-03-08 NOTE — TELEPHONE ENCOUNTER
Spoke with pt and and scheduled angiogram for 3/14 at 9 am. Gave pt verbal instructions and sent portal message. Pt VANESSA.

## 2023-03-08 NOTE — TELEPHONE ENCOUNTER
----- Message from Jose Walker MD sent at 3/7/2023  7:39 PM CST -----  ABNORMAL NUCLEAR STRESS WILL NEED ANGIOGRAM LATER, LEFT MESSAGE

## 2023-03-17 ENCOUNTER — INFUSION (OUTPATIENT)
Dept: INFUSION THERAPY | Facility: HOSPITAL | Age: 83
End: 2023-03-17
Attending: INTERNAL MEDICINE
Payer: MEDICARE

## 2023-03-17 VITALS
TEMPERATURE: 98 F | RESPIRATION RATE: 16 BRPM | HEART RATE: 74 BPM | DIASTOLIC BLOOD PRESSURE: 84 MMHG | SYSTOLIC BLOOD PRESSURE: 139 MMHG

## 2023-03-17 DIAGNOSIS — M81.0 AGE-RELATED OSTEOPOROSIS WITHOUT CURRENT PATHOLOGICAL FRACTURE: Primary | ICD-10-CM

## 2023-03-17 DIAGNOSIS — C61 PROSTATE CANCER: ICD-10-CM

## 2023-03-17 PROCEDURE — 96402 CHEMO HORMON ANTINEOPL SQ/IM: CPT | Mod: PN

## 2023-03-17 PROCEDURE — 63600175 PHARM REV CODE 636 W HCPCS: Mod: JZ,JG,PN | Performed by: INTERNAL MEDICINE

## 2023-03-17 PROCEDURE — 96372 THER/PROPH/DIAG INJ SC/IM: CPT | Mod: PN

## 2023-03-17 RX ADMIN — DENOSUMAB 60 MG: 60 INJECTION SUBCUTANEOUS at 10:03

## 2023-03-17 RX ADMIN — LEUPROLIDE ACETATE 45 MG: KIT at 10:03

## 2023-03-17 NOTE — PLAN OF CARE
Problem: Fatigue  Goal: Improved Activity Tolerance  Outcome: Ongoing, Progressing     Problem: Adult Inpatient Plan of Care  Goal: Plan of Care Review  Outcome: Met   Tolerated injections well today  Discharge instructions given and pt d/c to home per w/c  NAD

## 2023-04-05 ENCOUNTER — CLINICAL SUPPORT (OUTPATIENT)
Dept: CARDIOLOGY | Facility: HOSPITAL | Age: 83
End: 2023-04-05
Attending: INTERNAL MEDICINE
Payer: MEDICARE

## 2023-04-05 ENCOUNTER — OFFICE VISIT (OUTPATIENT)
Dept: CARDIOLOGY | Facility: CLINIC | Age: 83
End: 2023-04-05
Payer: MEDICARE

## 2023-04-05 ENCOUNTER — LAB VISIT (OUTPATIENT)
Dept: LAB | Facility: HOSPITAL | Age: 83
End: 2023-04-05
Attending: FAMILY MEDICINE
Payer: MEDICARE

## 2023-04-05 VITALS
DIASTOLIC BLOOD PRESSURE: 75 MMHG | SYSTOLIC BLOOD PRESSURE: 120 MMHG | BODY MASS INDEX: 25.71 KG/M2 | WEIGHT: 189.81 LBS | HEIGHT: 72 IN | HEART RATE: 74 BPM

## 2023-04-05 DIAGNOSIS — R94.30 ELEVATED LEFT VENTRICULAR END-DIASTOLIC PRESSURE (LVEDP): Primary | ICD-10-CM

## 2023-04-05 DIAGNOSIS — R79.89 HIGH SERUM FERRITIN: ICD-10-CM

## 2023-04-05 DIAGNOSIS — R20.0 NUMBNESS AND TINGLING: ICD-10-CM

## 2023-04-05 DIAGNOSIS — I48.0 PAF (PAROXYSMAL ATRIAL FIBRILLATION): Chronic | ICD-10-CM

## 2023-04-05 DIAGNOSIS — R60.9 SWELLING: ICD-10-CM

## 2023-04-05 DIAGNOSIS — I80.02 THROMBOPHLEBITIS OF SUPERFICIAL VEINS OF LEFT LOWER EXTREMITY: ICD-10-CM

## 2023-04-05 DIAGNOSIS — R20.2 NUMBNESS AND TINGLING: ICD-10-CM

## 2023-04-05 DIAGNOSIS — M79.10 MUSCLE PAIN: ICD-10-CM

## 2023-04-05 DIAGNOSIS — N18.31 STAGE 3A CHRONIC KIDNEY DISEASE: Chronic | ICD-10-CM

## 2023-04-05 DIAGNOSIS — Z79.02 LONG TERM (CURRENT) USE OF ANTITHROMBOTICS/ANTIPLATELETS: ICD-10-CM

## 2023-04-05 DIAGNOSIS — M79.602 PAIN OF LEFT UPPER EXTREMITY: ICD-10-CM

## 2023-04-05 DIAGNOSIS — I25.10 MILD CAD: Chronic | ICD-10-CM

## 2023-04-05 DIAGNOSIS — M79.602 PAIN OF LEFT UPPER EXTREMITY: Primary | ICD-10-CM

## 2023-04-05 DIAGNOSIS — M1A.09X0 IDIOPATHIC CHRONIC GOUT OF MULTIPLE SITES WITHOUT TOPHUS: ICD-10-CM

## 2023-04-05 DIAGNOSIS — R73.09 ABNORMAL GLUCOSE: ICD-10-CM

## 2023-04-05 DIAGNOSIS — I25.118 ATHEROSCLEROTIC HEART DISEASE OF NATIVE CORONARY ARTERY WITH OTHER FORMS OF ANGINA PECTORIS: ICD-10-CM

## 2023-04-05 LAB
ALBUMIN SERPL BCP-MCNC: 4.1 G/DL (ref 3.5–5.2)
ALP SERPL-CCNC: 84 U/L (ref 55–135)
ALT SERPL W/O P-5'-P-CCNC: 15 U/L (ref 10–44)
ANION GAP SERPL CALC-SCNC: 13 MMOL/L (ref 8–16)
AST SERPL-CCNC: 19 U/L (ref 10–40)
BASOPHILS # BLD AUTO: 0.02 K/UL (ref 0–0.2)
BASOPHILS NFR BLD: 0.4 % (ref 0–1.9)
BILIRUB SERPL-MCNC: 0.7 MG/DL (ref 0.1–1)
BUN SERPL-MCNC: 13 MG/DL (ref 8–23)
CALCIUM SERPL-MCNC: 8.3 MG/DL (ref 8.7–10.5)
CHLORIDE SERPL-SCNC: 106 MMOL/L (ref 95–110)
CHOLEST SERPL-MCNC: 157 MG/DL (ref 120–199)
CHOLEST/HDLC SERPL: 3.3 {RATIO} (ref 2–5)
CK SERPL-CCNC: 168 U/L (ref 20–200)
CO2 SERPL-SCNC: 22 MMOL/L (ref 23–29)
CREAT SERPL-MCNC: 1.2 MG/DL (ref 0.5–1.4)
DIFFERENTIAL METHOD: ABNORMAL
EOSINOPHIL # BLD AUTO: 0.3 K/UL (ref 0–0.5)
EOSINOPHIL NFR BLD: 5.2 % (ref 0–8)
ERYTHROCYTE [DISTWIDTH] IN BLOOD BY AUTOMATED COUNT: 15 % (ref 11.5–14.5)
EST. GFR  (NO RACE VARIABLE): >60 ML/MIN/1.73 M^2
ESTIMATED AVG GLUCOSE: 117 MG/DL (ref 68–131)
FERRITIN SERPL-MCNC: 260 NG/ML (ref 20–300)
FOLATE SERPL-MCNC: 6.2 NG/ML (ref 4–24)
GLUCOSE SERPL-MCNC: 97 MG/DL (ref 70–110)
HBA1C MFR BLD: 5.7 % (ref 4–5.6)
HCT VFR BLD AUTO: 40.2 % (ref 40–54)
HDLC SERPL-MCNC: 47 MG/DL (ref 40–75)
HDLC SERPL: 29.9 % (ref 20–50)
HGB BLD-MCNC: 13 G/DL (ref 14–18)
IMM GRANULOCYTES # BLD AUTO: 0.01 K/UL (ref 0–0.04)
IMM GRANULOCYTES NFR BLD AUTO: 0.2 % (ref 0–0.5)
IRON SERPL-MCNC: 77 UG/DL (ref 45–160)
LDLC SERPL CALC-MCNC: 89.4 MG/DL (ref 63–159)
LYMPHOCYTES # BLD AUTO: 1.5 K/UL (ref 1–4.8)
LYMPHOCYTES NFR BLD: 26 % (ref 18–48)
MCH RBC QN AUTO: 30.7 PG (ref 27–31)
MCHC RBC AUTO-ENTMCNC: 32.3 G/DL (ref 32–36)
MCV RBC AUTO: 95 FL (ref 82–98)
MONOCYTES # BLD AUTO: 0.5 K/UL (ref 0.3–1)
MONOCYTES NFR BLD: 9.4 % (ref 4–15)
NEUTROPHILS # BLD AUTO: 3.3 K/UL (ref 1.8–7.7)
NEUTROPHILS NFR BLD: 58.8 % (ref 38–73)
NONHDLC SERPL-MCNC: 110 MG/DL
NRBC BLD-RTO: 0 /100 WBC
PLATELET # BLD AUTO: 229 K/UL (ref 150–450)
PMV BLD AUTO: 10.1 FL (ref 9.2–12.9)
POTASSIUM SERPL-SCNC: 4.3 MMOL/L (ref 3.5–5.1)
PROT SERPL-MCNC: 7.2 G/DL (ref 6–8.4)
RBC # BLD AUTO: 4.24 M/UL (ref 4.6–6.2)
SATURATED IRON: 27 % (ref 20–50)
SODIUM SERPL-SCNC: 141 MMOL/L (ref 136–145)
TOTAL IRON BINDING CAPACITY: 286 UG/DL (ref 250–450)
TRANSFERRIN SERPL-MCNC: 193 MG/DL (ref 200–375)
TRIGL SERPL-MCNC: 103 MG/DL (ref 30–150)
URATE SERPL-MCNC: 6.4 MG/DL (ref 3.4–7)
VIT B12 SERPL-MCNC: 198 PG/ML (ref 210–950)
WBC # BLD AUTO: 5.61 K/UL (ref 3.9–12.7)

## 2023-04-05 PROCEDURE — 1159F PR MEDICATION LIST DOCUMENTED IN MEDICAL RECORD: ICD-10-PCS | Mod: CPTII,S$GLB,, | Performed by: INTERNAL MEDICINE

## 2023-04-05 PROCEDURE — 85025 COMPLETE CBC W/AUTO DIFF WBC: CPT | Performed by: FAMILY MEDICINE

## 2023-04-05 PROCEDURE — 1126F PR PAIN SEVERITY QUANTIFIED, NO PAIN PRESENT: ICD-10-PCS | Mod: CPTII,S$GLB,, | Performed by: INTERNAL MEDICINE

## 2023-04-05 PROCEDURE — 99214 PR OFFICE/OUTPT VISIT, EST, LEVL IV, 30-39 MIN: ICD-10-PCS | Mod: S$GLB,,, | Performed by: INTERNAL MEDICINE

## 2023-04-05 PROCEDURE — 83036 HEMOGLOBIN GLYCOSYLATED A1C: CPT | Performed by: FAMILY MEDICINE

## 2023-04-05 PROCEDURE — 93971 EXTREMITY STUDY: CPT | Mod: 26,LT,, | Performed by: INTERNAL MEDICINE

## 2023-04-05 PROCEDURE — 99214 OFFICE O/P EST MOD 30 MIN: CPT | Mod: S$GLB,,, | Performed by: INTERNAL MEDICINE

## 2023-04-05 PROCEDURE — 1101F PT FALLS ASSESS-DOCD LE1/YR: CPT | Mod: CPTII,S$GLB,, | Performed by: INTERNAL MEDICINE

## 2023-04-05 PROCEDURE — 3074F SYST BP LT 130 MM HG: CPT | Mod: CPTII,S$GLB,, | Performed by: INTERNAL MEDICINE

## 2023-04-05 PROCEDURE — 3288F PR FALLS RISK ASSESSMENT DOCUMENTED: ICD-10-PCS | Mod: CPTII,S$GLB,, | Performed by: INTERNAL MEDICINE

## 2023-04-05 PROCEDURE — 80053 COMPREHEN METABOLIC PANEL: CPT | Performed by: FAMILY MEDICINE

## 2023-04-05 PROCEDURE — 93971 CV US DOPPLER VENOUS ARM LEFT (CUPID ONLY): ICD-10-PCS | Mod: 26,LT,, | Performed by: INTERNAL MEDICINE

## 2023-04-05 PROCEDURE — 1101F PR PT FALLS ASSESS DOC 0-1 FALLS W/OUT INJ PAST YR: ICD-10-PCS | Mod: CPTII,S$GLB,, | Performed by: INTERNAL MEDICINE

## 2023-04-05 PROCEDURE — 84550 ASSAY OF BLOOD/URIC ACID: CPT | Performed by: FAMILY MEDICINE

## 2023-04-05 PROCEDURE — 3078F DIAST BP <80 MM HG: CPT | Mod: CPTII,S$GLB,, | Performed by: INTERNAL MEDICINE

## 2023-04-05 PROCEDURE — 36415 COLL VENOUS BLD VENIPUNCTURE: CPT | Mod: PO | Performed by: FAMILY MEDICINE

## 2023-04-05 PROCEDURE — 80061 LIPID PANEL: CPT | Performed by: FAMILY MEDICINE

## 2023-04-05 PROCEDURE — 3074F PR MOST RECENT SYSTOLIC BLOOD PRESSURE < 130 MM HG: ICD-10-PCS | Mod: CPTII,S$GLB,, | Performed by: INTERNAL MEDICINE

## 2023-04-05 PROCEDURE — 82550 ASSAY OF CK (CPK): CPT | Performed by: FAMILY MEDICINE

## 2023-04-05 PROCEDURE — 1159F MED LIST DOCD IN RCRD: CPT | Mod: CPTII,S$GLB,, | Performed by: INTERNAL MEDICINE

## 2023-04-05 PROCEDURE — 3288F FALL RISK ASSESSMENT DOCD: CPT | Mod: CPTII,S$GLB,, | Performed by: INTERNAL MEDICINE

## 2023-04-05 PROCEDURE — 3078F PR MOST RECENT DIASTOLIC BLOOD PRESSURE < 80 MM HG: ICD-10-PCS | Mod: CPTII,S$GLB,, | Performed by: INTERNAL MEDICINE

## 2023-04-05 PROCEDURE — 1126F AMNT PAIN NOTED NONE PRSNT: CPT | Mod: CPTII,S$GLB,, | Performed by: INTERNAL MEDICINE

## 2023-04-05 PROCEDURE — 82728 ASSAY OF FERRITIN: CPT | Performed by: FAMILY MEDICINE

## 2023-04-05 PROCEDURE — 82607 VITAMIN B-12: CPT | Performed by: FAMILY MEDICINE

## 2023-04-05 PROCEDURE — 93971 EXTREMITY STUDY: CPT | Mod: PO,LT

## 2023-04-05 PROCEDURE — 82746 ASSAY OF FOLIC ACID SERUM: CPT | Performed by: FAMILY MEDICINE

## 2023-04-05 PROCEDURE — 84466 ASSAY OF TRANSFERRIN: CPT | Performed by: FAMILY MEDICINE

## 2023-04-05 NOTE — PROGRESS NOTES
Subjective:    Patient ID:  Dilip Lopez is a 82 y.o. male who presents for Hypertension, Coronary Artery Disease, and Arm Pain        Hypertension  Pertinent negatives include no blurred vision, chest pain, malaise/fatigue, orthopnea, palpitations, PND or shortness of breath.     S/P CATH, MI,LD CAD, LVEDP 28, SX OF PHLEBITIS AT IV SITE IN LEFT UPPER EXTREMITY ALSO,HAD INJECTION FOR OSTEOPOROSIS, NO CHEST PAIN NO TIA TYPE SYMPTOMS NO NEAR-SYNCOPE, SEE ROS  Past Medical History:   Diagnosis Date    Atrial fibrillation     CAD (coronary artery disease)     Hyperlipidemia     Hypertension     Prostate cancer 4/12/2022     Past Surgical History:   Procedure Laterality Date    ANGIOGRAM, CORONARY, WITH LEFT HEART CATHETERIZATION N/A 3/14/2023    Procedure: Left Heart Cath;  Surgeon: Jose Walker MD;  Location: [facility] CATH;  Service: Cardiology;  Laterality: N/A;    CARDIAC CATHETERIZATION      CARDIAC SURGERY      COLONOSCOPY      COLONOSCOPY N/A 5/14/2021    Procedure: COLONOSCOPY;  Surgeon: Leopoldo Mendoza MD;  Location: [facility] ENDO;  Service: Endoscopy;  Laterality: N/A;    CORONARY ANGIOGRAPHY N/A 3/14/2023    Procedure: ANGIOGRAM, CORONARY ARTERY;  Surgeon: Jose Walker MD;  Location: [facility] CATH;  Service: Cardiology;  Laterality: N/A;    CORONARY ANGIOPLASTY      CORONARY STENT PLACEMENT  01/29/2018    ESOPHAGOGASTRODUODENOSCOPY N/A 5/14/2021    Procedure: EGD (ESOPHAGOGASTRODUODENOSCOPY);  Surgeon: Leopoldo Mendoza MD;  Location: [facility] ENDO;  Service: Endoscopy;  Laterality: N/A;  with push enteroscopy    HIATAL HERNIA REPAIR      TRANSRECTAL BIOPSY OF PROSTATE WITH ULTRASOUND GUIDANCE N/A 7/20/2021    Procedure: BIOPSY, PROSTATE, RECTAL APPROACH, WITH US GUIDANCE;  Surgeon: SARAI Thomason MD;  Location: [facility] OR;  Service: Urology;  Laterality: N/A;     Family History   Problem Relation Age of Onset    Hypertension Mother     Diabetes Mother     Heart disease Father 62        MI    Parkinsonism Brother       Social History     Socioeconomic History    Marital status:    Tobacco Use    Smoking status: Never    Smokeless tobacco: Never   Substance and Sexual Activity    Alcohol use: No    Drug use: No       Review of patient's allergies indicates:   Allergen Reactions    Doxycycline hcl Rash    Bactrim [sulfamethoxazole-trimethoprim]     Cinnamon Other (See Comments)    Ciprofloxacin Hives    Sulfa (sulfonamide antibiotics)        Current Outpatient Medications:     allopurinoL (ZYLOPRIM) 300 MG tablet, TAKE 1 TABLET BY MOUTH ONCE DAILY (Patient taking differently: Take 300 mg by mouth every evening.), Disp: 90 tablet, Rfl: 3    aspirin (ECOTRIN) 325 MG EC tablet, Take 325 mg by mouth every evening. Pt instructed to hold for 5 days prior to procedure on 05/14/21, Disp: , Rfl:     clopidogreL (PLAVIX) 75 mg tablet, TAKE 1 TABLET BY MOUTH ONCE DAILY (Patient taking differently: Take 75 mg by mouth every evening.), Disp: 90 tablet, Rfl: 0    cyanocobalamin 1,000 mcg/mL injection, Inject 1 mL (1,000 mcg total) into the muscle every 30 days., Disp: 3 mL, Rfl: 3    gabapentin (NEURONTIN) 300 MG capsule, TAKE 1 CAPSULE BY MOUTH  TWICE DAILY (Patient taking differently: Take 300 mg by mouth 2 (two) times daily.), Disp: 180 capsule, Rfl: 3    hydrALAZINE (APRESOLINE) 25 MG tablet, Take 25 mg by mouth every evening., Disp: , Rfl:     hydroCHLOROthiazide (HYDRODIURIL) 12.5 MG Tab, TAKE 1 TABLET BY MOUTH ONCE DAILY, Disp: 90 tablet, Rfl: 3    hydrOXYzine HCL (ATARAX) 25 MG tablet, TAKE ONE TABLET THREE TIMES A DAY AS NEEDED FOR ITCHING, Disp: 30 tablet, Rfl: 5    [id number]-algal oil (METANX/FOLTANX RF) 3 mg-35 mg-2 mg -90.314 mg Cap, Take 1 tablet by mouth 2 (two) times a day., Disp: 180 capsule, Rfl: 3    losartan (COZAAR) 100 MG tablet, TAKE 1 TABLET BY MOUTH ONCE DAILY, Disp: 90 tablet, Rfl: 3    montelukast (SINGULAIR) 10 mg tablet, Take 1 tablet (10 mg total) by mouth every evening., Disp: 90 tablet, Rfl: 3    " needle, disp, 25 gauge 25 gauge x 1" Ndle, Use as directed, Disp: 20 each, Rfl: 0    pantoprazole (PROTONIX) 20 MG tablet, Take 20 mg by mouth once daily., Disp: , Rfl:     sotalol (BETAPACE) 40 MG tablet, Take 40 mg by mouth once daily., Disp: , Rfl:     syringe with needle (BD LUER-PADMINI SYRINGE) 3 mL 25 x 1 1/2 " Syrg, Use once a month to inject B12 injection., Disp: 3 each, Rfl: 3    syringe with needle (SYRINGE 3CC/25GX1") 3 mL 25 gauge x 1" Syrg, Use as directed, Disp: 20 Syringe, Rfl: 0    temazepam (RESTORIL) 30 mg capsule, TAKE 1 CAPSULE BY MOUTH NIGHTLY AS NEEDED FOR INSOMNIA., Disp: 30 capsule, Rfl: 2    apixaban (ELIQUIS) 5 mg Tab, Take 1 tablet (5 mg total) by mouth 2 (two) times daily., Disp: 60 tablet, Rfl: 1    Current Facility-Administered Medications:     sodium chloride 0.9% flush 10 mL, 10 mL, Intravenous, PRN, Jose Walker MD    Review of Systems   Constitutional: Negative for chills, decreased appetite, diaphoresis, fever and malaise/fatigue.   HENT:  Negative for congestion and nosebleeds.    Eyes:  Negative for blurred vision and visual disturbance.   Cardiovascular:  Positive for dyspnea on exertion (MILD). Negative for chest pain, claudication, cyanosis, irregular heartbeat, leg swelling, near-syncope, orthopnea, palpitations, paroxysmal nocturnal dyspnea and syncope.   Respiratory:  Negative for cough, hemoptysis and shortness of breath.    Hematologic/Lymphatic: Negative for adenopathy. Does not bruise/bleed easily.   Skin:  Negative for rash.   Musculoskeletal:  Positive for arthritis. Negative for falls.        L ARM AND HAND PAIN, MILD ERYTHEMA   Gastrointestinal:  Negative for abdominal pain, dysphagia, jaundice, melena and nausea.   Genitourinary:  Negative for dysuria and flank pain.   Neurological:  Negative for brief paralysis, focal weakness, light-headedness, loss of balance and paresthesias.   Psychiatric/Behavioral:  Negative for altered mental status and depression.  "   Allergic/Immunologic: Negative for hives and persistent infections.      Objective:      Vitals:    04/05/23 1041   BP: 120/75   Pulse: 74   Weight: 86.1 kg (189 lb 13.1 oz)   Height: 6' (1.829 m)   PainSc: 0-No pain     Body mass index is 25.74 kg/m².    Physical Exam  Constitutional:       Appearance: Normal appearance.   HENT:      Head: Normocephalic and atraumatic.   Eyes:      Extraocular Movements: Extraocular movements intact.      Conjunctiva/sclera: Conjunctivae normal.   Neck:      Vascular: No carotid bruit or JVD.   Cardiovascular:      Rate and Rhythm: Normal rate and regular rhythm. No extrasystoles are present.     Pulses: Normal pulses.           Carotid pulses are 2+ on the right side and 2+ on the left side.       Radial pulses are 2+ on the right side and 2+ on the left side.        Posterior tibial pulses are 2+ on the right side and 2+ on the left side.      Heart sounds:     No friction rub. No gallop.   Pulmonary:      Effort: Pulmonary effort is normal.      Breath sounds: Normal breath sounds and air entry.   Abdominal:      Palpations: Abdomen is soft.      Tenderness: There is no abdominal tenderness.   Musculoskeletal:         General: Tenderness (JENNA , WITH MILD ERYTHEMA) present.      Left shoulder: No tenderness.      Left forearm: Edema and tenderness present.        Arms:       Cervical back: Neck supple.      Right lower leg: No edema.      Left lower leg: No edema.      Comments: USES A CANE   Skin:     General: Skin is warm and dry.      Capillary Refill: Capillary refill takes less than 2 seconds.   Neurological:      General: No focal deficit present.      Mental Status: He is alert and oriented to person, place, and time.   Psychiatric:         Mood and Affect: Mood normal.         Speech: Speech normal.         Behavior: Behavior normal.               ..    Chemistry        Component Value Date/Time     03/14/2023 0729    K 4.0 03/14/2023 0729     03/14/2023  0729    CO2 24 03/14/2023 0729    BUN 20 03/14/2023 0729    CREATININE 1.38 03/14/2023 0729    GLU 99 03/14/2023 0729        Component Value Date/Time    CALCIUM 9.4 03/14/2023 0729    ALKPHOS 73 03/14/2023 0729    AST 29 03/14/2023 0729    ALT 24 03/14/2023 0729    BILITOT 0.6 03/14/2023 0729    ESTGFRAFRICA 43.1 (A) 07/14/2022 1418    EGFRNONAA 37.3 (A) 07/14/2022 1418            ..  Lab Results   Component Value Date    CHOL 145 11/01/2022    CHOL 142 10/05/2021    CHOL 99 (L) 08/04/2020     Lab Results   Component Value Date    HDL 40 11/01/2022    HDL 40 10/05/2021    HDL 41 08/04/2020     Lab Results   Component Value Date    LDLCALC 86.6 11/01/2022    LDLCALC 82.4 10/05/2021    LDLCALC 44.8 (L) 08/04/2020     Lab Results   Component Value Date    TRIG 92 11/01/2022    TRIG 98 10/05/2021    TRIG 66 08/04/2020     Lab Results   Component Value Date    CHOLHDL 27.6 11/01/2022    CHOLHDL 28.2 10/05/2021    CHOLHDL 41.4 08/04/2020     ..  Lab Results   Component Value Date    WBC 6.25 03/14/2023    HGB 12.7 (L) 03/14/2023    HCT 37.7 (L) 03/14/2023    MCV 91 03/14/2023     03/14/2023       Test(s) Reviewed  I have reviewed the following in detail:  [] Stress test   [x] Angiography   [] Echocardiogram   [x] Labs   [] Other:       Assessment:         ICD-10-CM ICD-9-CM   1. Elevated left ventricular end-diastolic pressure (LVEDP)  R94.30 794.30   2. Thrombophlebitis of superficial veins of left lower extremity  I80.02 451.0   3. Mild CAD  I25.10 414.00   4. Stage 3a chronic kidney disease  N18.31 585.3   5. Long term (current) use of antithrombotics/antiplatelets  Z79.02 V58.63   6. PAF (paroxysmal atrial fibrillation)  I48.0 427.31     Problem List Items Addressed This Visit          Cardiac/Vascular    PAF (paroxysmal atrial fibrillation)    Elevated left ventricular end-diastolic pressure (LVEDP) - Primary       Renal/    Stage 3a chronic kidney disease       Hematology    Long term (current) use of  antithrombotics/antiplatelets    Thrombophlebitis of superficial veins of left lower extremity    Relevant Orders    US Upper Extremity Veins Left     Other Visit Diagnoses       Mild CAD  (Chronic)                Plan:     PROBABLE SUPERFICIAL THROMBOPHLEBITIS, ADD ELIQUIS, SPECIALLY IS VIEW OF HISTORY OF FACTOR 5 LEIDEN DEFICIENCY, HOLD ASA, PLAVIX, VENOUS US OF THE UPPER EXTREMITY USED WARM COMPRESSORS, NO ANGINA NO OVERT HEART FAILURE NO TIA TYPE SYMPTOMS NO NEAR-SYNCOPE, DISCUSSED PLAN AT LENGTH WITH THE PATIENT AND HIS WIFE RETURN TO CLINIC IN FEW WEEKS WILL UPDATE WITH VENOUS ULTRASOUND RESULTS, INCREASED CV RISK WITH CKD      Elevated left ventricular end-diastolic pressure (LVEDP)    Thrombophlebitis of superficial veins of left lower extremity  -     US Upper Extremity Veins Left; Future; Expected date: 04/05/2023    Mild CAD    Stage 3a chronic kidney disease    Long term (current) use of antithrombotics/antiplatelets    PAF (paroxysmal atrial fibrillation)  Comments:  H/O RFA AT [facility]    Other orders  -     apixaban (ELIQUIS) 5 mg Tab; Take 1 tablet (5 mg total) by mouth 2 (two) times daily.  Dispense: 60 tablet; Refill: 1    RTC Low level/low impact aerobic exercise 5x's/wk. Heart healthy diet and risk factor modification.    See labs and med orders.    Aerobic exercise 5x's/wk. Heart healthy diet and risk factor modification.    See labs and med orders.

## 2023-04-19 ENCOUNTER — OFFICE VISIT (OUTPATIENT)
Dept: FAMILY MEDICINE | Facility: CLINIC | Age: 83
End: 2023-04-19
Payer: MEDICARE

## 2023-04-19 VITALS
OXYGEN SATURATION: 96 % | HEART RATE: 94 BPM | SYSTOLIC BLOOD PRESSURE: 118 MMHG | DIASTOLIC BLOOD PRESSURE: 82 MMHG | WEIGHT: 190.25 LBS | HEIGHT: 72 IN | BODY MASS INDEX: 25.77 KG/M2

## 2023-04-19 DIAGNOSIS — M51.36 DEGENERATIVE DISC DISEASE, LUMBAR: ICD-10-CM

## 2023-04-19 DIAGNOSIS — E83.51 HYPOCALCEMIA: ICD-10-CM

## 2023-04-19 DIAGNOSIS — R73.03 PREDIABETES: ICD-10-CM

## 2023-04-19 DIAGNOSIS — I10 ESSENTIAL HYPERTENSION: ICD-10-CM

## 2023-04-19 DIAGNOSIS — E53.8 FOLATE DEFICIENCY: ICD-10-CM

## 2023-04-19 DIAGNOSIS — E53.8 B12 DEFICIENCY: Primary | ICD-10-CM

## 2023-04-19 PROCEDURE — 1101F PR PT FALLS ASSESS DOC 0-1 FALLS W/OUT INJ PAST YR: ICD-10-PCS | Mod: CPTII,S$GLB,, | Performed by: FAMILY MEDICINE

## 2023-04-19 PROCEDURE — 99214 OFFICE O/P EST MOD 30 MIN: CPT | Mod: S$GLB,,, | Performed by: FAMILY MEDICINE

## 2023-04-19 PROCEDURE — 3074F PR MOST RECENT SYSTOLIC BLOOD PRESSURE < 130 MM HG: ICD-10-PCS | Mod: CPTII,S$GLB,, | Performed by: FAMILY MEDICINE

## 2023-04-19 PROCEDURE — 3079F PR MOST RECENT DIASTOLIC BLOOD PRESSURE 80-89 MM HG: ICD-10-PCS | Mod: CPTII,S$GLB,, | Performed by: FAMILY MEDICINE

## 2023-04-19 PROCEDURE — 1159F MED LIST DOCD IN RCRD: CPT | Mod: CPTII,S$GLB,, | Performed by: FAMILY MEDICINE

## 2023-04-19 PROCEDURE — 99999 PR PBB SHADOW E&M-EST. PATIENT-LVL IV: ICD-10-PCS | Mod: PBBFAC,,, | Performed by: FAMILY MEDICINE

## 2023-04-19 PROCEDURE — 1101F PT FALLS ASSESS-DOCD LE1/YR: CPT | Mod: CPTII,S$GLB,, | Performed by: FAMILY MEDICINE

## 2023-04-19 PROCEDURE — 3079F DIAST BP 80-89 MM HG: CPT | Mod: CPTII,S$GLB,, | Performed by: FAMILY MEDICINE

## 2023-04-19 PROCEDURE — 1126F PR PAIN SEVERITY QUANTIFIED, NO PAIN PRESENT: ICD-10-PCS | Mod: CPTII,S$GLB,, | Performed by: FAMILY MEDICINE

## 2023-04-19 PROCEDURE — 3288F FALL RISK ASSESSMENT DOCD: CPT | Mod: CPTII,S$GLB,, | Performed by: FAMILY MEDICINE

## 2023-04-19 PROCEDURE — 3074F SYST BP LT 130 MM HG: CPT | Mod: CPTII,S$GLB,, | Performed by: FAMILY MEDICINE

## 2023-04-19 PROCEDURE — 1126F AMNT PAIN NOTED NONE PRSNT: CPT | Mod: CPTII,S$GLB,, | Performed by: FAMILY MEDICINE

## 2023-04-19 PROCEDURE — 99999 PR PBB SHADOW E&M-EST. PATIENT-LVL IV: CPT | Mod: PBBFAC,,, | Performed by: FAMILY MEDICINE

## 2023-04-19 PROCEDURE — 99214 PR OFFICE/OUTPT VISIT, EST, LEVL IV, 30-39 MIN: ICD-10-PCS | Mod: S$GLB,,, | Performed by: FAMILY MEDICINE

## 2023-04-19 PROCEDURE — 1159F PR MEDICATION LIST DOCUMENTED IN MEDICAL RECORD: ICD-10-PCS | Mod: CPTII,S$GLB,, | Performed by: FAMILY MEDICINE

## 2023-04-19 PROCEDURE — 3288F PR FALLS RISK ASSESSMENT DOCUMENTED: ICD-10-PCS | Mod: CPTII,S$GLB,, | Performed by: FAMILY MEDICINE

## 2023-04-19 RX ORDER — FOLIC ACID 1 MG/1
1 TABLET ORAL EVERY OTHER DAY
Qty: 45 TABLET | Refills: 3 | Status: SHIPPED | OUTPATIENT
Start: 2023-04-19 | End: 2023-07-27 | Stop reason: SINTOL

## 2023-04-19 NOTE — PROGRESS NOTES
Assessment:       1. B12 deficiency    2. Folate deficiency    3. Hypocalcemia    4. Essential hypertension    5. Prediabetes    6. Degenerative disc disease, lumbar        Plan:       B12 deficiency:  Uncontrolled  -     Vitamin B12; Future; Expected date: 04/19/2023    Folate deficiency:  Uncontrolled  -     folic acid (FOLVITE) 1 MG tablet; Take 1 tablet (1 mg total) by mouth every other day.  Dispense: 45 tablet; Refill: 3    Hypocalcemia:  New problem workup needed  -     Comprehensive Metabolic Panel; Future; Expected date: 04/19/2023  -     CBC Auto Differential; Future; Expected date: 04/19/2023    Essential hypertension:  Stable     Prediabetes:  Worsening  -     Hemoglobin A1C; Future; Expected date: 04/19/2023    Degenerative disc disease, lumbar:  Worsening         There is a possibility from previous injury when he was in the service that the patient is having worsening back problems now.  Patient agreed with assessment and plan. Patient verbalized understanding.     Subjective:       Patient ID: Dilip Lopez is a 82 y.o. male.    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  The patient last x-ray showed presence of degenerative disc disease of the lumbar spine.    B12 deficiency: Upon review of the last blood work, the patient has B12 deficiency, folic acid deficiency, hypocalcemia.    Pre diabetes:  The last hemoglobin A1c went up, the patient stated that he has not been eating healthy and is eating a lot of sugar.    Hypertension:  The patient is currently taking hydrochlorothiazide, denies any side effects of the medication, taking losartan, the  patient did not bring a log of the blood pressure readings from home.      Past medical history, past social history was reviewed and discussed with the patient.    Review of Systems   Constitutional:  Negative for activity change, appetite change and chills.   HENT:  Negative for congestion and ear discharge.    Eyes:  Negative for discharge and itching.   Respiratory:  Negative for choking and chest tightness.    Cardiovascular:  Negative for chest pain and leg swelling.   Gastrointestinal:  Negative for abdominal distention, abdominal pain and constipation.   Endocrine: Negative for cold intolerance and heat intolerance.   Genitourinary:  Negative for dysuria and flank pain.   Musculoskeletal:  Positive for arthralgias and back pain.   Skin:  Negative for pallor and rash.   Allergic/Immunologic: Negative for environmental allergies and food allergies.   Neurological:  Negative for dizziness, facial asymmetry and headaches.   Hematological:  Negative for adenopathy. Does not bruise/bleed easily.   Psychiatric/Behavioral:  Negative for agitation, confusion, decreased concentration and sleep disturbance.      Objective:      Physical Exam  Vitals and nursing note reviewed.   Constitutional:       General: He is not in acute distress.     Appearance: Normal appearance. He is well-developed. He is not diaphoretic.      Comments: The patient has limited ambulation, he is using a cane for mobilization.   HENT:      Head: Normocephalic and atraumatic.      Right Ear: External ear normal.      Left Ear: External ear normal.      Nose: Nose normal.   Eyes:      General: No scleral icterus.        Left eye: No discharge.   Cardiovascular:      Rate and Rhythm: Normal rate and regular rhythm.      Heart sounds: Normal heart sounds.   Pulmonary:      Effort: Pulmonary effort is normal. No respiratory distress.      Breath sounds: Normal breath sounds. No wheezing.   Musculoskeletal:         General: Tenderness (Lower back)  present.      Cervical back: Normal range of motion and neck supple.      Comments: Decreased range of motion on the lumbar spine and presence of muscle spasms.   Skin:     General: Skin is warm and dry.      Coloration: Skin is not pale.      Findings: No erythema.   Neurological:      Mental Status: He is alert.      Cranial Nerves: No cranial nerve deficit.      Motor: No abnormal muscle tone.   Psychiatric:         Behavior: Behavior normal.         Thought Content: Thought content normal.         Judgment: Judgment normal.

## 2023-04-28 ENCOUNTER — PATIENT MESSAGE (OUTPATIENT)
Dept: FAMILY MEDICINE | Facility: CLINIC | Age: 83
End: 2023-04-28
Payer: MEDICARE

## 2023-05-03 RX ORDER — APIXABAN 5 MG/1
TABLET, FILM COATED ORAL
Qty: 60 TABLET | Refills: 1 | Status: SHIPPED | OUTPATIENT
Start: 2023-05-03 | End: 2023-05-10 | Stop reason: SDUPTHER

## 2023-05-04 ENCOUNTER — PATIENT MESSAGE (OUTPATIENT)
Dept: FAMILY MEDICINE | Facility: CLINIC | Age: 83
End: 2023-05-04
Payer: MEDICARE

## 2023-05-10 ENCOUNTER — OFFICE VISIT (OUTPATIENT)
Dept: CARDIOLOGY | Facility: CLINIC | Age: 83
End: 2023-05-10
Payer: MEDICARE

## 2023-05-10 VITALS
DIASTOLIC BLOOD PRESSURE: 76 MMHG | WEIGHT: 193.81 LBS | HEIGHT: 72 IN | BODY MASS INDEX: 26.25 KG/M2 | SYSTOLIC BLOOD PRESSURE: 127 MMHG | HEART RATE: 67 BPM

## 2023-05-10 DIAGNOSIS — N18.31 STAGE 3A CHRONIC KIDNEY DISEASE: Chronic | ICD-10-CM

## 2023-05-10 DIAGNOSIS — I73.9 CLAUDICATION OF BOTH LOWER EXTREMITIES: Chronic | ICD-10-CM

## 2023-05-10 DIAGNOSIS — I82.612 CEPHALIC VEIN THROMBOSIS, LEFT: Primary | ICD-10-CM

## 2023-05-10 DIAGNOSIS — Z79.01 LONG TERM (CURRENT) USE OF ANTICOAGULANTS: ICD-10-CM

## 2023-05-10 DIAGNOSIS — R94.30 ELEVATED LEFT VENTRICULAR END-DIASTOLIC PRESSURE (LVEDP): ICD-10-CM

## 2023-05-10 DIAGNOSIS — I25.10 CORONARY ARTERY DISEASE INVOLVING NATIVE CORONARY ARTERY OF NATIVE HEART WITHOUT ANGINA PECTORIS: Chronic | ICD-10-CM

## 2023-05-10 PROCEDURE — 1126F PR PAIN SEVERITY QUANTIFIED, NO PAIN PRESENT: ICD-10-PCS | Mod: CPTII,S$GLB,, | Performed by: INTERNAL MEDICINE

## 2023-05-10 PROCEDURE — 3074F PR MOST RECENT SYSTOLIC BLOOD PRESSURE < 130 MM HG: ICD-10-PCS | Mod: CPTII,S$GLB,, | Performed by: INTERNAL MEDICINE

## 2023-05-10 PROCEDURE — 3074F SYST BP LT 130 MM HG: CPT | Mod: CPTII,S$GLB,, | Performed by: INTERNAL MEDICINE

## 2023-05-10 PROCEDURE — 3288F PR FALLS RISK ASSESSMENT DOCUMENTED: ICD-10-PCS | Mod: CPTII,S$GLB,, | Performed by: INTERNAL MEDICINE

## 2023-05-10 PROCEDURE — 3078F DIAST BP <80 MM HG: CPT | Mod: CPTII,S$GLB,, | Performed by: INTERNAL MEDICINE

## 2023-05-10 PROCEDURE — 1159F MED LIST DOCD IN RCRD: CPT | Mod: CPTII,S$GLB,, | Performed by: INTERNAL MEDICINE

## 2023-05-10 PROCEDURE — 3288F FALL RISK ASSESSMENT DOCD: CPT | Mod: CPTII,S$GLB,, | Performed by: INTERNAL MEDICINE

## 2023-05-10 PROCEDURE — 99214 PR OFFICE/OUTPT VISIT, EST, LEVL IV, 30-39 MIN: ICD-10-PCS | Mod: S$GLB,,, | Performed by: INTERNAL MEDICINE

## 2023-05-10 PROCEDURE — 1101F PT FALLS ASSESS-DOCD LE1/YR: CPT | Mod: CPTII,S$GLB,, | Performed by: INTERNAL MEDICINE

## 2023-05-10 PROCEDURE — 1101F PR PT FALLS ASSESS DOC 0-1 FALLS W/OUT INJ PAST YR: ICD-10-PCS | Mod: CPTII,S$GLB,, | Performed by: INTERNAL MEDICINE

## 2023-05-10 PROCEDURE — 3078F PR MOST RECENT DIASTOLIC BLOOD PRESSURE < 80 MM HG: ICD-10-PCS | Mod: CPTII,S$GLB,, | Performed by: INTERNAL MEDICINE

## 2023-05-10 PROCEDURE — 1126F AMNT PAIN NOTED NONE PRSNT: CPT | Mod: CPTII,S$GLB,, | Performed by: INTERNAL MEDICINE

## 2023-05-10 PROCEDURE — 99214 OFFICE O/P EST MOD 30 MIN: CPT | Mod: S$GLB,,, | Performed by: INTERNAL MEDICINE

## 2023-05-10 PROCEDURE — 1159F PR MEDICATION LIST DOCUMENTED IN MEDICAL RECORD: ICD-10-PCS | Mod: CPTII,S$GLB,, | Performed by: INTERNAL MEDICINE

## 2023-05-10 NOTE — PROGRESS NOTES
Subjective:    Patient ID:  Dilip Lopez is a 82 y.o. male who presents for Hypertension, Heart Problem, and Coronary Artery Disease        Hypertension  Pertinent negatives include no blurred vision, chest pain, malaise/fatigue, orthopnea, palpitations, PND or shortness of breath.   SUPERFICIAL THROMBOSIS, ON VENOUS ULTRASOUND ELIQUIS, DOING OK, , ARM BETTER, NO CHEST PAIN NO SHORTNESS OF BREATH NO TIA TYPE SYMPTOMS NO NEAR-SYNCOPE SEE ROS    Past Medical History:   Diagnosis Date    Atrial fibrillation     CAD (coronary artery disease)     Hyperlipidemia     Hypertension     Prostate cancer 4/12/2022     Past Surgical History:   Procedure Laterality Date    ANGIOGRAM, CORONARY, WITH LEFT HEART CATHETERIZATION N/A 3/14/2023    Procedure: Left Heart Cath;  Surgeon: Jose Walker MD;  Location: [facility] CATH;  Service: Cardiology;  Laterality: N/A;    CARDIAC CATHETERIZATION      CARDIAC SURGERY      COLONOSCOPY      COLONOSCOPY N/A 5/14/2021    Procedure: COLONOSCOPY;  Surgeon: Leopoldo Mendoza MD;  Location: [facility] ENDO;  Service: Endoscopy;  Laterality: N/A;    CORONARY ANGIOGRAPHY N/A 3/14/2023    Procedure: ANGIOGRAM, CORONARY ARTERY;  Surgeon: Jose Walker MD;  Location: [facility] CATH;  Service: Cardiology;  Laterality: N/A;    CORONARY ANGIOPLASTY      CORONARY STENT PLACEMENT  01/29/2018    ESOPHAGOGASTRODUODENOSCOPY N/A 5/14/2021    Procedure: EGD (ESOPHAGOGASTRODUODENOSCOPY);  Surgeon: Leopoldo Mendoza MD;  Location: [facility] ENDO;  Service: Endoscopy;  Laterality: N/A;  with push enteroscopy    HIATAL HERNIA REPAIR      TRANSRECTAL BIOPSY OF PROSTATE WITH ULTRASOUND GUIDANCE N/A 7/20/2021    Procedure: BIOPSY, PROSTATE, RECTAL APPROACH, WITH US GUIDANCE;  Surgeon: SARAI Thomason MD;  Location: [facility] OR;  Service: Urology;  Laterality: N/A;     Family History   Problem Relation Age of Onset    Hypertension Mother     Diabetes Mother     Heart disease Father 62        MI    Parkinsonism Brother      Social  "History     Socioeconomic History    Marital status:    Tobacco Use    Smoking status: Never    Smokeless tobacco: Never   Substance and Sexual Activity    Alcohol use: No    Drug use: No       Review of patient's allergies indicates:   Allergen Reactions    Doxycycline hcl Rash    Bactrim [sulfamethoxazole-trimethoprim]     Cinnamon Other (See Comments)    Ciprofloxacin Hives    Sulfa (sulfonamide antibiotics)        Current Outpatient Medications:     allopurinoL (ZYLOPRIM) 300 MG tablet, TAKE 1 TABLET BY MOUTH ONCE DAILY (Patient taking differently: Take 300 mg by mouth every evening.), Disp: 90 tablet, Rfl: 3    cyanocobalamin 1,000 mcg/mL injection, Inject 1 mL (1,000 mcg total) into the muscle every 30 days., Disp: 3 mL, Rfl: 3    folic acid (FOLVITE) 1 MG tablet, Take 1 tablet (1 mg total) by mouth every other day., Disp: 45 tablet, Rfl: 3    gabapentin (NEURONTIN) 300 MG capsule, TAKE 1 CAPSULE BY MOUTH  TWICE DAILY (Patient taking differently: Take 300 mg by mouth 2 (two) times daily.), Disp: 180 capsule, Rfl: 3    hydrALAZINE (APRESOLINE) 25 MG tablet, Take 25 mg by mouth every evening., Disp: , Rfl:     hydroCHLOROthiazide (HYDRODIURIL) 12.5 MG Tab, TAKE 1 TABLET BY MOUTH ONCE DAILY, Disp: 90 tablet, Rfl: 3    hydrOXYzine HCL (ATARAX) 25 MG tablet, TAKE ONE TABLET THREE TIMES A DAY AS NEEDED FOR ITCHING, Disp: 30 tablet, Rfl: 5    nnkfjeeqo-[id number]-algal oil (METANX/FOLTANX RF) 3 mg-35 mg-2 mg -90.314 mg Cap, Take 1 tablet by mouth 2 (two) times a day., Disp: 180 capsule, Rfl: 3    losartan (COZAAR) 100 MG tablet, TAKE 1 TABLET BY MOUTH ONCE DAILY, Disp: 90 tablet, Rfl: 3    montelukast (SINGULAIR) 10 mg tablet, Take 1 tablet (10 mg total) by mouth every evening., Disp: 90 tablet, Rfl: 3    needle, disp, 25 gauge 25 gauge x 1" Ndle, Use as directed, Disp: 20 each, Rfl: 0    pantoprazole (PROTONIX) 20 MG tablet, Take 20 mg by mouth once daily., Disp: , Rfl:     sotalol (BETAPACE) 40 MG tablet, Take " "40 mg by mouth once daily., Disp: , Rfl:     syringe with needle (BD LUER-PADMINI SYRINGE) 3 mL 25 x 1 1/2 " Syrg, Use once a month to inject B12 injection., Disp: 3 each, Rfl: 3    syringe with needle (SYRINGE 3CC/25GX1") 3 mL 25 gauge x 1" Syrg, Use as directed, Disp: 20 Syringe, Rfl: 0    temazepam (RESTORIL) 30 mg capsule, TAKE 1 CAPSULE BY MOUTH NIGHTLY AS NEEDED FOR INSOMNIA., Disp: 30 capsule, Rfl: 2    apixaban (ELIQUIS) 5 mg Tab, TAKE ONE TABLET TWO TIMES A DAY * BLOOD THINNER *, Disp: 60 tablet, Rfl: 1    Current Facility-Administered Medications:     sodium chloride 0.9% flush 10 mL, 10 mL, Intravenous, PRN, Jose Walker MD    Review of Systems   Constitutional: Negative for chills, decreased appetite, diaphoresis, fever and malaise/fatigue.   HENT:  Negative for congestion and nosebleeds.    Eyes:  Negative for blurred vision and visual disturbance.   Cardiovascular:  Negative for chest pain, claudication, cyanosis, dyspnea on exertion (MILD), irregular heartbeat, leg swelling (OCC), near-syncope, orthopnea, palpitations, paroxysmal nocturnal dyspnea and syncope.   Respiratory:  Negative for cough, hemoptysis and shortness of breath.    Hematologic/Lymphatic: Negative for adenopathy. Does not bruise/bleed easily.   Skin:  Negative for rash.   Musculoskeletal:  Positive for joint pain. Negative for falls.        L ARM AND HAND PAIN, MILD ERYTHEMA   Gastrointestinal:  Negative for abdominal pain, dysphagia, jaundice, melena and nausea.   Genitourinary:  Negative for dysuria, flank pain and hematuria.   Neurological:  Positive for paresthesias (NEUROPATHY). Negative for brief paralysis, focal weakness, light-headedness and loss of balance.   Psychiatric/Behavioral:  Negative for altered mental status and depression.       Objective:      Vitals:    05/10/23 1143   BP: 127/76   Pulse: 67   Weight: 87.9 kg (193 lb 12.6 oz)   Height: 6' (1.829 m)   PainSc: 0-No pain     Body mass index is 26.28 " kg/m².    Physical Exam  Constitutional:       Appearance: Normal appearance.   HENT:      Head: Normocephalic and atraumatic.   Eyes:      Extraocular Movements: Extraocular movements intact.      Pupils: Pupils are equal, round, and reactive to light.   Neck:      Vascular: No carotid bruit or JVD.   Cardiovascular:      Rate and Rhythm: Normal rate and regular rhythm. No extrasystoles are present.     Pulses: Normal pulses.           Carotid pulses are 2+ on the right side and 2+ on the left side.       Radial pulses are 2+ on the right side and 2+ on the left side.        Posterior tibial pulses are 2+ on the right side and 2+ on the left side.      Heart sounds:     No friction rub. No gallop.   Pulmonary:      Effort: Pulmonary effort is normal.      Breath sounds: Normal breath sounds. No rales.   Abdominal:      Palpations: Abdomen is soft.      Tenderness: There is no abdominal tenderness.   Musculoskeletal:         General: No tenderness (L ARM OK).      Left shoulder: No tenderness.      Left forearm: Edema present.        Arms:       Cervical back: Neck supple.      Right lower leg: No edema.      Left lower leg: No edema.      Comments: USES A CANE   Skin:     General: Skin is warm and dry.      Capillary Refill: Capillary refill takes less than 2 seconds.   Neurological:      General: No focal deficit present.      Mental Status: He is alert and oriented to person, place, and time.   Psychiatric:         Mood and Affect: Mood normal.         Speech: Speech normal.         Behavior: Behavior normal.             ..    Chemistry        Component Value Date/Time     04/05/2023 1230    K 4.3 04/05/2023 1230     04/05/2023 1230    CO2 22 (L) 04/05/2023 1230    BUN 13 04/05/2023 1230    CREATININE 1.2 04/05/2023 1230    GLU 97 04/05/2023 1230        Component Value Date/Time    CALCIUM 8.3 (L) 04/05/2023 1230    ALKPHOS 84 04/05/2023 1230    AST 19 04/05/2023 1230    ALT 15 04/05/2023 1230     BILITOT 0.7 04/05/2023 1230    ESTGFRAFRICA 43.1 (A) 07/14/2022 1418    EGFRNONAA 37.3 (A) 07/14/2022 1418            ..  Lab Results   Component Value Date    CHOL 157 04/05/2023    CHOL 145 11/01/2022    CHOL 142 10/05/2021     Lab Results   Component Value Date    HDL 47 04/05/2023    HDL 40 11/01/2022    HDL 40 10/05/2021     Lab Results   Component Value Date    LDLCALC 89.4 04/05/2023    LDLCALC 86.6 11/01/2022    LDLCALC 82.4 10/05/2021     Lab Results   Component Value Date    TRIG 103 04/05/2023    TRIG 92 11/01/2022    TRIG 98 10/05/2021     Lab Results   Component Value Date    CHOLHDL 29.9 04/05/2023    CHOLHDL 27.6 11/01/2022    CHOLHDL 28.2 10/05/2021     ..  Lab Results   Component Value Date    WBC 5.61 04/05/2023    HGB 13.0 (L) 04/05/2023    HCT 40.2 04/05/2023    MCV 95 04/05/2023     04/05/2023       Test(s) Reviewed  I have reviewed the following in detail:  [] Stress test   [] Angiography   [] Echocardiogram   [] Labs   [] Other:       Assessment:         ICD-10-CM ICD-9-CM   1. Cephalic vein thrombosis, left  I82.612 453.81   2. Coronary artery disease involving native coronary artery of native heart without angina pectoris  I25.10 414.01   3. Claudication of both lower extremities  I73.9 443.9   4. Elevated left ventricular end-diastolic pressure (LVEDP)  R94.30 794.30   5. Stage 3a chronic kidney disease  N18.31 585.3   6. Long term (current) use of anticoagulants  Z79.01 V58.61     Problem List Items Addressed This Visit          Cardiac/Vascular    Coronary artery disease involving native coronary artery of native heart without angina pectoris    Claudication of both lower extremities    Elevated left ventricular end-diastolic pressure (LVEDP)       Renal/    Stage 3a chronic kidney disease       Hematology    Cephalic vein thrombosis, left - Primary    Relevant Orders    CV Ultrasound doppler venous arm left    Long term (current) use of anticoagulants        Plan:     CONTINUE  ELIQUIS, FOR A TOTAL OF 3 MONTHS REPEAT VENOUS DOPPLER IN 3 MONTHS FROM THE START, NO ANGINA NO OVERT HEART FAILURE WALKING EXERCISE PROGRAM, RETURN TO CLINIC IN 3 MONTHS      Cephalic vein thrombosis, left  -     CV Ultrasound doppler venous arm left; Future; Expected date: 08/10/2023    Coronary artery disease involving native coronary artery of native heart without angina pectoris    Claudication of both lower extremities  Comments:  MILD    Elevated left ventricular end-diastolic pressure (LVEDP)    Stage 3a chronic kidney disease    Long term (current) use of anticoagulants    Other orders  -     apixaban (ELIQUIS) 5 mg Tab; TAKE ONE TABLET TWO TIMES A DAY * BLOOD THINNER *  Dispense: 60 tablet; Refill: 1    RTC Low level/low impact aerobic exercise 5x's/wk. Heart healthy diet and risk factor modification.    See labs and med orders.    Aerobic exercise 5x's/wk. Heart healthy diet and risk factor modification.    See labs and med orders.

## 2023-05-31 DIAGNOSIS — G47.09 OTHER INSOMNIA: ICD-10-CM

## 2023-05-31 RX ORDER — TEMAZEPAM 30 MG/1
CAPSULE ORAL
Qty: 30 CAPSULE | Refills: 2 | Status: SHIPPED | OUTPATIENT
Start: 2023-05-31 | End: 2023-08-29

## 2023-05-31 NOTE — TELEPHONE ENCOUNTER
No care due was identified.  [facility] Embedded Care Due Messages. Reference number: [id number].   5/31/2023 12:27:31 PM CDT

## 2023-06-20 DIAGNOSIS — I10 ESSENTIAL HYPERTENSION: ICD-10-CM

## 2023-06-21 RX ORDER — MONTELUKAST SODIUM 10 MG/1
TABLET ORAL
Qty: 90 TABLET | Refills: 3 | Status: SHIPPED | OUTPATIENT
Start: 2023-06-21

## 2023-06-21 RX ORDER — HYDROCHLOROTHIAZIDE 12.5 MG/1
TABLET ORAL
Qty: 90 TABLET | Refills: 3 | Status: SHIPPED | OUTPATIENT
Start: 2023-06-21

## 2023-06-21 NOTE — TELEPHONE ENCOUNTER
Refill Routing Note   Medication(s) are not appropriate for processing by [facility] for the following reason(s):      Drug-disease interaction    [facility] action(s):  Defer  Approve None identified     Medication Therapy Plan: Drug-Disease: hydroCHLOROthiazide and Idiopathic chronic gout of multiple sites without tophus      Appointments  past 12m or future 3m with PCP    Date Provider   Last Visit   4/19/2023 Nathalie Salgado MD   Next Visit   7/26/2023 Nathalie Salgado MD   ED visits in past 90 days: 0        Note composed:2:35 AM 06/21/2023

## 2023-06-21 NOTE — TELEPHONE ENCOUNTER
No care due was identified.  [facility] Embedded Care Due Messages. Reference number: [id number].   6/20/2023 9:09:01 PM CDT

## 2023-06-21 NOTE — TELEPHONE ENCOUNTER
Refill Decision Note   Dilip Lopez  is requesting a refill authorization.  Brief Assessment and Rationale for Refill:  Approve     Medication Therapy Plan:         Pharmacist review requested: Yes   Comments:     Note composed:3:03 AM 06/21/2023

## 2023-07-11 ENCOUNTER — LAB VISIT (OUTPATIENT)
Dept: PRIMARY CARE CLINIC | Facility: CLINIC | Age: 83
End: 2023-07-11
Attending: FAMILY MEDICINE
Payer: MEDICARE

## 2023-07-11 DIAGNOSIS — R73.03 PREDIABETES: ICD-10-CM

## 2023-07-11 DIAGNOSIS — E83.51 HYPOCALCEMIA: ICD-10-CM

## 2023-07-11 DIAGNOSIS — E53.8 B12 DEFICIENCY: ICD-10-CM

## 2023-07-11 LAB
ALBUMIN SERPL BCP-MCNC: 3.8 G/DL (ref 3.5–5.2)
ALP SERPL-CCNC: 67 U/L (ref 55–135)
ALT SERPL W/O P-5'-P-CCNC: 21 U/L (ref 10–44)
ANION GAP SERPL CALC-SCNC: 9 MMOL/L (ref 8–16)
AST SERPL-CCNC: 24 U/L (ref 10–40)
BASOPHILS # BLD AUTO: 0.02 K/UL (ref 0–0.2)
BASOPHILS NFR BLD: 0.4 % (ref 0–1.9)
BILIRUB SERPL-MCNC: 0.6 MG/DL (ref 0.1–1)
BUN SERPL-MCNC: 14 MG/DL (ref 8–23)
CALCIUM SERPL-MCNC: 8.5 MG/DL (ref 8.7–10.5)
CHLORIDE SERPL-SCNC: 105 MMOL/L (ref 95–110)
CO2 SERPL-SCNC: 26 MMOL/L (ref 23–29)
CREAT SERPL-MCNC: 1.3 MG/DL (ref 0.5–1.4)
DIFFERENTIAL METHOD: ABNORMAL
EOSINOPHIL # BLD AUTO: 0.3 K/UL (ref 0–0.5)
EOSINOPHIL NFR BLD: 6.2 % (ref 0–8)
ERYTHROCYTE [DISTWIDTH] IN BLOOD BY AUTOMATED COUNT: 13.8 % (ref 11.5–14.5)
EST. GFR  (NO RACE VARIABLE): 55.2 ML/MIN/1.73 M^2
ESTIMATED AVG GLUCOSE: 114 MG/DL (ref 68–131)
GLUCOSE SERPL-MCNC: 95 MG/DL (ref 70–110)
HBA1C MFR BLD: 5.6 % (ref 4–5.6)
HCT VFR BLD AUTO: 39.7 % (ref 40–54)
HGB BLD-MCNC: 13 G/DL (ref 14–18)
IMM GRANULOCYTES # BLD AUTO: 0.01 K/UL (ref 0–0.04)
IMM GRANULOCYTES NFR BLD AUTO: 0.2 % (ref 0–0.5)
LYMPHOCYTES # BLD AUTO: 1.3 K/UL (ref 1–4.8)
LYMPHOCYTES NFR BLD: 26.2 % (ref 18–48)
MCH RBC QN AUTO: 31.1 PG (ref 27–31)
MCHC RBC AUTO-ENTMCNC: 32.7 G/DL (ref 32–36)
MCV RBC AUTO: 95 FL (ref 82–98)
MONOCYTES # BLD AUTO: 0.6 K/UL (ref 0.3–1)
MONOCYTES NFR BLD: 11.7 % (ref 4–15)
NEUTROPHILS # BLD AUTO: 2.8 K/UL (ref 1.8–7.7)
NEUTROPHILS NFR BLD: 55.3 % (ref 38–73)
NRBC BLD-RTO: 0 /100 WBC
PLATELET # BLD AUTO: 198 K/UL (ref 150–450)
PMV BLD AUTO: 10.5 FL (ref 9.2–12.9)
POTASSIUM SERPL-SCNC: 4.1 MMOL/L (ref 3.5–5.1)
PROT SERPL-MCNC: 6.7 G/DL (ref 6–8.4)
RBC # BLD AUTO: 4.18 M/UL (ref 4.6–6.2)
SODIUM SERPL-SCNC: 140 MMOL/L (ref 136–145)
VIT B12 SERPL-MCNC: 511 PG/ML (ref 210–950)
WBC # BLD AUTO: 5.04 K/UL (ref 3.9–12.7)

## 2023-07-11 PROCEDURE — 83036 HEMOGLOBIN GLYCOSYLATED A1C: CPT | Performed by: FAMILY MEDICINE

## 2023-07-11 PROCEDURE — 80053 COMPREHEN METABOLIC PANEL: CPT | Performed by: FAMILY MEDICINE

## 2023-07-11 PROCEDURE — 85025 COMPLETE CBC W/AUTO DIFF WBC: CPT | Performed by: FAMILY MEDICINE

## 2023-07-11 PROCEDURE — 36415 COLL VENOUS BLD VENIPUNCTURE: CPT | Mod: S$GLB,,, | Performed by: FAMILY MEDICINE

## 2023-07-11 PROCEDURE — 82607 VITAMIN B-12: CPT | Performed by: FAMILY MEDICINE

## 2023-07-11 PROCEDURE — 36415 PR COLLECTION VENOUS BLOOD,VENIPUNCTURE: ICD-10-PCS | Mod: S$GLB,,, | Performed by: FAMILY MEDICINE

## 2023-07-16 DIAGNOSIS — M51.36 DEGENERATIVE DISC DISEASE, LUMBAR: ICD-10-CM

## 2023-07-17 RX ORDER — GABAPENTIN 300 MG/1
CAPSULE ORAL
Qty: 180 CAPSULE | Refills: 3 | Status: SHIPPED | OUTPATIENT
Start: 2023-07-17

## 2023-07-17 NOTE — TELEPHONE ENCOUNTER
No care due was identified.  [facility] Embedded Care Due Messages. Reference number: [id number].   7/16/2023 9:33:18 PM CDT

## 2023-07-27 ENCOUNTER — OFFICE VISIT (OUTPATIENT)
Dept: FAMILY MEDICINE | Facility: CLINIC | Age: 83
End: 2023-07-27
Payer: MEDICARE

## 2023-07-27 VITALS
OXYGEN SATURATION: 96 % | BODY MASS INDEX: 25.71 KG/M2 | HEART RATE: 67 BPM | SYSTOLIC BLOOD PRESSURE: 110 MMHG | WEIGHT: 189.81 LBS | DIASTOLIC BLOOD PRESSURE: 68 MMHG | HEIGHT: 72 IN

## 2023-07-27 DIAGNOSIS — E83.51 HYPOCALCEMIA: ICD-10-CM

## 2023-07-27 DIAGNOSIS — K21.9 GASTROESOPHAGEAL REFLUX DISEASE WITHOUT ESOPHAGITIS: ICD-10-CM

## 2023-07-27 DIAGNOSIS — L98.9 SKIN LESION: ICD-10-CM

## 2023-07-27 DIAGNOSIS — R26.89 BALANCE DISORDER: ICD-10-CM

## 2023-07-27 DIAGNOSIS — I10 ESSENTIAL HYPERTENSION: Primary | ICD-10-CM

## 2023-07-27 DIAGNOSIS — R73.03 PREDIABETES: ICD-10-CM

## 2023-07-27 DIAGNOSIS — R26.2 WALKING DIFFICULTY DUE TO LOWER LEG: ICD-10-CM

## 2023-07-27 PROCEDURE — 99214 PR OFFICE/OUTPT VISIT, EST, LEVL IV, 30-39 MIN: ICD-10-PCS | Mod: S$GLB,,, | Performed by: FAMILY MEDICINE

## 2023-07-27 PROCEDURE — 99999 PR PBB SHADOW E&M-EST. PATIENT-LVL III: CPT | Mod: PBBFAC,,, | Performed by: FAMILY MEDICINE

## 2023-07-27 PROCEDURE — 3074F SYST BP LT 130 MM HG: CPT | Mod: CPTII,S$GLB,, | Performed by: FAMILY MEDICINE

## 2023-07-27 PROCEDURE — 3078F DIAST BP <80 MM HG: CPT | Mod: CPTII,S$GLB,, | Performed by: FAMILY MEDICINE

## 2023-07-27 PROCEDURE — 99214 OFFICE O/P EST MOD 30 MIN: CPT | Mod: S$GLB,,, | Performed by: FAMILY MEDICINE

## 2023-07-27 PROCEDURE — 3078F PR MOST RECENT DIASTOLIC BLOOD PRESSURE < 80 MM HG: ICD-10-PCS | Mod: CPTII,S$GLB,, | Performed by: FAMILY MEDICINE

## 2023-07-27 PROCEDURE — 1126F AMNT PAIN NOTED NONE PRSNT: CPT | Mod: CPTII,S$GLB,, | Performed by: FAMILY MEDICINE

## 2023-07-27 PROCEDURE — 3074F PR MOST RECENT SYSTOLIC BLOOD PRESSURE < 130 MM HG: ICD-10-PCS | Mod: CPTII,S$GLB,, | Performed by: FAMILY MEDICINE

## 2023-07-27 PROCEDURE — 99999 PR PBB SHADOW E&M-EST. PATIENT-LVL III: ICD-10-PCS | Mod: PBBFAC,,, | Performed by: FAMILY MEDICINE

## 2023-07-27 PROCEDURE — 1101F PT FALLS ASSESS-DOCD LE1/YR: CPT | Mod: CPTII,S$GLB,, | Performed by: FAMILY MEDICINE

## 2023-07-27 PROCEDURE — 3288F PR FALLS RISK ASSESSMENT DOCUMENTED: ICD-10-PCS | Mod: CPTII,S$GLB,, | Performed by: FAMILY MEDICINE

## 2023-07-27 PROCEDURE — 1101F PR PT FALLS ASSESS DOC 0-1 FALLS W/OUT INJ PAST YR: ICD-10-PCS | Mod: CPTII,S$GLB,, | Performed by: FAMILY MEDICINE

## 2023-07-27 PROCEDURE — 3288F FALL RISK ASSESSMENT DOCD: CPT | Mod: CPTII,S$GLB,, | Performed by: FAMILY MEDICINE

## 2023-07-27 PROCEDURE — 1126F PR PAIN SEVERITY QUANTIFIED, NO PAIN PRESENT: ICD-10-PCS | Mod: CPTII,S$GLB,, | Performed by: FAMILY MEDICINE

## 2023-07-27 RX ORDER — ISOSORBIDE MONONITRATE 30 MG/1
1 TABLET, EXTENDED RELEASE ORAL DAILY
COMMUNITY
End: 2023-12-13 | Stop reason: CLARIF

## 2023-07-27 NOTE — PROGRESS NOTES
Assessment:       1. Essential hypertension    2. Gastroesophageal reflux disease without esophagitis    3. Walking difficulty due to lower leg    4. Balance disorder    5. Prediabetes    6. Hypocalcemia    7. Skin lesion        Plan:       Essential hypertension:  Stable  -     Cancel: Comprehensive Metabolic Panel; Future; Expected date: 07/27/2023  -     Comprehensive Metabolic Panel; Future; Expected date: 07/27/2023    Gastroesophageal reflux disease without esophagitis:  Stable    Walking difficulty due to lower leg:  Worsening  -     Magnesium; Future; Expected date: 07/27/2023    Balance disorder:  Worsening  -     Magnesium; Future; Expected date: 07/27/2023  -     Comprehensive Metabolic Panel; Future; Expected date: 07/27/2023  -     Folate; Future; Expected date: 07/27/2023  -     Vitamin B12; Future; Expected date: 07/27/2023    Prediabetes:  Stable  -     Hemoglobin A1C; Future; Expected date: 07/27/2023    Hypocalcemia:  Stable    Skin lesion:  New problem workup needed  -     Ambulatory referral/consult to Dermatology; Future; Expected date: 08/03/2023         Will consult Dermatologist, will also place a referral to see the dermatologist, E consult was also ordered.  The patient's BMI has been recorded in the chart. The patient has been provided educational materials regarding the benefits of attaining and maintaining a normal weight. We will continue to address and follow this issue during follow up visits.   Patient agreed with assessment and plan. Patient verbalized understanding.     Subjective:       Patient ID: Dilip Lopez is a 82 y.o. male.    Chief Complaint: Follow-up (3 month follow up)    HPI    The patient is coming here today for a follow-up visit, the patient is complaining of a skin lesion on the back that is been present for more than 4 weeks, the patient stated that he is not able to see the lesion but is pruritic.  The patient did not see a dermatologist yet.  The patient  complains of acid reflux symptoms, currently taking pantoprazole, the patient is not taking magnesium at this time, complains worsening symptoms of imbalance, the patient stated that many years ago during training for the [employer], the patient stated that meds at his back and since then is been having problems with his back and the symptoms every year get worse with weakness in the legs, difficulty to walk, he has to use his cane, the patient stated that is having more balance problems and is asking for an electrical scooter.  The patient has hypertension, currently taking his medications as directed, per review of the last blood work, the patient has hypocalcemia.    Past medical history, past social history was reviewed and discussed with the patient.    Review of Systems   Constitutional:  Positive for activity change. Negative for appetite change and chills.   HENT:  Negative for congestion and ear discharge.    Eyes:  Negative for discharge and itching.   Respiratory:  Negative for choking and chest tightness.    Cardiovascular:  Negative for chest pain and palpitations.   Gastrointestinal:  Negative for abdominal distention and abdominal pain.        GERD   Endocrine: Negative for cold intolerance and heat intolerance.   Genitourinary:  Negative for dysuria and flank pain.   Musculoskeletal:  Positive for arthralgias, back pain and gait problem.   Skin:  Negative for pallor and rash.        Skin lesion   Allergic/Immunologic: Negative for environmental allergies and food allergies.   Neurological:  Positive for weakness. Negative for dizziness and facial asymmetry.   Hematological:  Negative for adenopathy. Does not bruise/bleed easily.   Psychiatric/Behavioral:  Positive for sleep disturbance. Negative for agitation and confusion.        Objective:      Physical Exam  Vitals and nursing note reviewed.   Constitutional:       General: He is not in acute distress.     Appearance: Normal appearance. He is  well-developed. He is not diaphoretic.      Comments: The patient has limited ambulation, he is using a cane for mobilization.   HENT:      Head: Normocephalic and atraumatic.      Right Ear: External ear normal.      Left Ear: External ear normal.      Nose: Nose normal.   Eyes:      General: No scleral icterus.        Left eye: No discharge.   Neck:      Thyroid: No thyromegaly.      Trachea: No tracheal deviation.   Cardiovascular:      Rate and Rhythm: Normal rate and regular rhythm.      Heart sounds: Normal heart sounds.   Pulmonary:      Effort: Pulmonary effort is normal. No respiratory distress.      Breath sounds: Normal breath sounds. No wheezing.   Musculoskeletal:         General: Tenderness (Lower back) present.      Cervical back: Normal range of motion and neck supple.   Skin:     General: Skin is warm and dry.      Coloration: Skin is not pale.      Findings: Lesion (Back, see attach picture) present. No erythema.   Neurological:      Mental Status: He is alert.      Cranial Nerves: No cranial nerve deficit.      Motor: No abnormal muscle tone.   Psychiatric:         Behavior: Behavior normal.         Thought Content: Thought content normal.         Judgment: Judgment normal.

## 2023-07-28 DIAGNOSIS — R21 RASH: ICD-10-CM

## 2023-07-28 RX ORDER — HYDROXYZINE HYDROCHLORIDE 25 MG/1
25 TABLET, FILM COATED ORAL 3 TIMES DAILY PRN
Qty: 30 TABLET | Refills: 5 | Status: SHIPPED | OUTPATIENT
Start: 2023-07-28 | End: 2023-12-22

## 2023-07-30 ENCOUNTER — E-CONSULT (OUTPATIENT)
Dept: DERMATOLOGY | Facility: CLINIC | Age: 83
End: 2023-07-30

## 2023-07-30 DIAGNOSIS — D48.5 NEOPLASM OF UNCERTAIN BEHAVIOR OF SKIN: Primary | ICD-10-CM

## 2023-07-30 PROCEDURE — 99451 NTRPROF PH1/NTRNET/EHR 5/>: CPT | Mod: ,,, | Performed by: STUDENT IN AN ORGANIZED HEALTH CARE EDUCATION/TRAINING PROGRAM

## 2023-07-30 PROCEDURE — 99451 PR INTERPROF, PHONE/INTERNET/EHR, CONSULT, >= 5MINS: ICD-10-PCS | Mod: ,,, | Performed by: STUDENT IN AN ORGANIZED HEALTH CARE EDUCATION/TRAINING PROGRAM

## 2023-07-30 NOTE — CONSULTS
[facility]  Response for E-Consult     Patient Name: Dilip Lopez  MRN: [id number]  Primary Care Provider: Nathalie Salgado MD   Requesting Provider: Nathalie Salgado MD  E-Consult to Dermatology  Consult performed by: Yumi Burks MD  Consult ordered by: Nathalie Salgado MD  Reason for consult: Skin lesion  Assessment/Recommendations: Thank you for this consult. My first thought seeing this photo was a basal cell carcinoma. However, it is often difficult to tell from the photos, and based on the patient's reported history, it has not been present long.     I understand wait times are long, but I would suggest an expedited in person dermatology evaluation, if possible.                 Total time of Consultation: 10 minute    I did not speak to the requesting provider verbally about this.           *This eConsult is based on the clinical data available to me and is furnished without benefit of a physical examination. The eConsult will need to be interpreted in light of any clinical issues or changes in patient status not available to me at the time of filing this eConsults. Significant changes in patient condition or level of acuity should result in immediate formal consultation and reevaluation. Please alert me if you have further questions.    Thank you for this eConsult referral.     Yumi Burks MD  [facility]

## 2023-07-31 ENCOUNTER — TELEPHONE (OUTPATIENT)
Dept: FAMILY MEDICINE | Facility: CLINIC | Age: 83
End: 2023-07-31
Payer: MEDICARE

## 2023-07-31 ENCOUNTER — TELEPHONE (OUTPATIENT)
Dept: DERMATOLOGY | Facility: CLINIC | Age: 83
End: 2023-07-31
Payer: MEDICARE

## 2023-07-31 NOTE — TELEPHONE ENCOUNTER
"----- Message from Nathalie Salgado MD sent at 7/30/2023  1:52 PM CDT -----  Please can you let the patient know that the consult with the dermatologist via online, he stated that the photo was more likely to be a basal cell carcinoma and needs an appointment as soon as possible to see a dermatologist.  I placed a referral, the dermatologist recommended I expedite dermatology evaluation.  If you can send them a message.  Thank you.      "Thank you for this consult. My first thought seeing this photo was a basal cell carcinoma. However, it is often difficult to tell from the photos, and based on the patient's reported history, it has not been present long.      I understand wait times are long, but I would suggest an expedited in person dermatology evaluation, if possible"    Thank you.    "

## 2023-08-04 ENCOUNTER — OFFICE VISIT (OUTPATIENT)
Dept: DERMATOLOGY | Facility: CLINIC | Age: 83
End: 2023-08-04
Payer: MEDICARE

## 2023-08-04 DIAGNOSIS — L82.0 INFLAMED SEBORRHEIC KERATOSIS: ICD-10-CM

## 2023-08-04 DIAGNOSIS — I78.1 TELANGIECTASIA: ICD-10-CM

## 2023-08-04 DIAGNOSIS — L57.0 ACTINIC KERATOSIS: Primary | ICD-10-CM

## 2023-08-04 DIAGNOSIS — D48.5 NEOPLASM OF UNCERTAIN BEHAVIOR OF SKIN: ICD-10-CM

## 2023-08-04 PROCEDURE — 17004 DESTROY PREMAL LESIONS 15/>: CPT | Mod: S$GLB,,, | Performed by: DERMATOLOGY

## 2023-08-04 PROCEDURE — 11102 PR TANGENTIAL BIOPSY, SKIN, SINGLE LESION: ICD-10-PCS | Mod: XS,S$GLB,, | Performed by: DERMATOLOGY

## 2023-08-04 PROCEDURE — 99202 PR OFFICE/OUTPT VISIT, NEW, LEVL II, 15-29 MIN: ICD-10-PCS | Mod: 25,S$GLB,, | Performed by: DERMATOLOGY

## 2023-08-04 PROCEDURE — 99202 OFFICE O/P NEW SF 15 MIN: CPT | Mod: 25,S$GLB,, | Performed by: DERMATOLOGY

## 2023-08-04 PROCEDURE — 99999 PR PBB SHADOW E&M-EST. PATIENT-LVL II: CPT | Mod: PBBFAC,,, | Performed by: DERMATOLOGY

## 2023-08-04 PROCEDURE — 88305 TISSUE EXAM BY PATHOLOGIST: ICD-10-PCS | Mod: 26,,, | Performed by: PATHOLOGY

## 2023-08-04 PROCEDURE — 11102 TANGNTL BX SKIN SINGLE LES: CPT | Mod: XS,S$GLB,, | Performed by: DERMATOLOGY

## 2023-08-04 PROCEDURE — 88305 TISSUE EXAM BY PATHOLOGIST: CPT | Mod: 59,PO | Performed by: PATHOLOGY

## 2023-08-04 PROCEDURE — 11103 PR TANGENTIAL BIOPSY, SKIN, EA ADDTL LESION: ICD-10-PCS | Mod: XS,S$GLB,, | Performed by: DERMATOLOGY

## 2023-08-04 PROCEDURE — 88305 TISSUE EXAM BY PATHOLOGIST: CPT | Mod: 26,,, | Performed by: PATHOLOGY

## 2023-08-04 PROCEDURE — 17110 DESTRUCTION B9 LES UP TO 14: CPT | Mod: XS,S$GLB,, | Performed by: DERMATOLOGY

## 2023-08-04 PROCEDURE — 11103 TANGNTL BX SKIN EA SEP/ADDL: CPT | Mod: XS,S$GLB,, | Performed by: DERMATOLOGY

## 2023-08-04 PROCEDURE — 99999 PR PBB SHADOW E&M-EST. PATIENT-LVL II: ICD-10-PCS | Mod: PBBFAC,,, | Performed by: DERMATOLOGY

## 2023-08-04 PROCEDURE — 17110 PR DESTRUCTION BENIGN LESIONS UP TO 14: ICD-10-PCS | Mod: XS,S$GLB,, | Performed by: DERMATOLOGY

## 2023-08-04 PROCEDURE — 17004 PR DESTRUCTION, PREMALIGNANT LESIONS; 15 OR MORE LESIONS: ICD-10-PCS | Mod: S$GLB,,, | Performed by: DERMATOLOGY

## 2023-08-04 NOTE — PROGRESS NOTES
Dermatology Outpatient Clinic Progress Note    Patient Name: Dilip Lopez  Patient : 1940  Date of Service: 2023    CC/HPI: Dilip Lopez is a 82 y.o. year old male with history of  NMSC  who presents today for spot on the right shoulder, spot on right hand, spot on back.  Patient reports spot on back has bled in the past but not lately, other two areas are scaly and rough.     ROS:   Dermatological ROS: positive for skin lesion changes    Physical Exam   Head   Head comments: telangiectasia face       Chest       Back       Upper extremities           Diagram Legend     Erythematous scaling macule/papule c/w actinic keratosis       Vascular papule c/w angioma      Pigmented verrucoid papule/plaque c/w seborrheic keratosis      Yellow umbilicated papule c/w sebaceous hyperplasia      Irregularly shaped tan macule c/w lentigo     1-2 mm smooth white papules consistent with Milia      Movable subcutaneous cyst with punctum c/w epidermal inclusion cyst      Subcutaneous movable cyst c/w pilar cyst      Firm pink to brown papule c/w dermatofibroma      Pedunculated fleshy papule(s) c/w skin tag(s)      Evenly pigmented macule c/w junctional nevus     Mildly variegated pigmented, slightly irregular-bordered macule c/w mildly atypical nevus      Flesh colored to evenly pigmented papule c/w intradermal nevus       Pink pearly papule/plaque c/w basal cell carcinoma      Erythematous hyperkeratotic cursted plaque c/w SCC      Surgical scar with no sign of skin cancer recurrence      Open and closed comedones      Inflammatory papules and pustules      Verrucoid papule consistent consistent with wart     Erythematous eczematous patches and plaques     Dystrophic onycholytic nail with subungual debris c/w onychomycosis     Umbilicated papule    Erythematous-base heme-crusted tan verrucoid plaque consistent with inflamed seborrheic keratosis     Erythematous Silvery Scaling Plaque c/w Psoriasis     See  annotation    Assessment/Plan:    Diagnoses and all orders for this visit:    Actinic keratosis  Cryosurgery Procedure Note  The patient was informed that we would be destroying harmful lesions via cryosurgery, the risks of pain, vesiculation, and potentially persistent discoloration were discussed.   Patient expressed verbal consent. TNTC premalignant  lesions were treated with a 15 second freeze thaw cycle.   Thicker lesions were treated with two cycles to enhance chances of complete resolution.   Follow up in 6 weeks if lesions do not resolve.     Neoplasm of uncertain behavior of skin  -     Specimen to Pathology, Dermatology  Shave Biopsy Procedure Note  Risks, benefits, limitations of shave biopsy discussed. Areas cleansed with alcohol, photographs of suspicious lesions taken in [place]. 2 lesion(s) numbed with 1% lidocaine with 1:200,000 epinephrine. Lesions removed with razor surgery and sent for pathology in formalin. Hemostasis achieved with alumuninum chloride. Polysporin and a band-aid applied. Pressure dressing applied to the back.   Will call patient with results once available. Patient tolerated procedure well.     Inflamed seborrheic keratosis        -    Cryosurgery Procedure Note  The patient was informed that we would be destroying harmful lesions via cryosurgery, the risks of pain, vesiculation, and potentially persistent discoloration were discussed.   Patient expressed verbal consent. 2 benign  lesions were treated with a 15 second freeze thaw cycle.   Thicker lesions were treated with two cycles to enhance chances of complete resolution.   Follow up in 6 weeks if lesions do not resolve.     Telangiectasia  - Benign nature of lesion discussed, monitor for changes        No follow-ups on file.        Mendez Blackwood MD

## 2023-08-09 LAB
FINAL PATHOLOGIC DIAGNOSIS: NORMAL
GROSS: NORMAL
Lab: NORMAL
MICROSCOPIC EXAM: NORMAL

## 2023-08-10 ENCOUNTER — TELEPHONE (OUTPATIENT)
Dept: DERMATOLOGY | Facility: CLINIC | Age: 83
End: 2023-08-10
Payer: MEDICARE

## 2023-08-10 NOTE — TELEPHONE ENCOUNTER
----- Message from Mendez Blackwood MD sent at 8/9/2023  3:39 PM CDT -----  Schedule ln2 and ED&C  ----- Message -----  From: [email or web address]  Sent: 8/9/2023  10:41 AM CDT  To: Mendez Blackwood MD

## 2023-08-11 ENCOUNTER — TELEPHONE (OUTPATIENT)
Dept: FAMILY MEDICINE | Facility: CLINIC | Age: 83
End: 2023-08-11
Payer: MEDICARE

## 2023-08-11 NOTE — TELEPHONE ENCOUNTER
Dr. Salgado, patient is needing a letter written for a scooter to [facility] to [facility], can you please type a letter for the patient?

## 2023-08-11 NOTE — TELEPHONE ENCOUNTER
----- Message from Pricila Hernández sent at 8/11/2023 10:36 AM CDT -----  Contact: Jane (wife)  Type:  Needs Medical Advice    Who Called: Jane  Would the patient rather a call back or a response via [facility]? Call back  Best Call Back Number: [phone] or    Additional Information: Was supposed to send a letter to pt for scooter and [facility] is calling [facility] about where the [facility] is. If it was not mailed yet, pt can  next time they are in area on 8/15  Please call to advise  Thanks

## 2023-08-15 ENCOUNTER — OFFICE VISIT (OUTPATIENT)
Dept: DERMATOLOGY | Facility: CLINIC | Age: 83
End: 2023-08-15
Payer: MEDICARE

## 2023-08-15 ENCOUNTER — TELEPHONE (OUTPATIENT)
Dept: FAMILY MEDICINE | Facility: CLINIC | Age: 83
End: 2023-08-15
Payer: MEDICARE

## 2023-08-15 DIAGNOSIS — L57.0 ACTINIC KERATOSIS: ICD-10-CM

## 2023-08-15 DIAGNOSIS — C44.519 BASAL CELL CARCINOMA OF TRUNK: Primary | ICD-10-CM

## 2023-08-15 PROCEDURE — 17000 PR DESTRUCTION(LASER SURGERY,CRYOSURGERY,CHEMOSURGERY),PREMALIGNANT LESIONS,FIRST LESION: ICD-10-PCS | Mod: S$GLB,,, | Performed by: DERMATOLOGY

## 2023-08-15 PROCEDURE — 99214 OFFICE O/P EST MOD 30 MIN: CPT | Mod: 25,S$GLB,, | Performed by: DERMATOLOGY

## 2023-08-15 PROCEDURE — 99214 PR OFFICE/OUTPT VISIT, EST, LEVL IV, 30-39 MIN: ICD-10-PCS | Mod: 25,S$GLB,, | Performed by: DERMATOLOGY

## 2023-08-15 PROCEDURE — 17000 DESTRUCT PREMALG LESION: CPT | Mod: S$GLB,,, | Performed by: DERMATOLOGY

## 2023-08-15 NOTE — PROGRESS NOTES
Dermatology Outpatient Clinic Progress Note    Patient Name: Dilip Lopez  Patient : 1940  Date of Service: 8/15/2023    CC/HPI: Dilip Lopez is a 82 y.o. year old male with history of basal cell carcinoma who presents today for ln2 to a biopsy proven AK and re-evaluation of a large infiltrative bcc.  Patient reports oozing from bcc biopsy (he is not keeping it covered)    ROS:   Dermatological ROS: positive for skin lesion changes    Physical Exam   Back           Diagram Legend     Erythematous scaling macule/papule c/w actinic keratosis       Vascular papule c/w angioma      Pigmented verrucoid papule/plaque c/w seborrheic keratosis      Yellow umbilicated papule c/w sebaceous hyperplasia      Irregularly shaped tan macule c/w lentigo     1-2 mm smooth white papules consistent with Milia      Movable subcutaneous cyst with punctum c/w epidermal inclusion cyst      Subcutaneous movable cyst c/w pilar cyst      Firm pink to brown papule c/w dermatofibroma      Pedunculated fleshy papule(s) c/w skin tag(s)      Evenly pigmented macule c/w junctional nevus     Mildly variegated pigmented, slightly irregular-bordered macule c/w mildly atypical nevus      Flesh colored to evenly pigmented papule c/w intradermal nevus       Pink pearly papule/plaque c/w basal cell carcinoma      Erythematous hyperkeratotic cursted plaque c/w SCC      Surgical scar with no sign of skin cancer recurrence      Open and closed comedones      Inflammatory papules and pustules      Verrucoid papule consistent consistent with wart     Erythematous eczematous patches and plaques     Dystrophic onycholytic nail with subungual debris c/w onychomycosis     Umbilicated papule    Erythematous-base heme-crusted tan verrucoid plaque consistent with inflamed seborrheic keratosis     Erythematous Silvery Scaling Plaque c/w Psoriasis     See annotation    Assessment/Plan:    Diagnoses and all orders for this visit:    Basal cell carcinoma of  trunk        -  Due to size/aggressive histopathology of lesion, recommended follow up for mohs over excision or ED&C today after discussion of R/B/L of all treatment options        -   Pt. Agrees to proceed with micrographic surgery   -    MOHS Surgery; Future    Actinic keratosis biopsy proven left posterior shoulder  - LN2 today  Cryosurgery Procedure Note  The patient was informed that we would be destroying harmful lesions via cryosurgery, the risks of pain, vesiculation, and potentially persistent discoloration were discussed.   Patient expressed verbal consent. 1 premalignant  lesions were treated with a 15 second freeze thaw cycle.   Thicker lesions were treated with two cycles to enhance chances of complete resolution.   Follow up in 6 weeks if lesions do not resolve.       No follow-ups on file.        Mendez Blackwood MD

## 2023-08-15 NOTE — TELEPHONE ENCOUNTER
----- Message from Kay Rojas sent at 8/15/2023  9:17 AM CDT -----  Type:  Needs Medical Advice    Who Called: pt wife kristofer Brown Call Back Number: [phone] (home)     Additional Information:  Requesting call back , wants to know if the letter is ready for  today , sts they  haven't gotten in the mail and the pt will be in the area today around 2pm     please advise thank you

## 2023-08-29 DIAGNOSIS — G47.09 OTHER INSOMNIA: ICD-10-CM

## 2023-08-29 RX ORDER — TEMAZEPAM 30 MG/1
CAPSULE ORAL
Qty: 30 CAPSULE | Refills: 2 | Status: SHIPPED | OUTPATIENT
Start: 2023-08-29 | End: 2023-12-23

## 2023-08-29 NOTE — TELEPHONE ENCOUNTER
No care due was identified.  [facility] Embedded Care Due Messages. Reference number: [id number].   8/29/2023 11:11:58 AM CDT

## 2023-09-07 ENCOUNTER — PROCEDURE VISIT (OUTPATIENT)
Dept: DERMATOLOGY | Facility: CLINIC | Age: 83
End: 2023-09-07
Payer: MEDICARE

## 2023-09-07 VITALS
DIASTOLIC BLOOD PRESSURE: 96 MMHG | BODY MASS INDEX: 25.73 KG/M2 | HEART RATE: 75 BPM | WEIGHT: 190 LBS | SYSTOLIC BLOOD PRESSURE: 157 MMHG | HEIGHT: 72 IN

## 2023-09-07 DIAGNOSIS — C44.519 BASAL CELL CARCINOMA OF TRUNK: ICD-10-CM

## 2023-09-07 PROCEDURE — 17313 MOHS 1 STAGE T/A/L: CPT | Mod: S$GLB,,, | Performed by: DERMATOLOGY

## 2023-09-07 PROCEDURE — 13101 PR RECMPL WND TRUNK 2.6-7.5 CM: ICD-10-PCS | Mod: 51,S$GLB,, | Performed by: DERMATOLOGY

## 2023-09-07 PROCEDURE — 13102 CMPLX RPR TRUNK ADDL 5CM/<: CPT | Mod: S$GLB,,, | Performed by: DERMATOLOGY

## 2023-09-07 PROCEDURE — 13101 CMPLX RPR TRUNK 2.6-7.5 CM: CPT | Mod: 51,S$GLB,, | Performed by: DERMATOLOGY

## 2023-09-07 PROCEDURE — 17314: ICD-10-PCS | Mod: S$GLB,,, | Performed by: DERMATOLOGY

## 2023-09-07 PROCEDURE — 13102 PR REP,SKIN,TRUNK,CMPLX,+5 CM/<: ICD-10-PCS | Mod: S$GLB,,, | Performed by: DERMATOLOGY

## 2023-09-07 PROCEDURE — 17313: ICD-10-PCS | Mod: S$GLB,,, | Performed by: DERMATOLOGY

## 2023-09-07 PROCEDURE — 17314 MOHS ADDL STAGE T/A/L: CPT | Mod: S$GLB,,, | Performed by: DERMATOLOGY

## 2023-09-07 NOTE — PROGRESS NOTES
MOHS MICROGRAPHIC SURGERY OPERATIVE NOTE  Name:  Dilip Lopez  Date: 2023  Patient : 1940  Attending Surgeon: Mendez Blackwood MD  Assistants: Ny Bland - Surgical Technician and Carmita Jacobson - Histology Technician  Anesthetic Agent: 1% lidocaine with 1:100,000 epinephrine  Clinical Diagnosis: basal cell carcinoma nodular and infiltrative  Operation: Mohs Micrographic Surgery  Location: left mid back  Indications: Tumor with aggressive histopathology. Tumor size >2 cm on the trunk or extremities.  Surgical Preparation: chlorhexidine gluconate     Description of Operation:  The nature and purpose of the procedure, associated risks and alternative treatments were explained to the patient in detail. All patient questions were answered completely. An informed operative consent and photography permit were obtained. The tumor location was then identified and marked with agreement by the patient of the correct location. The patient was positioned, prepped, and draped in the usual sterile manner. Local anesthesia was obtained with 4 cc(s) of 1% lidocaine with 1:100,000 epinephrine. The lesion pre-operatively measures 2.4 by 1.6 cm.     1ST STAGE:  A 2+ mm rim of normal appearing skin was marked circumferentially around the lesion after scraping with a curette to define the margin. The area thus outlined was excised at a 45 degree angle. Hemostasis was obtained with electrodesiccation. The specimen was oriented, mapped, and subdivided into at least two sections. Sections were then chromacoded and submitted for horizontal frozen sections. The patient tolerated the procedure well and there were no complications. Upon microscopic examination of the processed horizontal frozen sections of this stage:  Tumor was present at the margin of the specimen; these areas of residual tumor were marked on the reference map with red pencil, pinpointing the location in which further tissue excision was  necessary.  Tumor type noted on stage 1: Nodular basal cell carcinoma: Nodular tumor in dermis composed of basaloid cells exhibiting peripheral palisading and retraction artifact.     SUBSEQUENT STAGES:  The patient returned to the operating room for additional stages of tumor removal, and prepped in the manner described above. Surgery was directed to the areas having residual tumor, with thin layers of tissue being excised from these regions. A new reference map was prepared during the surgery to maintain precise orientation as described above. Hemostasis was achieved and the excised tissue was processed for microscopic analysis.  These sections were then examined by the Mohs surgeon, and areas of persistent tumor were indicated on the reference map. This process was repeated until the frozen horizontal sections of STAGE 2 revealed no further tumor cells and tumor eradication was considered to be  complete.  Tumor type noted on subsequent stages: No tumor seen.     SUMMARY:  The tumor was extirpated in 2 Mohs Stages resulting in a final defect measuring 4.1 by 2.4 cm².   The final defect extended deep to deep dermis  The patient tolerated the procedure well and no complications were noted.     PHOTOS:      Mendez Blackwood MD    Complex Linear Closure Operative Note  Name: Dilip Lopez  YOB: 1940  Date: 9/7/2023  Attending Surgeon: Mendez Blackwood MD FAAD  Assistants:  Ny Bland - Surgical Technician  Clinical Diagnosis: A 4.1 by 2.4 cm defect secondary to Mohs Micrographic Surgery  Location: left mid back  Operation: Complex linear closure  Surgical Preparation: broad scrub with chlorhexidine gluconate    Description of Operation:  The nature and purpose of the procedure, associated risks and alternative treatments were explained to the patient in detail. All patient questions were answered completely. In order to minimize tension on the closure and avoid compromising the anatomic  contour of the cosmetic region and maximize functional capacity, a complex linear closure was performed. An informed operative consent and photography permit were obtained. The patient was positioned, prepped, and draped in the usual sterile manner. Local anesthesia was obtained with 9 cc(s) of 1% lidocaine with 1:100,000 epinephrine.    The defect edges were debeveled with a #15 scalpel blade. The circular defect was widely undermined in the deep subcutaneous plane at least 100% of the defect diameter to allow for maximum tissue movement. Hemostasis was achieved with spot electrodessication. The defect was closed first utilizing multiple 3-0 polydioxanone interrupted buried subcutaneous sutures. This resulted in excellent apposition of the dermis and epidermis, however two redundant areas of tissue were created on opposite sides of the defect. Utilizing a #15 scalpel blade, two Burows triangles were excised to ensure a flat scar. Hemostasis was obtained with spot electrodessication. The skin edges throughout further fastened superficially with 4-0 Prolene. The final length of the repair was 8.7 cm.  The patient tolerated the procedure well and there were no complications.  Polysporin, Telfa and a pressure dressing were applied to sutures after gentle cleansing with saline.    Patient instructed in and provided with instructions for post-op care, will RTC in 10 days for suture removal    Post op meds: None    Photos:        Mendez Blackwood MD

## 2023-09-14 ENCOUNTER — LAB VISIT (OUTPATIENT)
Dept: PRIMARY CARE CLINIC | Facility: CLINIC | Age: 83
End: 2023-09-14
Payer: MEDICARE

## 2023-09-14 DIAGNOSIS — C61 PROSTATE CANCER: ICD-10-CM

## 2023-09-14 LAB
ALBUMIN SERPL BCP-MCNC: 4 G/DL (ref 3.5–5.2)
ALP SERPL-CCNC: 68 U/L (ref 55–135)
ALT SERPL W/O P-5'-P-CCNC: 15 U/L (ref 10–44)
ANION GAP SERPL CALC-SCNC: 12 MMOL/L (ref 8–16)
AST SERPL-CCNC: 16 U/L (ref 10–40)
BASOPHILS # BLD AUTO: 0.02 K/UL (ref 0–0.2)
BASOPHILS NFR BLD: 0.3 % (ref 0–1.9)
BILIRUB SERPL-MCNC: 0.7 MG/DL (ref 0.1–1)
BUN SERPL-MCNC: 21 MG/DL (ref 8–23)
CALCIUM SERPL-MCNC: 9.2 MG/DL (ref 8.7–10.5)
CHLORIDE SERPL-SCNC: 104 MMOL/L (ref 95–110)
CO2 SERPL-SCNC: 23 MMOL/L (ref 23–29)
COMPLEXED PSA SERPL-MCNC: 0.28 NG/ML (ref 0–4)
CREAT SERPL-MCNC: 1.7 MG/DL (ref 0.5–1.4)
DIFFERENTIAL METHOD: ABNORMAL
EOSINOPHIL # BLD AUTO: 0.4 K/UL (ref 0–0.5)
EOSINOPHIL NFR BLD: 7 % (ref 0–8)
ERYTHROCYTE [DISTWIDTH] IN BLOOD BY AUTOMATED COUNT: 14.3 % (ref 11.5–14.5)
EST. GFR  (NO RACE VARIABLE): 40 ML/MIN/1.73 M^2
GLUCOSE SERPL-MCNC: 99 MG/DL (ref 70–110)
HCT VFR BLD AUTO: 39.2 % (ref 40–54)
HGB BLD-MCNC: 13 G/DL (ref 14–18)
IMM GRANULOCYTES # BLD AUTO: 0.02 K/UL (ref 0–0.04)
IMM GRANULOCYTES NFR BLD AUTO: 0.3 % (ref 0–0.5)
LYMPHOCYTES # BLD AUTO: 1.5 K/UL (ref 1–4.8)
LYMPHOCYTES NFR BLD: 25 % (ref 18–48)
MCH RBC QN AUTO: 31.2 PG (ref 27–31)
MCHC RBC AUTO-ENTMCNC: 33.2 G/DL (ref 32–36)
MCV RBC AUTO: 94 FL (ref 82–98)
MONOCYTES # BLD AUTO: 0.6 K/UL (ref 0.3–1)
MONOCYTES NFR BLD: 9.8 % (ref 4–15)
NEUTROPHILS # BLD AUTO: 3.4 K/UL (ref 1.8–7.7)
NEUTROPHILS NFR BLD: 57.6 % (ref 38–73)
NRBC BLD-RTO: 0 /100 WBC
PLATELET # BLD AUTO: 198 K/UL (ref 150–450)
PMV BLD AUTO: 10.6 FL (ref 9.2–12.9)
POTASSIUM SERPL-SCNC: 4.4 MMOL/L (ref 3.5–5.1)
PROT SERPL-MCNC: 7 G/DL (ref 6–8.4)
RBC # BLD AUTO: 4.17 M/UL (ref 4.6–6.2)
SODIUM SERPL-SCNC: 139 MMOL/L (ref 136–145)
TESTOST SERPL-MCNC: 17 NG/DL (ref 304–1227)
WBC # BLD AUTO: 5.84 K/UL (ref 3.9–12.7)

## 2023-09-14 PROCEDURE — 84153 ASSAY OF PSA TOTAL: CPT | Performed by: INTERNAL MEDICINE

## 2023-09-14 PROCEDURE — 84403 ASSAY OF TOTAL TESTOSTERONE: CPT | Performed by: INTERNAL MEDICINE

## 2023-09-14 PROCEDURE — 36415 COLL VENOUS BLD VENIPUNCTURE: CPT | Mod: S$GLB,,, | Performed by: INTERNAL MEDICINE

## 2023-09-14 PROCEDURE — 85025 COMPLETE CBC W/AUTO DIFF WBC: CPT | Performed by: INTERNAL MEDICINE

## 2023-09-14 PROCEDURE — 36415 PR COLLECTION VENOUS BLOOD,VENIPUNCTURE: ICD-10-PCS | Mod: S$GLB,,, | Performed by: INTERNAL MEDICINE

## 2023-09-14 PROCEDURE — 80053 COMPREHEN METABOLIC PANEL: CPT | Performed by: INTERNAL MEDICINE

## 2023-09-18 ENCOUNTER — INFUSION (OUTPATIENT)
Dept: INFUSION THERAPY | Facility: HOSPITAL | Age: 83
End: 2023-09-18
Attending: INTERNAL MEDICINE
Payer: MEDICARE

## 2023-09-18 ENCOUNTER — OFFICE VISIT (OUTPATIENT)
Dept: HEMATOLOGY/ONCOLOGY | Facility: CLINIC | Age: 83
End: 2023-09-18
Payer: MEDICARE

## 2023-09-18 ENCOUNTER — CLINICAL SUPPORT (OUTPATIENT)
Dept: DERMATOLOGY | Facility: CLINIC | Age: 83
End: 2023-09-18
Payer: MEDICARE

## 2023-09-18 VITALS
RESPIRATION RATE: 16 BRPM | HEART RATE: 78 BPM | WEIGHT: 189.63 LBS | DIASTOLIC BLOOD PRESSURE: 90 MMHG | BODY MASS INDEX: 25.71 KG/M2 | SYSTOLIC BLOOD PRESSURE: 154 MMHG | TEMPERATURE: 97 F

## 2023-09-18 VITALS
WEIGHT: 189.63 LBS | HEART RATE: 78 BPM | OXYGEN SATURATION: 100 % | TEMPERATURE: 97 F | BODY MASS INDEX: 25.68 KG/M2 | RESPIRATION RATE: 16 BRPM | SYSTOLIC BLOOD PRESSURE: 154 MMHG | HEIGHT: 72 IN | DIASTOLIC BLOOD PRESSURE: 90 MMHG

## 2023-09-18 DIAGNOSIS — Z48.00 ATTENTION TO DRESSINGS AND SUTURES: Primary | ICD-10-CM

## 2023-09-18 DIAGNOSIS — M81.0 AGE-RELATED OSTEOPOROSIS WITHOUT CURRENT PATHOLOGICAL FRACTURE: Primary | ICD-10-CM

## 2023-09-18 DIAGNOSIS — C61 PROSTATE CANCER: Primary | ICD-10-CM

## 2023-09-18 DIAGNOSIS — C61 PROSTATE CANCER: ICD-10-CM

## 2023-09-18 DIAGNOSIS — Z48.02 ATTENTION TO DRESSINGS AND SUTURES: Primary | ICD-10-CM

## 2023-09-18 DIAGNOSIS — Z80.42 FAMILY HISTORY OF PROSTATE CANCER: ICD-10-CM

## 2023-09-18 PROCEDURE — 3288F FALL RISK ASSESSMENT DOCD: CPT | Mod: CPTII,S$GLB,, | Performed by: INTERNAL MEDICINE

## 2023-09-18 PROCEDURE — 3288F PR FALLS RISK ASSESSMENT DOCUMENTED: ICD-10-PCS | Mod: CPTII,S$GLB,, | Performed by: INTERNAL MEDICINE

## 2023-09-18 PROCEDURE — 96372 THER/PROPH/DIAG INJ SC/IM: CPT | Mod: PN

## 2023-09-18 PROCEDURE — 3080F DIAST BP >= 90 MM HG: CPT | Mod: CPTII,S$GLB,, | Performed by: INTERNAL MEDICINE

## 2023-09-18 PROCEDURE — 99024 POSTOP FOLLOW-UP VISIT: CPT | Mod: S$GLB,,, | Performed by: DERMATOLOGY

## 2023-09-18 PROCEDURE — 99024 PR POST-OP FOLLOW-UP VISIT: ICD-10-PCS | Mod: S$GLB,,, | Performed by: DERMATOLOGY

## 2023-09-18 PROCEDURE — 99999 PR PBB SHADOW E&M-EST. PATIENT-LVL III: CPT | Mod: PBBFAC,,,

## 2023-09-18 PROCEDURE — 63600175 PHARM REV CODE 636 W HCPCS: Mod: JZ,JG,PN | Performed by: INTERNAL MEDICINE

## 2023-09-18 PROCEDURE — 99999 PR PBB SHADOW E&M-EST. PATIENT-LVL III: ICD-10-PCS | Mod: PBBFAC,,,

## 2023-09-18 PROCEDURE — 99499 RISK ADDL DX/OHS AUDIT: ICD-10-PCS | Mod: S$GLB,,, | Performed by: INTERNAL MEDICINE

## 2023-09-18 PROCEDURE — 99499 UNLISTED E&M SERVICE: CPT | Mod: S$GLB,,, | Performed by: INTERNAL MEDICINE

## 2023-09-18 PROCEDURE — 99999 PR PBB SHADOW E&M-EST. PATIENT-LVL IV: ICD-10-PCS | Mod: PBBFAC,,, | Performed by: INTERNAL MEDICINE

## 2023-09-18 PROCEDURE — 99999 PR PBB SHADOW E&M-EST. PATIENT-LVL IV: CPT | Mod: PBBFAC,,, | Performed by: INTERNAL MEDICINE

## 2023-09-18 PROCEDURE — 3080F PR MOST RECENT DIASTOLIC BLOOD PRESSURE >= 90 MM HG: ICD-10-PCS | Mod: CPTII,S$GLB,, | Performed by: INTERNAL MEDICINE

## 2023-09-18 PROCEDURE — 1159F PR MEDICATION LIST DOCUMENTED IN MEDICAL RECORD: ICD-10-PCS | Mod: CPTII,S$GLB,, | Performed by: INTERNAL MEDICINE

## 2023-09-18 PROCEDURE — 1101F PR PT FALLS ASSESS DOC 0-1 FALLS W/OUT INJ PAST YR: ICD-10-PCS | Mod: CPTII,S$GLB,, | Performed by: INTERNAL MEDICINE

## 2023-09-18 PROCEDURE — 1159F MED LIST DOCD IN RCRD: CPT | Mod: CPTII,S$GLB,, | Performed by: INTERNAL MEDICINE

## 2023-09-18 PROCEDURE — 99215 PR OFFICE/OUTPT VISIT, EST, LEVL V, 40-54 MIN: ICD-10-PCS | Mod: S$GLB,,, | Performed by: INTERNAL MEDICINE

## 2023-09-18 PROCEDURE — 1160F RVW MEDS BY RX/DR IN RCRD: CPT | Mod: CPTII,S$GLB,, | Performed by: INTERNAL MEDICINE

## 2023-09-18 PROCEDURE — 1126F AMNT PAIN NOTED NONE PRSNT: CPT | Mod: CPTII,S$GLB,, | Performed by: INTERNAL MEDICINE

## 2023-09-18 PROCEDURE — 3077F SYST BP >= 140 MM HG: CPT | Mod: CPTII,S$GLB,, | Performed by: INTERNAL MEDICINE

## 2023-09-18 PROCEDURE — 1101F PT FALLS ASSESS-DOCD LE1/YR: CPT | Mod: CPTII,S$GLB,, | Performed by: INTERNAL MEDICINE

## 2023-09-18 PROCEDURE — 3077F PR MOST RECENT SYSTOLIC BLOOD PRESSURE >= 140 MM HG: ICD-10-PCS | Mod: CPTII,S$GLB,, | Performed by: INTERNAL MEDICINE

## 2023-09-18 PROCEDURE — 1126F PR PAIN SEVERITY QUANTIFIED, NO PAIN PRESENT: ICD-10-PCS | Mod: CPTII,S$GLB,, | Performed by: INTERNAL MEDICINE

## 2023-09-18 PROCEDURE — 99215 OFFICE O/P EST HI 40 MIN: CPT | Mod: S$GLB,,, | Performed by: INTERNAL MEDICINE

## 2023-09-18 PROCEDURE — 96402 CHEMO HORMON ANTINEOPL SQ/IM: CPT | Mod: PN

## 2023-09-18 PROCEDURE — 1160F PR REVIEW ALL MEDS BY PRESCRIBER/CLIN PHARMACIST DOCUMENTED: ICD-10-PCS | Mod: CPTII,S$GLB,, | Performed by: INTERNAL MEDICINE

## 2023-09-18 RX ADMIN — LEUPROLIDE ACETATE 45 MG: KIT at 02:09

## 2023-09-18 RX ADMIN — DENOSUMAB 60 MG: 60 INJECTION SUBCUTANEOUS at 02:09

## 2023-09-18 NOTE — PROGRESS NOTES
Subjective:       Name: Dilip Lopez  : 1940  MRN: [id number]    Chief Complaint   Patient presents with    Prostate Cancer      The patient is in clinic by himself    HPI: Dilip Lopez is a 82 y.o. male presents for evaluation of Prostate Cancer      The patient is here to discuss the results of his work-up done to assess his prostate cancer T2 very high risk grade 5 isac (4+5). He received his last Lupron on 2023.          PREVIOUS WORK-UP:  His genetic testing done through [facility] came back negative. His bone density showed osteoporosis.    His PET scan done on 2023 showed no evidence of metastatic disease.  His repeat PSA done on 2023 came back 0.33 compared to 0.62 4 month ago, his testosterone was 14. His CBC showed a  Hgb 13.3 and his CMP was normal.    He denies any new complaints.    The patient has been followed and treated by Dr. Thomason.  His diagnostic PSA was 22 in 2021.     He underwent an ultrasound guided prostate biopsy on 2021 that showed:  1- left base:  Prostate adenocarcinoma [place] grade 3 + 4 equals 7 40% patent for involving the biopsy.  2- left mid:  Prostate adenocarcinoma [place] 4+5=9 involving 40% total of 1 core.  3- Left apex:  Prostate adenocarcinoma Isac 4 + 3 equals 7 80% pattern for involving 60% total of the 2 of the 2 cores  4-right base:  Prostate adenocarcinoma [place] grade 4 + 3 equals 77 5% pattern for involving 25% total of the 2/2 cores.  5-right mid:  Prostate adenocarcinoma Isac 3+4=7 10% pattern for involving 10% total of the 2 of the 2 cores.  6-right apex:  Prostate adenocarcinoma [place] grade 4+5=9 involving 20% of the total 1 of the 2 cores.    He underwent on 2021 a total body bone scan that failed to show signs of distant metastatic disease.    He underwent a CT chest without contrast on 2021 showed;  1. Bilateral calcified and noncalcified pulmonary nodules  without significant interval change as compared to the prior exam.  As stated on prior report, [employer]ischer society guidelines recommend follow-up noncontrast chest CT at 18-24 months after discovery.  2. CT findings suggestive of prior granulomatous disease.    He was started on androgen deprivation therapy since September 2021.  He has been experiencing hot flashes that are mild and some urinary symptoms.  His PSA done on March 28, 2022 was 1.1. The last injection of Lupron is in September 2022. After his last injection he felt weak in his  legs and tired.        Oncology History   Prostate cancer   4/12/2022 Initial Diagnosis    Prostate cancer     2/9/2023 Cancer Staged    Staging form: Prostate, AJCC 8th Edition  - Clinical: Stage IIIC (cT2c, cN0, cM0, PSA: 22, Grade Group: 5)     3/17/2023 -  Chemotherapy    Treatment Summary   Plan Name: OP PROSTATE LEUPROLIDE (LUPRON) 6 MONTH  Treatment Goal: Palliative  Status: Active  Start Date: 3/17/2023  End Date: 2/16/2024 (Planned)  Provider: Adelina Levi MD  Chemotherapy: leuprolide acetate (6 month) injection 45 mg, 45 mg, Intramuscular, Clinic/HOD 1 time, 1 of 3 cycles  Administration: 45 mg (3/17/2023)          Past Medical History:   Diagnosis Date    Atrial fibrillation     CAD (coronary artery disease)     Hyperlipidemia     Hypertension     Prostate cancer 4/12/2022       Past Surgical History:   Procedure Laterality Date    ANGIOGRAM, CORONARY, WITH LEFT HEART CATHETERIZATION N/A 3/14/2023    Procedure: Left Heart Cath;  Surgeon: Jose Walker MD;  Location: [facility] CATH;  Service: Cardiology;  Laterality: N/A;    CARDIAC CATHETERIZATION      CARDIAC SURGERY      COLONOSCOPY      COLONOSCOPY N/A 5/14/2021    Procedure: COLONOSCOPY;  Surgeon: Leopoldo Mendoza MD;  Location: [facility] ENDO;  Service: Endoscopy;  Laterality: N/A;    CORONARY ANGIOGRAPHY N/A 3/14/2023    Procedure: ANGIOGRAM, CORONARY ARTERY;  Surgeon: Jose Walker MD;  Location: [facility] CATH;  Service:  Cardiology;  Laterality: N/A;    CORONARY ANGIOPLASTY      CORONARY STENT PLACEMENT  01/29/2018    ESOPHAGOGASTRODUODENOSCOPY N/A 5/14/2021    Procedure: EGD (ESOPHAGOGASTRODUODENOSCOPY);  Surgeon: Leopoldo Mendoza MD;  Location: [facility] ENDO;  Service: Endoscopy;  Laterality: N/A;  with push enteroscopy    HIATAL HERNIA REPAIR      TRANSRECTAL BIOPSY OF PROSTATE WITH ULTRASOUND GUIDANCE N/A 7/20/2021    Procedure: BIOPSY, PROSTATE, RECTAL APPROACH, WITH US GUIDANCE;  Surgeon: SARAI Thomasno MD;  Location: [facility] OR;  Service: Urology;  Laterality: N/A;       Family History   Problem Relation Age of Onset    Hypertension Mother     Diabetes Mother     Heart disease Father 62        MI    Parkinsonism Brother        Social History     Socioeconomic History    Marital status:    Tobacco Use    Smoking status: Never    Smokeless tobacco: Never   Substance and Sexual Activity    Alcohol use: No    Drug use: No       Review of patient's allergies indicates:   Allergen Reactions    Doxycycline hcl Rash    Bactrim [sulfamethoxazole-trimethoprim]     Cinnamon Other (See Comments)    Ciprofloxacin Hives    Sulfa (sulfonamide antibiotics)        Review of Systems   Constitutional:  Positive for diaphoresis. Negative for appetite change, fatigue and fever.   HENT:  Negative for facial swelling, mouth sores, postnasal drip and sore throat.    Eyes:  Negative for photophobia and visual disturbance.   Respiratory:  Negative for cough, chest tightness and shortness of breath.    Cardiovascular:  Negative for chest pain and leg swelling.   Gastrointestinal:  Negative for abdominal pain, blood in stool, constipation, nausea and vomiting.   Endocrine: Negative for cold intolerance and polyuria.   Genitourinary:  Negative for dysuria, flank pain and hematuria.   Musculoskeletal:  Positive for arthralgias and gait problem. Negative for back pain and joint swelling.   Integumentary:  Negative for color change and mole/lesion.    Neurological:  Positive for weakness and numbness. Negative for dizziness, headaches and memory loss.   Hematological:  Negative for adenopathy. Does not bruise/bleed easily.   Psychiatric/Behavioral:  Negative for behavioral problems, decreased concentration and sleep disturbance. The patient is not nervous/anxious.             Objective:     Vitals:    09/18/23 1254   BP: (!) 154/90   Pulse: 78   Resp: 16   Temp: 97 °F (36.1 °C)   TempSrc: Temporal   SpO2: 100%   Weight: 86 kg (189 lb 9.5 oz)   Height: 6' (1.829 m)          Physical Exam  Vitals reviewed.   Constitutional:       Appearance: Normal appearance.   HENT:      Head: Normocephalic and atraumatic.      Mouth/Throat:      Mouth: Mucous membranes are moist.      Pharynx: No oropharyngeal exudate.   Eyes:      General: No scleral icterus.     Pupils: Pupils are equal, round, and reactive to light.   Cardiovascular:      Rate and Rhythm: Normal rate and regular rhythm.      Pulses: Normal pulses.      Heart sounds: Normal heart sounds.   Pulmonary:      Effort: Pulmonary effort is normal.      Breath sounds: Normal breath sounds. No wheezing.   Abdominal:      General: Bowel sounds are normal. There is no distension.      Tenderness: There is no abdominal tenderness.   Musculoskeletal:         General: No swelling or tenderness.      Cervical back: Neck supple.   Lymphadenopathy:      Cervical: No cervical adenopathy.   Skin:     Coloration: Skin is not jaundiced.      Findings: No bruising or rash.   Neurological:      General: No focal deficit present.      Mental Status: He is alert and oriented to person, place, and time.      Cranial Nerves: No cranial nerve deficit.      Motor: No weakness.      Gait: Gait normal.   Psychiatric:         Mood and Affect: Mood normal.         Behavior: Behavior normal.                Current Outpatient Medications on File Prior to Visit   Medication Sig    allopurinoL (ZYLOPRIM) 300 MG tablet TAKE 1 TABLET BY MOUTH  "ONCE DAILY (Patient taking differently: Take 300 mg by mouth every evening.)    apixaban (ELIQUIS) 5 mg Tab TAKE ONE TABLET TWO TIMES A DAY * BLOOD THINNER *    cyanocobalamin 1,000 mcg/mL injection Inject 1 mL (1,000 mcg total) into the muscle every 30 days.    gabapentin (NEURONTIN) 300 MG capsule TAKE 1 CAPSULE BY MOUTH  TWICE DAILY    hydrALAZINE (APRESOLINE) 25 MG tablet Take 25 mg by mouth every evening.    hydroCHLOROthiazide (HYDRODIURIL) 12.5 MG Tab TAKE 1 TABLET BY MOUTH ONCE DAILY    hydrOXYzine HCL (ATARAX) 25 MG tablet Take 1 tablet (25 mg total) by mouth 3 (three) times daily as needed for Itching.    isosorbide mononitrate (IMDUR) 30 MG 24 hr tablet Take 1 tablet by mouth once daily.    [id number]-algal oil (METANX/FOLTANX RF) 3 mg-35 mg-2 mg -90.314 mg Cap Take 1 tablet by mouth 2 (two) times a day.    losartan (COZAAR) 100 MG tablet TAKE 1 TABLET BY MOUTH ONCE DAILY    montelukast (SINGULAIR) 10 mg tablet TAKE 1 TABLET BY MOUTH IN  THE EVENING    needle, disp, 25 gauge 25 gauge x 1" Ndle Use as directed    pantoprazole (PROTONIX) 20 MG tablet Take 20 mg by mouth once daily.    sotalol (BETAPACE) 40 MG tablet Take 40 mg by mouth once daily.    syringe with needle (BD LUER-PADMINI SYRINGE) 3 mL 25 x 1 1/2 " Syrg Use once a month to inject B12 injection.    syringe with needle (SYRINGE 3CC/25GX1") 3 mL 25 gauge x 1" Syrg Use as directed    temazepam (RESTORIL) 30 mg capsule TAKE 1 CAPSULE BY MOUTH NIGHTLY AS NEEDED FOR INSOMNIA.     No current facility-administered medications on file prior to visit.       CBC:  Lab Results   Component Value Date    WBC 5.84 09/14/2023    HGB 13.0 (L) 09/14/2023    HCT 39.2 (L) 09/14/2023    MCV 94 09/14/2023     09/14/2023         CMP:  Sodium   Date Value Ref Range Status   09/14/2023 139 136 - 145 mmol/L Final     Potassium   Date Value Ref Range Status   09/14/2023 4.4 3.5 - 5.1 mmol/L Final     Chloride   Date Value Ref Range Status   09/14/2023 104 95 " - 110 mmol/L Final     CO2   Date Value Ref Range Status   09/14/2023 23 23 - 29 mmol/L Final     Glucose   Date Value Ref Range Status   09/14/2023 99 70 - 110 mg/dL Final     BUN   Date Value Ref Range Status   09/14/2023 21 8 - 23 mg/dL Final     Creatinine   Date Value Ref Range Status   09/14/2023 1.7 (H) 0.5 - 1.4 mg/dL Final     Calcium   Date Value Ref Range Status   09/14/2023 9.2 8.7 - 10.5 mg/dL Final     Total Protein   Date Value Ref Range Status   09/14/2023 7.0 6.0 - 8.4 g/dL Final     Albumin   Date Value Ref Range Status   09/14/2023 4.0 3.5 - 5.2 g/dL Final     Total Bilirubin   Date Value Ref Range Status   09/14/2023 0.7 0.1 - 1.0 mg/dL Final     Comment:     For infants and newborns, interpretation of results should be based  on gestational age, weight and in agreement with clinical  observations.    Premature Infant recommended reference ranges:  Up to 24 hours.............<8.0 mg/dL  Up to 48 hours............<12.0 mg/dL  3-5 days..................<15.0 mg/dL  6-29 days.................<15.0 mg/dL       Alkaline Phosphatase   Date Value Ref Range Status   09/14/2023 68 55 - 135 U/L Final     AST   Date Value Ref Range Status   09/14/2023 16 10 - 40 U/L Final     ALT   Date Value Ref Range Status   09/14/2023 15 10 - 44 U/L Final     Anion Gap   Date Value Ref Range Status   09/14/2023 12 8 - 16 mmol/L Final     eGFR if    Date Value Ref Range Status   07/14/2022 43.1 (A) >60 mL/min/1.73 m^2 Final     eGFR if non    Date Value Ref Range Status   07/14/2022 37.3 (A) >60 mL/min/1.73 m^2 Final     Comment:     Calculation used to obtain the estimated glomerular filtration  rate (eGFR) is the CKD-EPI equation.          CV Ultrasound doppler venous arm left  · Left cephalic vein below the elbow is thrombosed. Left Basilic vein at   antecubital area only is thrombosed.  · No DVT.          ECOG SCORE                  Assessment/Plan:       1-Prostate cancer T2c very  high high risk:  -I reviewed independently the patient laboratory,radiology and pathology findings.  He has been responding well to his treatment. His PSA has been trending down from 0.33 to 0.28.    - His  undergo a PET PSMA for  staging purposes showed no signs of distant metastatic disease.  - The genetic testing in view of his personal history of prostate cancer and his extensive family history of prostate cancer came back negative    According to the NCCN guidelines and in view of his very high risk category and since his workup showing he has locally regional nonmetastatic disease the treatment usually include local treatment with radiation +ADT plus Abiraterone for 2 years for a patient with a good performance status and a life expectancy > 10 years.    If the patient is frail and has a life expectancy < 5 years he will benefit from observation vs ADT vs RT. These options were discussed with the patient.  He was offered in case he is concerned about side effects of the ADT to proceed with radiation therapy he declined.  Since his PSA is trending down he might benefit from continuing the same management until it becomes undetectable.  At that time he will receive a treatment holiday.  The patient was agreeable with my current recommendations..      The side effects of ADT   -ADT is associated with a wide range of side effects that can significantly impair quality of life.  Important and/or frequent side effects include: Loss of lean body mass, increased body fat, and decreased muscle strength, sexual dysfunction, loss of bone mineral density, which can result in bone fracture due to osteoporosis, vasomotor instability, which is manifested by hot flashes, gynecomastia, decreased body hair, and smaller penile and/or testicular size, fatigue, behavioral and neurologic effects, cardiovascular and metabolic abnormalities.  -Dietary calcium intake (food and supplements) of 1000 to 1200 mg daily, supplemental vitamin  D 800 to 1000 international units daily, and lifestyle modifications (weight-bearing exercise, decreased alcohol consumption, smoking discontinuation) are indicated for all males beginning ADT.    - He is cleared to have Lupron at a dose of 45 mg subcu today  - The patient will be seen back again in  6 month before receiving his Lupron injection.  He will also have blood workup drawn for CBC CMP PSA and testosterone level.      2- Lung nodules:  -no uptake seen on his PET scan.  Most likely of a benign etiology.      3- Osteoporosis:  -his bone density confirmed the diagnosis of osteoporosis.  The patient will require to be treated for this condition that will worsen as he is on ADT.  He will be started on Prolia q.6 months.  The side effects of this treatment were discussed at length.  The patient was agreeable to proceed with this treatment.    He was advised to take calcium and vitamin-D on a daily basis.  His first injection was on March 17, 2023  He is cleared to have his second injection on Prolia today    4- Back pain:  -status post a fall injury.  He is currently having bilateral peripheral neuropathy.  -he has been seen and the currently being managed by his primary care physician    5- CAD/AFIB  -stable on medical management.    6- Chronic kidney disease stage 3   -crea: 1.7 on today's lab  -will continue to follow up with repeat BMP.    Advance Care Planning During this visit, I engaged the patient in the advance care planning process.  The patient and I reviewed the role for advance directives and their purpose in directing future healthcare if the patients unable to speak for him/herself.  At this point in time, the patient does have full decision-making capacity.  We discussed different extreme health states that he could experience, and reviewed what kind of medical care he would want in those situations.  The patient communicated that if he were comatose and had little chance of a meaningful  recovery, he would not want machines/life-sustaining treatments used. .  The patient has not completed a living will to reflect these preferences.  The patient  has not already designated a healthcare power of  to make decisions on his behalf.  He will bring the paperwork before his next visit to have it on file.  I spent a total of 25 minutes engaging the patient in this advance care planning discussion.             Med Onc Chart Routing      Follow up with physician 6 months. with repeat CBC CMP and PSA. He is cleared to have his Lupron and Prolia injection   Follow up with [facility]    Infusion scheduling note    Injection scheduling note    Labs    Imaging    Pharmacy appointment    Other referrals               Plan was discussed with the patient at length, and he verbalized understanding. Dilip was given an opportunity to ask questions that were answered to his satisfaction, and he was advised to call in the interval if any problems or questions arise.  Signed:  Adelina Levi MD   Hematology and Oncology  [facility] - HEMATOLOGY ONCOLOGY  [facility], [place]

## 2023-10-09 DIAGNOSIS — M1A.09X0 IDIOPATHIC CHRONIC GOUT OF MULTIPLE SITES WITHOUT TOPHUS: ICD-10-CM

## 2023-10-10 RX ORDER — ALLOPURINOL 300 MG/1
TABLET ORAL
Qty: 90 TABLET | Refills: 3 | Status: SHIPPED | OUTPATIENT
Start: 2023-10-10

## 2023-10-10 NOTE — TELEPHONE ENCOUNTER
Refill Routing Note     Refill Routing Note   Medication(s) are not appropriate for processing by [facility] for the following reason(s):      Required labs abnormal    [facility] action(s):  Defer Care Due:  None identified            Appointments  past 12m or future 3m with PCP    Date Provider   Last Visit   7/27/2023 Nathalie Salgado MD   Next Visit   1/29/2024 Nathalie Salgado MD   ED visits in past 90 days: 0        Note composed:10:23 PM 10/09/2023

## 2023-10-10 NOTE — TELEPHONE ENCOUNTER
No care due was identified.  Health [facility] Embedded Care Due Messages. Reference number: [id number].   10/09/2023 9:43:17 PM CDT

## 2023-12-12 ENCOUNTER — TELEPHONE (OUTPATIENT)
Dept: FAMILY MEDICINE | Facility: CLINIC | Age: 83
End: 2023-12-12
Payer: MEDICARE

## 2023-12-12 NOTE — TELEPHONE ENCOUNTER
----- Message from Blanca Ashley sent at 12/12/2023 12:12 PM CST -----  Contact: wife  Type:  Same Day Appointment Request    Caller is requesting a same day appointment.  Caller declined first available appointment listed below.      Name of Caller:  Jane, wife   When is the first available appointment?  12/14  Symptoms:  back, neck, shoulder pain  Best Call Back Number:  [phone] (home)   Additional Information:   patients wife said he refuses to let her call 911- and he will not go to the emergency room, she wants an appt     Symptoms: Neck Pain - Not From Injury, Shoulder Pain - Not From Injury, Back Pain - Not From Injury  Outcome: Instruct patient to Call 911 NOW!  Reason: Acting confused sometimes    The caller rejected this outcome

## 2023-12-12 NOTE — TELEPHONE ENCOUNTER
Returned Patient call, spoke with Patient, He is having extreme shoulder/neck/back pain. Scheduled patient with Dr. Salgado on 12/14/23 and advised if pain gets any worse or if SOB occurs to go to nearest ED for evaluation/

## 2023-12-13 PROBLEM — R07.9 CHEST PAIN: Status: ACTIVE | Noted: 2023-12-13

## 2023-12-14 PROBLEM — R94.31 ABNORMAL EKG: Status: ACTIVE | Noted: 2023-12-14

## 2023-12-18 ENCOUNTER — TELEPHONE (OUTPATIENT)
Dept: CARDIOLOGY | Facility: CLINIC | Age: 83
End: 2023-12-18
Payer: MEDICARE

## 2023-12-18 ENCOUNTER — TELEPHONE (OUTPATIENT)
Dept: FAMILY MEDICINE | Facility: CLINIC | Age: 83
End: 2023-12-18
Payer: MEDICARE
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
## 2023-12-18 NOTE — TELEPHONE ENCOUNTER
----- Message from Blanca Ashley sent at 12/18/2023  8:19 AM CST -----  Type: Needs Medical Advice  Who Called:  Jane wife   Best Call Back Number: [phone]  Additional Information: patient was seen in hospital- is still not feeling well, cannot hardly get up, can't get into the shower - wants to know if dr weeks can't look through his chart from the hospital f/u- she doesn't know what to do, he is feeling worse

## 2023-12-18 NOTE — TELEPHONE ENCOUNTER
"Please advise: pt recently hospitalized and had angiogram with Dr Starkey. Wife called saying pt is feeling worse than he was before he was admitted. States the their RN daughter looked over his chart and saw the stress test from February and this month and that the perfusion abnormalities need to be addressed. Tried to explain that was what the angiogram was for, but they are concerned because "they didn't do anything". PCP has advised pt to go to the emergency room but daughter advised pt not to and to call you.    "

## 2023-12-18 NOTE — TELEPHONE ENCOUNTER
ABY:     Spoke with patient's spouse. She states that patient is refusing to go to ED despite pain being extreme. Patient cannot even reach to answer the phone, but is refusing to go to ED. He is convince the hospital made him worse.

## 2023-12-19 ENCOUNTER — TELEPHONE (OUTPATIENT)
Dept: FAMILY MEDICINE | Facility: CLINIC | Age: 83
End: 2023-12-19
Payer: MEDICARE

## 2023-12-19 ENCOUNTER — TELEPHONE (OUTPATIENT)
Dept: HEMATOLOGY/ONCOLOGY | Facility: CLINIC | Age: 83
End: 2023-12-19
Payer: MEDICARE

## 2023-12-19 NOTE — TELEPHONE ENCOUNTER
"Spoke with wife regards to  just got out the hospital a couple of days ago. Wife states,"pt is feeling bad again but refused to go to the ER and want Dr. Levi to see if she can check his PSA and telephone call regards to his no appetite, bone pain, weak. Informed wife that I will notify Dr. Levi. Wife verbalized understanding.     ----- Message from Lucy Balbuena, Patient Care Assistant sent at 12/19/2023  3:00 PM CST -----  Type: Needs Medical Advice  Who Called:  Dilip    Best Call Back Number: [phone]    Additional Information: Dilip is wanting to speak to nurse , please call to further  discuss, thank you        "

## 2023-12-19 NOTE — TELEPHONE ENCOUNTER
"Spoke with pt and advised angiogram with no significant blockage. Pt VU and states "something has to be done, all my bones hurt" Advised pt to f/u with PCP.   "

## 2023-12-19 NOTE — TELEPHONE ENCOUNTER
----- Message from Nathalie Salgado MD sent at 12/19/2023 12:38 PM CST -----  Regarding: RE: Care @ Home (NP HOME VISIT)  Can you please let the patient know that we are not able to do the [facility] because he lives outside of the current service area.  Can you please schedule for hospital follow-up visit in the office if the patient agrees.  If still very weak, still recommend the patient to go to the emergency room, we are not having another way to evaluate him without thing him.  Thank you.  ----- Message -----  From: Nemo Wei MA  Sent: 12/19/2023  11:14 AM CST  To: Nathalie Salgado MD  Subject: FW: Care @ Home (NP HOME VISIT)                  Please see message below:  ----- Message -----  From: Maria Ines Parker  Sent: 12/19/2023  10:47 AM CST  To: No[facility] Zelaya Staff  Subject: Care @ Home (NP HOME VISIT)                      [facility]. Unfortunately, the patient lives outside of our current service area. We are continuously looking at way to increase our bandwidth to include additional coverage. Please schedule the patient for an in clinic HOS visit.

## 2023-12-20 ENCOUNTER — TELEPHONE (OUTPATIENT)
Dept: FAMILY MEDICINE | Facility: CLINIC | Age: 83
End: 2023-12-20
Payer: MEDICARE

## 2023-12-20 NOTE — TELEPHONE ENCOUNTER
----- Message from Caroline Niraj sent at 12/20/2023 10:41 AM CST -----  Regarding: Sooner appt  Contact: Jane, wife  Type:  Sooner Appointment Request    Caller is requesting a sooner appointment.  Caller declined first available appointment listed below.  Caller will not accept being placed on the waitlist and is requesting a message be sent to doctor.    Name of Caller:  Jane, Wife  When is the first available appointment?  N/A  Symptoms:  Bone pain in groin and back  Would the patient rather a call back or a response via [facility]? Call back  Best Call Back Number:  [phone]      Additional Information:  Patient really needs to see Dr. Salgado as soon as possible for bone pain and to get an order in for an X-Ray. Please call patient to advise and schedule. Thanks!

## 2023-12-20 NOTE — TELEPHONE ENCOUNTER
His prostate levels were checked and were in normal range 3 months ago.  I can overbook him for Friday at 2:20 p.m. if he wants to come.  But I am still recommended him to go to the ER because of the pain that he is having.  Thank you.

## 2023-12-20 NOTE — TELEPHONE ENCOUNTER
Pt refused to go to ER. He stated he only wants labs for PSA. To see if his prostate is inflamed. I explained due to the amount of pain is in he has to go to the ER. Pt declined again.  He stated he will wait till his Joss appt but if he waits too long he might die.

## 2023-12-22 ENCOUNTER — OFFICE VISIT (OUTPATIENT)
Dept: FAMILY MEDICINE | Facility: CLINIC | Age: 83
End: 2023-12-22
Payer: MEDICARE

## 2023-12-22 ENCOUNTER — LAB VISIT (OUTPATIENT)
Dept: LAB | Facility: HOSPITAL | Age: 83
End: 2023-12-22
Attending: FAMILY MEDICINE
Payer: MEDICARE

## 2023-12-22 VITALS
OXYGEN SATURATION: 98 % | WEIGHT: 182 LBS | HEART RATE: 83 BPM | BODY MASS INDEX: 24.01 KG/M2 | SYSTOLIC BLOOD PRESSURE: 128 MMHG | DIASTOLIC BLOOD PRESSURE: 68 MMHG

## 2023-12-22 DIAGNOSIS — G47.09 OTHER INSOMNIA: ICD-10-CM

## 2023-12-22 DIAGNOSIS — R63.0 APPETITE ABSENT: ICD-10-CM

## 2023-12-22 DIAGNOSIS — E53.8 FOLATE DEFICIENCY: ICD-10-CM

## 2023-12-22 DIAGNOSIS — R53.83 OTHER FATIGUE: ICD-10-CM

## 2023-12-22 DIAGNOSIS — M25.50 MULTIPLE JOINT PAIN: Primary | ICD-10-CM

## 2023-12-22 DIAGNOSIS — R70.0 ESR RAISED: ICD-10-CM

## 2023-12-22 DIAGNOSIS — M25.50 MULTIPLE JOINT PAIN: ICD-10-CM

## 2023-12-22 DIAGNOSIS — R79.82 CRP ELEVATED: ICD-10-CM

## 2023-12-22 DIAGNOSIS — E53.8 B12 DEFICIENCY: ICD-10-CM

## 2023-12-22 DIAGNOSIS — R61 NIGHT SWEATS: ICD-10-CM

## 2023-12-22 LAB
ALBUMIN SERPL BCP-MCNC: 3.7 G/DL (ref 3.5–5.2)
ALP SERPL-CCNC: 67 U/L (ref 55–135)
ALT SERPL W/O P-5'-P-CCNC: 9 U/L (ref 10–44)
ANION GAP SERPL CALC-SCNC: 12 MMOL/L (ref 8–16)
AST SERPL-CCNC: 13 U/L (ref 10–40)
BASOPHILS # BLD AUTO: 0.03 K/UL (ref 0–0.2)
BASOPHILS NFR BLD: 0.3 % (ref 0–1.9)
BILIRUB SERPL-MCNC: 0.6 MG/DL (ref 0.1–1)
BUN SERPL-MCNC: 25 MG/DL (ref 8–23)
CALCIUM SERPL-MCNC: 8.6 MG/DL (ref 8.7–10.5)
CHLORIDE SERPL-SCNC: 103 MMOL/L (ref 95–110)
CO2 SERPL-SCNC: 22 MMOL/L (ref 23–29)
CREAT SERPL-MCNC: 1.6 MG/DL (ref 0.5–1.4)
CRP SERPL-MCNC: 112 MG/L (ref 0–8.2)
DIFFERENTIAL METHOD: ABNORMAL
EOSINOPHIL # BLD AUTO: 0.2 K/UL (ref 0–0.5)
EOSINOPHIL NFR BLD: 1.8 % (ref 0–8)
ERYTHROCYTE [DISTWIDTH] IN BLOOD BY AUTOMATED COUNT: 13.3 % (ref 11.5–14.5)
ERYTHROCYTE [SEDIMENTATION RATE] IN BLOOD BY PHOTOMETRIC METHOD: 81 MM/HR (ref 0–23)
EST. GFR  (NO RACE VARIABLE): 42.8 ML/MIN/1.73 M^2
FOLATE SERPL-MCNC: 4.8 NG/ML (ref 4–24)
GLUCOSE SERPL-MCNC: 101 MG/DL (ref 70–110)
HCT VFR BLD AUTO: 41.7 % (ref 40–54)
HGB BLD-MCNC: 13.8 G/DL (ref 14–18)
IMM GRANULOCYTES # BLD AUTO: 0.05 K/UL (ref 0–0.04)
IMM GRANULOCYTES NFR BLD AUTO: 0.5 % (ref 0–0.5)
LYMPHOCYTES # BLD AUTO: 1.8 K/UL (ref 1–4.8)
LYMPHOCYTES NFR BLD: 17.5 % (ref 18–48)
MCH RBC QN AUTO: 31.4 PG (ref 27–31)
MCHC RBC AUTO-ENTMCNC: 33.1 G/DL (ref 32–36)
MCV RBC AUTO: 95 FL (ref 82–98)
MONOCYTES # BLD AUTO: 0.9 K/UL (ref 0.3–1)
MONOCYTES NFR BLD: 8.8 % (ref 4–15)
NEUTROPHILS # BLD AUTO: 7.3 K/UL (ref 1.8–7.7)
NEUTROPHILS NFR BLD: 71.1 % (ref 38–73)
NRBC BLD-RTO: 0 /100 WBC
PLATELET # BLD AUTO: 357 K/UL (ref 150–450)
PMV BLD AUTO: 9.9 FL (ref 9.2–12.9)
POTASSIUM SERPL-SCNC: 4.1 MMOL/L (ref 3.5–5.1)
PROT SERPL-MCNC: 8 G/DL (ref 6–8.4)
RBC # BLD AUTO: 4.4 M/UL (ref 4.6–6.2)
SODIUM SERPL-SCNC: 137 MMOL/L (ref 136–145)
TSH SERPL DL<=0.005 MIU/L-ACNC: 2.37 UIU/ML (ref 0.4–4)
URATE SERPL-MCNC: 8 MG/DL (ref 3.4–7)
VIT B12 SERPL-MCNC: 262 PG/ML (ref 210–950)
WBC # BLD AUTO: 10.3 K/UL (ref 3.9–12.7)

## 2023-12-22 PROCEDURE — 1159F PR MEDICATION LIST DOCUMENTED IN MEDICAL RECORD: ICD-10-PCS | Mod: CPTII,S$GLB,, | Performed by: FAMILY MEDICINE

## 2023-12-22 PROCEDURE — 85652 RBC SED RATE AUTOMATED: CPT | Performed by: FAMILY MEDICINE

## 2023-12-22 PROCEDURE — 3288F FALL RISK ASSESSMENT DOCD: CPT | Mod: CPTII,S$GLB,, | Performed by: FAMILY MEDICINE

## 2023-12-22 PROCEDURE — 84443 ASSAY THYROID STIM HORMONE: CPT | Performed by: FAMILY MEDICINE

## 2023-12-22 PROCEDURE — 99214 OFFICE O/P EST MOD 30 MIN: CPT | Mod: S$GLB,,, | Performed by: FAMILY MEDICINE

## 2023-12-22 PROCEDURE — 3078F PR MOST RECENT DIASTOLIC BLOOD PRESSURE < 80 MM HG: ICD-10-PCS | Mod: CPTII,S$GLB,, | Performed by: FAMILY MEDICINE

## 2023-12-22 PROCEDURE — 1111F PR DISCHARGE MEDS RECONCILED W/ CURRENT OUTPATIENT MED LIST: ICD-10-PCS | Mod: CPTII,S$GLB,, | Performed by: FAMILY MEDICINE

## 2023-12-22 PROCEDURE — 3074F SYST BP LT 130 MM HG: CPT | Mod: CPTII,S$GLB,, | Performed by: FAMILY MEDICINE

## 2023-12-22 PROCEDURE — 82607 VITAMIN B-12: CPT | Performed by: FAMILY MEDICINE

## 2023-12-22 PROCEDURE — 80053 COMPREHEN METABOLIC PANEL: CPT | Performed by: FAMILY MEDICINE

## 2023-12-22 PROCEDURE — 1159F MED LIST DOCD IN RCRD: CPT | Mod: CPTII,S$GLB,, | Performed by: FAMILY MEDICINE

## 2023-12-22 PROCEDURE — 85025 COMPLETE CBC W/AUTO DIFF WBC: CPT | Performed by: FAMILY MEDICINE

## 2023-12-22 PROCEDURE — 99999 PR PBB SHADOW E&M-EST. PATIENT-LVL III: ICD-10-PCS | Mod: PBBFAC,,, | Performed by: FAMILY MEDICINE

## 2023-12-22 PROCEDURE — 99214 PR OFFICE/OUTPT VISIT, EST, LEVL IV, 30-39 MIN: ICD-10-PCS | Mod: S$GLB,,, | Performed by: FAMILY MEDICINE

## 2023-12-22 PROCEDURE — 36415 COLL VENOUS BLD VENIPUNCTURE: CPT | Mod: PO | Performed by: FAMILY MEDICINE

## 2023-12-22 PROCEDURE — 1101F PR PT FALLS ASSESS DOC 0-1 FALLS W/OUT INJ PAST YR: ICD-10-PCS | Mod: CPTII,S$GLB,, | Performed by: FAMILY MEDICINE

## 2023-12-22 PROCEDURE — 1125F PR PAIN SEVERITY QUANTIFIED, PAIN PRESENT: ICD-10-PCS | Mod: CPTII,S$GLB,, | Performed by: FAMILY MEDICINE

## 2023-12-22 PROCEDURE — 84550 ASSAY OF BLOOD/URIC ACID: CPT | Performed by: FAMILY MEDICINE

## 2023-12-22 PROCEDURE — 82746 ASSAY OF FOLIC ACID SERUM: CPT | Performed by: FAMILY MEDICINE

## 2023-12-22 PROCEDURE — 1101F PT FALLS ASSESS-DOCD LE1/YR: CPT | Mod: CPTII,S$GLB,, | Performed by: FAMILY MEDICINE

## 2023-12-22 PROCEDURE — 86140 C-REACTIVE PROTEIN: CPT | Performed by: FAMILY MEDICINE

## 2023-12-22 PROCEDURE — 3288F PR FALLS RISK ASSESSMENT DOCUMENTED: ICD-10-PCS | Mod: CPTII,S$GLB,, | Performed by: FAMILY MEDICINE

## 2023-12-22 PROCEDURE — 3078F DIAST BP <80 MM HG: CPT | Mod: CPTII,S$GLB,, | Performed by: FAMILY MEDICINE

## 2023-12-22 PROCEDURE — 99999 PR PBB SHADOW E&M-EST. PATIENT-LVL III: CPT | Mod: PBBFAC,,, | Performed by: FAMILY MEDICINE

## 2023-12-22 PROCEDURE — 1125F AMNT PAIN NOTED PAIN PRSNT: CPT | Mod: CPTII,S$GLB,, | Performed by: FAMILY MEDICINE

## 2023-12-22 PROCEDURE — 1111F DSCHRG MED/CURRENT MED MERGE: CPT | Mod: CPTII,S$GLB,, | Performed by: FAMILY MEDICINE

## 2023-12-22 PROCEDURE — 3074F PR MOST RECENT SYSTOLIC BLOOD PRESSURE < 130 MM HG: ICD-10-PCS | Mod: CPTII,S$GLB,, | Performed by: FAMILY MEDICINE

## 2023-12-22 NOTE — TELEPHONE ENCOUNTER
No care due was identified.  [facility] Embedded Care Due Messages. Reference number: [id number].   12/22/2023 4:04:16 PM CST

## 2023-12-23 RX ORDER — METHYLPREDNISOLONE 4 MG/1
TABLET ORAL
Qty: 1 EACH | Refills: 0 | Status: SHIPPED | OUTPATIENT
Start: 2023-12-23 | End: 2024-01-29

## 2023-12-23 RX ORDER — TEMAZEPAM 30 MG/1
CAPSULE ORAL
Qty: 30 CAPSULE | Refills: 2 | Status: SHIPPED | OUTPATIENT
Start: 2023-12-23 | End: 2024-03-27

## 2023-12-23 NOTE — PROGRESS NOTES
Assessment:       1. Multiple joint pain    2. Appetite absent    3. Night sweats    4. B12 deficiency    5. Folate deficiency    6. Other fatigue        Plan:       Multiple joint pain:  Worsening  -     Uric Acid; Future; Expected date: 12/22/2023  -     CBC Auto Differential; Future; Expected date: 12/22/2023  -     Sedimentation rate; Future; Expected date: 12/22/2023  -     C-Reactive Protein; Future; Expected date: 12/22/2023    Appetite absent:  Worsening  -     Urinalysis; Future; Expected date: 12/22/2023  -     CULTURE, URINE; Future; Expected date: 12/22/2023    Night sweats:  Worsening  -     Urinalysis; Future; Expected date: 12/22/2023  -     CULTURE, URINE; Future; Expected date: 12/22/2023    B12 deficiency:  Uncontrolled  -     Vitamin B12; Future; Expected date: 12/22/2023    Folate deficiency:  Uncontrolled  -     Folate; Future; Expected date: 12/22/2023    Other fatigue:  Worsening  -     Urinalysis; Future; Expected date: 12/22/2023  -     CULTURE, URINE; Future; Expected date: 12/22/2023  -     Comprehensive Metabolic Panel; Future; Expected date: 12/22/2023  -     TSH; Future; Expected date: 12/22/2023         Will send a message when we have the results of the test.  The patient was advised if the symptoms get worse to go to the ER.   Patient agreed with assessment and plan. Patient verbalized understanding.     Subjective:       Patient ID: Dilip Lopez is a 83 y.o. male.    Chief Complaint: Follow-up and fatigue    HPI    The patient coming here today for a follow-up visit, the patient was hospitalized secondary to chest pain, the patient now stated that after being released from the hospital feels worse.  He is complaining of pain on the neck area radiated to the shoulder area, back pain radiated to the lower extremities, he also stated that is having night sweats, multiple joint pain, feels very tired, having low appetite and feeling weak.  Upon review of the last blood work, the  patient has B12 deficiency and also folic acid deficiency.  The patient has not been taking folic acid because cause him allergic reaction with pruritus of the skin and he has not been taking B12 either.    Past medical history, past social history was reviewed and discussed with the patient.    Review of Systems   Constitutional:  Positive for activity change, appetite change and fatigue. Negative for chills.   HENT:  Negative for congestion and ear discharge.    Eyes:  Negative for discharge and itching.   Respiratory:  Negative for choking and chest tightness.    Cardiovascular:  Negative for chest pain and palpitations.   Gastrointestinal:  Negative for abdominal distention and abdominal pain.   Endocrine: Negative for cold intolerance and heat intolerance.   Genitourinary:  Negative for dysuria and flank pain.   Musculoskeletal:  Positive for arthralgias, back pain and gait problem.   Skin:  Negative for pallor and rash.   Allergic/Immunologic: Negative for environmental allergies and food allergies.   Neurological:  Positive for weakness. Negative for dizziness and facial asymmetry.   Hematological:  Negative for adenopathy. Does not bruise/bleed easily.   Psychiatric/Behavioral:  Negative for agitation and confusion.        Objective:      Physical Exam  Vitals and nursing note reviewed.   Constitutional:       General: He is not in acute distress.     Appearance: Normal appearance. He is well-developed and normal weight. He is not diaphoretic.      Comments: The patient has limited ambulation   HENT:      Head: Normocephalic and atraumatic.      Right Ear: External ear normal.      Left Ear: External ear normal.      Nose: Nose normal.   Eyes:      General: No scleral icterus.        Left eye: No discharge.   Cardiovascular:      Rate and Rhythm: Normal rate and regular rhythm.      Heart sounds: Normal heart sounds.   Pulmonary:      Effort: Pulmonary effort is normal. No respiratory distress.      Breath  sounds: Normal breath sounds. No wheezing.   Musculoskeletal:      Cervical back: Normal range of motion and neck supple.   Skin:     General: Skin is warm and dry.      Coloration: Skin is not pale.      Findings: No erythema.   Neurological:      Mental Status: He is alert.      Cranial Nerves: No cranial nerve deficit.      Motor: No abnormal muscle tone.   Psychiatric:         Behavior: Behavior normal.         Thought Content: Thought content normal.         Judgment: Judgment normal.

## 2023-12-26 ENCOUNTER — TELEPHONE (OUTPATIENT)
Dept: HEMATOLOGY/ONCOLOGY | Facility: CLINIC | Age: 83
End: 2023-12-26
Payer: MEDICARE

## 2023-12-26 ENCOUNTER — TELEPHONE (OUTPATIENT)
Dept: CARDIOLOGY | Facility: CLINIC | Age: 83
End: 2023-12-26
Payer: MEDICARE

## 2023-12-26 DIAGNOSIS — C61 PROSTATE CANCER: Primary | ICD-10-CM

## 2023-12-26 NOTE — TELEPHONE ENCOUNTER
----- Message from Kay Rojas sent at 12/26/2023 11:59 AM CST -----  Type:  Needs Medical Advice    Who Called: pt  step-daughter ronda   Kevin Call Back Number: [phone]  Additional Information:  Requesting call back wants to dx test results     please advise thank you

## 2023-12-26 NOTE — TELEPHONE ENCOUNTER
Step daughter calling with questions: why did perfusion defect get worse from February to now; why is nothing being done about it (because of his age?) how does his elevated CRP and ESR figure into all of this (PCP started him on steroids for the inflammation)  Pt still having significant symptoms  Is this something you want to address with her over the phone or should I schedule an appointment?

## 2023-12-26 NOTE — TELEPHONE ENCOUNTER
Dtr. C/o of father is having bone pain and would like something to put in an order for a PSA. I verbalized understanding and informed her that I would contact the on-call provider. She verbalized understanding.     ----- Message from Kay Rojas sent at 12/26/2023 12:01 PM CST -----  Type:  Needs Medical Advice    Who Called: pt  step-daughter ronda Brown Call Back Number: [phone]  Additional Information:  Requesting call back wants to dx test results      please advise thank you

## 2023-12-27 ENCOUNTER — PATIENT MESSAGE (OUTPATIENT)
Dept: HEMATOLOGY/ONCOLOGY | Facility: CLINIC | Age: 83
End: 2023-12-27
Payer: MEDICARE

## 2023-12-28 ENCOUNTER — E-CONSULT (OUTPATIENT)
Dept: RHEUMATOLOGY | Facility: CLINIC | Age: 83
End: 2023-12-28
Payer: MEDICARE

## 2023-12-28 DIAGNOSIS — R70.0 ELEVATED SEDIMENTATION RATE: Primary | ICD-10-CM

## 2023-12-28 PROCEDURE — 99451 NTRPROF PH1/NTRNET/EHR 5/>: CPT | Mod: S$GLB,,, | Performed by: INTERNAL MEDICINE

## 2023-12-28 PROCEDURE — 99451 PR INTERPROF, PHONE/INTERNET/EHR, CONSULT, >= 5MINS: ICD-10-PCS | Mod: S$GLB,,, | Performed by: INTERNAL MEDICINE

## 2023-12-28 NOTE — CONSULTS
[facility] [address]  Response for E-Consult     Patient Name: Dilip Lopez  MRN: [id number]  Primary Care Provider: Nathalie Salgado MD   Requesting Provider: Nathalie Salgado MD  Consults    Recommendation: Clinical picture suspicious for polymyalgia rheumatica with shoulder and hip girdle pain and consitutional symptoms and elevated ESR/CRP. ER workup did not show aortitis but giant cell arteritis may occur with PMR and is more likely when constitutional symptoms are prominent (low appetite, fatigue, night sweats). Same is true of lymphoma, which can present as PMR. So if he responds to medrol pack then would continue 10 mg/d prednisone while we schedule rheumatology office visit    Contingency:  If response to medrol is not satisfactory then needs sooner evaluation in rheum clinic so message me to help make that happen    Total time of Consultation: 10 minute    I did not speak to the requesting provider verbally about this.     *This eConsult is based on the clinical data available to me and is furnished without benefit of a physical examination. The eConsult will need to be interpreted in light of any clinical issues or changes in patient status not available to me at the time of filing this eConsults. Significant changes in patient condition or level of acuity should result in immediate formal consultation and reevaluation. Please alert me if you have further questions.    Thank you for this eConsult referral.     MD [facility]Steven[address]

## 2023-12-29 ENCOUNTER — PATIENT MESSAGE (OUTPATIENT)
Dept: FAMILY MEDICINE | Facility: CLINIC | Age: 83
End: 2023-12-29
Payer: MEDICARE

## 2023-12-29 DIAGNOSIS — R70.0 ESR RAISED: Primary | ICD-10-CM

## 2023-12-29 DIAGNOSIS — R79.82 CRP ELEVATED: ICD-10-CM

## 2023-12-29 RX ORDER — PREDNISONE 10 MG/1
10 TABLET ORAL DAILY
Qty: 30 TABLET | Refills: 1 | Status: SHIPPED | OUTPATIENT
Start: 2023-12-29 | End: 2024-02-27

## 2023-12-30 ENCOUNTER — TELEPHONE (OUTPATIENT)
Dept: FAMILY MEDICINE | Facility: CLINIC | Age: 83
End: 2023-12-30
Payer: MEDICARE

## 2023-12-30 NOTE — TELEPHONE ENCOUNTER
Spoke with patient stated daughter on 12/29/2023, the patient already feeling better, will start patient on prednisone 10 mg daily, the patient will start taking the prednisone 10 mg after finish the Medrol Dosepak.  Thank you.

## 2023-12-30 NOTE — TELEPHONE ENCOUNTER
----- Message from Narda Leiva MD sent at 12/26/2023  4:32 PM CST -----  Regarding: Reschedule 1/22 labs  Hi - Could we please reschedule labs from 1/22 to 1/3? Patient has pain since discharge so I am adding a PSA to all labs ordered by Dr. Salgado. Per wife cardiac work-up was negative but he is still feeling poorly.    Thanks,  Narda

## 2024-01-03 ENCOUNTER — LAB VISIT (OUTPATIENT)
Dept: LAB | Facility: HOSPITAL | Age: 84
End: 2024-01-03
Attending: FAMILY MEDICINE
Payer: MEDICARE

## 2024-01-03 DIAGNOSIS — R70.0 ESR RAISED: ICD-10-CM

## 2024-01-03 DIAGNOSIS — R73.03 PREDIABETES: ICD-10-CM

## 2024-01-03 DIAGNOSIS — E53.8 FOLATE DEFICIENCY: ICD-10-CM

## 2024-01-03 DIAGNOSIS — R26.89 BALANCE DISORDER: ICD-10-CM

## 2024-01-03 DIAGNOSIS — M25.50 MULTIPLE JOINT PAIN: ICD-10-CM

## 2024-01-03 DIAGNOSIS — E53.8 B12 DEFICIENCY: ICD-10-CM

## 2024-01-03 DIAGNOSIS — R79.82 CRP ELEVATED: ICD-10-CM

## 2024-01-03 DIAGNOSIS — C61 PROSTATE CANCER: ICD-10-CM

## 2024-01-03 DIAGNOSIS — R26.2 WALKING DIFFICULTY DUE TO LOWER LEG: ICD-10-CM

## 2024-01-03 LAB
ALBUMIN SERPL BCP-MCNC: 3.9 G/DL (ref 3.5–5.2)
ALP SERPL-CCNC: 55 U/L (ref 55–135)
ALT SERPL W/O P-5'-P-CCNC: 13 U/L (ref 10–44)
ANION GAP SERPL CALC-SCNC: 13 MMOL/L (ref 8–16)
AST SERPL-CCNC: 14 U/L (ref 10–40)
BASOPHILS # BLD AUTO: 0.03 K/UL (ref 0–0.2)
BASOPHILS NFR BLD: 0.3 % (ref 0–1.9)
BILIRUB SERPL-MCNC: 0.4 MG/DL (ref 0.1–1)
BUN SERPL-MCNC: 28 MG/DL (ref 8–23)
CALCIUM SERPL-MCNC: 8.8 MG/DL (ref 8.7–10.5)
CCP AB SER IA-ACNC: 1.1 U/ML
CHLORIDE SERPL-SCNC: 104 MMOL/L (ref 95–110)
CO2 SERPL-SCNC: 24 MMOL/L (ref 23–29)
COMPLEXED PSA SERPL-MCNC: 0.29 NG/ML (ref 0–4)
CREAT SERPL-MCNC: 1.4 MG/DL (ref 0.5–1.4)
CRP SERPL-MCNC: 2.1 MG/L (ref 0–8.2)
DIFFERENTIAL METHOD BLD: ABNORMAL
EOSINOPHIL # BLD AUTO: 0.1 K/UL (ref 0–0.5)
EOSINOPHIL NFR BLD: 0.9 % (ref 0–8)
ERYTHROCYTE [DISTWIDTH] IN BLOOD BY AUTOMATED COUNT: 14.6 % (ref 11.5–14.5)
ERYTHROCYTE [SEDIMENTATION RATE] IN BLOOD BY PHOTOMETRIC METHOD: 15 MM/HR (ref 0–23)
EST. GFR  (NO RACE VARIABLE): 50.2 ML/MIN/1.73 M^2
ESTIMATED AVG GLUCOSE: 120 MG/DL (ref 68–131)
FOLATE SERPL-MCNC: 4.4 NG/ML (ref 4–24)
FOLATE SERPL-MCNC: 4.4 NG/ML (ref 4–24)
GLUCOSE SERPL-MCNC: 105 MG/DL (ref 70–110)
HBA1C MFR BLD: 5.8 % (ref 4–5.6)
HCT VFR BLD AUTO: 39.4 % (ref 40–54)
HGB BLD-MCNC: 13.5 G/DL (ref 14–18)
IMM GRANULOCYTES # BLD AUTO: 0.12 K/UL (ref 0–0.04)
IMM GRANULOCYTES NFR BLD AUTO: 1.2 % (ref 0–0.5)
LYMPHOCYTES # BLD AUTO: 1.4 K/UL (ref 1–4.8)
LYMPHOCYTES NFR BLD: 13.5 % (ref 18–48)
MAGNESIUM SERPL-MCNC: 2.1 MG/DL (ref 1.6–2.6)
MCH RBC QN AUTO: 30.5 PG (ref 27–31)
MCHC RBC AUTO-ENTMCNC: 34.3 G/DL (ref 32–36)
MCV RBC AUTO: 89 FL (ref 82–98)
MONOCYTES # BLD AUTO: 0.6 K/UL (ref 0.3–1)
MONOCYTES NFR BLD: 5.8 % (ref 4–15)
NEUTROPHILS # BLD AUTO: 8 K/UL (ref 1.8–7.7)
NEUTROPHILS NFR BLD: 78.3 % (ref 38–73)
NRBC BLD-RTO: 0 /100 WBC
PLATELET # BLD AUTO: 279 K/UL (ref 150–450)
PMV BLD AUTO: 9.6 FL (ref 9.2–12.9)
POTASSIUM SERPL-SCNC: 3.8 MMOL/L (ref 3.5–5.1)
PROT SERPL-MCNC: 7.4 G/DL (ref 6–8.4)
RBC # BLD AUTO: 4.42 M/UL (ref 4.6–6.2)
RHEUMATOID FACT SERPL-ACNC: <13 IU/ML (ref 0–15)
SODIUM SERPL-SCNC: 141 MMOL/L (ref 136–145)
TESTOST SERPL-MCNC: 6 NG/DL (ref 304–1227)
URATE SERPL-MCNC: 6.1 MG/DL (ref 3.4–7)
VIT B12 SERPL-MCNC: 216 PG/ML (ref 210–950)
VIT B12 SERPL-MCNC: 216 PG/ML (ref 210–950)
WBC # BLD AUTO: 10.18 K/UL (ref 3.9–12.7)

## 2024-01-03 PROCEDURE — 86431 RHEUMATOID FACTOR QUANT: CPT | Performed by: FAMILY MEDICINE

## 2024-01-03 PROCEDURE — 80053 COMPREHEN METABOLIC PANEL: CPT | Performed by: FAMILY MEDICINE

## 2024-01-03 PROCEDURE — 83036 HEMOGLOBIN GLYCOSYLATED A1C: CPT | Performed by: FAMILY MEDICINE

## 2024-01-03 PROCEDURE — 84153 ASSAY OF PSA TOTAL: CPT | Performed by: STUDENT IN AN ORGANIZED HEALTH CARE EDUCATION/TRAINING PROGRAM

## 2024-01-03 PROCEDURE — 82746 ASSAY OF FOLIC ACID SERUM: CPT | Performed by: FAMILY MEDICINE

## 2024-01-03 PROCEDURE — 86140 C-REACTIVE PROTEIN: CPT | Performed by: FAMILY MEDICINE

## 2024-01-03 PROCEDURE — 82607 VITAMIN B-12: CPT | Performed by: FAMILY MEDICINE

## 2024-01-03 PROCEDURE — 84403 ASSAY OF TOTAL TESTOSTERONE: CPT | Performed by: STUDENT IN AN ORGANIZED HEALTH CARE EDUCATION/TRAINING PROGRAM

## 2024-01-03 PROCEDURE — 85652 RBC SED RATE AUTOMATED: CPT | Performed by: FAMILY MEDICINE

## 2024-01-03 PROCEDURE — 83735 ASSAY OF MAGNESIUM: CPT | Performed by: FAMILY MEDICINE

## 2024-01-03 PROCEDURE — 84550 ASSAY OF BLOOD/URIC ACID: CPT | Performed by: FAMILY MEDICINE

## 2024-01-03 PROCEDURE — 86038 ANTINUCLEAR ANTIBODIES: CPT | Performed by: FAMILY MEDICINE

## 2024-01-03 PROCEDURE — 86200 CCP ANTIBODY: CPT | Performed by: FAMILY MEDICINE

## 2024-01-03 PROCEDURE — 85025 COMPLETE CBC W/AUTO DIFF WBC: CPT | Performed by: FAMILY MEDICINE

## 2024-01-03 PROCEDURE — 36415 COLL VENOUS BLD VENIPUNCTURE: CPT | Mod: PO | Performed by: FAMILY MEDICINE

## 2024-01-04 LAB — ANA SER QL IF: NORMAL

## 2024-01-11 DIAGNOSIS — Z00.00 ENCOUNTER FOR MEDICARE ANNUAL WELLNESS EXAM: ICD-10-CM

## 2024-01-29 ENCOUNTER — LAB VISIT (OUTPATIENT)
Dept: LAB | Facility: HOSPITAL | Age: 84
End: 2024-01-29
Attending: FAMILY MEDICINE
Payer: MEDICARE

## 2024-01-29 ENCOUNTER — OFFICE VISIT (OUTPATIENT)
Dept: FAMILY MEDICINE | Facility: CLINIC | Age: 84
End: 2024-01-29
Payer: MEDICARE

## 2024-01-29 VITALS
SYSTOLIC BLOOD PRESSURE: 112 MMHG | BODY MASS INDEX: 25.07 KG/M2 | HEART RATE: 87 BPM | HEIGHT: 73 IN | DIASTOLIC BLOOD PRESSURE: 72 MMHG | OXYGEN SATURATION: 97 % | WEIGHT: 189.13 LBS

## 2024-01-29 DIAGNOSIS — C61 PROSTATE CANCER: ICD-10-CM

## 2024-01-29 DIAGNOSIS — R30.0 DYSURIA: Primary | ICD-10-CM

## 2024-01-29 DIAGNOSIS — N39.0 BACTERIAL UTI: ICD-10-CM

## 2024-01-29 DIAGNOSIS — M25.50 MULTIPLE JOINT PAIN: ICD-10-CM

## 2024-01-29 DIAGNOSIS — R30.0 DYSURIA: ICD-10-CM

## 2024-01-29 DIAGNOSIS — I25.118 CORONARY ARTERY DISEASE OF NATIVE ARTERY OF NATIVE HEART WITH STABLE ANGINA PECTORIS: ICD-10-CM

## 2024-01-29 DIAGNOSIS — E53.8 B12 DEFICIENCY: ICD-10-CM

## 2024-01-29 DIAGNOSIS — A49.9 BACTERIAL UTI: ICD-10-CM

## 2024-01-29 DIAGNOSIS — N18.31 CHRONIC KIDNEY DISEASE, STAGE 3A: ICD-10-CM

## 2024-01-29 DIAGNOSIS — I73.9 CLAUDICATION OF BOTH LOWER EXTREMITIES: ICD-10-CM

## 2024-01-29 DIAGNOSIS — I48.0 PAROXYSMAL ATRIAL FIBRILLATION: ICD-10-CM

## 2024-01-29 PROBLEM — D68.51 FACTOR 5 LEIDEN MUTATION, HETEROZYGOUS: Status: RESOLVED | Noted: 2023-01-26 | Resolved: 2024-01-29

## 2024-01-29 LAB
BACTERIA #/AREA URNS HPF: ABNORMAL /HPF
BILIRUB UR QL STRIP: NEGATIVE
CLARITY UR: CLEAR
COLOR UR: YELLOW
GLUCOSE UR QL STRIP: NEGATIVE
HGB UR QL STRIP: ABNORMAL
HYALINE CASTS #/AREA URNS LPF: 1 /LPF
KETONES UR QL STRIP: NEGATIVE
LEUKOCYTE ESTERASE UR QL STRIP: ABNORMAL
MICROSCOPIC COMMENT: ABNORMAL
NITRITE UR QL STRIP: NEGATIVE
PH UR STRIP: 6 [PH] (ref 5–8)
PROT UR QL STRIP: ABNORMAL
RBC #/AREA URNS HPF: 1 /HPF (ref 0–4)
SP GR UR STRIP: 1.02 (ref 1–1.03)
SQUAMOUS #/AREA URNS HPF: 1 /HPF
URN SPEC COLLECT METH UR: ABNORMAL
WBC #/AREA URNS HPF: 20 /HPF (ref 0–5)

## 2024-01-29 PROCEDURE — 81000 URINALYSIS NONAUTO W/SCOPE: CPT | Mod: PO | Performed by: FAMILY MEDICINE

## 2024-01-29 PROCEDURE — 87086 URINE CULTURE/COLONY COUNT: CPT | Performed by: FAMILY MEDICINE

## 2024-01-29 PROCEDURE — 99999 PR PBB SHADOW E&M-EST. PATIENT-LVL III: CPT | Mod: PBBFAC,,, | Performed by: FAMILY MEDICINE

## 2024-01-29 PROCEDURE — 99214 OFFICE O/P EST MOD 30 MIN: CPT | Mod: S$GLB,,, | Performed by: FAMILY MEDICINE

## 2024-01-29 RX ORDER — AMOXICILLIN AND CLAVULANATE POTASSIUM 875; 125 MG/1; MG/1
1 TABLET, FILM COATED ORAL 2 TIMES DAILY
Qty: 20 TABLET | Refills: 0 | Status: SHIPPED | OUTPATIENT
Start: 2024-01-29 | End: 2024-02-08

## 2024-01-29 NOTE — PROGRESS NOTES
Assessment:       1. Dysuria    2. Bacterial UTI    3. Prostate cancer    4. Paroxysmal atrial fibrillation    5. Claudication of both lower extremities    6. Chronic kidney disease, stage 3a    7. Coronary artery disease of native artery of native heart with stable angina pectoris    8. B12 deficiency    9. Multiple joint pain        Plan:       Dysuria:  New problem workup needed  -     Urinalysis; Future; Expected date: 01/29/2024  -     Urine culture; Future; Expected date: 01/29/2024    Bacterial UTI:  New problem workup needed  -     amoxicillin-clavulanate 875-125mg (AUGMENTIN) 875-125 mg per tablet; Take 1 tablet by mouth 2 (two) times daily. for 10 days  Dispense: 20 tablet; Refill: 0  -     Urinalysis; Future; Expected date: 01/29/2024  -     Urine culture; Future; Expected date: 01/29/2024    Prostate cancer:  Stable  -     Vitamin D; Future; Expected date: 01/29/2024    Paroxysmal atrial fibrillation:  -     Vitamin D; Future; Expected date: 01/29/2024    Claudication of both lower extremities:  -     Vitamin D; Future; Expected date: 01/29/2024    Chronic kidney disease, stage 3a:  Improved  -     Vitamin D; Future; Expected date: 01/29/2024    Coronary artery disease of native artery of native heart with stable angina pectoris:  Stable  -     Vitamin D; Future; Expected date: 01/29/2024    B12 deficiency:  Uncontrolled  -     Vitamin D; Future; Expected date: 01/29/2024  -     Vitamin B12; Future; Expected date: 01/29/2024    Multiple joint pain:  Improved  -     CBC Auto Differential; Future; Expected date: 01/29/2024  -     Comprehensive Metabolic Panel; Future; Expected date: 01/29/2024  -     Vitamin D; Future; Expected date: 01/29/2024  -     Sedimentation rate; Future; Expected date: 01/29/2024  -     C-Reactive Protein; Future; Expected date: 01/29/2024         Recommend the patient to drink more water, take B12 1000 mcg every day over-the-counter, follow-up with the rheumatologist and the  hematologist.  Continue with the rest of the medications.  Will start patient Augmentin twice daily secondary to symptoms of UTI.  Recommend to take probiotics over-the-counter daily like Culturelle or align while taking the antibiotics in order to avoid diarrhea or yeast infection.  Patient agreed with assessment and plan. Patient verbalized understanding.     Subjective:       Patient ID: Dilip Lopez is a 83 y.o. male.    Chief Complaint: Follow-up (6 month follow up ) and Urinary Tract Infection (Burning, frequent urination. Symptoms started Friday )    HPI    The patient is coming here today for a follow-up visit, complains of symptoms of UTI burning sensation, frequent urination, symptoms started approximately 3 days ago.  The patient denies any fever, chills, abdominal pain.  He is still complaining of back pain, difficulty to walk, has prostate cancer and has an appointment to see the urologist for chemo treatment in March.  The patient also is taking the prednisone secondary to increased CRP levels and ESR levels and multiple joint pain, the symptoms improved but still having night sweats, decreased appetite and pain in the joints.  The appointment was made with the rheumatologist in [place] but not until March which is the the after taking chemo treatment, the patient is afraid to having side effects from the chemo which make him feel usually very weak and not able to go to the appointment.  He also has an appointment to see the hematologist next month.    Past medical history, past social history was reviewed and discussed with the patient.    Review of Systems   Constitutional:  Positive for activity change. Negative for appetite change.   HENT:  Negative for congestion and ear discharge.    Eyes:  Negative for discharge and itching.   Respiratory:  Negative for choking and chest tightness.    Cardiovascular:  Positive for leg swelling. Negative for chest pain and palpitations.   Gastrointestinal:   Negative for abdominal distention, abdominal pain and constipation.   Endocrine: Negative for cold intolerance and heat intolerance.   Genitourinary:  Negative for dysuria and flank pain.   Musculoskeletal:  Positive for arthralgias, back pain and gait problem.   Skin:  Negative for pallor and rash.   Allergic/Immunologic: Negative for environmental allergies and food allergies.   Neurological:  Negative for dizziness and facial asymmetry.   Hematological:  Negative for adenopathy. Bruises/bleeds easily.   Psychiatric/Behavioral:  Negative for agitation, confusion and sleep disturbance.        Objective:      Physical Exam  Vitals and nursing note reviewed.   Constitutional:       General: He is not in acute distress.     Appearance: Normal appearance. He is well-developed and normal weight. He is not diaphoretic.   HENT:      Head: Normocephalic and atraumatic.      Right Ear: External ear normal.      Left Ear: External ear normal.      Nose: Nose normal.   Eyes:      General: No scleral icterus.        Left eye: No discharge.   Cardiovascular:      Rate and Rhythm: Normal rate and regular rhythm.      Heart sounds: Normal heart sounds.   Pulmonary:      Effort: Pulmonary effort is normal. No respiratory distress.      Breath sounds: Normal breath sounds. No wheezing.   Abdominal:      General: Bowel sounds are normal. There is no distension.      Palpations: Abdomen is soft.      Tenderness: There is no abdominal tenderness. There is no guarding.   Musculoskeletal:      Cervical back: Normal range of motion and neck supple.   Skin:     General: Skin is warm and dry.      Findings: Bruising present.   Neurological:      Mental Status: He is alert.      Cranial Nerves: No cranial nerve deficit.      Motor: No abnormal muscle tone.   Psychiatric:         Behavior: Behavior normal.         Thought Content: Thought content normal.         Judgment: Judgment normal.

## 2024-01-29 NOTE — PROGRESS NOTES
Assessment:       1. Dysuria    2. Bacterial UTI    3. Prostate cancer    4. Paroxysmal atrial fibrillation    5. Claudication of both lower extremities    6. Chronic kidney disease, stage 3a    7. Coronary artery disease of native artery of native heart with stable angina pectoris    8. B12 deficiency    9. Multiple joint pain        Plan:       Dysuria:  New problem workup needed  -     Urinalysis; Future; Expected date: 01/29/2024  -     Urine culture; Future; Expected date: 01/29/2024    Bacterial UTI:  New problem workup needed  -     amoxicillin-clavulanate 875-125mg (AUGMENTIN) 875-125 mg per tablet; Take 1 tablet by mouth 2 (two) times daily. for 10 days  Dispense: 20 tablet; Refill: 0  -     Urinalysis; Future; Expected date: 01/29/2024  -     Urine culture; Future; Expected date: 01/29/2024    Prostate cancer:  Stable  -     Vitamin D; Future; Expected date: 01/29/2024    Paroxysmal atrial fibrillation:  -     Vitamin D; Future; Expected date: 01/29/2024    Claudication of both lower extremities:  -     Vitamin D; Future; Expected date: 01/29/2024    Chronic kidney disease, stage 3a:  Improved  -     Vitamin D; Future; Expected date: 01/29/2024    Coronary artery disease of native artery of native heart with stable angina pectoris:  Stable  -     Vitamin D; Future; Expected date: 01/29/2024    B12 deficiency:  Uncontrolled  -     Vitamin D; Future; Expected date: 01/29/2024  -     Vitamin B12; Future; Expected date: 01/29/2024    Multiple joint pain:  Improved  -     CBC Auto Differential; Future; Expected date: 01/29/2024  -     Comprehensive Metabolic Panel; Future; Expected date: 01/29/2024  -     Vitamin D; Future; Expected date: 01/29/2024  -     Sedimentation rate; Future; Expected date: 01/29/2024  -     C-Reactive Protein; Future; Expected date: 01/29/2024         Recommend the patient to drink more water, take B12 1000 mcg every day over-the-counter, follow-up with the rheumatologist and the  hematologist.  Continue with the rest of the medications.  Will start patient Augmentin twice daily secondary to symptoms of UTI.  Recommend to take probiotics over-the-counter daily like Culturelle or align while taking the antibiotics in order to avoid diarrhea or yeast infection.  Patient agreed with assessment and plan. Patient verbalized understanding.     Subjective:       Patient ID: Dilip Lopez is a 83 y.o. male.    Chief Complaint: Follow-up (6 month follow up ) and Urinary Tract Infection (Burning, frequent urination. Symptoms started Friday )    HPI    The patient is coming here today for a follow-up visit, complains of symptoms of UTI burning sensation, frequent urination, symptoms started approximately 3 days ago.  The patient denies any fever, chills, abdominal pain.  He is still complaining of back pain, difficulty to walk, has prostate cancer and has an appointment to see the urologist for chemo treatment in March.  The patient also is taking the prednisone secondary to increased CRP levels and ESR levels and multiple joint pain, the symptoms improved but still having night sweats, decreased appetite and pain in the joints.  The appointment was made with the rheumatologist in [place] but not until March which is the the after taking chemo treatment, the patient is afraid to having side effects from the chemo which make him feel usually very weak and not able to go to the appointment.  He also has an appointment to see the hematologist next month.    Past medical history, past social history was reviewed and discussed with the patient.    Review of Systems   Constitutional:  Positive for activity change. Negative for appetite change.   HENT:  Negative for congestion and ear discharge.    Eyes:  Negative for discharge and itching.   Respiratory:  Negative for choking and chest tightness.    Cardiovascular:  Positive for leg swelling. Negative for chest pain and palpitations.   Gastrointestinal:   Negative for abdominal distention, abdominal pain and constipation.   Endocrine: Negative for cold intolerance and heat intolerance.   Genitourinary:  Negative for dysuria and flank pain.   Musculoskeletal:  Positive for arthralgias, back pain and gait problem.   Skin:  Negative for pallor and rash.   Allergic/Immunologic: Negative for environmental allergies and food allergies.   Neurological:  Negative for dizziness and facial asymmetry.   Hematological:  Negative for adenopathy. Bruises/bleeds easily.   Psychiatric/Behavioral:  Negative for agitation, confusion and sleep disturbance.        Objective:      Physical Exam  Vitals and nursing note reviewed.   Constitutional:       General: He is not in acute distress.     Appearance: Normal appearance. He is well-developed and normal weight. He is not diaphoretic.      Comments: The patient has limited ambulation, he has using a walker for mobilization.   HENT:      Head: Normocephalic and atraumatic.      Right Ear: External ear normal.      Left Ear: External ear normal.      Nose: Nose normal.   Eyes:      General: No scleral icterus.        Left eye: No discharge.   Cardiovascular:      Rate and Rhythm: Normal rate and regular rhythm.      Heart sounds: Normal heart sounds.   Pulmonary:      Effort: Pulmonary effort is normal. No respiratory distress.      Breath sounds: Normal breath sounds. No wheezing.   Abdominal:      General: Bowel sounds are normal. There is no distension.      Palpations: Abdomen is soft.      Tenderness: There is no abdominal tenderness. There is no guarding.   Musculoskeletal:         General: Tenderness (Back) present.      Cervical back: Normal range of motion and neck supple.   Skin:     General: Skin is warm and dry.      Findings: Bruising present.   Neurological:      Mental Status: He is alert.      Cranial Nerves: No cranial nerve deficit.      Motor: No abnormal muscle tone.   Psychiatric:         Behavior: Behavior normal.          Thought Content: Thought content normal.         Judgment: Judgment normal.

## 2024-01-31 LAB — BACTERIA UR CULT: NO GROWTH

## 2024-02-15 ENCOUNTER — TELEPHONE (OUTPATIENT)
Dept: FAMILY MEDICINE | Facility: CLINIC | Age: 84
End: 2024-02-15
Payer: MEDICARE

## 2024-02-15 NOTE — TELEPHONE ENCOUNTER
----- Message from Marlee Trivedi sent at 2/15/2024  8:38 AM CST -----  Regarding: Needs return call  Type: Needs Medical Advice  Who Called:  Dilip Brown Call Back Number: [phone]    Additional Information: Pt wife is calling to see if the dr will just put in a order for the xray of his ankle ( pt fell last Friday and injured left ankle) or if they would need to come in, please advsie states that is is warm to the touch

## 2024-02-16 ENCOUNTER — HOSPITAL ENCOUNTER (OUTPATIENT)
Dept: RADIOLOGY | Facility: HOSPITAL | Age: 84
Discharge: HOME | End: 2024-02-16
Attending: FAMILY MEDICINE
Payer: MEDICARE

## 2024-02-16 ENCOUNTER — OFFICE VISIT (OUTPATIENT)
Dept: FAMILY MEDICINE | Facility: CLINIC | Age: 84
End: 2024-02-16
Payer: MEDICARE

## 2024-02-16 VITALS
HEIGHT: 73 IN | SYSTOLIC BLOOD PRESSURE: 118 MMHG | BODY MASS INDEX: 25.54 KG/M2 | OXYGEN SATURATION: 99 % | DIASTOLIC BLOOD PRESSURE: 74 MMHG | WEIGHT: 192.69 LBS | HEART RATE: 64 BPM

## 2024-02-16 DIAGNOSIS — T14.90XA INJURY: ICD-10-CM
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
## 2024-02-16 DIAGNOSIS — T14.90XA INJURY: Primary | ICD-10-CM

## 2024-02-16 PROCEDURE — 73610 X-RAY EXAM OF ANKLE: CPT | Mod: TC,FY,PO,LT

## 2024-02-16 PROCEDURE — 73610 X-RAY EXAM OF ANKLE: CPT | Mod: 26,LT,, | Performed by: RADIOLOGY

## 2024-02-16 PROCEDURE — 99213 OFFICE O/P EST LOW 20 MIN: CPT | Mod: S$GLB,,, | Performed by: FAMILY MEDICINE

## 2024-02-16 PROCEDURE — 99999 PR PBB SHADOW E&M-EST. PATIENT-LVL IV: CPT | Mod: PBBFAC,,, | Performed by: FAMILY MEDICINE

## 2024-02-16 RX ORDER — LOSARTAN POTASSIUM 100 MG/1
1 TABLET ORAL DAILY
COMMUNITY
End: 2024-04-29

## 2024-02-16 RX ORDER — HYDRALAZINE HYDROCHLORIDE 25 MG/1
1 TABLET, FILM COATED ORAL DAILY
COMMUNITY

## 2024-02-16 RX ORDER — PANTOPRAZOLE SODIUM 20 MG/1
1 TABLET, DELAYED RELEASE ORAL DAILY
COMMUNITY

## 2024-02-16 RX ORDER — HYDROXYZINE HYDROCHLORIDE 25 MG/1
TABLET, FILM COATED ORAL
COMMUNITY

## 2024-02-16 NOTE — PROGRESS NOTES
Subjective:       Patient ID: Dilip Lopez is a 83 y.o. male.    Chief Complaint: Ankle Injury (Patient states he fell 2/9/24)    This pt is new to me.  He fell a week ago and injured his left ankle.  It has been somewhat swollen and is painful.  It did not bruise.  It hurts all of the time but is worse if he puts weight on it.  He has taken nothing for the pain.    Ankle Injury       Review of Systems    Objective:      Physical Exam  Constitutional:       General: He is not in acute distress.     Appearance: Normal appearance.   Cardiovascular:      Rate and Rhythm: Normal rate and regular rhythm.   Pulmonary:      Effort: Pulmonary effort is normal.      Breath sounds: Normal breath sounds.   Musculoskeletal:         General: Swelling and tenderness (lateral malleolus left ankle) present. No deformity.   Skin:     General: Skin is warm and dry.      Findings: No bruising.   Neurological:      Mental Status: He is alert and oriented to person, place, and time.   Psychiatric:         Behavior: Behavior normal.         Assessment:       1. Injury    2. Fall, initial encounter        Plan:       Dilip was seen today for ankle injury.    Diagnoses and all orders for this visit:    Injury  -     X-Ray Ankle Complete Left; Future    Fall, initial encounter  -     X-Ray Ankle Complete Left; Future      During this visit, I reviewed the pt's history, medications, allergies, and problem list.

## 2024-02-19 ENCOUNTER — TELEPHONE (OUTPATIENT)
Dept: HEMATOLOGY/ONCOLOGY | Facility: CLINIC | Age: 84
End: 2024-02-19
Payer: MEDICARE

## 2024-02-19 ENCOUNTER — TELEPHONE (OUTPATIENT)
Dept: INFUSION THERAPY | Facility: HOSPITAL | Age: 84
End: 2024-02-19
Payer: MEDICARE

## 2024-02-19 NOTE — TELEPHONE ENCOUNTER
----- Message from Lucy Balbuena, Patient Care Assistant sent at 2/19/2024 10:35 AM CST -----  Type: Needs Medical Advice  Who Called:  Ce  Best Call Back Number: [phone]    Additional Information: ce is wanting to reschedule  his 3/18  infusion appointment for another day , please call to further discuss .thank you.

## 2024-03-18 ENCOUNTER — LAB VISIT (OUTPATIENT)
Dept: LAB | Facility: HOSPITAL | Age: 84
End: 2024-03-18
Attending: INTERNAL MEDICINE
Payer: MEDICARE

## 2024-03-18 ENCOUNTER — TELEPHONE (OUTPATIENT)
Dept: CARDIOLOGY | Facility: CLINIC | Age: 84
End: 2024-03-18
Payer: MEDICARE

## 2024-03-18 ENCOUNTER — OFFICE VISIT (OUTPATIENT)
Dept: HEMATOLOGY/ONCOLOGY | Facility: CLINIC | Age: 84
End: 2024-03-18
Payer: MEDICARE

## 2024-03-18 VITALS
DIASTOLIC BLOOD PRESSURE: 82 MMHG | SYSTOLIC BLOOD PRESSURE: 130 MMHG | WEIGHT: 199.06 LBS | TEMPERATURE: 97 F | BODY MASS INDEX: 26.96 KG/M2 | HEIGHT: 72 IN | OXYGEN SATURATION: 98 % | HEART RATE: 73 BPM | RESPIRATION RATE: 16 BRPM

## 2024-03-18 DIAGNOSIS — Z80.42 FAMILY HISTORY OF PROSTATE CANCER: ICD-10-CM

## 2024-03-18 DIAGNOSIS — M54.16 LUMBAR RADICULOPATHY: ICD-10-CM

## 2024-03-18 DIAGNOSIS — C61 PROSTATE CANCER: Primary | ICD-10-CM

## 2024-03-18 DIAGNOSIS — M51.36 DEGENERATIVE DISC DISEASE, LUMBAR: ICD-10-CM

## 2024-03-18 DIAGNOSIS — C61 PROSTATE CANCER: ICD-10-CM

## 2024-03-18 DIAGNOSIS — N18.31 STAGE 3A CHRONIC KIDNEY DISEASE: ICD-10-CM

## 2024-03-18 DIAGNOSIS — R91.8 LUNG NODULE, MULTIPLE: ICD-10-CM

## 2024-03-18 DIAGNOSIS — I48.0 PAF (PAROXYSMAL ATRIAL FIBRILLATION): ICD-10-CM

## 2024-03-18 LAB
ALBUMIN SERPL BCP-MCNC: 3.9 G/DL (ref 3.5–5.2)
ALP SERPL-CCNC: 60 U/L (ref 55–135)
ALT SERPL W/O P-5'-P-CCNC: 18 U/L (ref 10–44)
ANION GAP SERPL CALC-SCNC: 14 MMOL/L (ref 8–16)
AST SERPL-CCNC: 18 U/L (ref 10–40)
BASOPHILS # BLD AUTO: 0.02 K/UL (ref 0–0.2)
BASOPHILS NFR BLD: 0.3 % (ref 0–1.9)
BILIRUB SERPL-MCNC: 0.5 MG/DL (ref 0.1–1)
BUN SERPL-MCNC: 18 MG/DL (ref 8–23)
CALCIUM SERPL-MCNC: 9.3 MG/DL (ref 8.7–10.5)
CHLORIDE SERPL-SCNC: 102 MMOL/L (ref 95–110)
CO2 SERPL-SCNC: 23 MMOL/L (ref 23–29)
COMPLEXED PSA SERPL-MCNC: 0.08 NG/ML (ref 0–4)
CREAT SERPL-MCNC: 1.4 MG/DL (ref 0.5–1.4)
DIFFERENTIAL METHOD BLD: ABNORMAL
EOSINOPHIL # BLD AUTO: 0.1 K/UL (ref 0–0.5)
EOSINOPHIL NFR BLD: 0.9 % (ref 0–8)
ERYTHROCYTE [DISTWIDTH] IN BLOOD BY AUTOMATED COUNT: 14.6 % (ref 11.5–14.5)
EST. GFR  (NO RACE VARIABLE): 50.2 ML/MIN/1.73 M^2
GLUCOSE SERPL-MCNC: 136 MG/DL (ref 70–110)
HCT VFR BLD AUTO: 41.5 % (ref 40–54)
HGB BLD-MCNC: 13.7 G/DL (ref 14–18)
IMM GRANULOCYTES # BLD AUTO: 0.02 K/UL (ref 0–0.04)
IMM GRANULOCYTES NFR BLD AUTO: 0.3 % (ref 0–0.5)
LYMPHOCYTES # BLD AUTO: 0.9 K/UL (ref 1–4.8)
LYMPHOCYTES NFR BLD: 12.4 % (ref 18–48)
MCH RBC QN AUTO: 31.1 PG (ref 27–31)
MCHC RBC AUTO-ENTMCNC: 33 G/DL (ref 32–36)
MCV RBC AUTO: 94 FL (ref 82–98)
MONOCYTES # BLD AUTO: 0.3 K/UL (ref 0.3–1)
MONOCYTES NFR BLD: 4.2 % (ref 4–15)
NEUTROPHILS # BLD AUTO: 5.6 K/UL (ref 1.8–7.7)
NEUTROPHILS NFR BLD: 81.9 % (ref 38–73)
NRBC BLD-RTO: 0 /100 WBC
PLATELET # BLD AUTO: 192 K/UL (ref 150–450)
PMV BLD AUTO: 9.4 FL (ref 9.2–12.9)
POTASSIUM SERPL-SCNC: 4.3 MMOL/L (ref 3.5–5.1)
PROT SERPL-MCNC: 7 G/DL (ref 6–8.4)
RBC # BLD AUTO: 4.4 M/UL (ref 4.6–6.2)
SODIUM SERPL-SCNC: 139 MMOL/L (ref 136–145)
WBC # BLD AUTO: 6.86 K/UL (ref 3.9–12.7)

## 2024-03-18 PROCEDURE — 84153 ASSAY OF PSA TOTAL: CPT | Performed by: INTERNAL MEDICINE

## 2024-03-18 PROCEDURE — 36415 COLL VENOUS BLD VENIPUNCTURE: CPT | Mod: PN | Performed by: INTERNAL MEDICINE

## 2024-03-18 PROCEDURE — 99999 PR PBB SHADOW E&M-EST. PATIENT-LVL III: CPT | Mod: PBBFAC,,, | Performed by: INTERNAL MEDICINE

## 2024-03-18 PROCEDURE — 99215 OFFICE O/P EST HI 40 MIN: CPT | Mod: S$GLB,,, | Performed by: INTERNAL MEDICINE

## 2024-03-18 PROCEDURE — 85025 COMPLETE CBC W/AUTO DIFF WBC: CPT | Mod: PN | Performed by: INTERNAL MEDICINE

## 2024-03-18 PROCEDURE — 80053 COMPREHEN METABOLIC PANEL: CPT | Mod: PN | Performed by: INTERNAL MEDICINE

## 2024-03-18 RX ORDER — VENLAFAXINE HYDROCHLORIDE 37.5 MG/1
37.5 CAPSULE, EXTENDED RELEASE ORAL DAILY
Qty: 90 CAPSULE | Refills: 0 | Status: SHIPPED | OUTPATIENT
Start: 2024-03-18 | End: 2024-04-11

## 2024-03-18 RX ORDER — PREDNISONE 10 MG/1
10 TABLET ORAL
COMMUNITY
Start: 2024-03-01 | End: 2024-03-19

## 2024-03-18 NOTE — PROGRESS NOTES
Subjective:       Name: Dilip Lopez  : 1940  MRN: [id number]    Chief Complaint   Patient presents with    Prostate Cancer     6 month md f/u appt. with labs/injection cl      The patient is in clinic by himself    HPI: Dilip Lopez is a 83 y.o. male presents for evaluation of Prostate Cancer (6 month md f/u appt. with labs/injection cl)      The patient is here to be cleared medically before receiving the Lupron and Prolia to treat his prostate cancer T2 very high risk grade 5 isac (4+5).  He reporting hot flushes. Otherwise he has been able to do his daily activities    PREVIOUS WORK-UP:  His genetic testing done through [facility] came back negative. His bone density showed osteoporosis.    His PET scan done on 2023 showed no evidence of metastatic disease.  His repeat PSA done on 2023 came back 0.33 compared to 0.62 4 month ago, his testosterone was 14. His CBC showed a  Hgb 13.3 and his CMP was normal.    He denies any new complaints.    The patient has been followed and treated by Dr. Thomason.  His diagnostic PSA was 22 in 2021.     He underwent an ultrasound guided prostate biopsy on 2021 that showed:  1- left base:  Prostate adenocarcinoma Isac grade 3 + 4 equals 7 40% patent for involving the biopsy.  2- left mid:  Prostate adenocarcinoma [place] 4+5=9 involving 40% total of 1 core.  3- Left apex:  Prostate adenocarcinoma [place] 4 + 3 equals 7 80% pattern for involving 60% total of the 2 of the 2 cores  4-right base:  Prostate adenocarcinoma Isac grade 4 + 3 equals 77 5% pattern for involving 25% total of the 2/2 cores.  5-right mid:  Prostate adenocarcinoma [place] 3+4=7 10% pattern for involving 10% total of the 2 of the 2 cores.  6-right apex:  Prostate adenocarcinoma [place] grade 4+5=9 involving 20% of the total 1 of the 2 cores.    He underwent on 2021 a total body bone scan that failed to show signs of distant metastatic  disease.    He underwent a CT chest without contrast on October 2, 2021 showed;  1. Bilateral calcified and noncalcified pulmonary nodules without significant interval change as compared to the prior exam.  As stated on prior report, [employer]ischer society guidelines recommend follow-up noncontrast chest CT at 18-24 months after discovery.  2. CT findings suggestive of prior granulomatous disease.    He was started on androgen deprivation therapy since September 2021.  He has been experiencing hot flashes that are mild and some urinary symptoms.  His PSA done on March 28, 2022 was 1.1. The last injection of Lupron is in September 2022. After his last injection he felt weak in his  legs and tired.        Oncology History   Prostate cancer   4/12/2022 Initial Diagnosis    Prostate cancer     2/9/2023 Cancer Staged    Staging form: Prostate, AJCC 8th Edition  - Clinical: Stage IIIC (cT2c, cN0, cM0, PSA: 22, Grade Group: 5)     3/17/2023 -  Chemotherapy    Treatment Summary   Plan Name: OP PROSTATE LEUPROLIDE (LUPRON) 6 MONTH  Treatment Goal: Palliative  Status: Active  Start Date: 3/17/2023  End Date: 2/16/2024 (Planned)  Provider: Adelina Levi MD  Chemotherapy: leuprolide acetate (6 month) injection 45 mg, 45 mg, Intramuscular, Clinic/HOD 1 time, 2 of 3 cycles  Administration: 45 mg (3/17/2023), 45 mg (9/18/2023)          Past Medical History:   Diagnosis Date    Atrial fibrillation     CAD (coronary artery disease)     Hyperlipidemia     Hypertension     Prostate cancer 4/12/2022       Past Surgical History:   Procedure Laterality Date    ANGIOGRAM, CORONARY, WITH LEFT HEART CATHETERIZATION N/A 3/14/2023    Procedure: Left Heart Cath;  Surgeon: Jose Walker MD;  Location: [facility] CATH;  Service: Cardiology;  Laterality: N/A;    CARDIAC CATHETERIZATION      CARDIAC SURGERY      COLONOSCOPY      COLONOSCOPY N/A 5/14/2021    Procedure: COLONOSCOPY;  Surgeon: Leopoldo Mendoza MD;  Location: [facility] ENDO;  Service: Endoscopy;   Laterality: N/A;    CORONARY ANGIOGRAPHY N/A 3/14/2023    Procedure: ANGIOGRAM, CORONARY ARTERY;  Surgeon: Jose Walker MD;  Location: [facility] CATH;  Service: Cardiology;  Laterality: N/A;    CORONARY ANGIOPLASTY      CORONARY STENT PLACEMENT  01/29/2018    ESOPHAGOGASTRODUODENOSCOPY N/A 5/14/2021    Procedure: EGD (ESOPHAGOGASTRODUODENOSCOPY);  Surgeon: Leopoldo Mendoza MD;  Location: [facility] ENDO;  Service: Endoscopy;  Laterality: N/A;  with push enteroscopy    HIATAL HERNIA REPAIR      LEFT HEART CATHETERIZATION  12/15/2023    Procedure: Left heart cath  RM [id number];  Surgeon: Augustine Conteh MD;  Location: [facility] CATH;  Service: Cardiology;;    TRANSRECTAL BIOPSY OF PROSTATE WITH ULTRASOUND GUIDANCE N/A 7/20/2021    Procedure: BIOPSY, PROSTATE, RECTAL APPROACH, WITH US GUIDANCE;  Surgeon: SARAI Thomason MD;  Location: [facility] OR;  Service: Urology;  Laterality: N/A;       Family History   Problem Relation Age of Onset    Hypertension Mother     Diabetes Mother     Heart disease Father 62        MI    Parkinsonism Brother        Social History     Socioeconomic History    Marital status:    Tobacco Use    Smoking status: Never    Smokeless tobacco: Never   Substance and Sexual Activity    Alcohol use: No    Drug use: No     Social Determinants of Health     Financial Resource Strain: Low Risk  (12/14/2023)    Overall Financial Resource Strain (CARDIA)     Difficulty of Paying Living Expenses: Not hard at all   Food Insecurity: No Food Insecurity (12/14/2023)    Hunger Vital Sign     Worried About Running Out of Food in the Last Year: Never true     Ran Out of Food in the Last Year: Never true   Transportation Needs: No Transportation Needs (12/14/2023)    PRAPARE - Transportation     Lack of Transportation (Medical): No     Lack of Transportation (Non-Medical): No   Physical Activity: Unknown (12/13/2023)    Exercise Vital Sign     Minutes of Exercise per Session: 30 min   Stress: No Stress Concern Present  (12/13/2023)    [employer] - Occupational Stress Questionnaire     Feeling of Stress : Not at all   Social Connections: Unknown (12/13/2023)    Social Connection and Isolation Panel [NHANES]     Frequency of Communication with Friends and Family: Twice a week     Frequency of Social Gatherings with Friends and Family: Once a week     Active Member of Clubs or Organizations: Yes     Attends Club or Organization Meetings: More than 4 times per year     Marital Status:    Housing Stability: Low Risk  (12/14/2023)    Housing Stability Vital Sign     Unable to Pay for Housing in the Last Year: No     Number of Places Lived in the Last Year: 1     Unstable Housing in the Last Year: No       Review of patient's allergies indicates:   Allergen Reactions    Doxycycline hcl Rash    Bactrim [sulfamethoxazole-trimethoprim]     Ciprofloxacin Hives    Sulfa (sulfonamide antibiotics) Other (See Comments)       Review of Systems   Constitutional:  Positive for diaphoresis. Negative for appetite change, fatigue and fever.   HENT:  Negative for facial swelling, mouth sores, postnasal drip and sore throat.    Eyes:  Negative for photophobia and visual disturbance.   Respiratory:  Negative for cough, chest tightness and shortness of breath.    Cardiovascular:  Negative for chest pain and leg swelling.   Gastrointestinal:  Negative for abdominal pain, blood in stool, constipation, nausea and vomiting.   Endocrine: Negative for cold intolerance and polyuria.   Genitourinary:  Negative for dysuria, flank pain and hematuria.   Musculoskeletal:  Positive for arthralgias and gait problem. Negative for back pain and joint swelling.   Integumentary:  Negative for color change and mole/lesion.   Neurological:  Positive for weakness and numbness. Negative for dizziness, headaches and memory loss.   Hematological:  Negative for adenopathy. Does not bruise/bleed easily.   Psychiatric/Behavioral:  Negative for  behavioral problems, decreased concentration and sleep disturbance. The patient is not nervous/anxious.             Objective:     Vitals:    03/18/24 1413   BP: 130/82   BP Location: Right arm   Patient Position: Sitting   BP Method: Medium (Manual)   Pulse: 73   Resp: 16   Temp: 97.4 °F (36.3 °C)   TempSrc: Temporal   SpO2: 98%   Weight: 90.3 kg (199 lb 1.2 oz)   Height: 6' (1.829 m)          Physical Exam  Vitals reviewed.   Constitutional:       Appearance: Normal appearance.   Eyes:      General: No scleral icterus.     Pupils: Pupils are equal, round, and reactive to light.   Cardiovascular:      Rate and Rhythm: Regular rhythm.      Heart sounds: Normal heart sounds.   Pulmonary:      Breath sounds: Normal breath sounds. No wheezing.   Abdominal:      General: Bowel sounds are normal.   Musculoskeletal:         General: No swelling.      Cervical back: Neck supple.   Lymphadenopathy:      Cervical: No cervical adenopathy.   Skin:     General: Skin is warm.   Neurological:      General: No focal deficit present.      Mental Status: He is alert and oriented to person, place, and time.                Current Outpatient Medications on File Prior to Visit   Medication Sig    allopurinoL (ZYLOPRIM) 300 MG tablet TAKE 1 TABLET BY MOUTH ONCE  DAILY (Patient taking differently: Take 300 mg by mouth once daily.)    apixaban (ELIQUIS) 5 mg Tab TAKE ONE TABLET TWO TIMES A DAY * BLOOD THINNER * (Patient taking differently: Take 5 mg by mouth 2 (two) times daily.)    cyanocobalamin, vitamin B-12, 1,000 mcg/mL Kit Inject 1 mL every month by subcutaneous route.    gabapentin (NEURONTIN) 300 MG capsule TAKE 1 CAPSULE BY MOUTH  TWICE DAILY (Patient taking differently: Take 300 mg by mouth 2 (two) times daily.)    hydrALAZINE (APRESOLINE) 25 MG tablet Take 1 tablet by mouth once daily.    hydroCHLOROthiazide (HYDRODIURIL) 12.5 MG Tab TAKE 1 TABLET BY MOUTH ONCE DAILY (Patient taking differently: Take 12.5 mg by mouth once  "daily.)    hydrOXYzine HCL (ATARAX) 25 MG tablet Take 1 tablet 3 times a day by oral route as needed.    losartan (COZAAR) 100 MG tablet Take 1 tablet by mouth once daily.    needle, disp, 25 gauge 25 gauge x 1" Ndle Use as directed    pantoprazole (PROTONIX) 20 MG tablet Take 1 tablet by mouth once daily.    predniSONE (DELTASONE) 10 MG tablet Take 10 mg by mouth.    syringe with needle (BD LUER-PADMINI SYRINGE) 3 mL 25 x 1 1/2 " Syrg Use once a month to inject B12 injection.    syringe with needle (SYRINGE 3CC/25GX1") 3 mL 25 gauge x 1" Syrg Use as directed    temazepam (RESTORIL) 30 mg capsule TAKE 1 CAPSULE BY MOUTH NIGHTLY AS NEEDED FOR INSOMNIA.    montelukast (SINGULAIR) 10 mg tablet TAKE 1 TABLET BY MOUTH IN  THE EVENING (Patient not taking: Reported on 3/18/2024)     No current facility-administered medications on file prior to visit.       CBC:  Lab Results   Component Value Date    WBC 6.86 03/18/2024    HGB 13.7 (L) 03/18/2024    HCT 41.5 03/18/2024    MCV 94 03/18/2024     03/18/2024         CMP:  Sodium   Date Value Ref Range Status   01/03/2024 141 136 - 145 mmol/L Final     Potassium   Date Value Ref Range Status   01/03/2024 3.8 3.5 - 5.1 mmol/L Final     Chloride   Date Value Ref Range Status   01/03/2024 104 95 - 110 mmol/L Final     CO2   Date Value Ref Range Status   01/03/2024 24 23 - 29 mmol/L Final     Glucose   Date Value Ref Range Status   01/03/2024 105 70 - 110 mg/dL Final     BUN   Date Value Ref Range Status   01/03/2024 28 (H) 8 - 23 mg/dL Final     Creatinine   Date Value Ref Range Status   01/03/2024 1.4 0.5 - 1.4 mg/dL Final     Calcium   Date Value Ref Range Status   01/03/2024 8.8 8.7 - 10.5 mg/dL Final     Total Protein   Date Value Ref Range Status   01/03/2024 7.4 6.0 - 8.4 g/dL Final     Albumin   Date Value Ref Range Status   01/03/2024 3.9 3.5 - 5.2 g/dL Final     Total Bilirubin   Date Value Ref Range Status   01/03/2024 0.4 0.1 - 1.0 mg/dL Final     Comment:     For " infants and newborns, interpretation of results should be based  on gestational age, weight and in agreement with clinical  observations.    Premature Infant recommended reference ranges:  Up to 24 hours.............<8.0 mg/dL  Up to 48 hours............<12.0 mg/dL  3-5 days..................<15.0 mg/dL  6-29 days.................<15.0 mg/dL       Alkaline Phosphatase   Date Value Ref Range Status   01/03/2024 55 55 - 135 U/L Final     AST   Date Value Ref Range Status   01/03/2024 14 10 - 40 U/L Final     ALT   Date Value Ref Range Status   01/03/2024 13 10 - 44 U/L Final     Anion Gap   Date Value Ref Range Status   01/03/2024 13 8 - 16 mmol/L Final     eGFR if    Date Value Ref Range Status   07/14/2022 43.1 (A) >60 mL/min/1.73 m^2 Final     eGFR if non    Date Value Ref Range Status   07/14/2022 37.3 (A) >60 mL/min/1.73 m^2 Final     Comment:     Calculation used to obtain the estimated glomerular filtration  rate (eGFR) is the CKD-EPI equation.          X-Ray Ankle Complete Left  Narrative: EXAMINATION:  XR ANKLE COMPLETE 3 VIEW LEFT    CLINICAL HISTORY:  Injury, unspecified, initial encounter    TECHNIQUE:  AP, lateral and oblique views of the left ankle were performed.    COMPARISON:  None    FINDINGS:  Soft tissue swelling surrounds the medial and lateral malleolus.  Th the ankle mortise appears relatively well preserved.  No definitive fractures are noted.  MRI is more sensitive and specific if necessary.  A plantar calcaneal spur is noted.  Talonavicular joint space loss is noted.  An os trigone or posterior talar spur is noted.  Impression: Soft tissue swelling surrounding the ankle of uncertain etiology.  Please correlate for etiologies.  MRI is more sensitive and specific than is plain film radiography particularly for soft tissue injury.  Please clinically correlate.    Electronically signed by: Lul Restrepo MD  Date:    02/16/2024  Time:    12:46       ECOG SCORE    1  - Restricted in strenuous activity-ambulatory and able to carry out work of a light nature              Assessment/Plan:       Prostate cancer T2c very high high risk:  -I reviewed independently the patient laboratory,radiology and pathology findings.  He has been responding well to his treatment. His PSA has been trending down from 0.33 to 0.28.    - His  undergo a PET PSMA February 6, 2023 for  staging purposes showed no signs of distant metastatic disease.  - The genetic testing in view of his personal history of prostate cancer and his extensive family history of prostate cancer came back negative    According to the NCCN guidelines and in view of his very high risk category and since his workup showing he has locally regional nonmetastatic disease the treatment usually include local treatment with radiation +ADT plus Abiraterone for 2 years for a patient with a good performance status and a life expectancy > 10 years.    If the patient is frail and has a life expectancy < 5 years he will benefit from observation vs ADT vs RT. These options were discussed with the patient.  He was offered in case he is concerned about side effects of the ADT to proceed with radiation therapy he declined.  Since his PSA is trending down he might benefit from continuing the same management until it becomes undetectable.  At that time he will receive a treatment holiday.  The patient was agreeable with my current recommendations..      The side effects of ADT   -ADT is associated with a wide range of side effects that can significantly impair quality of life.  Important and/or frequent side effects include: Loss of lean body mass, increased body fat, and decreased muscle strength, sexual dysfunction, loss of bone mineral density, which can result in bone fracture due to osteoporosis, vasomotor instability, which is manifested by hot flashes, gynecomastia, decreased body hair, and smaller penile and/or testicular size, fatigue,  behavioral and neurologic effects, cardiovascular and metabolic abnormalities.  -Dietary calcium intake (food and supplements) of 1000 to 1200 mg daily, supplemental vitamin D 800 to 1000 international units daily, and lifestyle modifications (weight-bearing exercise, decreased alcohol consumption, smoking discontinuation) are indicated for all males beginning ADT.    - He is cleared to have Lupron at a dose of 45 mg subcu today since his CBC CMP are within normal limits.  His PSA is pending.  His last PSA drawn on January 3, 2024 was 0.29.    He will be started on Effexor 37.5 mg Po daily to treat his hot flushes.  - The patient will be seen back again in  6 month before receiving his Lupron injection.  He will also have blood workup drawn for CBC CMP PSA and testosterone level.    Lung nodules:  -no uptake seen on his PET scan.  Most likely of a benign etiology.      Osteoporosis:  -his bone density done on February 6, 2023 confirmed the diagnosis of osteoporosis.  The patient will require to be treated for this condition that will worsen as he is on ADT.  He will be started on Prolia q.6 months.  The side effects of this treatment were discussed at length.  The patient was agreeable to proceed with this treatment.  He was advised to take calcium and vitamin-D on a daily basis.  His first injection was on March 17, 2023  He is cleared to have his next injection with Prolia       Back pain:  -status post a fall injury.  He is currently having bilateral peripheral neuropathy.  -he has been seen and currently being managed by his primary care physician    CAD/AFIB  -stable on medical management.    Chronic kidney disease stage 3   -will continue to follow up with repeat BMP.    Advance Care Planning During this visit, I engaged the patient in the advance care planning process.  The patient and I reviewed the role for advance directives and their purpose in directing future healthcare if the patients unable to speak  for him/herself.  At this point in time, the patient does have full decision-making capacity.  We discussed different extreme health states that he could experience, and reviewed what kind of medical care he would want in those situations.  The patient communicated that if he were comatose and had little chance of a meaningful recovery, he would not want machines/life-sustaining treatments used. .  The patient has not completed a living will to reflect these preferences.  The patient  has not already designated a healthcare power of  to make decisions on his behalf.  He will bring the paperwork before his next visit to have it on file.  I spent a total of 25 minutes engaging the patient in this advance care planning discussion.             Med Onc Chart Routing      Follow up with physician 6 months. with repeat CBC CMP PSA. He ronda have his Lupron and Prolia reschedule for next week.   Follow up with [facility]    Infusion scheduling note    Injection scheduling note    Labs    Imaging    Pharmacy appointment    Other referrals                   Plan was discussed with the patient at length, and he verbalized understanding. Dilip was given an opportunity to ask questions that were answered to his satisfaction, and he was advised to call in the interval if any problems or questions arise.  Signed:  Adelina Levi MD   Hematology and Oncology  [facility] - HEMATOLOGY ONCOLOGY  [facility], [place]

## 2024-03-19 ENCOUNTER — OFFICE VISIT (OUTPATIENT)
Dept: RHEUMATOLOGY | Facility: CLINIC | Age: 84
End: 2024-03-19
Payer: MEDICARE

## 2024-03-19 VITALS
DIASTOLIC BLOOD PRESSURE: 91 MMHG | HEART RATE: 64 BPM | SYSTOLIC BLOOD PRESSURE: 164 MMHG | BODY MASS INDEX: 27.39 KG/M2 | HEIGHT: 72 IN | WEIGHT: 202.19 LBS

## 2024-03-19 DIAGNOSIS — R79.82 CRP ELEVATED: ICD-10-CM

## 2024-03-19 DIAGNOSIS — R70.0 ESR RAISED: ICD-10-CM

## 2024-03-19 DIAGNOSIS — M35.3 POLYMYALGIA: Primary | ICD-10-CM

## 2024-03-19 PROCEDURE — 99999 PR PBB SHADOW E&M-EST. PATIENT-LVL IV: CPT | Mod: PBBFAC,GC,, | Performed by: INTERNAL MEDICINE

## 2024-03-19 PROCEDURE — 99204 OFFICE O/P NEW MOD 45 MIN: CPT | Mod: GC,S$GLB,, | Performed by: INTERNAL MEDICINE

## 2024-03-19 RX ORDER — PREDNISONE 1 MG/1
TABLET ORAL
Qty: 180 TABLET | Refills: 1 | Status: SHIPPED | OUTPATIENT
Start: 2024-03-19 | End: 2024-04-02 | Stop reason: SDUPTHER

## 2024-03-19 NOTE — PROGRESS NOTES
I have personally taken the history and examined the patient to verify the fellow's notation, and I agree with the impression and plan stated.      History is consistent with PMR now controlled on prednisone so can start tapering with hopes of monophasic course, but if symptoms recur with prednisone taper then will consider Kevzara as steroid sparing drug for PMR.    WD

## 2024-03-19 NOTE — PROGRESS NOTES
Subjective:      Patient ID: Dilip Lopez is a 83 y.o. male.    Chief Complaint: Disease Management    83 year old male with pmh lumbar degenerative disease, gout without tophi will controlled on allopurinol, CAD, factor V leiden with DVT history, CKD3a, dCHF, Osteoporosis with T score -3.4 at L spine, Prostate cancer on leupron without known mets. Patient presents for evaluation of suspected PMR. Patient reports several months of pain and stiffness, particularly in his shoulders bilaterally, but also in his hips, neck, ankles, hands. Pain reported as achy, not associated with more significant AM symptoms, but with reported night sweats, chills, fatigue going on since starting leupron. No visual disturbance, temporal tenderness, new HA, jaw claudication, etc. His symptoms improved dramatically within a day or two with initiation of a medrol dose pack followed by 10 mg prednisone daily, which was started in late December. ESR/CRP have responded well to this regimen as well. Patient does report ongoing mechanical pain worse with use. He has a history of PsO per a dermatology evaluation, none now reported. Without uveitis, dactylitis, enthesitis, synovitis, IBD symptoms, etc.   Review of Systems   Constitutional:  Positive for activity change (prior to prednisone), chills and fatigue. Negative for fever.   Eyes:  Negative for redness and visual disturbance.   Respiratory:  Negative for apnea, cough, choking and shortness of breath.    Cardiovascular:  Negative for chest pain, palpitations and leg swelling.   Gastrointestinal:  Negative for abdominal distention, abdominal pain and diarrhea.   Musculoskeletal:  Positive for arthralgias and back pain. Negative for joint swelling, myalgias (myalgias stopped with prednisone), neck pain and neck stiffness.   Skin:  Negative for color change and rash.   Neurological:  Negative for dizziness, facial asymmetry, numbness and headaches.   Psychiatric/Behavioral:  Negative for  agitation and confusion.       Objective:   BP (!) 164/91   Pulse 64   Ht 6' (1.829 m)   Wt 91.7 kg (202 lb 2.6 oz)   BMI 27.42 kg/m²   Physical Exam   Constitutional: He is oriented to person, place, and time. normal appearance.  Non-toxic appearance. No distress. He does not appear ill.   HENT:   Head: Normocephalic and atraumatic.   Nose: Nose normal. No rhinorrhea or nasal congestion.   Mouth/Throat: Mucous membranes are moist. No oropharyngeal exudate or posterior oropharyngeal erythema.   Eyes: Pupils are equal, round, and reactive to light. Right eye exhibits no discharge. Left eye exhibits no discharge. No scleral icterus.   Cardiovascular: Normal rate, regular rhythm, normal heart sounds and normal pulses. Exam reveals no gallop and no friction rub.   No murmur heard.  Temporal pulses equal bilaterally. No TTP in the temporal area.    Pulmonary/Chest: Effort normal and breath sounds normal. No stridor. No respiratory distress. He has no wheezes. He has no rhonchi.   Abdominal: He exhibits no distension and no mass. There is no abdominal tenderness. There is no rebound and no guarding. No hernia.   Musculoskeletal:         General: No swelling, tenderness, deformity or signs of injury. Normal range of motion.   Neurological: He is alert and oriented to person, place, and time.   Skin: Skin is warm and dry. No rash noted. He is not diaphoretic.   Vitals reviewed.    No data to display     Assessment:     1. Polymyalgia    2. ESR raised    3. CRP elevated      Polymyalgia: suspected diagnosis based on significant response to low dose prednisone. No clinical suggestion of GCA. Fatigue and night sweats likely secondary to leupron. Currently on 10 mg daily and without his initial aching pain in his bilateral shoulders. He does still have activity related pain throughout his body, suspect chronic degenerative changes as the etiology. Patient wants to know if I think a motorized scooter would be appropriate for  him. Given significant osteoporosis and history of falls I agree that this would be reasonable. With history of PsO in derm notes, no current PsO on exam and minimal clinical suggestion of PsA. RF/CCP/PRATIMA negative. ESR/CRP normalized on 10 mg prednisone. Patient with history of severe osteoporosis, making prolonged prednisone use unideal. He has a history of possible diverticulitis read on previous abdominal imaging and with definite diverticula noted on colonoscopies. It does not appear that he was ever treated for diverticulitis. I suspect we could still consider kevzara if needed for steroid sparing, though would prefer to avoid if possible. Will attempt to slowly taper off prednisone and revaluate in a few months.   -Check CBC/CMP/ESR/CRP in 3 mo  -Agree with jamal, would continue  -Taper prednisone by 1 mg/week, patient to contact us if PMR symptoms return.     3 mo follow up for further evaluation.     Plan:     Problem List Items Addressed This Visit    None  Visit Diagnoses       Polymyalgia    -  Primary    Relevant Orders    CBC W/ AUTO DIFFERENTIAL    COMPREHENSIVE METABOLIC PANEL    Sedimentation rate    C-REACTIVE PROTEIN    ESR raised        CRP elevated              Dilip Marquez

## 2024-03-25 ENCOUNTER — INFUSION (OUTPATIENT)
Dept: INFUSION THERAPY | Facility: HOSPITAL | Age: 84
End: 2024-03-25
Attending: INTERNAL MEDICINE
Payer: MEDICARE

## 2024-03-25 VITALS
HEIGHT: 72 IN | DIASTOLIC BLOOD PRESSURE: 91 MMHG | TEMPERATURE: 98 F | WEIGHT: 202.19 LBS | RESPIRATION RATE: 16 BRPM | SYSTOLIC BLOOD PRESSURE: 164 MMHG | BODY MASS INDEX: 27.39 KG/M2 | HEART RATE: 64 BPM

## 2024-03-25 DIAGNOSIS — C61 PROSTATE CANCER: Primary | ICD-10-CM

## 2024-03-25 PROCEDURE — 63600175 PHARM REV CODE 636 W HCPCS: Mod: JZ,JG,PN | Performed by: INTERNAL MEDICINE

## 2024-03-25 PROCEDURE — 96402 CHEMO HORMON ANTINEOPL SQ/IM: CPT | Mod: PN

## 2024-03-25 RX ADMIN — LEUPROLIDE ACETATE 45 MG: KIT at 12:03

## 2024-03-25 NOTE — PLAN OF CARE
Problem: Adult Inpatient Plan of Care  Goal: Plan of Care Review  Outcome: Met     Problem: Fatigue  Goal: Improved Activity Tolerance  Outcome: Ongoing, Progressing   Tolerated injection well today  Discharge instructions given and pt d/c to home per w/c  NAD

## 2024-03-27 DIAGNOSIS — G47.09 OTHER INSOMNIA: ICD-10-CM

## 2024-03-27 RX ORDER — TEMAZEPAM 30 MG/1
CAPSULE ORAL
Qty: 30 CAPSULE | Refills: 0 | Status: SHIPPED | OUTPATIENT
Start: 2024-03-27 | End: 2024-05-06

## 2024-03-27 NOTE — TELEPHONE ENCOUNTER
No care due was identified.  [facility] Embedded Care Due Messages. Reference number: [id number].   3/27/2024 9:19:09 AM CDT

## 2024-04-01 ENCOUNTER — INFUSION (OUTPATIENT)
Dept: INFUSION THERAPY | Facility: HOSPITAL | Age: 84
End: 2024-04-01
Attending: INTERNAL MEDICINE
Payer: MEDICARE

## 2024-04-01 VITALS
BODY MASS INDEX: 26.69 KG/M2 | TEMPERATURE: 98 F | RESPIRATION RATE: 17 BRPM | HEIGHT: 72 IN | SYSTOLIC BLOOD PRESSURE: 117 MMHG | HEART RATE: 52 BPM | WEIGHT: 197.06 LBS | DIASTOLIC BLOOD PRESSURE: 82 MMHG

## 2024-04-01 DIAGNOSIS — M81.0 AGE-RELATED OSTEOPOROSIS WITHOUT CURRENT PATHOLOGICAL FRACTURE: Primary | ICD-10-CM

## 2024-04-01 PROCEDURE — 63600175 PHARM REV CODE 636 W HCPCS: Mod: JZ,JG,PN | Performed by: INTERNAL MEDICINE

## 2024-04-01 PROCEDURE — 96372 THER/PROPH/DIAG INJ SC/IM: CPT | Mod: PN

## 2024-04-01 RX ADMIN — DENOSUMAB 60 MG: 60 INJECTION SUBCUTANEOUS at 01:04

## 2024-04-01 NOTE — PLAN OF CARE
Problem: Adult Inpatient Plan of Care  Goal: Patient-Specific Goal (Individualized)  Outcome: Ongoing, Progressing  Flowsheets (Taken 4/1/2024 1306)  Anxieties, Fears or Concerns: none  Individualized Care Needs: wife chairside     Problem: Fatigue  Goal: Improved Activity Tolerance  Outcome: Ongoing, Progressing  Intervention: Promote Improved Energy  Flowsheets (Taken 4/1/2024 1306)  Fatigue Management: frequent rest breaks encouraged  Sleep/Rest Enhancement: regular sleep/rest pattern promoted  Activity Management: Up in chair - L3

## 2024-04-01 NOTE — PLAN OF CARE
Problem: Adult Inpatient Plan of Care  Goal: Plan of Care Review  Outcome: Ongoing, Progressing  Flowsheets (Taken 4/1/2024 1313)  Plan of Care Reviewed With:   patient   spouse   Pt tolerated prolia subq well.  No adverse reaction noted.   Patient left clinic in no acute distress.

## 2024-04-02 DIAGNOSIS — M35.3 POLYMYALGIA RHEUMATICA: Primary | ICD-10-CM

## 2024-04-02 RX ORDER — PREDNISONE 1 MG/1
TABLET ORAL
Qty: 180 TABLET | Refills: 1 | Status: SHIPPED | OUTPATIENT
Start: 2024-04-02 | End: 2024-04-09 | Stop reason: SDUPTHER

## 2024-04-09 ENCOUNTER — PATIENT MESSAGE (OUTPATIENT)
Dept: RHEUMATOLOGY | Facility: CLINIC | Age: 84
End: 2024-04-09
Payer: MEDICARE

## 2024-04-09 DIAGNOSIS — M35.3 POLYMYALGIA RHEUMATICA: ICD-10-CM

## 2024-04-09 RX ORDER — PREDNISONE 1 MG/1
TABLET ORAL
Qty: 180 TABLET | Refills: 3 | Status: SHIPPED | OUTPATIENT
Start: 2024-04-09 | End: 2024-04-11 | Stop reason: SDUPTHER

## 2024-04-11 ENCOUNTER — OFFICE VISIT (OUTPATIENT)
Dept: FAMILY MEDICINE | Facility: CLINIC | Age: 84
End: 2024-04-11
Payer: MEDICARE

## 2024-04-11 ENCOUNTER — LAB VISIT (OUTPATIENT)
Dept: LAB | Facility: HOSPITAL | Age: 84
End: 2024-04-11
Payer: MEDICARE

## 2024-04-11 VITALS — HEIGHT: 72 IN | BODY MASS INDEX: 26.88 KG/M2 | OXYGEN SATURATION: 97 % | HEART RATE: 69 BPM | WEIGHT: 198.44 LBS

## 2024-04-11 DIAGNOSIS — I25.118 CORONARY ARTERY DISEASE OF NATIVE ARTERY OF NATIVE HEART WITH STABLE ANGINA PECTORIS: ICD-10-CM

## 2024-04-11 DIAGNOSIS — I10 ESSENTIAL HYPERTENSION: Primary | ICD-10-CM

## 2024-04-11 DIAGNOSIS — R53.83 FATIGUE, UNSPECIFIED TYPE: ICD-10-CM

## 2024-04-11 DIAGNOSIS — C61 PROSTATE CANCER: ICD-10-CM

## 2024-04-11 DIAGNOSIS — M35.3 POLYMYALGIA RHEUMATICA: ICD-10-CM

## 2024-04-11 DIAGNOSIS — R26.89 BALANCE DISORDER: ICD-10-CM

## 2024-04-11 DIAGNOSIS — D64.9 ANEMIA, UNSPECIFIED TYPE: ICD-10-CM

## 2024-04-11 DIAGNOSIS — I48.0 PAF (PAROXYSMAL ATRIAL FIBRILLATION): ICD-10-CM

## 2024-04-11 DIAGNOSIS — Z79.01 LONG TERM (CURRENT) USE OF ANTICOAGULANTS: ICD-10-CM

## 2024-04-11 LAB
ANION GAP SERPL CALC-SCNC: 12 MMOL/L (ref 8–16)
BUN SERPL-MCNC: 19 MG/DL (ref 8–23)
CALCIUM SERPL-MCNC: 9.7 MG/DL (ref 8.7–10.5)
CHLORIDE SERPL-SCNC: 108 MMOL/L (ref 95–110)
CO2 SERPL-SCNC: 22 MMOL/L (ref 23–29)
CREAT SERPL-MCNC: 1.2 MG/DL (ref 0.5–1.4)
ERYTHROCYTE [DISTWIDTH] IN BLOOD BY AUTOMATED COUNT: 13.6 % (ref 11.5–14.5)
EST. GFR  (NO RACE VARIABLE): >60 ML/MIN/1.73 M^2
FOLATE SERPL-MCNC: 6.6 NG/ML (ref 4–24)
GLUCOSE SERPL-MCNC: 94 MG/DL (ref 70–110)
HCT VFR BLD AUTO: 42.9 % (ref 40–54)
HGB BLD-MCNC: 13.9 G/DL (ref 14–18)
MAGNESIUM SERPL-MCNC: 1.8 MG/DL (ref 1.6–2.6)
MCH RBC QN AUTO: 31 PG (ref 27–31)
MCHC RBC AUTO-ENTMCNC: 32.4 G/DL (ref 32–36)
MCV RBC AUTO: 96 FL (ref 82–98)
PLATELET # BLD AUTO: 249 K/UL (ref 150–450)
PMV BLD AUTO: 9.9 FL (ref 9.2–12.9)
POTASSIUM SERPL-SCNC: 4.2 MMOL/L (ref 3.5–5.1)
RBC # BLD AUTO: 4.48 M/UL (ref 4.6–6.2)
SODIUM SERPL-SCNC: 142 MMOL/L (ref 136–145)
TSH SERPL DL<=0.005 MIU/L-ACNC: 2.26 UIU/ML (ref 0.4–4)
VIT B12 SERPL-MCNC: 231 PG/ML (ref 210–950)
WBC # BLD AUTO: 7.98 K/UL (ref 3.9–12.7)

## 2024-04-11 PROCEDURE — 83735 ASSAY OF MAGNESIUM: CPT

## 2024-04-11 PROCEDURE — 82607 VITAMIN B-12: CPT

## 2024-04-11 PROCEDURE — 85027 COMPLETE CBC AUTOMATED: CPT

## 2024-04-11 PROCEDURE — 1126F AMNT PAIN NOTED NONE PRSNT: CPT | Mod: CPTII,S$GLB,,

## 2024-04-11 PROCEDURE — 99214 OFFICE O/P EST MOD 30 MIN: CPT | Mod: S$GLB,,,

## 2024-04-11 PROCEDURE — 80048 BASIC METABOLIC PNL TOTAL CA: CPT

## 2024-04-11 PROCEDURE — 99999 PR PBB SHADOW E&M-EST. PATIENT-LVL III: CPT | Mod: PBBFAC,,,

## 2024-04-11 PROCEDURE — 1101F PT FALLS ASSESS-DOCD LE1/YR: CPT | Mod: CPTII,S$GLB,,

## 2024-04-11 PROCEDURE — 3288F FALL RISK ASSESSMENT DOCD: CPT | Mod: CPTII,S$GLB,,

## 2024-04-11 PROCEDURE — 82746 ASSAY OF FOLIC ACID SERUM: CPT

## 2024-04-11 PROCEDURE — 36415 COLL VENOUS BLD VENIPUNCTURE: CPT | Mod: PO

## 2024-04-11 PROCEDURE — 1159F MED LIST DOCD IN RCRD: CPT | Mod: CPTII,S$GLB,,

## 2024-04-11 PROCEDURE — 84443 ASSAY THYROID STIM HORMONE: CPT

## 2024-04-11 RX ORDER — PREDNISONE 1 MG/1
TABLET ORAL
Qty: 180 TABLET | Refills: 3 | Status: SHIPPED | OUTPATIENT
Start: 2024-04-11

## 2024-04-11 NOTE — PROGRESS NOTES
Name: Dilip Lopez  MRN: [id number]  : 1940  PCP: Nathalie Salgado MD    HPI    Patient follows with Dr. Salgado, new to me. He was started on Effexor for hot flashes secondary to starting ADT however he stopped the medication. The effexor made him shaking and tremulous. Shakiness improved some after stopping the medication. He has generalized weakness that has been gradually worsening. He was started on Lupron per oncologist for prostate cancer. He also follows with rheumatology for Polymyalgia rheumatica. He was prescribed a steroid taper however he was unable to get medication that the rheumatologist prescribed secondary to placing the incorrect diagnosis. Will try to get him on the appropriate steroid taper. He was on the Effexor for less than a week before stopping the medication.  He walks with a cane at baseline. He does have a rolling walker as well. [facility]     Review of Systems   Constitutional:  Positive for fatigue.   Respiratory:  Negative for cough, shortness of breath and wheezing.    Cardiovascular:  Negative for chest pain and palpitations.   Gastrointestinal:  Negative for nausea and vomiting.   Genitourinary:  Negative for difficulty urinating.   Neurological:  Positive for tremors.       Patient Active Problem List   Diagnosis    Hypercholesterolemia    Coronary artery disease of native artery of native heart with stable angina pectoris    Neuropathy    Idiopathic chronic gout of multiple sites without tophus    Essential hypertension    Weakness    Numbness and tingling    Degenerative disc disease, lumbar    Localized edema    Long term current use of antiarrhythmic drug    Long term (current) use of antithrombotics/antiplatelets    PAF (paroxysmal atrial fibrillation)    Claudication of both lower extremities    Lumbar radiculopathy    Stage 3a chronic kidney disease    Frequent PVCs    Diastolic dysfunction    Pain in both lower extremities     Closed displaced fracture of distal phalanx of lesser toe of right foot    Abnormal finding on GI tract imaging    Elevated PSA    Rash    Prostate cancer    COVID    History of DVT of lower extremity    Lung nodule, multiple    Chronic low back pain without sciatica    Near syncope    Age-related osteoporosis without current pathological fracture    Elevated left ventricular end-diastolic pressure (LVEDP)    Thrombophlebitis of superficial veins of left lower extremity    Cephalic vein thrombosis, left    Long term (current) use of anticoagulants    Chest pain    Abnormal EKG       Vitals:    04/11/24 1446   Pulse: 69       Physical Exam  Constitutional:       General: He is not in acute distress.     Appearance: He is well-developed. He is ill-appearing.      Comments: Chronically ill appearing, walks with a cane   HENT:      Head: Normocephalic and atraumatic.      Right Ear: External ear normal.      Left Ear: External ear normal.   Eyes:      Conjunctiva/sclera: Conjunctivae normal.      Pupils: Pupils are equal, round, and reactive to light.   Neck:      Thyroid: No thyromegaly.   Cardiovascular:      Rate and Rhythm: Normal rate and regular rhythm.      Pulses: Normal pulses.      Heart sounds: Normal heart sounds, S1 normal and S2 normal.   Pulmonary:      Effort: Pulmonary effort is normal. No respiratory distress.      Breath sounds: Normal breath sounds.   Chest:      Chest wall: No tenderness.   Musculoskeletal:         General: No swelling or tenderness. Normal range of motion.      Cervical back: Normal range of motion and neck supple.   Skin:     General: Skin is warm and dry.      Coloration: Skin is not jaundiced or pale.   Neurological:      General: No focal deficit present.      Mental Status: He is alert and oriented to person, place, and time.      Cranial Nerves: No cranial nerve deficit.   Psychiatric:         Mood and Affect: Mood normal.         Behavior: Behavior normal.         1.  Essential hypertension   Continue with current medications    2. Polymyalgia rheumatica  -     predniSONE (DELTASONE) 1 MG tablet; Take 9 mg daily for one week, then take 8 mg daily for one week, then Take 7 mg daily for one week, then take 6 mg daily for one week, then Take 5 mg daily for one week, then take 4 mg daily for one week, then Take 3 mg daily for one week, then take 2 mg daily for one week, Take 1 mg daily for one week  Dispense: 180 tablet; Refill: 3    3. PAF (paroxysmal atrial fibrillation)   Stable. Continue with current medications    4. Long term (current) use of anticoagulants    5. Prostate cancer   Follows with oncology. Encouraged to follow up with oncology as Effexor caused tremors.     6. Fatigue, unspecified type  -     Magnesium; Future; Expected date: 04/11/2024  -     CBC Without Differential; Future; Expected date: 04/11/2024  -     BASIC METABOLIC PANEL; Future; Expected date: 04/11/2024  -     FOLATE; Future; Expected date: 04/11/2024  -     VITAMIN B12; Future; Expected date: 04/11/2024  -     TSH; Future; Expected date: 04/11/2024    7. Anemia, unspecified type  -     FOLATE; Future; Expected date: 04/11/2024  -     VITAMIN B12; Future; Expected date: 04/11/2024    8. Balance disorder  -     FOLATE; Future; Expected date: 04/11/2024  -     VITAMIN B12; Future; Expected date: 04/11/2024    9. Coronary artery disease of native artery of native heart with stable angina pectoris   Stable with current medications      Follow up as scheduled       LESLIE Valladares  04/11/2024

## 2024-04-16 NOTE — PROGRESS NOTES
I have discussed with the patients daughter and confirmed that he does have access to prednisone for the prescribed taper at this time.

## 2024-04-17 ENCOUNTER — OFFICE VISIT (OUTPATIENT)
Dept: CARDIOLOGY | Facility: CLINIC | Age: 84
End: 2024-04-17
Payer: MEDICARE

## 2024-04-17 VITALS
SYSTOLIC BLOOD PRESSURE: 120 MMHG | HEART RATE: 101 BPM | WEIGHT: 192.25 LBS | HEIGHT: 72 IN | BODY MASS INDEX: 26.04 KG/M2 | DIASTOLIC BLOOD PRESSURE: 83 MMHG

## 2024-04-17 DIAGNOSIS — I25.10 MILD CAD: Chronic | ICD-10-CM

## 2024-04-17 DIAGNOSIS — I10 ESSENTIAL HYPERTENSION: Chronic | ICD-10-CM

## 2024-04-17 DIAGNOSIS — I48.0 PAF (PAROXYSMAL ATRIAL FIBRILLATION): Primary | Chronic | ICD-10-CM

## 2024-04-17 DIAGNOSIS — Z79.01 LONG TERM (CURRENT) USE OF ANTICOAGULANTS: Chronic | ICD-10-CM

## 2024-04-17 DIAGNOSIS — E78.00 HYPERCHOLESTEROLEMIA: Chronic | ICD-10-CM

## 2024-04-17 DIAGNOSIS — R09.89 ARTERIAL BRUIT: ICD-10-CM

## 2024-04-17 PROCEDURE — 1159F MED LIST DOCD IN RCRD: CPT | Mod: CPTII,S$GLB,, | Performed by: INTERNAL MEDICINE

## 2024-04-17 PROCEDURE — 3074F SYST BP LT 130 MM HG: CPT | Mod: CPTII,S$GLB,, | Performed by: INTERNAL MEDICINE

## 2024-04-17 PROCEDURE — 3288F FALL RISK ASSESSMENT DOCD: CPT | Mod: CPTII,S$GLB,, | Performed by: INTERNAL MEDICINE

## 2024-04-17 PROCEDURE — 3079F DIAST BP 80-89 MM HG: CPT | Mod: CPTII,S$GLB,, | Performed by: INTERNAL MEDICINE

## 2024-04-17 PROCEDURE — 99214 OFFICE O/P EST MOD 30 MIN: CPT | Mod: S$GLB,,, | Performed by: INTERNAL MEDICINE

## 2024-04-17 PROCEDURE — 1101F PT FALLS ASSESS-DOCD LE1/YR: CPT | Mod: CPTII,S$GLB,, | Performed by: INTERNAL MEDICINE

## 2024-04-17 PROCEDURE — 1126F AMNT PAIN NOTED NONE PRSNT: CPT | Mod: CPTII,S$GLB,, | Performed by: INTERNAL MEDICINE

## 2024-04-17 NOTE — PROGRESS NOTES
Subjective:    Patient ID:  Dilip Lopez is a 83 y.o. male who presents for Cephalic vein thrombosis, left        HPI  STATUS POST ADMISSION TO [facility] IN DECEMBER WITH CHEST PAIN ECHO NORMAL LV FUNCTION MILD AORTIC VALVE SCLEROSIS HAD ABNORMAL NUCLEAR STRESS TEST PER DR. ARROYO  THEN CARDIAC CATHETERIZATION SHOWED MINIMAL NONOBSTRUCTIVE CAD NORMAL LVEDP, STOMACH VIRUS, RECENT LABS OK, SEE ROS    Past Medical History:   Diagnosis Date    Atrial fibrillation     CAD (coronary artery disease)     Hyperlipidemia     Hypertension     Prostate cancer 4/12/2022     Past Surgical History:   Procedure Laterality Date    ANGIOGRAM, CORONARY, WITH LEFT HEART CATHETERIZATION N/A 3/14/2023    Procedure: Left Heart Cath;  Surgeon: Jose Walker MD;  Location: [facility] CATH;  Service: Cardiology;  Laterality: N/A;    CARDIAC CATHETERIZATION      CARDIAC SURGERY      COLONOSCOPY      COLONOSCOPY N/A 5/14/2021    Procedure: COLONOSCOPY;  Surgeon: Leopoldo Mendoza MD;  Location: [facility] ENDO;  Service: Endoscopy;  Laterality: N/A;    CORONARY ANGIOGRAPHY N/A 3/14/2023    Procedure: ANGIOGRAM, CORONARY ARTERY;  Surgeon: Jose Walker MD;  Location: [facility] CATH;  Service: Cardiology;  Laterality: N/A;    CORONARY ANGIOPLASTY      CORONARY STENT PLACEMENT  01/29/2018    ESOPHAGOGASTRODUODENOSCOPY N/A 5/14/2021    Procedure: EGD (ESOPHAGOGASTRODUODENOSCOPY);  Surgeon: Leopoldo Mendoza MD;  Location: [facility] ENDO;  Service: Endoscopy;  Laterality: N/A;  with push enteroscopy    HIATAL HERNIA REPAIR      LEFT HEART CATHETERIZATION  12/15/2023    Procedure: Left heart cath  RM [id number];  Surgeon: Augustine Conteh MD;  Location: [facility] CATH;  Service: Cardiology;;    TRANSRECTAL BIOPSY OF PROSTATE WITH ULTRASOUND GUIDANCE N/A 7/20/2021    Procedure: BIOPSY, PROSTATE, RECTAL APPROACH, WITH US GUIDANCE;  Surgeon: SARAI Thomason MD;  Location: [facility] OR;  Service: Urology;  Laterality: N/A;     Family History   Problem  Relation Name Age of Onset    Hypertension Mother      Diabetes Mother      Heart disease Father  62        MI    Parkinsonism Brother       Social History     Socioeconomic History    Marital status:    Tobacco Use    Smoking status: Never    Smokeless tobacco: Never   Substance and Sexual Activity    Alcohol use: No    Drug use: No     Social Determinants of Health     Financial Resource Strain: Low Risk  (12/14/2023)    Overall Financial Resource Strain (CARDIA)     Difficulty of Paying Living Expenses: Not hard at all   Food Insecurity: No Food Insecurity (12/14/2023)    Hunger Vital Sign     Worried About Running Out of Food in the Last Year: Never true     Ran Out of Food in the Last Year: Never true   Transportation Needs: No Transportation Needs (12/14/2023)    PRAPARE - Transportation     Lack of Transportation (Medical): No     Lack of Transportation (Non-Medical): No   Physical Activity: Unknown (12/13/2023)    Exercise Vital Sign     Minutes of Exercise per Session: 30 min   Stress: No Stress Concern Present (12/13/2023)    [place] [employer] - Occupational Stress Questionnaire     Feeling of Stress : Not at all   Social Connections: Unknown (12/13/2023)    Social Connection and Isolation Panel [NHANES]     Frequency of Communication with Friends and Family: Twice a week     Frequency of Social Gatherings with Friends and Family: Once a week     Active Member of Clubs or Organizations: Yes     Attends Club or Organization Meetings: More than 4 times per year     Marital Status:    Housing Stability: Low Risk  (12/14/2023)    Housing Stability Vital Sign     Unable to Pay for Housing in the Last Year: No     Number of Places Lived in the Last Year: 1     Unstable Housing in the Last Year: No       Review of patient's allergies indicates:   Allergen Reactions    Doxycycline hcl Rash    Bactrim [sulfamethoxazole-trimethoprim]     Ciprofloxacin Hives    Sulfa (sulfonamide  "antibiotics) Other (See Comments)       Current Outpatient Medications:     allopurinoL (ZYLOPRIM) 300 MG tablet, TAKE 1 TABLET BY MOUTH ONCE  DAILY (Patient taking differently: Take 300 mg by mouth once daily.), Disp: 90 tablet, Rfl: 3    apixaban (ELIQUIS) 5 mg Tab, TAKE ONE TABLET TWO TIMES A DAY * BLOOD THINNER * (Patient taking differently: Take 5 mg by mouth 2 (two) times daily.), Disp: 60 tablet, Rfl: 1    cyanocobalamin, vitamin B-12, 1,000 mcg/mL Kit, Inject 1 mL every month by subcutaneous route., Disp: , Rfl:     gabapentin (NEURONTIN) 300 MG capsule, TAKE 1 CAPSULE BY MOUTH  TWICE DAILY (Patient taking differently: Take 300 mg by mouth 2 (two) times daily.), Disp: 180 capsule, Rfl: 3    hydrALAZINE (APRESOLINE) 25 MG tablet, Take 1 tablet by mouth once daily., Disp: , Rfl:     hydroCHLOROthiazide (HYDRODIURIL) 12.5 MG Tab, TAKE 1 TABLET BY MOUTH ONCE DAILY (Patient taking differently: Take 12.5 mg by mouth once daily.), Disp: 90 tablet, Rfl: 3    hydrOXYzine HCL (ATARAX) 25 MG tablet, Take 1 tablet 3 times a day by oral route as needed., Disp: , Rfl:     needle, disp, 25 gauge 25 gauge x 1" Ndle, Use as directed, Disp: 20 each, Rfl: 0    pantoprazole (PROTONIX) 20 MG tablet, Take 1 tablet by mouth once daily., Disp: , Rfl:     predniSONE (DELTASONE) 1 MG tablet, Take 9 mg daily for one week, then take 8 mg daily for one week, then Take 7 mg daily for one week, then take 6 mg daily for one week, then Take 5 mg daily for one week, then take 4 mg daily for one week, then Take 3 mg daily for one week, then take 2 mg daily for one week, Take 1 mg daily for one week, Disp: 180 tablet, Rfl: 3    syringe with needle (BD LUER-PADMINI SYRINGE) 3 mL 25 x 1 1/2 " Syrg, Use once a month to inject B12 injection., Disp: 3 each, Rfl: 3    syringe with needle (SYRINGE 3CC/25GX1") 3 mL 25 gauge x 1" Syrg, Use as directed, Disp: 20 Syringe, Rfl: 0    temazepam (RESTORIL) 30 mg capsule, TAKE 1 CAPSULE BY MOUTH NIGHTLY AS NEEDED " FOR INSOMNIA., Disp: 30 capsule, Rfl: 0    losartan (COZAAR) 100 MG tablet, Take 1 tablet by mouth once daily. (Patient not taking: Reported on 4/11/2024), Disp: , Rfl:     montelukast (SINGULAIR) 10 mg tablet, TAKE 1 TABLET BY MOUTH IN  THE EVENING (Patient not taking: Reported on 4/11/2024), Disp: 90 tablet, Rfl: 3    Review of Systems   Constitutional: Negative for chills, decreased appetite, diaphoresis, fever and malaise/fatigue.   HENT:  Negative for congestion and nosebleeds.    Eyes:  Negative for blurred vision and visual disturbance.   Cardiovascular:  Negative for chest pain, claudication (RESOLVED), cyanosis, dyspnea on exertion (MILD), irregular heartbeat, leg swelling (OCC), near-syncope, orthopnea, palpitations, paroxysmal nocturnal dyspnea and syncope.   Respiratory:  Negative for cough, hemoptysis and shortness of breath.    Hematologic/Lymphatic: Negative for adenopathy. Does not bruise/bleed easily.   Skin:  Negative for rash.   Musculoskeletal:  Positive for joint pain. Negative for back pain and falls.   Gastrointestinal:  Positive for nausea. Negative for abdominal pain, dysphagia, jaundice and melena.   Genitourinary:  Negative for dysuria, flank pain and hematuria.   Neurological:  Positive for paresthesias (NEUROPATHY). Negative for brief paralysis, focal weakness, light-headedness and loss of balance (SOME).   Psychiatric/Behavioral:  Negative for altered mental status and depression.         Objective:      Vitals:    04/17/24 0842   BP: 120/83   Pulse: 101   Weight: 87.2 kg (192 lb 3.9 oz)   Height: 6' (1.829 m)   PainSc: 0-No pain     Body mass index is 26.07 kg/m².    Physical Exam  Constitutional:       Appearance: Normal appearance.   HENT:      Head: Normocephalic and atraumatic.   Eyes:      General: No scleral icterus.     Extraocular Movements: Extraocular movements intact.   Neck:      Vascular: Carotid bruit present. No JVD.   Cardiovascular:      Rate and Rhythm: Normal rate  and regular rhythm. No extrasystoles are present.     Pulses: Normal pulses.           Carotid pulses are 2+ on the right side and 2+ on the left side with bruit.       Radial pulses are 2+ on the right side and 2+ on the left side.        Posterior tibial pulses are 2+ on the right side and 2+ on the left side.      Heart sounds:      No friction rub. No gallop.   Pulmonary:      Effort: Pulmonary effort is normal.      Breath sounds: Normal breath sounds and air entry. No rales.   Abdominal:      Palpations: Abdomen is soft.      Tenderness: There is no abdominal tenderness.   Musculoskeletal:         General: No tenderness (L ARM OK).        Arms:       Cervical back: Neck supple.      Right lower leg: No edema.      Left lower leg: No edema.      Comments: USES A CANE   Skin:     General: Skin is warm and dry.      Capillary Refill: Capillary refill takes less than 2 seconds.   Neurological:      General: No focal deficit present.      Mental Status: He is alert and oriented to person, place, and time.      Cranial Nerves: No cranial nerve deficit.   Psychiatric:         Mood and Affect: Mood normal.         Speech: Speech normal.         Behavior: Behavior normal.                 ..    Chemistry        Component Value Date/Time     04/11/2024 1537    K 4.2 04/11/2024 1537     04/11/2024 1537    CO2 22 (L) 04/11/2024 1537    BUN 19 04/11/2024 1537    CREATININE 1.2 04/11/2024 1537    GLU 94 04/11/2024 1537        Component Value Date/Time    CALCIUM 9.7 04/11/2024 1537    ALKPHOS 60 03/18/2024 1218    AST 18 03/18/2024 1218    ALT 18 03/18/2024 1218    BILITOT 0.5 03/18/2024 1218    ESTGFRAFRICA 43.1 (A) 07/14/2022 1418    EGFRNONAA 37.3 (A) 07/14/2022 1418            ..  Lab Results   Component Value Date    CHOL 157 04/05/2023    CHOL 145 11/01/2022    CHOL 142 10/05/2021     Lab Results   Component Value Date    HDL 47 04/05/2023    HDL 40 11/01/2022    HDL 40 10/05/2021     Lab Results    Component Value Date    LDLCALC 89.4 04/05/2023    LDLCALC 86.6 11/01/2022    LDLCALC 82.4 10/05/2021     Lab Results   Component Value Date    TRIG 103 04/05/2023    TRIG 92 11/01/2022    TRIG 98 10/05/2021     Lab Results   Component Value Date    CHOLHDL 29.9 04/05/2023    CHOLHDL 27.6 11/01/2022    CHOLHDL 28.2 10/05/2021     ..  Lab Results   Component Value Date    WBC 7.98 04/11/2024    HGB 13.9 (L) 04/11/2024    HCT 42.9 04/11/2024    MCV 96 04/11/2024     04/11/2024       Test(s) Reviewed  I have reviewed the following in detail:  [x] Stress test   [x] Angiography   [x] Echocardiogram   [x] Labs   [x] Other:       Assessment:         ICD-10-CM ICD-9-CM   1. PAF (paroxysmal atrial fibrillation)  I48.0 427.31   2. Mild CAD  I25.10 414.00   3. Arterial bruit  R09.89 785.9   4. Essential hypertension  I10 401.9   5. Hypercholesterolemia  E78.00 272.0   6. Long term (current) use of anticoagulants  Z79.01 V58.61     Problem List Items Addressed This Visit          Cardiac/Vascular    Hypercholesterolemia    Mild CAD    Essential hypertension    PAF (paroxysmal atrial fibrillation) - Primary    Arterial bruit    Relevant Orders    CV Ultrasound Bilateral Doppler Carotid       Hematology    Long term (current) use of anticoagulants        Plan:         CAROTID US, ALL CV CLINICALLY STABLE, NO ANGINA, NO HF, NO TIA, NO CLINICAL ARRHYTHMIA,CONTINUE CURRENT MEDS, EDUCATION, DIET, EXERCISE , INCREASE ACTIVITY RETURN TO CLINIC IN 7-8 MO  PAF (paroxysmal atrial fibrillation)    Mild CAD  Comments:  MINIMAL BY CATHETERIZATION TWICE    Arterial bruit  Comments:  ULTRASOUND  Orders:  -     CV Ultrasound Bilateral Doppler Carotid; Future    Essential hypertension    Hypercholesterolemia    Long term (current) use of anticoagulants    RTC Low level/low impact aerobic exercise 5x's/wk. Heart healthy diet and risk factor modification.    See labs and med orders.    Aerobic exercise 5x's/wk. Heart healthy diet and risk  factor modification.    See labs and med orders.

## 2024-04-25 ENCOUNTER — LAB VISIT (OUTPATIENT)
Dept: PRIMARY CARE CLINIC | Facility: CLINIC | Age: 84
End: 2024-04-25
Payer: MEDICARE

## 2024-04-25 DIAGNOSIS — I73.9 CLAUDICATION OF BOTH LOWER EXTREMITIES: ICD-10-CM

## 2024-04-25 DIAGNOSIS — I48.0 PAROXYSMAL ATRIAL FIBRILLATION: ICD-10-CM

## 2024-04-25 DIAGNOSIS — E53.8 B12 DEFICIENCY: ICD-10-CM

## 2024-04-25 DIAGNOSIS — I25.118 CORONARY ARTERY DISEASE OF NATIVE ARTERY OF NATIVE HEART WITH STABLE ANGINA PECTORIS: ICD-10-CM

## 2024-04-25 DIAGNOSIS — N18.31 CHRONIC KIDNEY DISEASE, STAGE 3A: ICD-10-CM

## 2024-04-25 DIAGNOSIS — M25.50 MULTIPLE JOINT PAIN: ICD-10-CM

## 2024-04-25 DIAGNOSIS — C61 PROSTATE CANCER: ICD-10-CM

## 2024-04-25 LAB
25(OH)D3+25(OH)D2 SERPL-MCNC: 13 NG/ML (ref 30–96)
ALBUMIN SERPL BCP-MCNC: 3.7 G/DL (ref 3.5–5.2)
ALP SERPL-CCNC: 63 U/L (ref 55–135)
ALT SERPL W/O P-5'-P-CCNC: 13 U/L (ref 10–44)
ANION GAP SERPL CALC-SCNC: 10 MMOL/L (ref 8–16)
AST SERPL-CCNC: 15 U/L (ref 10–40)
BASOPHILS # BLD AUTO: 0.04 K/UL (ref 0–0.2)
BASOPHILS NFR BLD: 0.6 % (ref 0–1.9)
BILIRUB SERPL-MCNC: 0.5 MG/DL (ref 0.1–1)
BUN SERPL-MCNC: 23 MG/DL (ref 8–23)
CALCIUM SERPL-MCNC: 8.6 MG/DL (ref 8.7–10.5)
CHLORIDE SERPL-SCNC: 105 MMOL/L (ref 95–110)
CO2 SERPL-SCNC: 26 MMOL/L (ref 23–29)
CREAT SERPL-MCNC: 1.5 MG/DL (ref 0.5–1.4)
CRP SERPL-MCNC: 1.9 MG/L (ref 0–8.2)
DIFFERENTIAL METHOD BLD: ABNORMAL
EOSINOPHIL # BLD AUTO: 0.4 K/UL (ref 0–0.5)
EOSINOPHIL NFR BLD: 5.8 % (ref 0–8)
ERYTHROCYTE [DISTWIDTH] IN BLOOD BY AUTOMATED COUNT: 13.4 % (ref 11.5–14.5)
ERYTHROCYTE [SEDIMENTATION RATE] IN BLOOD BY PHOTOMETRIC METHOD: 9 MM/HR (ref 0–23)
EST. GFR  (NO RACE VARIABLE): 46.2 ML/MIN/1.73 M^2
GLUCOSE SERPL-MCNC: 89 MG/DL (ref 70–110)
HCT VFR BLD AUTO: 42.1 % (ref 40–54)
HGB BLD-MCNC: 13.9 G/DL (ref 14–18)
IMM GRANULOCYTES # BLD AUTO: 0.01 K/UL (ref 0–0.04)
IMM GRANULOCYTES NFR BLD AUTO: 0.1 % (ref 0–0.5)
LYMPHOCYTES # BLD AUTO: 2.3 K/UL (ref 1–4.8)
LYMPHOCYTES NFR BLD: 32.2 % (ref 18–48)
MCH RBC QN AUTO: 31.4 PG (ref 27–31)
MCHC RBC AUTO-ENTMCNC: 33 G/DL (ref 32–36)
MCV RBC AUTO: 95 FL (ref 82–98)
MONOCYTES # BLD AUTO: 0.7 K/UL (ref 0.3–1)
MONOCYTES NFR BLD: 9.8 % (ref 4–15)
NEUTROPHILS # BLD AUTO: 3.6 K/UL (ref 1.8–7.7)
NEUTROPHILS NFR BLD: 51.5 % (ref 38–73)
NRBC BLD-RTO: 0 /100 WBC
PLATELET # BLD AUTO: 213 K/UL (ref 150–450)
PMV BLD AUTO: 10.5 FL (ref 9.2–12.9)
POTASSIUM SERPL-SCNC: 3.4 MMOL/L (ref 3.5–5.1)
PROT SERPL-MCNC: 6.5 G/DL (ref 6–8.4)
RBC # BLD AUTO: 4.42 M/UL (ref 4.6–6.2)
SODIUM SERPL-SCNC: 141 MMOL/L (ref 136–145)
VIT B12 SERPL-MCNC: 315 PG/ML (ref 210–950)
WBC # BLD AUTO: 7.05 K/UL (ref 3.9–12.7)

## 2024-04-25 PROCEDURE — 85025 COMPLETE CBC W/AUTO DIFF WBC: CPT | Performed by: FAMILY MEDICINE

## 2024-04-25 PROCEDURE — 80053 COMPREHEN METABOLIC PANEL: CPT | Performed by: FAMILY MEDICINE

## 2024-04-25 PROCEDURE — 82306 VITAMIN D 25 HYDROXY: CPT | Performed by: FAMILY MEDICINE

## 2024-04-25 PROCEDURE — 82607 VITAMIN B-12: CPT | Performed by: FAMILY MEDICINE

## 2024-04-25 PROCEDURE — 86140 C-REACTIVE PROTEIN: CPT | Performed by: FAMILY MEDICINE

## 2024-04-25 PROCEDURE — 85652 RBC SED RATE AUTOMATED: CPT | Performed by: FAMILY MEDICINE

## 2024-04-25 PROCEDURE — 36415 COLL VENOUS BLD VENIPUNCTURE: CPT | Mod: S$GLB,,, | Performed by: FAMILY MEDICINE

## 2024-04-29 ENCOUNTER — OFFICE VISIT (OUTPATIENT)
Dept: FAMILY MEDICINE | Facility: CLINIC | Age: 84
End: 2024-04-29
Payer: MEDICARE

## 2024-04-29 VITALS
HEIGHT: 72 IN | OXYGEN SATURATION: 96 % | TEMPERATURE: 98 F | WEIGHT: 201.5 LBS | RESPIRATION RATE: 18 BRPM | SYSTOLIC BLOOD PRESSURE: 120 MMHG | HEART RATE: 97 BPM | BODY MASS INDEX: 27.29 KG/M2 | DIASTOLIC BLOOD PRESSURE: 80 MMHG

## 2024-04-29 DIAGNOSIS — R20.2 TINGLING OF BOTH FEET: Primary | ICD-10-CM

## 2024-04-29 DIAGNOSIS — E53.8 B12 DEFICIENCY: ICD-10-CM

## 2024-04-29 DIAGNOSIS — E87.6 HYPOKALEMIA: ICD-10-CM

## 2024-04-29 DIAGNOSIS — I10 ESSENTIAL HYPERTENSION: ICD-10-CM

## 2024-04-29 DIAGNOSIS — E55.9 VITAMIN D DEFICIENCY: ICD-10-CM

## 2024-04-29 PROCEDURE — 96372 THER/PROPH/DIAG INJ SC/IM: CPT | Mod: S$GLB,,, | Performed by: FAMILY MEDICINE

## 2024-04-29 PROCEDURE — 1101F PT FALLS ASSESS-DOCD LE1/YR: CPT | Mod: CPTII,S$GLB,, | Performed by: FAMILY MEDICINE

## 2024-04-29 PROCEDURE — 1126F AMNT PAIN NOTED NONE PRSNT: CPT | Mod: CPTII,S$GLB,, | Performed by: FAMILY MEDICINE

## 2024-04-29 PROCEDURE — 3079F DIAST BP 80-89 MM HG: CPT | Mod: CPTII,S$GLB,, | Performed by: FAMILY MEDICINE

## 2024-04-29 PROCEDURE — 3074F SYST BP LT 130 MM HG: CPT | Mod: CPTII,S$GLB,, | Performed by: FAMILY MEDICINE

## 2024-04-29 PROCEDURE — 99214 OFFICE O/P EST MOD 30 MIN: CPT | Mod: 25,S$GLB,, | Performed by: FAMILY MEDICINE

## 2024-04-29 PROCEDURE — 99999 PR PBB SHADOW E&M-EST. PATIENT-LVL IV: CPT | Mod: PBBFAC,,, | Performed by: FAMILY MEDICINE

## 2024-04-29 PROCEDURE — 1159F MED LIST DOCD IN RCRD: CPT | Mod: CPTII,S$GLB,, | Performed by: FAMILY MEDICINE

## 2024-04-29 PROCEDURE — 3288F FALL RISK ASSESSMENT DOCD: CPT | Mod: CPTII,S$GLB,, | Performed by: FAMILY MEDICINE

## 2024-04-29 RX ORDER — POTASSIUM CHLORIDE 20 MEQ/1
20 TABLET, EXTENDED RELEASE ORAL DAILY
Qty: 30 TABLET | Refills: 0 | Status: SHIPPED | OUTPATIENT
Start: 2024-04-29 | End: 2024-05-20

## 2024-04-29 RX ORDER — CYANOCOBALAMIN 1000 UG/ML
1000 INJECTION, SOLUTION INTRAMUSCULAR; SUBCUTANEOUS ONCE
Status: COMPLETED | OUTPATIENT
Start: 2024-04-29 | End: 2024-04-29

## 2024-04-29 RX ORDER — ERGOCALCIFEROL 1.25 MG/1
50000 CAPSULE ORAL
Qty: 12 CAPSULE | Refills: 0 | Status: SHIPPED | OUTPATIENT
Start: 2024-04-29 | End: 2024-05-20

## 2024-04-29 RX ADMIN — CYANOCOBALAMIN 1000 MCG: 1000 INJECTION, SOLUTION INTRAMUSCULAR; SUBCUTANEOUS at 02:04

## 2024-04-30 NOTE — PROGRESS NOTES
Assessment:       1. Tingling of both feet    2. B12 deficiency    3. Vitamin D deficiency    4. Hypokalemia    5. Essential hypertension        Assessment & Plan  1. Neuropathy.  The patient was educated about various causes of neuropathy, including metabolic disorders, long-term steroid use, B12 deficiency, folic acid deficiency, thyroid disorders, and spinal issues. A B12 injection will be administered in the office today, followed by a home injection in 1 month.    2. Hypokalemia.  The patient was informed that her low potassium levels could be contributing to her cramping. The patient was advised to incorporate potassium-rich foods such as bananas, fruits, and vegetables into her diet. She was also advised to increase her water intake. Potassium chloride 20 mEq will be prescribed, to be taken once daily for 30 days. A follow-up BMP will be scheduled in 2 weeks to evaluate the need for continued treatment.    3. Vitamin D deficiency.  A prescription for vitamin D 50,000 units will be provided for a duration of 3 months.    4. Hypertension: Well controlled.    Follow-up  The patient is scheduled for a follow-up visit in 4 months.         The patient's BMI has been recorded in the chart. The patient has been provided educational materials regarding the benefits of attaining and maintaining a normal weight. We will continue to address and follow this issue during follow up visits.   Patient agreed with assessment and plan. Patient verbalized understanding.     Subjective:       Patient ID: Dilip Lopez is a 83 y.o. male.    Chief Complaint: Follow-up and lab results.    HPI  History of Present Illness  The patient presents to the office for follow-up of multiple medical concerns.    The patient reports an increased appetite, which he attributes to his  steroid treatment.    He experiences severe tingling and numbness in her feet when in a supine position or when she ceases ambulation. This necessitates the use of  temazepam to aid sleep.  He denies any thyroid-related issues.  He is currently on a regimen of vitamin B12 by mouth.    He experiences cramping in her hands and legs.       Past medical history, past social history was reviewed and discussed with the patient.    Review of Systems   Respiratory:  Negative for choking and chest tightness.    Cardiovascular:  Negative for chest pain and palpitations.   Musculoskeletal:  Positive for arthralgias, back pain and gait problem.   Neurological:  Positive for numbness.       Objective:      Physical Exam    Physical Exam  Vital Signs  The patient's blood pressure is 120/80. The patient's weight is 201 pound.   The patient is in no acute distress, the patient has limited ambulation, he has using a cane for mobilization.  He has presence of trace edema bilateral lower extremities, has presence of normal peripheral pulses, decreased sensation on bilateral feet and lower extremities.  Results  Laboratory Studies  B12 increased from 231 to 315. CRP level is 1.9. Vitamin D levels are low. Blood sugar is 89. Kidney function is 46. Potassium is low. Hemoglobin is stable. Thyroid function is normal.     This note was generated with the assistance of ambient listening technology. Verbal consent was obtained by the patient and accompanying visitor(s) for the recording of patient appointment to facilitate this note. I attest to having reviewed and edited the generated note for accuracy, though some syntax or spelling errors may persist. Please contact the author of this note for any clarification.

## 2024-05-06 DIAGNOSIS — G47.09 OTHER INSOMNIA: ICD-10-CM

## 2024-05-06 RX ORDER — TEMAZEPAM 30 MG/1
CAPSULE ORAL
Qty: 30 CAPSULE | Refills: 5 | Status: SHIPPED | OUTPATIENT
Start: 2024-05-06 | End: 2024-06-05

## 2024-05-06 NOTE — TELEPHONE ENCOUNTER
No care due was identified.  [facility] Embedded Care Due Messages. Reference number: [id number].   5/06/2024 11:27:07 AM CDT

## 2024-05-12 DIAGNOSIS — I10 ESSENTIAL HYPERTENSION: ICD-10-CM

## 2024-05-13 RX ORDER — HYDROCHLOROTHIAZIDE 12.5 MG/1
TABLET ORAL
Qty: 90 TABLET | Refills: 3 | Status: SHIPPED | OUTPATIENT
Start: 2024-05-13

## 2024-05-13 NOTE — TELEPHONE ENCOUNTER
No care due was identified.  [facility] Embedded Care Due Messages. Reference number: [id number].   5/12/2024 10:11:20 PM CDT

## 2024-05-13 NOTE — TELEPHONE ENCOUNTER
Refill Routing Note   Medication(s) are not appropriate for processing by [facility] for the following reason(s):        Required labs abnormal    [facility] action(s):  Defer               Appointments  past 12m or future 3m with PCP    Date Provider   Last Visit   4/29/2024 Nathalie Salgado MD   Next Visit   8/30/2024 Nathalie Salgado MD   ED visits in past 90 days: 0        Note composed:12:28 PM 05/13/2024

## 2024-05-16 ENCOUNTER — LAB VISIT (OUTPATIENT)
Dept: PRIMARY CARE CLINIC | Facility: CLINIC | Age: 84
End: 2024-05-16
Attending: FAMILY MEDICINE
Payer: MEDICARE

## 2024-05-16 DIAGNOSIS — E55.9 VITAMIN D DEFICIENCY: ICD-10-CM

## 2024-05-16 DIAGNOSIS — E87.6 HYPOKALEMIA: ICD-10-CM

## 2024-05-16 LAB
ANION GAP SERPL CALC-SCNC: 10 MMOL/L (ref 8–16)
BUN SERPL-MCNC: 27 MG/DL (ref 8–23)
CALCIUM SERPL-MCNC: 8.9 MG/DL (ref 8.7–10.5)
CHLORIDE SERPL-SCNC: 109 MMOL/L (ref 95–110)
CO2 SERPL-SCNC: 22 MMOL/L (ref 23–29)
CREAT SERPL-MCNC: 1.6 MG/DL (ref 0.5–1.4)
EST. GFR  (NO RACE VARIABLE): 42.8 ML/MIN/1.73 M^2
GLUCOSE SERPL-MCNC: 95 MG/DL (ref 70–110)
MAGNESIUM SERPL-MCNC: 1.7 MG/DL (ref 1.6–2.6)
POTASSIUM SERPL-SCNC: 3.6 MMOL/L (ref 3.5–5.1)
SODIUM SERPL-SCNC: 141 MMOL/L (ref 136–145)

## 2024-05-16 PROCEDURE — 83735 ASSAY OF MAGNESIUM: CPT | Performed by: FAMILY MEDICINE

## 2024-05-16 PROCEDURE — 36415 COLL VENOUS BLD VENIPUNCTURE: CPT | Mod: S$GLB,,, | Performed by: FAMILY MEDICINE

## 2024-05-16 PROCEDURE — 80048 BASIC METABOLIC PNL TOTAL CA: CPT | Performed by: FAMILY MEDICINE

## 2024-05-16 PROCEDURE — 82652 VIT D 1 25-DIHYDROXY: CPT | Performed by: FAMILY MEDICINE

## 2024-05-19 DIAGNOSIS — E87.6 HYPOKALEMIA: ICD-10-CM

## 2024-05-19 NOTE — TELEPHONE ENCOUNTER
No care due was identified.  [facility] Embedded Care Due Messages. Reference number: [id number].   5/19/2024 6:01:02 AM CDT

## 2024-05-20 DIAGNOSIS — E55.9 VITAMIN D DEFICIENCY: ICD-10-CM

## 2024-05-20 DIAGNOSIS — N18.32 STAGE 3B CHRONIC KIDNEY DISEASE: ICD-10-CM

## 2024-05-20 DIAGNOSIS — N18.31 CHRONIC KIDNEY DISEASE, STAGE 3A: Primary | ICD-10-CM

## 2024-05-20 LAB — 1,25(OH)2D3 SERPL-MCNC: 14 PG/ML (ref 20–79)

## 2024-05-20 RX ORDER — ERGOCALCIFEROL 1.25 MG/1
50000 CAPSULE ORAL
Qty: 12 CAPSULE | Refills: 0 | Status: SHIPPED | OUTPATIENT
Start: 2024-05-20 | End: 2024-08-18

## 2024-05-20 RX ORDER — POTASSIUM CHLORIDE 20 MEQ/1
20 TABLET, EXTENDED RELEASE ORAL
Qty: 90 TABLET | Refills: 3 | Status: SHIPPED | OUTPATIENT
Start: 2024-05-20

## 2024-05-20 NOTE — TELEPHONE ENCOUNTER
Refill Routing Note   Medication(s) are not appropriate for processing by [facility] for the following reason(s):        Required labs abnormal    [facility] action(s):  Defer               Appointments  past 12m or future 3m with PCP    Date Provider   Last Visit   4/29/2024 Nathalie Salgado MD   Next Visit   8/30/2024 Nathalie Salgado MD   ED visits in past 90 days: 0        Note composed:6:50 AM 05/20/2024            Additional Notes: Patient consent was obtained to proceed with the visit and recommended plan of care after discussion of all risks and benefits, including the risks of COVID-19 exposure. Detail Level: Simple Render Risk Assessment In Note?: no

## 2024-06-05 DIAGNOSIS — G47.09 OTHER INSOMNIA: ICD-10-CM

## 2024-06-05 RX ORDER — TEMAZEPAM 30 MG/1
CAPSULE ORAL
Qty: 30 CAPSULE | Refills: 2 | Status: SHIPPED | OUTPATIENT
Start: 2024-06-05

## 2024-06-05 NOTE — TELEPHONE ENCOUNTER
No care due was identified.  [facility] Embedded Care Due Messages. Reference number: [id number].   6/05/2024 11:32:05 AM CDT

## 2024-06-17 DIAGNOSIS — M51.36 DEGENERATIVE DISC DISEASE, LUMBAR: ICD-10-CM

## 2024-06-18 RX ORDER — GABAPENTIN 300 MG/1
CAPSULE ORAL
Qty: 180 CAPSULE | Refills: 1 | Status: SHIPPED | OUTPATIENT
Start: 2024-06-18

## 2024-06-18 NOTE — TELEPHONE ENCOUNTER
No care due was identified.  [facility] Embedded Care Due Messages. Reference number: [id number].   6/17/2024 11:57:50 PM CDT

## 2024-07-02 ENCOUNTER — TELEPHONE (OUTPATIENT)
Dept: HEMATOLOGY/ONCOLOGY | Facility: CLINIC | Age: 84
End: 2024-07-02
Payer: MEDICARE

## 2024-08-05 ENCOUNTER — TELEPHONE (OUTPATIENT)
Dept: NEPHROLOGY | Facility: CLINIC | Age: 84
End: 2024-08-05
Payer: MEDICARE

## 2024-08-05 DIAGNOSIS — M1A.09X0 IDIOPATHIC CHRONIC GOUT OF MULTIPLE SITES WITHOUT TOPHUS: ICD-10-CM

## 2024-08-06 RX ORDER — ALLOPURINOL 300 MG/1
300 TABLET ORAL DAILY
Qty: 90 TABLET | Refills: 1 | Status: SHIPPED | OUTPATIENT
Start: 2024-08-06

## 2024-08-24 DIAGNOSIS — E55.9 VITAMIN D DEFICIENCY: ICD-10-CM

## 2024-08-25 RX ORDER — ERGOCALCIFEROL 1.25 MG/1
50000 CAPSULE ORAL
Qty: 12 CAPSULE | Refills: 3 | Status: SHIPPED | OUTPATIENT
Start: 2024-08-25

## 2024-08-30 ENCOUNTER — OFFICE VISIT (OUTPATIENT)
Dept: FAMILY MEDICINE | Facility: CLINIC | Age: 84
End: 2024-08-30
Payer: MEDICARE

## 2024-08-30 ENCOUNTER — TELEPHONE (OUTPATIENT)
Dept: RHEUMATOLOGY | Facility: CLINIC | Age: 84
End: 2024-08-30
Payer: MEDICARE

## 2024-08-30 ENCOUNTER — LAB VISIT (OUTPATIENT)
Dept: LAB | Facility: HOSPITAL | Age: 84
End: 2024-08-30
Attending: FAMILY MEDICINE
Payer: MEDICARE

## 2024-08-30 VITALS
DIASTOLIC BLOOD PRESSURE: 80 MMHG | SYSTOLIC BLOOD PRESSURE: 120 MMHG | BODY MASS INDEX: 27.16 KG/M2 | WEIGHT: 200.5 LBS | HEART RATE: 85 BPM | HEIGHT: 72 IN | OXYGEN SATURATION: 96 % | RESPIRATION RATE: 18 BRPM

## 2024-08-30 DIAGNOSIS — N18.32 STAGE 3B CHRONIC KIDNEY DISEASE: ICD-10-CM

## 2024-08-30 DIAGNOSIS — N18.31 CHRONIC KIDNEY DISEASE, STAGE 3A: ICD-10-CM

## 2024-08-30 DIAGNOSIS — R14.0 ABDOMINAL BLOATING: ICD-10-CM

## 2024-08-30 DIAGNOSIS — H53.8 BLURRED VISION: Primary | ICD-10-CM

## 2024-08-30 DIAGNOSIS — M35.3 PMR (POLYMYALGIA RHEUMATICA): ICD-10-CM

## 2024-08-30 LAB
ANION GAP SERPL CALC-SCNC: 11 MMOL/L (ref 8–16)
BASOPHILS # BLD AUTO: 0.02 K/UL (ref 0–0.2)
BASOPHILS NFR BLD: 0.3 % (ref 0–1.9)
BUN SERPL-MCNC: 26 MG/DL (ref 8–23)
CALCIUM SERPL-MCNC: 9.1 MG/DL (ref 8.7–10.5)
CHLORIDE SERPL-SCNC: 102 MMOL/L (ref 95–110)
CO2 SERPL-SCNC: 26 MMOL/L (ref 23–29)
CREAT SERPL-MCNC: 1.7 MG/DL (ref 0.5–1.4)
DIFFERENTIAL METHOD BLD: ABNORMAL
EOSINOPHIL # BLD AUTO: 0.1 K/UL (ref 0–0.5)
EOSINOPHIL NFR BLD: 1.5 % (ref 0–8)
ERYTHROCYTE [DISTWIDTH] IN BLOOD BY AUTOMATED COUNT: 14 % (ref 11.5–14.5)
EST. GFR  (NO RACE VARIABLE): 39.8 ML/MIN/1.73 M^2
GLUCOSE SERPL-MCNC: 111 MG/DL (ref 70–110)
HCT VFR BLD AUTO: 40.6 % (ref 40–54)
HGB BLD-MCNC: 13.5 G/DL (ref 14–18)
IMM GRANULOCYTES # BLD AUTO: 0.04 K/UL (ref 0–0.04)
IMM GRANULOCYTES NFR BLD AUTO: 0.5 % (ref 0–0.5)
LYMPHOCYTES # BLD AUTO: 1.7 K/UL (ref 1–4.8)
LYMPHOCYTES NFR BLD: 21.3 % (ref 18–48)
MCH RBC QN AUTO: 31.6 PG (ref 27–31)
MCHC RBC AUTO-ENTMCNC: 33.3 G/DL (ref 32–36)
MCV RBC AUTO: 95 FL (ref 82–98)
MONOCYTES # BLD AUTO: 0.6 K/UL (ref 0.3–1)
MONOCYTES NFR BLD: 7.9 % (ref 4–15)
NEUTROPHILS # BLD AUTO: 5.4 K/UL (ref 1.8–7.7)
NEUTROPHILS NFR BLD: 68.5 % (ref 38–73)
NRBC BLD-RTO: 0 /100 WBC
PLATELET # BLD AUTO: 249 K/UL (ref 150–450)
PMV BLD AUTO: 10.6 FL (ref 9.2–12.9)
POTASSIUM SERPL-SCNC: 3.6 MMOL/L (ref 3.5–5.1)
RBC # BLD AUTO: 4.27 M/UL (ref 4.6–6.2)
SODIUM SERPL-SCNC: 139 MMOL/L (ref 136–145)
WBC # BLD AUTO: 7.85 K/UL (ref 3.9–12.7)

## 2024-08-30 PROCEDURE — 3074F SYST BP LT 130 MM HG: CPT | Mod: CPTII,S$GLB,, | Performed by: FAMILY MEDICINE

## 2024-08-30 PROCEDURE — 85025 COMPLETE CBC W/AUTO DIFF WBC: CPT | Performed by: FAMILY MEDICINE

## 2024-08-30 PROCEDURE — 99214 OFFICE O/P EST MOD 30 MIN: CPT | Mod: S$GLB,,, | Performed by: FAMILY MEDICINE

## 2024-08-30 PROCEDURE — 1101F PT FALLS ASSESS-DOCD LE1/YR: CPT | Mod: CPTII,S$GLB,, | Performed by: FAMILY MEDICINE

## 2024-08-30 PROCEDURE — 36415 COLL VENOUS BLD VENIPUNCTURE: CPT | Mod: PO | Performed by: FAMILY MEDICINE

## 2024-08-30 PROCEDURE — 1159F MED LIST DOCD IN RCRD: CPT | Mod: CPTII,S$GLB,, | Performed by: FAMILY MEDICINE

## 2024-08-30 PROCEDURE — 3079F DIAST BP 80-89 MM HG: CPT | Mod: CPTII,S$GLB,, | Performed by: FAMILY MEDICINE

## 2024-08-30 PROCEDURE — 99999 PR PBB SHADOW E&M-EST. PATIENT-LVL IV: CPT | Mod: PBBFAC,,, | Performed by: FAMILY MEDICINE

## 2024-08-30 PROCEDURE — 3288F FALL RISK ASSESSMENT DOCD: CPT | Mod: CPTII,S$GLB,, | Performed by: FAMILY MEDICINE

## 2024-08-30 PROCEDURE — 80048 BASIC METABOLIC PNL TOTAL CA: CPT | Performed by: FAMILY MEDICINE

## 2024-08-30 PROCEDURE — 1126F AMNT PAIN NOTED NONE PRSNT: CPT | Mod: CPTII,S$GLB,, | Performed by: FAMILY MEDICINE

## 2024-09-08 PROBLEM — N18.32 STAGE 3B CHRONIC KIDNEY DISEASE: Status: ACTIVE | Noted: 2024-09-08

## 2024-09-08 NOTE — PROGRESS NOTES
Assessment:       1. Blurred vision    2. Abdominal bloating    3. PMR (polymyalgia rheumatica)    4. Chronic kidney disease, stage 3a    5. Stage 3b chronic kidney disease        Assessment & Plan    Blurred vision:  Worsening    Abdominal bloating: New problem workup needed    PMR (polymyalgia rheumatica): Worsening    Chronic kidney disease, stage 3a:  Worsening  -     Basic Metabolic Panel; Future; Expected date: 08/30/2024  -     CBC Auto Differential; Future; Expected date: 08/30/2024    Stage 3b chronic kidney disease: Worsening       The patient is currently on 6 mg of prednisone, which helps manage his pain but causes side effects such as blurry vision, abdominal bloating, mood swings, and insomnia. He has been advised to contact his rheumatologist, Dr. Marquez, to discuss starting Kevzara as a corticosteroid-sparing drug. Kevzara is an injectable medication specifically for polymyalgia rheumatica. A referral has been made to another rheumatologist since Dr. Marquez is no longer available. Blood work has been ordered to monitor kidney function, blood sugar levels, and complete blood count.    The patient's magnesium levels are below the normal range. Over-the-counter magnesium glycinate has been recommended for nightly intake.    Follow-up  The patient will follow up in 3 months.             The patient's BMI has been recorded in the chart. The patient has been provided educational materials regarding the benefits of attaining and maintaining a normal weight. We will continue to address and follow this issue during follow up visits.   Patient agreed with assessment and plan. Patient verbalized understanding.     Subjective:       Patient ID: Dilip Lopez is a 83 y.o. male.    Chief Complaint: Follow-up on polymyalgia rheumatica    HPI  History of Present Illness  The patient presents for evaluation of multiple medical concerns.    He was prescribed a 10 mg steroid by Moisés Flores NP, which he attempted to  discontinue gradually. However, upon reaching a dosage of 3 mg, he experienced widespread pain, necessitating an increase to 6 mg. This adjustment has been effective in managing his pain. He reports experiencing blurry vision, bloating, and weight gain. His last consultation with Moisés was in April 2024. He is scheduled for an eye examination next week. He also mentions feeling weak in his legs.    He is currently taking vitamin D3 and B12 supplements. He has not been supplementing with magnesium.    He cancelled his appointment with the cancer doctor for his prostate as his PSA is down. He states that when they give him Lupron shot and bone intensity shot, he is out of it for a month and a half. He talked to them and said he would skip a session and look at his PSA and see if it starts coming back up, then he has to do something else. He states that he gets sick when he takes a shot.     Past medical history, past social history was reviewed and discussed with the patient.    Review of Systems    Objective:      Physical Exam    Physical Exam  The patient has limited ambulation, he is using a cane for mobilization, lungs are clear to auscultation heart is regular rate.     Results  Laboratory Studies  Magnesium was low. Potassium was okay. B12 was a little bit better.     This note was generated with the assistance of ambient listening technology. Verbal consent was obtained by the patient and accompanying visitor(s) for the recording of patient appointment to facilitate this note. I attest to having reviewed and edited the generated note for accuracy, though some syntax or spelling errors may persist. Please contact the author of this note for any clarification.

## 2024-09-13 NOTE — PROGRESS NOTES
Subjective:      Patient ID: Dilip Lopez is a 83 y.o. male.    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  - osteoporosis with T score -3.4 at L spine  - PMR diagnosed by Dr Norman in 3/2024 based on hip and shoulder girdle stiffness, elevated acute phase reactants and excellent steroid responsiveness  - Positive serologies: -  - Negative serologies: PRATIMA, RF, CCP  - Infectious screening labs: -  - Imaging:   - DEXA (2/2023) osteoporosis  - Previous Treatments: -  - Current Treatments:    - Allopurinol 300mg daily per PCP   - Prolia per Heme (3/2023- )   - Prednisone 3mg   Interval History:   This is an 83 year old male with PsO, lumbar degenerative disease, 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 3a, HF, osteoporosis with T score -3.4 at L spine on Prolia, neuropathy, and prostate cancer on Leupron (he has not been taking this due to adverse effects) without known metastasis. He was evaluated by Dr. Norman for suspected PMR on 3/19/24. He reported several months of pain and stiffness, particularly in his shoulders bilaterally, but also in his hips, neck, ankles, hands, night sweats, chills, fatigue going on since starting leupron. He states that the hip and shoulder stiffness began in December. He denied visual disturbance, temporal tenderness, new HA, jaw claudication. His symptoms improved dramatically within a day or two with initiation of a medrol dose pack followed by 10 mg prednisone daily.  Dr. Norman did not want to continue prolonged steroids due to severe osteoporosis and was concerned about possibly starting Kevzara due to possible history of diverticulitis (?). He was advised prednisone taper. He is now on prednisone 3mg and has not had increased pain in his shoulders and hips.  He does report vision changes (cloudy vision) over the past 3 months but denies headaches or jaw claudication.     Objective:   BP (!) 144/84   Pulse 65   Wt 91.1 kg (200 lb 13.4 oz)   BMI 27.24 kg/m²   Physical Exam   Constitutional: normal appearance.   Difficulty palpating bilateral temporal pulses but no tenderness to palpation of the temples   HENT:   Head: Normocephalic and atraumatic.   Cardiovascular: Normal rate, regular rhythm and normal heart sounds.   Pulmonary/Chest: Effort normal and breath sounds normal.   Musculoskeletal:      Comments: No synovitis, dactylitis, enthesitis, effusions     Neurological: He is alert.   Skin: Skin is warm and dry.   No skin thickening, telangiectasias, calcinosis, psoriasiform lesions, lupoid lesions       No data to display     Assessment:     1. Polymyalgia rheumatica    2. Blurring of vision    3. 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 3a, HF, osteoporosis with T score -3.4 at L spine on Prolia, neuropathy, prostate cancer on Leupron (he has not been taking this due to adverse effects) without known metastasis, and PMR diagnosed by Dr Norman in 3/2024 based on hip and shoulder girdle stiffness, elevated acute phase reactants and excellent steroid responsiveness. He is now on prednisone 3mg and denies hip/shoulder girdle stiffness, headaches and jaw claudication. He does report vision changes (cloudy vision) over the past 3 months. He has a history of cataracts, and will need to be evaluated by ophthalmology ASAP. Temporal US ASAP. Markers of inflammation now. If negative, plan to continue tapering prednisone by 1mg every 2 weeks. Low threshold to start DMARD therapy with Kevzara or Actemra. He denies a history of diverticulitis and states he was only found to have  diverticulosis on colonoscopy 30 years ago.     Plan:     Problem List Items Addressed This Visit    None  Visit Diagnoses       Polymyalgia rheumatica    -  Primary    Relevant Orders    C-Reactive Protein    CBC Auto Differential    Creatinine, Serum    ALT (SGPT)    AST (SGOT)    Sedimentation rate    HIV 1/2 Ag/Ab (4th Gen)    Hepatitis B surface antigen    HBcAB    Hepatitis B surface antibody    Hepatitis C antibody    Quantiferon Gold TB    Ambulatory referral/consult to Ophthalmology    US Temporal Artery Bilateral    Blurring of vision        Relevant Orders    Ambulatory referral/consult to Ophthalmology    US Temporal Artery Bilateral    Arteritis, unspecified        Relevant Orders    US Temporal Artery Bilateral          - Continue tapering prednisone by 1mg every 2 weeks  - Temporal artery US  - ESR CRP monthly  - CBC, CMP, pre-DMARD labs now  - Refer to Ophthalmology     Follow up in 4 weeks    30 minutes of total time spent on the encounter, which includes face to face time and non-face to face time preparing to see the patient (eg, review of tests), Obtaining and/or reviewing separately obtained history, Documenting clinical information in the electronic or other health record, Independently interpreting results (not separately reported) and communicating results to the patient/family/caregiver, or Care coordination (not separately reported).     This note was prepared with [employer] Direct voice recognition transcription software. Garbled syntax, mangled pronouns, and other bizarre constructions may be attributed to that software system       Kelli Massey M.D.  Rheumatology Dept  [place], [place]

## 2024-09-16 ENCOUNTER — OFFICE VISIT (OUTPATIENT)
Dept: RHEUMATOLOGY | Facility: CLINIC | Age: 84
End: 2024-09-16
Payer: MEDICARE

## 2024-09-16 ENCOUNTER — HOSPITAL ENCOUNTER (OUTPATIENT)
Dept: RADIOLOGY | Facility: HOSPITAL | Age: 84
Discharge: HOME | End: 2024-09-16
Attending: STUDENT IN AN ORGANIZED HEALTH CARE EDUCATION/TRAINING PROGRAM
Payer: MEDICARE

## 2024-09-16 VITALS
HEART RATE: 65 BPM | SYSTOLIC BLOOD PRESSURE: 144 MMHG | BODY MASS INDEX: 27.24 KG/M2 | DIASTOLIC BLOOD PRESSURE: 84 MMHG | WEIGHT: 200.81 LBS

## 2024-09-16 DIAGNOSIS — I77.6 ARTERITIS, UNSPECIFIED: ICD-10-CM

## 2024-09-16 DIAGNOSIS — H53.8 BLURRING OF VISION: ICD-10-CM

## 2024-09-16 DIAGNOSIS — M35.3 POLYMYALGIA RHEUMATICA: Primary | ICD-10-CM

## 2024-09-16 DIAGNOSIS — M35.3 POLYMYALGIA RHEUMATICA: ICD-10-CM

## 2024-09-16 PROCEDURE — 3288F FALL RISK ASSESSMENT DOCD: CPT | Mod: CPTII,S$GLB,, | Performed by: STUDENT IN AN ORGANIZED HEALTH CARE EDUCATION/TRAINING PROGRAM

## 2024-09-16 PROCEDURE — 99999 PR PBB SHADOW E&M-EST. PATIENT-LVL IV: CPT | Mod: PBBFAC,,, | Performed by: STUDENT IN AN ORGANIZED HEALTH CARE EDUCATION/TRAINING PROGRAM

## 2024-09-16 PROCEDURE — 1160F RVW MEDS BY RX/DR IN RCRD: CPT | Mod: CPTII,S$GLB,, | Performed by: STUDENT IN AN ORGANIZED HEALTH CARE EDUCATION/TRAINING PROGRAM

## 2024-09-16 PROCEDURE — 99215 OFFICE O/P EST HI 40 MIN: CPT | Mod: S$GLB,,, | Performed by: STUDENT IN AN ORGANIZED HEALTH CARE EDUCATION/TRAINING PROGRAM

## 2024-09-16 PROCEDURE — 3079F DIAST BP 80-89 MM HG: CPT | Mod: CPTII,S$GLB,, | Performed by: STUDENT IN AN ORGANIZED HEALTH CARE EDUCATION/TRAINING PROGRAM

## 2024-09-16 PROCEDURE — 1101F PT FALLS ASSESS-DOCD LE1/YR: CPT | Mod: CPTII,S$GLB,, | Performed by: STUDENT IN AN ORGANIZED HEALTH CARE EDUCATION/TRAINING PROGRAM

## 2024-09-16 PROCEDURE — 93880 EXTRACRANIAL BILAT STUDY: CPT | Mod: TC,PO

## 2024-09-16 PROCEDURE — 1126F AMNT PAIN NOTED NONE PRSNT: CPT | Mod: CPTII,S$GLB,, | Performed by: STUDENT IN AN ORGANIZED HEALTH CARE EDUCATION/TRAINING PROGRAM

## 2024-09-16 PROCEDURE — 1159F MED LIST DOCD IN RCRD: CPT | Mod: CPTII,S$GLB,, | Performed by: STUDENT IN AN ORGANIZED HEALTH CARE EDUCATION/TRAINING PROGRAM

## 2024-09-16 PROCEDURE — 3077F SYST BP >= 140 MM HG: CPT | Mod: CPTII,S$GLB,, | Performed by: STUDENT IN AN ORGANIZED HEALTH CARE EDUCATION/TRAINING PROGRAM

## 2024-09-16 ASSESSMENT — ROUTINE ASSESSMENT OF PATIENT INDEX DATA (RAPID3)
TOTAL RAPID3 SCORE: 4.05
PATIENT GLOBAL ASSESSMENT SCORE: 7
MDHAQ FUNCTION SCORE: 0.8
PAIN SCORE: 2.5
PSYCHOLOGICAL DISTRESS SCORE: 2.2
FATIGUE SCORE: 2.2

## 2024-10-14 ENCOUNTER — OFFICE VISIT (OUTPATIENT)
Dept: NEPHROLOGY | Facility: CLINIC | Age: 84
End: 2024-10-14
Payer: MEDICARE

## 2024-10-14 VITALS
HEIGHT: 72 IN | OXYGEN SATURATION: 95 % | BODY MASS INDEX: 27.24 KG/M2 | DIASTOLIC BLOOD PRESSURE: 78 MMHG | SYSTOLIC BLOOD PRESSURE: 122 MMHG | HEART RATE: 87 BPM

## 2024-10-14 DIAGNOSIS — I10 ESSENTIAL HYPERTENSION: ICD-10-CM

## 2024-10-14 DIAGNOSIS — E78.00 HYPERCHOLESTEROLEMIA: ICD-10-CM

## 2024-10-14 DIAGNOSIS — I25.10 MILD CAD: ICD-10-CM

## 2024-10-14 DIAGNOSIS — N18.31 STAGE 3A CHRONIC KIDNEY DISEASE: Primary | ICD-10-CM

## 2024-10-14 PROCEDURE — 3074F SYST BP LT 130 MM HG: CPT | Mod: CPTII,S$GLB,, | Performed by: STUDENT IN AN ORGANIZED HEALTH CARE EDUCATION/TRAINING PROGRAM

## 2024-10-14 PROCEDURE — 1159F MED LIST DOCD IN RCRD: CPT | Mod: CPTII,S$GLB,, | Performed by: STUDENT IN AN ORGANIZED HEALTH CARE EDUCATION/TRAINING PROGRAM

## 2024-10-14 PROCEDURE — 3078F DIAST BP <80 MM HG: CPT | Mod: CPTII,S$GLB,, | Performed by: STUDENT IN AN ORGANIZED HEALTH CARE EDUCATION/TRAINING PROGRAM

## 2024-10-14 PROCEDURE — 99999 PR PBB SHADOW E&M-EST. PATIENT-LVL III: CPT | Mod: PBBFAC,,, | Performed by: STUDENT IN AN ORGANIZED HEALTH CARE EDUCATION/TRAINING PROGRAM

## 2024-10-14 PROCEDURE — 99204 OFFICE O/P NEW MOD 45 MIN: CPT | Mod: S$GLB,,, | Performed by: STUDENT IN AN ORGANIZED HEALTH CARE EDUCATION/TRAINING PROGRAM

## 2024-10-14 NOTE — PROGRESS NOTES
Subjective:       Patient ID: Dilip Lopez is a 83 y.o. male who presents for new patient evaluation for chronic renal failure.    Dilip Lopez is referred by Nathalie Salgado MD to be evaluated for chronic renal failure.  He was previously followed by another nephrologist in the [facility] setting, last seen by the prior nephrologist in 2019 for evaluation of chronic kidney disease stage 3.  The patient was subsequently lost to follow up.  He has multiple comorbid conditions, ongoing conditions of hypertension, coronary artery disease, atrial fibrillation, history osteoporosis, PMR, Factor V leiden mutation on long term anticoagulation and previously diagnosed with chronic kidney disease stage IIIB. We reivewed his lab trends at chairside. Last sCr was from 9/16/24 and ft a sCr of 1.4mg/dL, which is near his baseline of 1.2-1.4mg/dL.     In terms of his renal history, he denies hospitalizations for prior LIONEL or LIONEL requiring RRT. Denies history of nephrolithiasis or hematuria episodes. No reported history of frequent or recurrent use of NSAIDs/COXI. He follows with rheumatology for PMR. Denies any pertinent family history of known kidney disease, or family members diagnosed with ESRD requiring dialysis.  Smoking Hx: denies  Other pertinent urologic history: prostate cancer, s/p lupron  Fluid intake per 24hr: water, juice, mountain dew, coffee. 60oz per day.    He has no uremic or urinary symptoms and is in his usual state of health.          During this visit, the patient and I reviewed his lab trends, discussed CKD epidemiology and risk factors, as well as general lifestyle and risk factor modifications to reduce his risk of progression to ESRD.       Review of Systems   Constitutional:  Negative for chills, diaphoresis and fever.   Respiratory:  Negative for cough and shortness of breath.    Cardiovascular:  Negative for chest pain and leg swelling.   Gastrointestinal:  Negative for abdominal pain,  diarrhea, nausea and vomiting.   Genitourinary:  Negative for difficulty urinating, dysuria and hematuria.   Musculoskeletal:  Negative for myalgias.   Neurological:  Negative for headaches.   Hematological:  Does not bruise/bleed easily.       The past medical, family and social histories were reviewed for this encounter.     Past Medical History:   Diagnosis Date    Atrial fibrillation     CAD (coronary artery disease)     Hyperlipidemia     Hypertension     Prostate cancer 4/12/2022     Past Surgical History:   Procedure Laterality Date    ANGIOGRAM, CORONARY, WITH LEFT HEART CATHETERIZATION N/A 3/14/2023    Procedure: Left Heart Cath;  Surgeon: Jose Walker MD;  Location: [facility] CATH;  Service: Cardiology;  Laterality: N/A;    CARDIAC CATHETERIZATION      CARDIAC SURGERY      COLONOSCOPY      COLONOSCOPY N/A 5/14/2021    Procedure: COLONOSCOPY;  Surgeon: Leopoldo Mendoza MD;  Location: [facility] ENDO;  Service: Endoscopy;  Laterality: N/A;    CORONARY ANGIOGRAPHY N/A 3/14/2023    Procedure: ANGIOGRAM, CORONARY ARTERY;  Surgeon: Jose Walker MD;  Location: [facility] CATH;  Service: Cardiology;  Laterality: N/A;    CORONARY ANGIOPLASTY      CORONARY STENT PLACEMENT  01/29/2018    ESOPHAGOGASTRODUODENOSCOPY N/A 5/14/2021    Procedure: EGD (ESOPHAGOGASTRODUODENOSCOPY);  Surgeon: Leopoldo Mendoza MD;  Location: [facility];  Service: Endoscopy;  Laterality: N/A;  with push enteroscopy    HIATAL HERNIA REPAIR      LEFT HEART CATHETERIZATION  12/15/2023    Procedure: Left heart cath  RM [id number];  Surgeon: Augustine Conteh MD;  Location: [facility] CATH;  Service: Cardiology;;    TRANSRECTAL BIOPSY OF PROSTATE WITH ULTRASOUND GUIDANCE N/A 7/20/2021    Procedure: BIOPSY, PROSTATE, RECTAL APPROACH, WITH US GUIDANCE;  Surgeon: SARAI Thomason MD;  Location: [facility] OR;  Service: Urology;  Laterality: N/A;     Social History     Socioeconomic History    Marital status:    Tobacco Use    Smoking status: Never    Smokeless  "tobacco: Never   Substance and Sexual Activity    Alcohol use: No    Drug use: No     Social Drivers of Health     Financial Resource Strain: Low Risk  (12/14/2023)    Overall Financial Resource Strain (CARDIA)     Difficulty of Paying Living Expenses: Not hard at all   Food Insecurity: No Food Insecurity (12/14/2023)    Hunger Vital Sign     Worried About Running Out of Food in the Last Year: Never true     Ran Out of Food in the Last Year: Never true   Transportation Needs: No Transportation Needs (12/14/2023)    PRAPARE - Transportation     Lack of Transportation (Medical): No     Lack of Transportation (Non-Medical): No   Physical Activity: Unknown (12/13/2023)    Exercise Vital Sign     Minutes of Exercise per Session: 30 min   Stress: No Stress Concern Present (12/13/2023)    [employer] - Occupational Stress Questionnaire     Feeling of Stress : Not at all   Housing Stability: Low Risk  (12/14/2023)    Housing Stability Vital Sign     Unable to Pay for Housing in the Last Year: No     Number of Places Lived in the Last Year: 1     Unstable Housing in the Last Year: No     Current Outpatient Medications   Medication Sig    allopurinoL (ZYLOPRIM) 300 MG tablet Take 1 tablet (300 mg total) by mouth once daily.    apixaban (ELIQUIS) 5 mg Tab TAKE ONE TABLET TWO TIMES A DAY * BLOOD THINNER *    cyanocobalamin, vitamin B-12, 1,000 mcg/mL Kit Inject 1 mL every month by subcutaneous route.    ergocalciferol (VITAMIN D2) 50,000 unit Cap Take 1 capsule (50,000 Units total) by mouth every 7 days.    gabapentin (NEURONTIN) 300 MG capsule TAKE 1 CAPSULE BY MOUTH TWICE  DAILY    hydroCHLOROthiazide (HYDRODIURIL) 12.5 MG Tab TAKE 1 TABLET BY MOUTH ONCE  DAILY    hydrOXYzine HCL (ATARAX) 25 MG tablet Take 1 tablet 3 times a day by oral route as needed.    needle, disp, 25 gauge 25 gauge x 1" Ndle Use as directed    potassium chloride SA (K-DUR,KLOR-CON) 20 MEQ tablet TAKE 1 TABLET BY MOUTH ONCE  " "DAILY    predniSONE (DELTASONE) 1 MG tablet Take 9 mg daily for one week, then take 8 mg daily for one week, then Take 7 mg daily for one week, then take 6 mg daily for one week, then Take 5 mg daily for one week, then take 4 mg daily for one week, then Take 3 mg daily for one week, then take 2 mg daily for one week, Take 1 mg daily for one week    syringe with needle (BD LUER-PADMINI SYRINGE) 3 mL 25 x 1 1/2 " Syrg Use once a month to inject B12 injection.    syringe with needle (SYRINGE 3CC/25GX1") 3 mL 25 gauge x 1" Syrg Use as directed    temazepam (RESTORIL) 30 mg capsule take 1 capsule by mouth nightly as needed for insomnia.    pantoprazole (PROTONIX) 20 MG tablet Take 1 tablet by mouth once daily. (Patient not taking: Reported on 10/14/2024)     No current facility-administered medications for this visit.         Objective:   /78   Pulse 87   Ht 6' (1.829 m)   SpO2 95%   BMI 27.24 kg/m²      Physical Exam  Vitals reviewed.   Constitutional:       General: He is not in acute distress.     Appearance: Normal appearance.   HENT:      Head: Normocephalic and atraumatic.   Eyes:      General: No scleral icterus.     Extraocular Movements: Extraocular movements intact.   Cardiovascular:      Rate and Rhythm: Normal rate and regular rhythm.      Pulses: Normal pulses.      Heart sounds: Normal heart sounds.      No friction rub.   Pulmonary:      Effort: Pulmonary effort is normal. No respiratory distress.      Breath sounds: Normal breath sounds.   Abdominal:      General: Abdomen is flat.      Palpations: Abdomen is soft.   Musculoskeletal:         General: No swelling.      Cervical back: Normal range of motion. No tenderness.      Right lower leg: No edema.      Left lower leg: No edema.   Neurological:      General: No focal deficit present.      Mental Status: He is oriented to person, place, and time.      Motor: No weakness.   Psychiatric:         Mood and Affect: Mood normal.         Behavior: " Behavior normal.         Assessment:     Lab Results   Component Value Date    CREATININE 1.4 09/16/2024    BUN 26 (H) 08/30/2024     08/30/2024    K 3.6 08/30/2024     08/30/2024    CO2 26 08/30/2024     Lab Results   Component Value Date    .0 (H) 08/07/2019    CALCIUM 9.1 08/30/2024    PHOS 3.6 08/07/2019     Lab Results   Component Value Date    HCT 40.9 09/16/2024     Prot/Creat Ratio, Urine   Date Value Ref Range Status   08/07/2019 Unable to calculate 0.00 - 0.20 Final       Lab Results   Component Value Date    MICALBCREAT 26.2 04/05/2023    MICALBCREAT 9.4 04/12/2022    MICALBCREAT 3.2 01/14/2019           1. Stage 3a chronic kidney disease    2. Hypercholesterolemia    3. Mild CAD    4. Essential hypertension        Plan:   Return to clinic in 3 months  Labs for next visit include rfp, pth, uacr,upcr, ua, cbc.  Baseline creatinine is 1.2-1.4mg/dL.    Renal fxn relatively stable over last 5 years since last nephrology visit in 2019, still CKD IIIa at baseline...  F/u urine studies to restage risk assessment and albuminuria  Not on RASI but may not need to be introduced at this time if low risk of progression with minimal protein and good control of BP.     Has a history of nephrolithiasis; will plan for repeat ultrasound.       Emeka Marks MD  [facility] [place]    Part of this note has been created using [employer] dictation system. Errors in transcription may not be completely avoided.      KFRE 2-Year: 0.2% at 3/18/2024 12:18 PM  Calculated from:  Serum Creatinine: 1.4 mg/dL at 3/18/2024 12:18 PM  Urine Albumin Creatinine Ratio: 26.2 ug/mg at 4/5/2023 12:38 PM  Age: 83 years  Sex: Male at 3/18/2024 12:18 PM  Has CKD-3 to CKD-5: Yes    KFRE 5-Year: 0.6% at 3/18/2024 12:18 PM  Calculated from:  Serum Creatinine: 1.4 mg/dL at 3/18/2024 12:18 PM  Urine Albumin Creatinine Ratio: 26.2 ug/mg at 4/5/2023 12:38 PM  Age: 83 years  Sex: Male at 3/18/2024 12:18 PM  Has CKD-3 to  CKD-5: Yes

## 2024-10-17 ENCOUNTER — LAB VISIT (OUTPATIENT)
Dept: PRIMARY CARE CLINIC | Facility: CLINIC | Age: 84
End: 2024-10-17
Payer: MEDICARE

## 2024-10-17 DIAGNOSIS — M35.3 POLYMYALGIA RHEUMATICA: ICD-10-CM

## 2024-10-17 LAB
CRP SERPL-MCNC: 4.1 MG/L (ref 0–8.2)
ERYTHROCYTE [SEDIMENTATION RATE] IN BLOOD BY PHOTOMETRIC METHOD: 16 MM/HR (ref 0–23)

## 2024-10-17 PROCEDURE — 86140 C-REACTIVE PROTEIN: CPT | Performed by: STUDENT IN AN ORGANIZED HEALTH CARE EDUCATION/TRAINING PROGRAM

## 2024-10-17 PROCEDURE — 85652 RBC SED RATE AUTOMATED: CPT | Performed by: STUDENT IN AN ORGANIZED HEALTH CARE EDUCATION/TRAINING PROGRAM

## 2024-10-17 NOTE — PROGRESS NOTES
Venipuncture performed with 23 gauge butterfly, x's 1 attempt.  Successful venipuncture to L Cephalic vein.  Specimens collected per orders.       Pressure dressing applied to site, instructed patient to remove dressing in 10-15 minutes, OK to re-adjust dressing if pressure causing any discomfort, to observe closely for numbness and/or discoloration to hand or fingers, and to notify provider if bleeding persists after applying constant pressure lasting 30 minutes.

## 2024-10-22 NOTE — PROGRESS NOTES
Subjective:      Patient ID: Dilip Lopez is a 83 y.o. male.    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  - osteoporosis with T score -3.4 at L spine  - PMR diagnosed by Dr Norman in 3/2024 based on hip and shoulder girdle stiffness, elevated acute phase reactants and excellent steroid responsiveness  - Positive serologies: -  - Negative serologies: PRATIMA, RF, CCP  - Infectious screening labs: Negative hepatitis B, C, quantiferon, and HIV (9/16/24)  - Imaging:              - DEXA (2/2023) osteoporosis   - Temporal artery US (9/16/24) Negative  - Previous Treatments: -  - Current Treatments:               - Allopurinol 300mg daily per PCP              - Prolia per Heme (3/2023- )              - Prednisone 1mg   Interval History:  He is now on 1mg and has not noticed any increase in his pain. He wishes to taper completely off prednisone.     Objective:   /75   Pulse 75   Wt 90.5 kg (199 lb 8.3 oz)   BMI 27.06 kg/m²   Physical Exam   Constitutional: normal appearance.   HENT:   Head: Normocephalic and atraumatic.   Cardiovascular: Normal rate, regular rhythm and normal heart sounds.   Pulmonary/Chest: Effort normal and breath sounds normal.   Musculoskeletal:      Comments: No synovitis, dactylitis, enthesitis, effusions     Neurological: He is alert.   Skin: Skin is warm and dry.   No skin thickening, telangiectasias, calcinosis, psoriasiform lesions, lupoid lesions         No data to display     Labs (10/17/24)   ESR CRP WNL   Cbc (9/16/24) hgb 13.8, WBC, PLT WNL   CR 1.4 <- 1.7, AST, ALT WNL     Assessment:     1. Polymyalgia rheumatica    2. 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 3a, HF, osteoporosis with T score -3.4 at L spine on Prolia, neuropathy, prostate cancer on Leupron (he has not been taking this due to adverse effects) without known metastasis, and PMR diagnosed by Dr Norman in 3/2024 based on hip and shoulder girdle stiffness, elevated acute phase reactants and excellent steroid responsiveness. He is now on prednisone 1mg and wishes to taper completely off steroids and DMARDs. Low threshold to start DMARD therapy with Kevzara or Actemra though he wishes to avoid this if possible. He denies a history of diverticulitis and states he was only found to have diverticulosis on colonoscopy 30 years ago.     Plan:     Problem List Items Addressed This Visit          Immunology/Multi System    Polymyalgia rheumatica - Primary       Endocrine    Long term systemic steroid user     - Continue tapering prednisone by 1mg every 2 weeks  - diffuse visit, I discussed that polymyalgia rheumatica can be associated with giant cell arteritis and if he develops new/worsening headaches, vision changes, jaw claudication he should reach out to me.      Follow up in 3 months    30 minutes of total time spent on the encounter, which includes face to face time and non-face to face time preparing to see the patient (eg, review of tests), Obtaining and/or reviewing separately obtained history, Documenting clinical information in the electronic or other health record, Independently interpreting results (not separately reported) and communicating results to the patient/family/caregiver, or Care coordination (not separately reported).     This note was prepared with [employer] Direct voice recognition transcription software. Garbled syntax, mangled pronouns, and other bizarre constructions may be attributed to that software system       Kelli Massey M.D.  Rheumatology Dept  [place], [place]

## 2024-10-23 ENCOUNTER — OFFICE VISIT (OUTPATIENT)
Dept: RHEUMATOLOGY | Facility: CLINIC | Age: 84
End: 2024-10-23
Payer: MEDICARE

## 2024-10-23 VITALS
WEIGHT: 199.5 LBS | DIASTOLIC BLOOD PRESSURE: 75 MMHG | BODY MASS INDEX: 27.06 KG/M2 | SYSTOLIC BLOOD PRESSURE: 115 MMHG | HEART RATE: 75 BPM

## 2024-10-23 DIAGNOSIS — Z79.52 LONG TERM SYSTEMIC STEROID USER: ICD-10-CM

## 2024-10-23 DIAGNOSIS — M35.3 POLYMYALGIA RHEUMATICA: Primary | ICD-10-CM

## 2024-10-23 PROCEDURE — 1159F MED LIST DOCD IN RCRD: CPT | Mod: CPTII,S$GLB,, | Performed by: STUDENT IN AN ORGANIZED HEALTH CARE EDUCATION/TRAINING PROGRAM

## 2024-10-23 PROCEDURE — 3288F FALL RISK ASSESSMENT DOCD: CPT | Mod: CPTII,S$GLB,, | Performed by: STUDENT IN AN ORGANIZED HEALTH CARE EDUCATION/TRAINING PROGRAM

## 2024-10-23 PROCEDURE — 3074F SYST BP LT 130 MM HG: CPT | Mod: CPTII,S$GLB,, | Performed by: STUDENT IN AN ORGANIZED HEALTH CARE EDUCATION/TRAINING PROGRAM

## 2024-10-23 PROCEDURE — 1160F RVW MEDS BY RX/DR IN RCRD: CPT | Mod: CPTII,S$GLB,, | Performed by: STUDENT IN AN ORGANIZED HEALTH CARE EDUCATION/TRAINING PROGRAM

## 2024-10-23 PROCEDURE — 1125F AMNT PAIN NOTED PAIN PRSNT: CPT | Mod: CPTII,S$GLB,, | Performed by: STUDENT IN AN ORGANIZED HEALTH CARE EDUCATION/TRAINING PROGRAM

## 2024-10-23 PROCEDURE — 1101F PT FALLS ASSESS-DOCD LE1/YR: CPT | Mod: CPTII,S$GLB,, | Performed by: STUDENT IN AN ORGANIZED HEALTH CARE EDUCATION/TRAINING PROGRAM

## 2024-10-23 PROCEDURE — 99999 PR PBB SHADOW E&M-EST. PATIENT-LVL III: CPT | Mod: PBBFAC,,, | Performed by: STUDENT IN AN ORGANIZED HEALTH CARE EDUCATION/TRAINING PROGRAM

## 2024-10-23 PROCEDURE — 99214 OFFICE O/P EST MOD 30 MIN: CPT | Mod: S$GLB,,, | Performed by: STUDENT IN AN ORGANIZED HEALTH CARE EDUCATION/TRAINING PROGRAM

## 2024-10-23 PROCEDURE — 3078F DIAST BP <80 MM HG: CPT | Mod: CPTII,S$GLB,, | Performed by: STUDENT IN AN ORGANIZED HEALTH CARE EDUCATION/TRAINING PROGRAM

## 2024-11-08 NOTE — LETTER
[facility]  [address]  [place] [address]  Phone: [phone]  Fax: [phone] August 14, 2023    Dilip Lopez  [address] [facility] [place] [address]      To Whom It May Concern:    Dilip Lopez was evaluated in the office on 07/27/2023 due to back pain, neuropathy, gait abnormality and difficulty to walk, I will recommend scooter secondary to these chronic conditions.      If you have any questions or concerns, please feel free to call my office.    Sincerely,        Nathalie Salgado MD      MEDICARE WELLNESS VISIT + SOAP NOTE    Patient Care Team:  Pcp, Verify as PCP - General    Saira presents for her Subsequent Annual Medicare Wellness Visit with Medicare Wellness Visit, Office Visit, Neck Pain (Neck and shoulder pain x long time. Getting worse.), and Derm Problem (Itchy mole on the right side of chest area near neck.)   Acute and chronic problems were discussed separately below.    Status MWV Topics Details (Refresh Note to Update)    Depression Screening (Trend)          PHQ2: Score = 0     Depression screening is negative no further plan needed.      Blood Pressure  Weight  BMI     /73  Current Weight 185 lbs  body mass index is 32.81 kg/m².  BMI is in obese range.    Caloric restriction and Low carbohydrate diet         Advanced Directives on File Not on file. Form was given.      Health Risk Assessment  (Click to Review or Edit)   Completed      Falls Risk  Have you had a fall in the past year?................................. No  Do you feel unsteady when standing or walking?........Yes.  Do you worry about falling?.................................................. No  Gait/Balance: Normal      Tobacco/Alcohol/Drugs Never Smoker      reports current alcohol use.   reports no history of drug use.      Opioid Review (Update Meds)      No opioid on med list.  is not taking opioid medications.      Cognitive/Functional Status  (Optional MOCA  Mini Cog)   no evidence of cognitive dysfunction by direct observation    Vision and Hearing Screens    Hearing Screening - Comments:: Passed hearing test  Vision Screening - Comments:: Last eye exam 08/2024Hearing Screening - Comments:: Passed hearing test  Vision Screening - Comments:: Last eye exam 08/2024      Care Gaps/Screenings  (Click to Review and Order) See patient instructions and orders         The following items on the Medicare Health Risk Assessment were found to be positive  1.) Do you have an Advance directive, living will, or power of   for health care document that contains your wishes for end of life care?: No     2.) Would you like additional information on advance directives?: Yes     6 b.) How many servings of High Fiber / Whole Grain Foods to you have each day ( 1 serving = 1 cup cold cereal, 1/2 cup cooked cereal, 1 slice bread): 1 per day     7b.) Do you feel unsteady when standing or walking?: Yes         I reviewed and updated the past Medical, Surgical, Family, Social History, Allergies and Medications in Epic: Yes    Family History   Problem Relation Age of Onset    Cancer, Lung Sister     Cancer, Stomach Brother          SOAP NOTE       Subjective     Saira is a 67 year old here for Medicare Wellness Visit, Office Visit, Neck Pain (Neck and shoulder pain x long time. Getting worse.), and Derm Problem (Itchy mole on the right side of chest area near neck.)  C/o chronic neck and trapezius area pain. Not able to sit in one position for too long. Has done PT in past with slight    Review of Systems  As documented above.    SDOH Never Smoker       Objective   Vitals:    11/08/24 1301   BP: 125/73   Pulse: 74   Temp: 97.7 °F (36.5 °C)   TempSrc: Temporal   SpO2: 97%   Weight: 84 kg (185 lb 3.2 oz)   Height: 5' 3\" (1.6 m)   PainSc: 7    PainLoc: Neck     Physical Exam  General: Alert.   Head: Normocephalic.  Eyes: Pupils equal, round and reactive to light. Conjunctivae clear.  Ear: Tympanic membranes and external ear canals normal.  Nose: Nares normal. No sinus tenderness.  Throat: Lips, mucosa, and tongue normal. Teeth and gums normal. Pharyngeal mucosa non-inflamed.  Neck: Supple. Thyroid is normal. Lymphadenopathy is not present. Upper spinal and bilateral paraspinal tenderness with palpation  Respiratory: Normal respiratory effort.   Cardiovascular: Normal rate and regular rhythm. Normal S1, S2. Murmurs are not present.  Lungs: Clear to auscultation. Wheezing, rales or rhonchi not present.  Musculoskeletal: Gait: normal. Bilateral  trapezius muscle tenderness  Psychiatric: Mood is normal and affect is normal.             ASSESSMENT AND PLAN            1. Gastroesophageal reflux disease, unspecified whether esophagitis present  -     SERVICE TO GASTROENTEROLOGY  2. Colon cancer screening  -     SERVICE TO GASTROENTEROLOGY  3. Visit for screening mammogram  -     MAMMO SCREENING BILATERAL W DEMARCO; Future  4. Postmenopause  -     BD DEXA SCAN AXIAL SKELETON; Future  5. Muscle cramps  -     Thyroid Stimulating Hormone Reflex; Future  -     Vitamin D -25 Hydroxy; Future  -     Magnesium; Future  6. Healthcare maintenance  -     CBC with Automated Differential; Future  -     Comprehensive Metabolic Panel; Future  -     Lipid Panel Without Reflex; Future  -     Urinalysis & Reflex Microscopy With Culture If Indicated; Future  7. Vitamin D deficiency  -     Vitamin D -25 Hydroxy; Future  8. Chronic neck pain  -     XR CERVICAL SPINE 2-3 VIEWS; Future  9. Skin lesions, generalized  -     SERVICE TO DERMATOLOGY    Discussed likely PT for neck and upper back after xray  F/u with GI for GERD evaluation  Declined flu vaccine at this time     Return if symptoms worsen or fail to improve.

## 2024-11-11 DIAGNOSIS — M51.369 DEGENERATIVE DISC DISEASE, LUMBAR: ICD-10-CM

## 2024-11-12 NOTE — TELEPHONE ENCOUNTER
No care due was identified.  [facility] Embedded Care Due Messages. Reference number: [id number].   11/11/2024 9:58:53 PM CST

## 2024-11-13 RX ORDER — GABAPENTIN 300 MG/1
CAPSULE ORAL
Qty: 180 CAPSULE | Refills: 3 | Status: SHIPPED | OUTPATIENT
Start: 2024-11-13

## 2024-12-02 DIAGNOSIS — G47.09 OTHER INSOMNIA: ICD-10-CM

## 2024-12-02 RX ORDER — TEMAZEPAM 30 MG/1
CAPSULE ORAL
Qty: 30 CAPSULE | Refills: 0 | Status: SHIPPED | OUTPATIENT
Start: 2024-12-02

## 2024-12-02 NOTE — TELEPHONE ENCOUNTER
No care due was identified.  [facility] Embedded Care Due Messages. Reference number: [id number].   12/02/2024 9:56:26 AM CST

## 2024-12-04 ENCOUNTER — OFFICE VISIT (OUTPATIENT)
Dept: FAMILY MEDICINE | Facility: CLINIC | Age: 84
End: 2024-12-04
Payer: MEDICARE

## 2024-12-04 ENCOUNTER — LAB VISIT (OUTPATIENT)
Dept: LAB | Facility: HOSPITAL | Age: 84
End: 2024-12-04
Attending: FAMILY MEDICINE
Payer: MEDICARE

## 2024-12-04 VITALS
WEIGHT: 201.5 LBS | HEIGHT: 72 IN | DIASTOLIC BLOOD PRESSURE: 80 MMHG | SYSTOLIC BLOOD PRESSURE: 136 MMHG | HEART RATE: 100 BPM | RESPIRATION RATE: 18 BRPM | OXYGEN SATURATION: 94 % | BODY MASS INDEX: 27.29 KG/M2

## 2024-12-04 DIAGNOSIS — R53.83 OTHER FATIGUE: ICD-10-CM

## 2024-12-04 DIAGNOSIS — E53.8 B12 DEFICIENCY: ICD-10-CM

## 2024-12-04 DIAGNOSIS — N18.31 CHRONIC KIDNEY DISEASE, STAGE 3A: ICD-10-CM

## 2024-12-04 DIAGNOSIS — I10 ESSENTIAL HYPERTENSION: Primary | ICD-10-CM

## 2024-12-04 DIAGNOSIS — C61 PROSTATE CANCER: ICD-10-CM

## 2024-12-04 DIAGNOSIS — M25.50 MULTIPLE JOINT PAIN: ICD-10-CM

## 2024-12-04 DIAGNOSIS — Z23 IMMUNIZATION DUE: ICD-10-CM

## 2024-12-04 DIAGNOSIS — M79.10 MUSCLE PAIN: ICD-10-CM

## 2024-12-04 DIAGNOSIS — K21.9 GASTROESOPHAGEAL REFLUX DISEASE WITHOUT ESOPHAGITIS: ICD-10-CM

## 2024-12-04 LAB
ALBUMIN SERPL BCP-MCNC: 4 G/DL (ref 3.5–5.2)
ALP SERPL-CCNC: 75 U/L (ref 40–150)
ALT SERPL W/O P-5'-P-CCNC: 17 U/L (ref 10–44)
ANION GAP SERPL CALC-SCNC: 8 MMOL/L (ref 8–16)
AST SERPL-CCNC: 20 U/L (ref 10–40)
BILIRUB SERPL-MCNC: 0.3 MG/DL (ref 0.1–1)
BUN SERPL-MCNC: 21 MG/DL (ref 8–23)
CALCIUM SERPL-MCNC: 9.4 MG/DL (ref 8.7–10.5)
CHLORIDE SERPL-SCNC: 108 MMOL/L (ref 95–110)
CHOLEST SERPL-MCNC: 154 MG/DL (ref 120–199)
CHOLEST/HDLC SERPL: 4.1 {RATIO} (ref 2–5)
CK SERPL-CCNC: 58 U/L (ref 20–200)
CO2 SERPL-SCNC: 24 MMOL/L (ref 23–29)
COMPLEXED PSA SERPL-MCNC: 0.13 NG/ML (ref 0–4)
CREAT SERPL-MCNC: 1.5 MG/DL (ref 0.5–1.4)
CRP SERPL-MCNC: 9.1 MG/L (ref 0–8.2)
ERYTHROCYTE [DISTWIDTH] IN BLOOD BY AUTOMATED COUNT: 14 % (ref 11.5–14.5)
ERYTHROCYTE [SEDIMENTATION RATE] IN BLOOD BY PHOTOMETRIC METHOD: 7 MM/HR (ref 0–23)
EST. GFR  (NO RACE VARIABLE): 45.9 ML/MIN/1.73 M^2
GLUCOSE SERPL-MCNC: 119 MG/DL (ref 70–110)
HCT VFR BLD AUTO: 40 % (ref 40–54)
HDLC SERPL-MCNC: 38 MG/DL (ref 40–75)
HDLC SERPL: 24.7 % (ref 20–50)
HGB BLD-MCNC: 13.3 G/DL (ref 14–18)
LDLC SERPL CALC-MCNC: 68.8 MG/DL (ref 63–159)
MCH RBC QN AUTO: 31.3 PG (ref 27–31)
MCHC RBC AUTO-ENTMCNC: 33.3 G/DL (ref 32–36)
MCV RBC AUTO: 94 FL (ref 82–98)
NONHDLC SERPL-MCNC: 116 MG/DL
PLATELET # BLD AUTO: 201 K/UL (ref 150–450)
PMV BLD AUTO: 10.7 FL (ref 9.2–12.9)
POTASSIUM SERPL-SCNC: 3.9 MMOL/L (ref 3.5–5.1)
PROT SERPL-MCNC: 7.1 G/DL (ref 6–8.4)
RBC # BLD AUTO: 4.25 M/UL (ref 4.6–6.2)
SODIUM SERPL-SCNC: 140 MMOL/L (ref 136–145)
TRIGL SERPL-MCNC: 236 MG/DL (ref 30–150)
WBC # BLD AUTO: 7.58 K/UL (ref 3.9–12.7)

## 2024-12-04 PROCEDURE — 85027 COMPLETE CBC AUTOMATED: CPT | Performed by: FAMILY MEDICINE

## 2024-12-04 PROCEDURE — 1159F MED LIST DOCD IN RCRD: CPT | Mod: CPTII,S$GLB,, | Performed by: FAMILY MEDICINE

## 2024-12-04 PROCEDURE — 86140 C-REACTIVE PROTEIN: CPT | Performed by: FAMILY MEDICINE

## 2024-12-04 PROCEDURE — 99999 PR PBB SHADOW E&M-EST. PATIENT-LVL IV: CPT | Mod: PBBFAC,,, | Performed by: FAMILY MEDICINE

## 2024-12-04 PROCEDURE — 80053 COMPREHEN METABOLIC PANEL: CPT | Performed by: FAMILY MEDICINE

## 2024-12-04 PROCEDURE — G0008 ADMIN INFLUENZA VIRUS VAC: HCPCS | Mod: S$GLB,,, | Performed by: FAMILY MEDICINE

## 2024-12-04 PROCEDURE — 3288F FALL RISK ASSESSMENT DOCD: CPT | Mod: CPTII,S$GLB,, | Performed by: FAMILY MEDICINE

## 2024-12-04 PROCEDURE — 82550 ASSAY OF CK (CPK): CPT | Performed by: FAMILY MEDICINE

## 2024-12-04 PROCEDURE — 90653 IIV ADJUVANT VACCINE IM: CPT | Mod: S$GLB,,, | Performed by: FAMILY MEDICINE

## 2024-12-04 PROCEDURE — G2211 COMPLEX E/M VISIT ADD ON: HCPCS | Mod: S$GLB,,, | Performed by: FAMILY MEDICINE

## 2024-12-04 PROCEDURE — 1125F AMNT PAIN NOTED PAIN PRSNT: CPT | Mod: CPTII,S$GLB,, | Performed by: FAMILY MEDICINE

## 2024-12-04 PROCEDURE — 82652 VIT D 1 25-DIHYDROXY: CPT | Performed by: FAMILY MEDICINE

## 2024-12-04 PROCEDURE — 84153 ASSAY OF PSA TOTAL: CPT | Performed by: FAMILY MEDICINE

## 2024-12-04 PROCEDURE — 80061 LIPID PANEL: CPT | Performed by: FAMILY MEDICINE

## 2024-12-04 PROCEDURE — 36415 COLL VENOUS BLD VENIPUNCTURE: CPT | Mod: PO | Performed by: FAMILY MEDICINE

## 2024-12-04 PROCEDURE — 1101F PT FALLS ASSESS-DOCD LE1/YR: CPT | Mod: CPTII,S$GLB,, | Performed by: FAMILY MEDICINE

## 2024-12-04 PROCEDURE — 99214 OFFICE O/P EST MOD 30 MIN: CPT | Mod: S$GLB,,, | Performed by: FAMILY MEDICINE

## 2024-12-04 PROCEDURE — 85652 RBC SED RATE AUTOMATED: CPT | Performed by: FAMILY MEDICINE

## 2024-12-04 PROCEDURE — 3075F SYST BP GE 130 - 139MM HG: CPT | Mod: CPTII,S$GLB,, | Performed by: FAMILY MEDICINE

## 2024-12-04 PROCEDURE — 3079F DIAST BP 80-89 MM HG: CPT | Mod: CPTII,S$GLB,, | Performed by: FAMILY MEDICINE

## 2024-12-04 RX ORDER — CYANOCOBALAMIN 1000 UG/ML
1000 INJECTION, SOLUTION INTRAMUSCULAR; SUBCUTANEOUS
Qty: 3 ML | Refills: 3 | Status: SHIPPED | OUTPATIENT
Start: 2024-12-04

## 2024-12-04 RX ORDER — PANTOPRAZOLE SODIUM 20 MG/1
20 TABLET, DELAYED RELEASE ORAL DAILY
Qty: 90 TABLET | Refills: 3 | Status: SHIPPED | OUTPATIENT
Start: 2024-12-04

## 2024-12-06 LAB — 1,25(OH)2D3 SERPL-MCNC: 19 PG/ML (ref 20–79)

## 2024-12-07 NOTE — PROGRESS NOTES
Assessment:       1. Essential hypertension    2. Immunization due    3. B12 deficiency    4. Other fatigue    5. Chronic kidney disease, stage 3a    6. Muscle pain    7. Multiple joint pain    8. Prostate cancer    9. Gastroesophageal reflux disease without esophagitis        Assessment & Plan    Essential hypertension: Stable    Immunization due  -     influenza (adjuvanted) (Fluad) 45 mcg/0.5 mL IM vaccine (> or = 66 yo) 0.5 mL    B12 deficiency: Stable  -     cyanocobalamin 1,000 mcg/mL injection; Inject 1 mL (1,000 mcg total) into the muscle every 30 days.  Dispense: 3 mL; Refill: 3    Other fatigue: Stable  -     cyanocobalamin 1,000 mcg/mL injection; Inject 1 mL (1,000 mcg total) into the muscle every 30 days.  Dispense: 3 mL; Refill: 3    Chronic kidney disease, stage 3a: Stable  -     cyanocobalamin 1,000 mcg/mL injection; Inject 1 mL (1,000 mcg total) into the muscle every 30 days.  Dispense: 3 mL; Refill: 3  -     Calcitriol; Future; Expected date: 12/04/2024  -     Comprehensive Metabolic Panel; Future; Expected date: 12/04/2024  -     Lipid Panel; Future; Expected date: 12/04/2024  -     CBC Without Differential; Future; Expected date: 12/04/2024    Muscle pain: Worsening  -     CK; Future; Expected date: 12/04/2024    Multiple joint pain: Worsening  -     Sedimentation rate; Future; Expected date: 12/04/2024  -     C-Reactive Protein; Future; Expected date: 12/04/2024    Prostate cancer: Stable  -     PROSTATE SPECIFIC ANTIGEN, DIAGNOSTIC; Future; Expected date: 12/04/2024    Gastroesophageal reflux disease without esophagitis: Stable  -     cyanocobalamin 1,000 mcg/mL injection; Inject 1 mL (1,000 mcg total) into the muscle every 30 days.  Dispense: 3 mL; Refill: 3  -     pantoprazole (PROTONIX) 20 MG tablet; Take 1 tablet (20 mg total) by mouth once daily.  Dispense: 90 tablet; Refill: 3       Assessed medication regimen, including Eliquis, B12 injections, and blood pressure medications  Evaluated  recent complaints of joint pain and fatigue, considering possible flare-up of underlying rheumatological condition  Reviewed kidney function and need for ongoing nephrology care  Considered need for PSA testing given history of prostate cancer  Evaluated reported leg swelling, recommending continuation of diuretic therapy  Discussed potential need for lymphedema therapy if diuretic therapy proves insufficient    HYPERTENSION:  - Measured the patient's blood pressure at 136/80.  - Evaluated the blood pressure as acceptable.      WEIGHT MANAGEMENT:  - Recorded the patient's weight as 91.4 kg (201 lbs).  - Assessed the patient's weight as excessive.  - Counseled the patient on the importance of weight management for overall health.    ATRIAL FIBRILLATION:  - Confirmed the patient is taking Eliquis.  - Verified the dosage of Eliquis in the system.  - Continued Eliquis prescription at twice daily dosage.    CHRONIC KIDNEY DISEASE:  - Noted the patient is under the care of a nephrologist for kidney issues.  - Continued calcitriol prescription for kidney function support.    JOINT AND MUSCLE PAIN:  - Noted the patient reports multiple joint pain and muscle pain throughout the body.  - Ordered labs including inflammation markers.  - Planned to consult with a rheumatologist if inflammation markers are elevated.  - Instructed the patient to contact the office if inflammation markers are high.  - Noted the patient previously took steroids for joint pain but has discontinued use.  - Discussed the rheumatologist's suggestion of injections (possibly Methotrexate) for flare-ups.    EDEMA:  - Noted the patient reports swelling in feet.  - Emphasized the importance of continuing diuretic medication to manage leg swelling.  - Discussed the option of lymphedema therapy for managing swelling if diuretic medication is not fully effective.  - Instructed the patient to follow up if leg swelling persists despite diuretic use, to consider  lymphedema clinic appointment.    ACID REFLUX:  - Prescribed pantoprazole for acid reflux management.    VITAMIN D DEFICIENCY:  - Noted the patient's last Vitamin D level was 14, which is low.  - Refilled vitamin D2 prescription (high dosage).  - Noted the patient also takes OTC vitamin D3.  - Ordered labs including vitamin D level for reassessment.    VITAMIN B12 DEFICIENCY:  - Continued B12 injections once monthly, 1 mL dose, with 3 refills provided for 3 months.    PROSTATE CANCER:  - Ordered labs including PSA test for prostate cancer monitoring.    INFLUENZA VACCINATION:  - Flu shot administered in office.    MEDICATIONS/SUPPLEMENTS:  - Noted the patient is taking potassium daily, provided by [facility].  - Continued temazepam, filled recently at [facility].    LABS:  - Ordered labs including lipid panel.    FOLLOW UP:  - Follow up in 6 months.        Visit today included increased complexity associated with the care of the episodic problem hypertension, prostate cancer, chronic kidney disease addressed and managing the longitudinal care of the patient due to the serious and/or complex managed problem(s) hypertension, prostate cancer, chronic kidney disease.         The patient's BMI has been recorded in the chart. The patient has been provided educational materials regarding the benefits of attaining and maintaining a normal weight. We will continue to address and follow this issue during follow up visits.   Patient agreed with assessment and plan. Patient verbalized understanding.     Subjective:       Patient ID: Dilip Lopez is a 84 y.o. male.    Chief Complaint: Follow-up      History of Present Illness    CHIEF COMPLAINT:  Patient presents for a routine follow-up visit, including a flu vaccine and discussion of various medications and health concerns.    HPI:  Patient reports generalized pain and fatigue over the last 3-4 days, attributed to recent physical exertion while moving a new deep freezer. The  pain affects multiple joints and muscles throughout his body, with particular difficulty closing his hand the previous night, though noting some improvement today.    Patient mentions ongoing issues with chronic leg swelling, particularly in his feet, causing nightly discomfort and difficulty falling asleep. This condition is managed using a diuretic.    B12 levels have improved over time, with recent measurements of 216, 231, and most recently 315. Patient continues to self-administer monthly B12 injections at home.    Patient has seen a rheumatologist who suggested potential injectable treatments, possibly methotrexate, if his condition worsens. Patient prefers to avoid this treatment if possible.    Patient reports a history of acid reflux, for which he previously took pantoprazole but had discontinued. He acknowledges the need to resume this medication, suggesting a recurrence or persistence of reflux symptoms.    Patient denies any memory problems.    MEDICATIONS:  Patient is on Eliquis twice daily and receives B12 injections once a month. He is also taking Hydroxyzine, Temazepam, and Pantoprazole for acid reflux. He is on Allopurinol, daily Potassium, OTC Vitamin D (D3), and Calcitriol for kidney function problems.    MEDICAL HISTORY:  Patient has a history of prostate cancer, kidney function problems, a rheumatological condition, and acid reflux. He recently received a flu vaccine.    TEST RESULTS:  Patient's B12 levels have been improving, with recent readings of 216, 231, and most recently 315. His Vitamin D level was last checked at 14, which is low. A lipid panel conducted a year ago showed great results.    SOCIAL HISTORY:  Patient is a  and [facility].      ROS:  ROS as indicated in HPI.          Past medical history, past social history was reviewed and discussed with the patient.        Objective:      Physical Exam      Vitals: Weight: 91.4 kg (201 lbs). Blood pressure:  136/80.  General: No acute distress. Well-developed. Well-nourished.  The patient has limited ambulation, he is using a walker for mobilization  Eyes: EOMI. Sclerae anicteric.  HENT: Normocephalic. Atraumatic. Nares patent. Moist oral mucosa.  Cardiovascular: Regular rate. Regular rhythm.   Respiratory: Normal respiratory effort. Clear to auscultation bilaterally. No rales. No rhonchi. No wheezing.  Psychiatric: Normal mood. Normal affect. Good insight. Good judgment.  Skin: Warm. Dry. No rash.        Results             This note was generated with the assistance of ambient listening technology. Verbal consent was obtained by the patient and accompanying visitor(s) for the recording of patient appointment to facilitate this note. I attest to having reviewed and edited the generated note for accuracy, though some syntax or spelling errors may persist. Please contact the author of this note for any clarification.

## 2024-12-18 ENCOUNTER — OFFICE VISIT (OUTPATIENT)
Dept: CARDIOLOGY | Facility: CLINIC | Age: 84
End: 2024-12-18
Payer: MEDICARE

## 2024-12-18 VITALS
OXYGEN SATURATION: 95 % | DIASTOLIC BLOOD PRESSURE: 79 MMHG | BODY MASS INDEX: 27.33 KG/M2 | WEIGHT: 201.5 LBS | SYSTOLIC BLOOD PRESSURE: 130 MMHG | HEART RATE: 73 BPM

## 2024-12-18 DIAGNOSIS — R09.89 BRUIT OF LEFT CAROTID ARTERY: ICD-10-CM

## 2024-12-18 DIAGNOSIS — R94.30 ELEVATED LEFT VENTRICULAR END-DIASTOLIC PRESSURE (LVEDP): Primary | Chronic | ICD-10-CM

## 2024-12-18 DIAGNOSIS — I48.0 PAF (PAROXYSMAL ATRIAL FIBRILLATION): Chronic | ICD-10-CM

## 2024-12-18 DIAGNOSIS — Z79.899 LONG TERM CURRENT USE OF ANTIARRHYTHMIC DRUG: Chronic | ICD-10-CM

## 2024-12-18 DIAGNOSIS — I25.10 MILD CAD: Chronic | ICD-10-CM

## 2024-12-18 DIAGNOSIS — Z79.01 LONG TERM (CURRENT) USE OF ANTICOAGULANTS: Chronic | ICD-10-CM

## 2024-12-18 DIAGNOSIS — N18.31 STAGE 3A CHRONIC KIDNEY DISEASE: Chronic | ICD-10-CM

## 2024-12-18 PROCEDURE — 1126F AMNT PAIN NOTED NONE PRSNT: CPT | Mod: CPTII,S$GLB,, | Performed by: INTERNAL MEDICINE

## 2024-12-18 PROCEDURE — 3075F SYST BP GE 130 - 139MM HG: CPT | Mod: CPTII,S$GLB,, | Performed by: INTERNAL MEDICINE

## 2024-12-18 PROCEDURE — 3078F DIAST BP <80 MM HG: CPT | Mod: CPTII,S$GLB,, | Performed by: INTERNAL MEDICINE

## 2024-12-18 PROCEDURE — 1159F MED LIST DOCD IN RCRD: CPT | Mod: CPTII,S$GLB,, | Performed by: INTERNAL MEDICINE

## 2024-12-18 PROCEDURE — 99214 OFFICE O/P EST MOD 30 MIN: CPT | Mod: S$GLB,,, | Performed by: INTERNAL MEDICINE

## 2024-12-18 PROCEDURE — 1101F PT FALLS ASSESS-DOCD LE1/YR: CPT | Mod: CPTII,S$GLB,, | Performed by: INTERNAL MEDICINE

## 2024-12-18 PROCEDURE — 3288F FALL RISK ASSESSMENT DOCD: CPT | Mod: CPTII,S$GLB,, | Performed by: INTERNAL MEDICINE

## 2024-12-18 NOTE — PROGRESS NOTES
Subjective:    Patient ID:  Dilip Lopez is a 84 y.o. male who presents for Heart Problem and Atrial Fibrillation        HPI  RECENT LABS NOTED CMP WITH GFR OF 46, LDL 69, HDL 38, TRIGLYCERIDE 238, HEMOGLOBIN 13.3, DOING OK, OA, OFF STEROIDS,TO HAVE CATARACT SURGERY, SEE ROS    Past Medical History:   Diagnosis Date    Atrial fibrillation     CAD (coronary artery disease)     Hyperlipidemia     Hypertension     Prostate cancer 4/12/2022     Past Surgical History:   Procedure Laterality Date    ANGIOGRAM, CORONARY, WITH LEFT HEART CATHETERIZATION N/A 3/14/2023    Procedure: Left Heart Cath;  Surgeon: Jose Walker MD;  Location: [facility] CATH;  Service: Cardiology;  Laterality: N/A;    CARDIAC CATHETERIZATION      CARDIAC SURGERY      COLONOSCOPY      COLONOSCOPY N/A 5/14/2021    Procedure: COLONOSCOPY;  Surgeon: Leopoldo Mendoza MD;  Location: [facility] ENDO;  Service: Endoscopy;  Laterality: N/A;    CORONARY ANGIOGRAPHY N/A 3/14/2023    Procedure: ANGIOGRAM, CORONARY ARTERY;  Surgeon: Jose Walker MD;  Location: [facility] CATH;  Service: Cardiology;  Laterality: N/A;    CORONARY ANGIOPLASTY      CORONARY STENT PLACEMENT  01/29/2018    ESOPHAGOGASTRODUODENOSCOPY N/A 5/14/2021    Procedure: EGD (ESOPHAGOGASTRODUODENOSCOPY);  Surgeon: Leopoldo Mendoza MD;  Location: [facility] ENDO;  Service: Endoscopy;  Laterality: N/A;  with push enteroscopy    HIATAL HERNIA REPAIR      LEFT HEART CATHETERIZATION  12/15/2023    Procedure: Left heart cath  RM [id number];  Surgeon: Augustine Conteh MD;  Location: [facility] CATH;  Service: Cardiology;;    TRANSRECTAL BIOPSY OF PROSTATE WITH ULTRASOUND GUIDANCE N/A 7/20/2021    Procedure: BIOPSY, PROSTATE, RECTAL APPROACH, WITH US GUIDANCE;  Surgeon: SARAI Thomason MD;  Location: [facility] OR;  Service: Urology;  Laterality: N/A;     Family History   Problem Relation Name Age of Onset    Hypertension Mother      Diabetes Mother      Heart disease Father  62        MI    Parkinsonism Brother       Social  History     Socioeconomic History    Marital status:    Tobacco Use    Smoking status: Never    Smokeless tobacco: Never   Substance and Sexual Activity    Alcohol use: No    Drug use: No     Social Drivers of Health     Financial Resource Strain: Low Risk  (12/14/2023)    Overall Financial Resource Strain (CARDIA)     Difficulty of Paying Living Expenses: Not hard at all   Food Insecurity: No Food Insecurity (12/14/2023)    Hunger Vital Sign     Worried About Running Out of Food in the Last Year: Never true     Ran Out of Food in the Last Year: Never true   Transportation Needs: No Transportation Needs (12/14/2023)    PRAPARE - Transportation     Lack of Transportation (Medical): No     Lack of Transportation (Non-Medical): No   Physical Activity: Unknown (12/13/2023)    Exercise Vital Sign     Minutes of Exercise per Session: 30 min   Stress: No Stress Concern Present (12/13/2023)    [employer] - Occupational Stress Questionnaire     Feeling of Stress : Not at all   Housing Stability: Low Risk  (12/14/2023)    Housing Stability Vital Sign     Unable to Pay for Housing in the Last Year: No     Number of Places Lived in the Last Year: 1     Unstable Housing in the Last Year: No       Review of patient's allergies indicates:   Allergen Reactions    Doxycycline hcl Rash    Bactrim [sulfamethoxazole-trimethoprim]     Ciprofloxacin Hives    Sulfa (sulfonamide antibiotics) Other (See Comments)       Current Outpatient Medications:     allopurinoL (ZYLOPRIM) 300 MG tablet, Take 1 tablet (300 mg total) by mouth once daily., Disp: 90 tablet, Rfl: 1    apixaban (ELIQUIS) 5 mg Tab, TAKE ONE TABLET TWO TIMES A DAY * BLOOD THINNER *, Disp: 60 tablet, Rfl: 1    cyanocobalamin 1,000 mcg/mL injection, Inject 1 mL (1,000 mcg total) into the muscle every 30 days., Disp: 3 mL, Rfl: 3    ergocalciferol (VITAMIN D2) 50,000 unit Cap, Take 1 capsule (50,000 Units total) by mouth every 7 days., Disp:  "12 capsule, Rfl: 3    gabapentin (NEURONTIN) 300 MG capsule, TAKE 1 CAPSULE BY MOUTH TWICE  DAILY, Disp: 180 capsule, Rfl: 3    hydroCHLOROthiazide (HYDRODIURIL) 12.5 MG Tab, TAKE 1 TABLET BY MOUTH ONCE  DAILY, Disp: 90 tablet, Rfl: 3    hydrOXYzine HCL (ATARAX) 25 MG tablet, Take 1 tablet 3 times a day by oral route as needed., Disp: , Rfl:     needle, disp, 25 gauge 25 gauge x 1" Ndle, Use as directed, Disp: 20 each, Rfl: 0    pantoprazole (PROTONIX) 20 MG tablet, Take 1 tablet (20 mg total) by mouth once daily., Disp: 90 tablet, Rfl: 3    potassium chloride SA (K-DUR,KLOR-CON) 20 MEQ tablet, TAKE 1 TABLET BY MOUTH ONCE  DAILY, Disp: 90 tablet, Rfl: 3    predniSONE (DELTASONE) 1 MG tablet, Take 9 mg daily for one week, then take 8 mg daily for one week, then Take 7 mg daily for one week, then take 6 mg daily for one week, then Take 5 mg daily for one week, then take 4 mg daily for one week, then Take 3 mg daily for one week, then take 2 mg daily for one week, Take 1 mg daily for one week, Disp: 180 tablet, Rfl: 3    syringe with needle (BD LUER-PADMINI SYRINGE) 3 mL 25 x 1 1/2 " Syrg, Use once a month to inject B12 injection., Disp: 3 each, Rfl: 3    syringe with needle (SYRINGE 3CC/25GX1") 3 mL 25 gauge x 1" Syrg, Use as directed, Disp: 20 Syringe, Rfl: 0    temazepam (RESTORIL) 30 mg capsule, take 1 capsule by mouth nightly as needed for insomnia., Disp: 30 capsule, Rfl: 0    Review of Systems   Constitutional: Negative for chills, decreased appetite, diaphoresis, fever and malaise/fatigue.   HENT:  Negative for congestion and nosebleeds.    Eyes:  Positive for blurred vision. Negative for visual disturbance.   Cardiovascular:  Negative for chest pain, claudication, cyanosis, dyspnea on exertion (MILD), irregular heartbeat, leg swelling (MILD), near-syncope, orthopnea, palpitations, paroxysmal nocturnal dyspnea and syncope.   Respiratory:  Negative for cough, hemoptysis and shortness of breath.    Endocrine: " Negative for polyphagia and polyuria.   Hematologic/Lymphatic: Negative for adenopathy. Does not bruise/bleed easily.   Skin:  Negative for rash.   Musculoskeletal:  Positive for arthritis and joint pain. Negative for back pain and falls. Muscle weakness: SOME.  Gastrointestinal:  Negative for abdominal pain, dysphagia, jaundice, melena and nausea.   Genitourinary:  Negative for dysuria, flank pain and hematuria.   Neurological:  Positive for paresthesias (NEUROPATHY). Negative for brief paralysis, focal weakness, light-headedness and loss of balance (SOME).   Psychiatric/Behavioral:  Negative for altered mental status and depression.    Allergic/Immunologic: Negative for persistent infections.        Objective:      Vitals:    12/18/24 0841   BP: 130/79   Pulse: 73   SpO2: 95%   Weight: 91.4 kg (201 lb 8 oz)   PainSc: 0-No pain     Body mass index is 27.33 kg/m².    Physical Exam  Constitutional:       Appearance: Normal appearance.   HENT:      Head: Normocephalic and atraumatic.   Eyes:      General: No scleral icterus.     Extraocular Movements: Extraocular movements intact.      Conjunctiva/sclera: Conjunctivae normal.   Neck:      Vascular: Carotid bruit present. No JVD.   Cardiovascular:      Rate and Rhythm: Normal rate and regular rhythm. No extrasystoles are present.     Pulses: Normal pulses.           Carotid pulses are 2+ on the right side and 2+ on the left side with bruit.       Radial pulses are 2+ on the right side and 2+ on the left side.        Posterior tibial pulses are 2+ on the right side and 2+ on the left side.      Heart sounds:      No friction rub. No gallop.   Pulmonary:      Effort: Pulmonary effort is normal.      Breath sounds: Normal breath sounds and air entry. No rales.   Abdominal:      Palpations: Abdomen is soft.      Tenderness: There is no abdominal tenderness.   Musculoskeletal:         General: No tenderness (L ARM OK).        Arms:       Cervical back: Neck supple.       Right lower le+ Edema present.      Left lower leg: No edema.      Comments: USES A CANE, SLOW GAIT   Skin:     General: Skin is warm and dry.      Capillary Refill: Capillary refill takes less than 2 seconds.   Neurological:      General: No focal deficit present.      Mental Status: He is alert and oriented to person, place, and time.      Cranial Nerves: No cranial nerve deficit.   Psychiatric:         Mood and Affect: Mood normal.         Speech: Speech normal.         Behavior: Behavior normal.                 ..    Chemistry        Component Value Date/Time     2024 1428    K 3.9 2024 1428     2024 1428    CO2 24 2024 1428    BUN 21 2024 1428    CREATININE 1.5 (H) 2024 1428     (H) 2024 1428        Component Value Date/Time    CALCIUM 9.4 2024 1428    ALKPHOS 75 2024 1428    AST 20 2024 1428    ALT 17 2024 1428    BILITOT 0.3 2024 1428    ESTGFRAFRICA 43.1 (A) 2022 1418    EGFRNONAA 37.3 (A) 2022 1418            ..  Lab Results   Component Value Date    CHOL 154 2024    CHOL 157 2023    CHOL 145 2022     Lab Results   Component Value Date    HDL 38 (L) 2024    HDL 47 2023    HDL 40 2022     Lab Results   Component Value Date    LDLCALC 68.8 2024    LDLCALC 89.4 2023    LDLCALC 86.6 2022     Lab Results   Component Value Date    TRIG 236 (H) 2024    TRIG 103 2023    TRIG 92 2022     Lab Results   Component Value Date    CHOLHDL 24.7 2024    CHOLHDL 29.9 2023    CHOLHDL 27.6 2022     ..  Lab Results   Component Value Date    WBC 7.58 2024    HGB 13.3 (L) 2024    HCT 40.0 2024    MCV 94 2024     2024       Test(s) Reviewed  I have reviewed the following in detail:  [] Stress test   [] Angiography   [] Echocardiogram   [x] Labs   [] Other:       Assessment:         ICD-10-CM ICD-9-CM    1. Elevated left ventricular end-diastolic pressure (LVEDP)  R94.30 794.30   2. PAF (paroxysmal atrial fibrillation)  I48.0 427.31   3. Bruit of left carotid artery  R09.89 785.9   4. Mild CAD  I25.10 414.00   5. Long term current use of antiarrhythmic drug  Z79.899 V58.69   6. Long term (current) use of anticoagulants  Z79.01 V58.61   7. Stage 3a chronic kidney disease  N18.31 585.3     Problem List Items Addressed This Visit          Cardiac/Vascular    Mild CAD    Relevant Orders    Echo    Long term current use of antiarrhythmic drug    PAF (paroxysmal atrial fibrillation)    Relevant Orders    Echo    Elevated left ventricular end-diastolic pressure (LVEDP) - Primary    Bruit of left carotid artery    Relevant Orders    CV Ultrasound Bilateral Doppler Carotid       Renal/    Stage 3a chronic kidney disease       Hematology    Long term (current) use of anticoagulants        Plan:       CHECK ECHO CAROTIDS, INCREASED CV RISK WITH CKD RETURN TO CLINIC IN 6-8 MONTHS  ALL CV CLINICALLY STABLE, NO ANGINA, NO HF, NO TIA, STAY CLINICAL ARRHYTHMIA,CONTINUE CURRENT MEDS, EDUCATION, DIET, EXERCISE         Elevated left ventricular end-diastolic pressure (LVEDP)    PAF (paroxysmal atrial fibrillation)  -     Echo; Future; Expected date: 06/18/2025    Bruit of left carotid artery  Comments:  ULTRASOUND  Orders:  -     CV Ultrasound Bilateral Doppler Carotid; Future    Mild CAD  -     Echo; Future; Expected date: 06/18/2025    Long term current use of antiarrhythmic drug    Long term (current) use of anticoagulants    Stage 3a chronic kidney disease    RTC Low level/low impact aerobic exercise 5x's/wk. Heart healthy diet and risk factor modification.    See labs and med orders.    Aerobic exercise 5x's/wk. Heart healthy diet and risk factor modification.    See labs and med orders.

## 2025-01-02 DIAGNOSIS — G47.09 OTHER INSOMNIA: ICD-10-CM

## 2025-01-02 RX ORDER — TEMAZEPAM 30 MG/1
CAPSULE ORAL
Qty: 30 CAPSULE | Refills: 5 | Status: SHIPPED | OUTPATIENT
Start: 2025-01-02

## 2025-01-02 NOTE — TELEPHONE ENCOUNTER
No care due was identified.  [facility] Embedded Care Due Messages. Reference number: [id number].   1/02/2025 10:37:19 AM CST

## 2025-01-09 ENCOUNTER — TELEPHONE (OUTPATIENT)
Dept: FAMILY MEDICINE | Facility: CLINIC | Age: 85
End: 2025-01-09
Payer: MEDICARE

## 2025-01-09 NOTE — TELEPHONE ENCOUNTER
----- Message from [place] sent at 1/9/2025 11:06 AM CST -----  Type: Needs Medical Advice  Who Called:  pt     Best Call Back Number: [phone] (home)     Additional Information: pt requesting call back in regards to needing clearance for eye surgery and has a paper for  to fill please advise

## 2025-01-13 ENCOUNTER — LAB VISIT (OUTPATIENT)
Dept: LAB | Facility: HOSPITAL | Age: 85
End: 2025-01-13
Attending: PHYSICIAN ASSISTANT
Payer: MEDICARE

## 2025-01-13 ENCOUNTER — OFFICE VISIT (OUTPATIENT)
Dept: FAMILY MEDICINE | Facility: CLINIC | Age: 85
End: 2025-01-13
Payer: MEDICARE

## 2025-01-13 VITALS
DIASTOLIC BLOOD PRESSURE: 86 MMHG | OXYGEN SATURATION: 96 % | BODY MASS INDEX: 26.57 KG/M2 | SYSTOLIC BLOOD PRESSURE: 118 MMHG | WEIGHT: 196.19 LBS | HEART RATE: 98 BPM | HEIGHT: 72 IN

## 2025-01-13 DIAGNOSIS — N18.32 STAGE 3B CHRONIC KIDNEY DISEASE: ICD-10-CM

## 2025-01-13 DIAGNOSIS — I10 ESSENTIAL HYPERTENSION: ICD-10-CM

## 2025-01-13 DIAGNOSIS — H26.9 CATARACT OF BOTH EYES, UNSPECIFIED CATARACT TYPE: ICD-10-CM

## 2025-01-13 DIAGNOSIS — I48.0 PAF (PAROXYSMAL ATRIAL FIBRILLATION): ICD-10-CM

## 2025-01-13 DIAGNOSIS — I51.89 DIASTOLIC DYSFUNCTION: ICD-10-CM

## 2025-01-13 DIAGNOSIS — N18.31 STAGE 3A CHRONIC KIDNEY DISEASE: ICD-10-CM

## 2025-01-13 DIAGNOSIS — Z86.718 HISTORY OF DVT OF LOWER EXTREMITY: ICD-10-CM

## 2025-01-13 DIAGNOSIS — C61 PROSTATE CANCER: ICD-10-CM

## 2025-01-13 DIAGNOSIS — Z01.818 PRE-OP EXAM: ICD-10-CM

## 2025-01-13 DIAGNOSIS — Z01.818 PRE-OP EXAM: Primary | ICD-10-CM

## 2025-01-13 DIAGNOSIS — D68.51 FACTOR V LEIDEN MUTATION: ICD-10-CM

## 2025-01-13 PROBLEM — Z79.02 LONG TERM (CURRENT) USE OF ANTITHROMBOTICS/ANTIPLATELETS: Status: RESOLVED | Noted: 2019-01-14 | Resolved: 2025-01-13

## 2025-01-13 PROBLEM — U07.1 COVID: Status: RESOLVED | Noted: 2022-07-29 | Resolved: 2025-01-13

## 2025-01-13 LAB
ALBUMIN SERPL BCP-MCNC: 4.2 G/DL (ref 3.5–5.2)
ALBUMIN SERPL BCP-MCNC: 4.2 G/DL (ref 3.5–5.2)
ALBUMIN/CREAT UR: 67.1 UG/MG (ref 0–30)
ALP SERPL-CCNC: 81 U/L (ref 40–150)
ALT SERPL W/O P-5'-P-CCNC: 14 U/L (ref 10–44)
ANION GAP SERPL CALC-SCNC: 12 MMOL/L (ref 8–16)
ANION GAP SERPL CALC-SCNC: 12 MMOL/L (ref 8–16)
AST SERPL-CCNC: 17 U/L (ref 10–40)
BASOPHILS # BLD AUTO: 0.03 K/UL (ref 0–0.2)
BASOPHILS NFR BLD: 0.3 % (ref 0–1.9)
BILIRUB SERPL-MCNC: 0.4 MG/DL (ref 0.1–1)
BILIRUB UR QL STRIP: NEGATIVE
BUN SERPL-MCNC: 22 MG/DL (ref 8–23)
BUN SERPL-MCNC: 22 MG/DL (ref 8–23)
CALCIUM SERPL-MCNC: 10 MG/DL (ref 8.7–10.5)
CALCIUM SERPL-MCNC: 10 MG/DL (ref 8.7–10.5)
CHLORIDE SERPL-SCNC: 105 MMOL/L (ref 95–110)
CHLORIDE SERPL-SCNC: 105 MMOL/L (ref 95–110)
CLARITY UR: CLEAR
CO2 SERPL-SCNC: 22 MMOL/L (ref 23–29)
CO2 SERPL-SCNC: 22 MMOL/L (ref 23–29)
COLOR UR: YELLOW
CREAT SERPL-MCNC: 1.6 MG/DL (ref 0.5–1.4)
CREAT SERPL-MCNC: 1.6 MG/DL (ref 0.5–1.4)
CREAT UR-MCNC: 140 MG/DL (ref 23–375)
CREAT UR-MCNC: 140 MG/DL (ref 23–375)
DIFFERENTIAL METHOD BLD: ABNORMAL
EOSINOPHIL # BLD AUTO: 0.3 K/UL (ref 0–0.5)
EOSINOPHIL NFR BLD: 2.5 % (ref 0–8)
ERYTHROCYTE [DISTWIDTH] IN BLOOD BY AUTOMATED COUNT: 14 % (ref 11.5–14.5)
EST. GFR  (NO RACE VARIABLE): 42.5 ML/MIN/1.73 M^2
EST. GFR  (NO RACE VARIABLE): 42.5 ML/MIN/1.73 M^2
GLUCOSE SERPL-MCNC: 122 MG/DL (ref 70–110)
GLUCOSE SERPL-MCNC: 122 MG/DL (ref 70–110)
GLUCOSE UR QL STRIP: NEGATIVE
HCT VFR BLD AUTO: 41.3 % (ref 40–54)
HGB BLD-MCNC: 14.1 G/DL (ref 14–18)
HGB UR QL STRIP: NEGATIVE
IMM GRANULOCYTES # BLD AUTO: 0.05 K/UL (ref 0–0.04)
IMM GRANULOCYTES NFR BLD AUTO: 0.5 % (ref 0–0.5)
KETONES UR QL STRIP: NEGATIVE
LEUKOCYTE ESTERASE UR QL STRIP: NEGATIVE
LYMPHOCYTES # BLD AUTO: 1.9 K/UL (ref 1–4.8)
LYMPHOCYTES NFR BLD: 18.7 % (ref 18–48)
MCH RBC QN AUTO: 31.3 PG (ref 27–31)
MCHC RBC AUTO-ENTMCNC: 34.1 G/DL (ref 32–36)
MCV RBC AUTO: 92 FL (ref 82–98)
MICROALBUMIN UR DL<=1MG/L-MCNC: 94 UG/ML
MONOCYTES # BLD AUTO: 0.8 K/UL (ref 0.3–1)
MONOCYTES NFR BLD: 7.9 % (ref 4–15)
NEUTROPHILS # BLD AUTO: 7 K/UL (ref 1.8–7.7)
NEUTROPHILS NFR BLD: 70.1 % (ref 38–73)
NITRITE UR QL STRIP: NEGATIVE
NRBC BLD-RTO: 0 /100 WBC
PH UR STRIP: 6 [PH] (ref 5–8)
PHOSPHATE SERPL-MCNC: 4.3 MG/DL (ref 2.7–4.5)
PLATELET # BLD AUTO: 263 K/UL (ref 150–450)
PMV BLD AUTO: 10.7 FL (ref 9.2–12.9)
POTASSIUM SERPL-SCNC: 3.8 MMOL/L (ref 3.5–5.1)
POTASSIUM SERPL-SCNC: 3.8 MMOL/L (ref 3.5–5.1)
PROT SERPL-MCNC: 8.1 G/DL (ref 6–8.4)
PROT UR QL STRIP: ABNORMAL
PROT UR-MCNC: 27 MG/DL (ref 0–15)
PROT/CREAT UR: 0.19 MG/G{CREAT} (ref 0–0.2)
PTH-INTACT SERPL-MCNC: 64.6 PG/ML (ref 9–77)
RBC # BLD AUTO: 4.5 M/UL (ref 4.6–6.2)
SODIUM SERPL-SCNC: 139 MMOL/L (ref 136–145)
SODIUM SERPL-SCNC: 139 MMOL/L (ref 136–145)
SP GR UR STRIP: 1.02 (ref 1–1.03)
URN SPEC COLLECT METH UR: ABNORMAL
WBC # BLD AUTO: 9.97 K/UL (ref 3.9–12.7)

## 2025-01-13 PROCEDURE — 81003 URINALYSIS AUTO W/O SCOPE: CPT | Mod: PO | Performed by: STUDENT IN AN ORGANIZED HEALTH CARE EDUCATION/TRAINING PROGRAM

## 2025-01-13 PROCEDURE — 3079F DIAST BP 80-89 MM HG: CPT | Mod: CPTII,S$GLB,, | Performed by: PHYSICIAN ASSISTANT

## 2025-01-13 PROCEDURE — 1126F AMNT PAIN NOTED NONE PRSNT: CPT | Mod: CPTII,S$GLB,, | Performed by: PHYSICIAN ASSISTANT

## 2025-01-13 PROCEDURE — 1100F PTFALLS ASSESS-DOCD GE2>/YR: CPT | Mod: CPTII,S$GLB,, | Performed by: PHYSICIAN ASSISTANT

## 2025-01-13 PROCEDURE — 84156 ASSAY OF PROTEIN URINE: CPT | Performed by: STUDENT IN AN ORGANIZED HEALTH CARE EDUCATION/TRAINING PROGRAM

## 2025-01-13 PROCEDURE — 85025 COMPLETE CBC W/AUTO DIFF WBC: CPT | Performed by: PHYSICIAN ASSISTANT

## 2025-01-13 PROCEDURE — 82043 UR ALBUMIN QUANTITATIVE: CPT | Performed by: STUDENT IN AN ORGANIZED HEALTH CARE EDUCATION/TRAINING PROGRAM

## 2025-01-13 PROCEDURE — 99999 PR PBB SHADOW E&M-EST. PATIENT-LVL III: CPT | Mod: PBBFAC,,, | Performed by: PHYSICIAN ASSISTANT

## 2025-01-13 PROCEDURE — 99214 OFFICE O/P EST MOD 30 MIN: CPT | Mod: S$GLB,,, | Performed by: PHYSICIAN ASSISTANT

## 2025-01-13 PROCEDURE — 80053 COMPREHEN METABOLIC PANEL: CPT | Performed by: PHYSICIAN ASSISTANT

## 2025-01-13 PROCEDURE — 1159F MED LIST DOCD IN RCRD: CPT | Mod: CPTII,S$GLB,, | Performed by: PHYSICIAN ASSISTANT

## 2025-01-13 PROCEDURE — 83970 ASSAY OF PARATHORMONE: CPT | Performed by: STUDENT IN AN ORGANIZED HEALTH CARE EDUCATION/TRAINING PROGRAM

## 2025-01-13 PROCEDURE — 80069 RENAL FUNCTION PANEL: CPT | Performed by: STUDENT IN AN ORGANIZED HEALTH CARE EDUCATION/TRAINING PROGRAM

## 2025-01-13 PROCEDURE — 3288F FALL RISK ASSESSMENT DOCD: CPT | Mod: CPTII,S$GLB,, | Performed by: PHYSICIAN ASSISTANT

## 2025-01-13 PROCEDURE — 3074F SYST BP LT 130 MM HG: CPT | Mod: CPTII,S$GLB,, | Performed by: PHYSICIAN ASSISTANT

## 2025-01-13 PROCEDURE — 36415 COLL VENOUS BLD VENIPUNCTURE: CPT | Mod: PO | Performed by: STUDENT IN AN ORGANIZED HEALTH CARE EDUCATION/TRAINING PROGRAM

## 2025-01-13 NOTE — PROGRESS NOTES
Subjective     Patient ID: Dilip Lopez is a 84 y.o. male.    Chief Complaint: Pre-op Exam (Pre-op for cataract Sx on 2/6/25)      HPI      Pt is new to me, PCP Dr. Salgado.     Pt is a 84 year old male with lumbar DDD, lung nodules, paroxysmal A-fib with long term eliquis use, HTN, HLD, PAD, CKD, hx of DVT, prostate ca (followed by oncology and urology), PMR on chronic prednisone, gout. He presents today for a pre-op exam. Is getting cataract surgery on 2/6/25. No chest pain, shortness of breath, fever, chills. Note, pt was previously prescribed steroids for polymyalgia rheumatica. Successfully tapered them, but restarted himself on them (5mg daily) a few weeks ago due to return of generalized aching, which is now improved. Currently taking 3mg daily with plans ot continuing taper and finish the week prior to his cataract procedure.     RCRI risk factors include: history of ischemic disease. As such, per RCRI the risk of cardiac death, nonfatal myocardial infarction, or nonfatal cardiac arrest is 1.0% and the risk of myocardial infarction, pulmonary edema, ventricular fibrillation, primary cardiac arrest, or complete heart block is 1.3%.  Overall this patient can be considered intermediate risk for this low risk procedure. No further cardiac testing is recommended at this time.     Patient denies any symptoms (as per HPI) concerning for undiagnosed lung disease including SAMMI. Would not recommend obtaining chest X-ray, sleep study, or PFTs at this time. Patient is a non-smoker. We discussed the benefits of early mobilization and deep breathing after surgery.      Screened patient for alcohol misuse, use of illicit drugs, and personal or family history of anesthetic complications or bleeding diathesis and no substantial concerns were identified.     All current medications were reviewed and at this time no changes to medications are recommended prior to surgery.     Pending acceptable labs, I recommend use of standard  pre-op and post-op precautions for this patient. Will notify ophthalmologist of his prednisone use . No need to discontinue eliquis per cataract surgeon paperwork. In my opinion, he is medically optimized for this procedure, and can proceed without further evaluation.       Review of Systems   All other systems reviewed and are negative.         Objective     Physical Exam  Constitutional:       General: He is not in acute distress.     Appearance: Normal appearance. He is obese. He is not ill-appearing, toxic-appearing or diaphoretic.   HENT:      Head: Normocephalic and atraumatic.   Eyes:      General: No scleral icterus.        Right eye: No discharge.         Left eye: No discharge.      Extraocular Movements: Extraocular movements intact.      Conjunctiva/sclera: Conjunctivae normal.      Pupils: Pupils are equal, round, and reactive to light.      Comments: Bilateral cataracts present   Cardiovascular:      Heart sounds: Normal heart sounds. No murmur heard.     No friction rub. No gallop.   Pulmonary:      Effort: No respiratory distress.      Breath sounds: Normal breath sounds. No stridor. No wheezing, rhonchi or rales.   Chest:      Chest wall: No tenderness.   Musculoskeletal:      Right lower leg: No edema.      Left lower leg: No edema.   Neurological:      General: No focal deficit present.      Mental Status: He is alert and oriented to person, place, and time. Mental status is at baseline.   Psychiatric:         Mood and Affect: Mood normal.         Behavior: Behavior normal.         Thought Content: Thought content normal.         Judgment: Judgment normal.       1. Pre-op exam (Primary)  - CBC W/ AUTO DIFFERENTIAL; Future  - COMPREHENSIVE METABOLIC PANEL; Future  -medically optimized, paperwork sent    2. Cataract of both eyes, unspecified cataract type  -surgery as scheduled, will notify that patient may be on low dose prednisone prior to procedure. Can continue eliquis for procedure.     3. PAF  (paroxysmal atrial fibrillation)  -controlled on current med regimen    4. Essential hypertension  -controlled, continue current med regimen    5. Diastolic dysfunction  -no signs of volume overload at this time, followed by cardiology    6. Stage 3b chronic kidney disease  -labs today    7. History of DVT of lower extremity  -with factor V leiden mutation, continue eliquis as prescribed    8. Factor 5 leiden mutation  -see above    9. Prostate cancer  -followed by [facility], watchful waiting at this time    RTC/ER precautions given. F/U with me prn    Blanca Chadwick PA-C

## 2025-01-13 NOTE — PATIENT INSTRUCTIONS
A few reminders from today:    Labs today      Follow up with me if needed.   Please go to ER/urgent care if after hours or symptoms persist/worsen.     Do not hesitate to get in touch with me should you have any further questions.     Thank you for trusting me with your care!  I wish you health and happiness.    -Blanca Chadwick PA-C

## 2025-01-17 DIAGNOSIS — M1A.09X0 IDIOPATHIC CHRONIC GOUT OF MULTIPLE SITES WITHOUT TOPHUS: ICD-10-CM

## 2025-01-18 RX ORDER — ALLOPURINOL 300 MG/1
300 TABLET ORAL
Qty: 90 TABLET | Refills: 0 | Status: SHIPPED | OUTPATIENT
Start: 2025-01-18

## 2025-01-18 NOTE — TELEPHONE ENCOUNTER
No care due was identified.  [facility] Embedded Care Due Messages. Reference number: [id number].   1/17/2025 9:36:24 PM CST

## 2025-01-18 NOTE — TELEPHONE ENCOUNTER
Refill Routing Note   Medication(s) are not appropriate for processing by [facility] for the following reason(s):        Required labs outdated    [facility] action(s):  Defer             Appointments  past 12m or future 3m with PCP    Date Provider   Last Visit   12/4/2024 Nathalie Salgado MD   Next Visit   6/4/2025 Nathalie Salgado MD   ED visits in past 90 days: 0        Note composed:11:58 AM 01/18/2025

## 2025-01-28 ENCOUNTER — OFFICE VISIT (OUTPATIENT)
Dept: NEPHROLOGY | Facility: CLINIC | Age: 85
End: 2025-01-28
Payer: MEDICARE

## 2025-01-28 VITALS
OXYGEN SATURATION: 95 % | HEIGHT: 72 IN | HEART RATE: 77 BPM | SYSTOLIC BLOOD PRESSURE: 128 MMHG | DIASTOLIC BLOOD PRESSURE: 70 MMHG | BODY MASS INDEX: 26.61 KG/M2

## 2025-01-28 DIAGNOSIS — I10 ESSENTIAL HYPERTENSION: ICD-10-CM

## 2025-01-28 DIAGNOSIS — I25.10 MILD CAD: ICD-10-CM

## 2025-01-28 DIAGNOSIS — E78.00 HYPERCHOLESTEROLEMIA: ICD-10-CM

## 2025-01-28 DIAGNOSIS — N18.31 STAGE 3A CHRONIC KIDNEY DISEASE: Primary | ICD-10-CM

## 2025-01-28 DIAGNOSIS — R80.9 ALBUMINURIA: ICD-10-CM

## 2025-01-28 PROCEDURE — 99214 OFFICE O/P EST MOD 30 MIN: CPT | Mod: S$GLB,,, | Performed by: STUDENT IN AN ORGANIZED HEALTH CARE EDUCATION/TRAINING PROGRAM

## 2025-01-28 PROCEDURE — 3078F DIAST BP <80 MM HG: CPT | Mod: CPTII,S$GLB,, | Performed by: STUDENT IN AN ORGANIZED HEALTH CARE EDUCATION/TRAINING PROGRAM

## 2025-01-28 PROCEDURE — 99999 PR PBB SHADOW E&M-EST. PATIENT-LVL III: CPT | Mod: PBBFAC,,, | Performed by: STUDENT IN AN ORGANIZED HEALTH CARE EDUCATION/TRAINING PROGRAM

## 2025-01-28 PROCEDURE — 1160F RVW MEDS BY RX/DR IN RCRD: CPT | Mod: CPTII,S$GLB,, | Performed by: STUDENT IN AN ORGANIZED HEALTH CARE EDUCATION/TRAINING PROGRAM

## 2025-01-28 PROCEDURE — 3074F SYST BP LT 130 MM HG: CPT | Mod: CPTII,S$GLB,, | Performed by: STUDENT IN AN ORGANIZED HEALTH CARE EDUCATION/TRAINING PROGRAM

## 2025-01-28 PROCEDURE — 1159F MED LIST DOCD IN RCRD: CPT | Mod: CPTII,S$GLB,, | Performed by: STUDENT IN AN ORGANIZED HEALTH CARE EDUCATION/TRAINING PROGRAM

## 2025-01-28 NOTE — PROGRESS NOTES
[facility]  Nephrology Clinic    Subjective:       HPI ID: Dilip Lopez is a 84 y.o. male who returns for ongoing evaluation and management of CKD.   He was previously followed by another nephrologist in the [facility] setting, last seen by the prior nephrologist in 2019 for evaluation of chronic kidney disease stage 3.  The patient was subsequently lost to follow up.  He has multiple comorbid conditions, ongoing conditions of hypertension, coronary artery disease, Prostate cancer, atrial fibrillation, history osteoporosis, PMR, Factor V leiden mutation on long term anticoagulation and previously diagnosed with chronic kidney disease stage IIIB. We reivewed his lab trends at chairside. Last sCr was from 9/16/24 and ft a sCr of 1.4mg/dL, which is near his baseline of 1.2-1.4mg/dL.     In terms of his renal history, he denies hospitalizations for prior LIONEL or LIONEL requiring RRT. Denies history of nephrolithiasis or hematuria episodes. No reported history of frequent or recurrent use of NSAIDs/COXI. He follows with rheumatology for PMR. Denies any pertinent family history of known kidney disease, or family members diagnosed with ESRD requiring dialysis.  Smoking Hx: denies  Other pertinent urologic history: prostate cancer, s/p lupron  Fluid intake per 24hr: water, juice, mountain dew, coffee. 60oz per day.    Interval history:  1/28/25 - here today for CKD f/u. Recently completed a prednisone course for his PMR. He denies issues with dehydration. He is maintaining his fluid intake. Appetite is good. Feeling well. We reviewed recent labs at chairside. His renal fxn based on sCr trend is stable overall. His albuminuria is mildly elevated. We discussed guideline directed therapy for ckd w/ albuminuria to include RASI at this time for CKD-MACE risk reduction. His calculated risk via KFRE model is deemed low. He voices that he takes too many medications and would like to defer starting  "additional therapies at this time.       Review of Systems   Constitutional:  Negative for chills, diaphoresis and fever.   Respiratory:  Negative for cough and shortness of breath.    Cardiovascular:  Negative for chest pain and leg swelling.   Gastrointestinal:  Negative for abdominal pain, diarrhea, nausea and vomiting.   Genitourinary:  Negative for difficulty urinating, dysuria and hematuria.   Musculoskeletal:  Negative for myalgias.   Neurological:  Negative for headaches.   Hematological:  Does not bruise/bleed easily.       The past medical, family and social histories were reviewed for this encounter.     Past Medical History:   Diagnosis Date    Atrial fibrillation     CAD (coronary artery disease)     Hyperlipidemia     Hypertension     Prostate cancer 4/12/2022       Current Outpatient Medications   Medication Sig    allopurinoL (ZYLOPRIM) 300 MG tablet TAKE 1 TABLET BY MOUTH ONCE  DAILY    apixaban (ELIQUIS) 5 mg Tab TAKE ONE TABLET TWO TIMES A DAY * BLOOD THINNER *    cyanocobalamin 1,000 mcg/mL injection Inject 1 mL (1,000 mcg total) into the muscle every 30 days.    ergocalciferol (VITAMIN D2) 50,000 unit Cap Take 1 capsule (50,000 Units total) by mouth every 7 days.    gabapentin (NEURONTIN) 300 MG capsule TAKE 1 CAPSULE BY MOUTH TWICE  DAILY    hydroCHLOROthiazide (HYDRODIURIL) 12.5 MG Tab TAKE 1 TABLET BY MOUTH ONCE  DAILY    hydrOXYzine HCL (ATARAX) 25 MG tablet Take 1 tablet 3 times a day by oral route as needed.    needle, disp, 25 gauge 25 gauge x 1" Ndle Use as directed    pantoprazole (PROTONIX) 20 MG tablet Take 1 tablet (20 mg total) by mouth once daily.    potassium chloride SA (K-DUR,KLOR-CON) 20 MEQ tablet TAKE 1 TABLET BY MOUTH ONCE  DAILY    predniSONE (DELTASONE) 1 MG tablet Take 9 mg daily for one week, then take 8 mg daily for one week, then Take 7 mg daily for one week, then take 6 mg daily for one week, then Take 5 mg daily for one week, then take 4 mg daily for one week, then " "Take 3 mg daily for one week, then take 2 mg daily for one week, Take 1 mg daily for one week    syringe with needle (BD LUER-PADMINI SYRINGE) 3 mL 25 x 1 1/2 " Syrg Use once a month to inject B12 injection.    syringe with needle (SYRINGE 3CC/25GX1") 3 mL 25 gauge x 1" Syrg Use as directed    temazepam (RESTORIL) 30 mg capsule take 1 capsule by mouth nightly as needed for insomnia.     No current facility-administered medications for this visit.         Objective:   /70 (BP Location: Right arm, Patient Position: Sitting)   Pulse 77   Ht 6' (1.829 m)   SpO2 95%   BMI 26.61 kg/m²      Physical Exam  Vitals reviewed.   Constitutional:       General: He is not in acute distress.     Appearance: Normal appearance.   Cardiovascular:      Pulses: Normal pulses.      Heart sounds: Normal heart sounds.      No friction rub.   Pulmonary:      Effort: Pulmonary effort is normal. No respiratory distress.      Breath sounds: Normal breath sounds.   Abdominal:      General: Abdomen is flat.      Palpations: Abdomen is soft.   Musculoskeletal:         General: No swelling.      Right lower leg: No edema.      Left lower leg: No edema.   Neurological:      General: No focal deficit present.      Mental Status: He is oriented to person, place, and time.      Motor: No weakness.   Psychiatric:         Mood and Affect: Mood normal.         Behavior: Behavior normal.         Assessment:     Lab Results   Component Value Date    CREATININE 1.6 (H) 01/13/2025    CREATININE 1.6 (H) 01/13/2025    BUN 22 01/13/2025    BUN 22 01/13/2025     01/13/2025     01/13/2025    K 3.8 01/13/2025    K 3.8 01/13/2025     01/13/2025     01/13/2025    CO2 22 (L) 01/13/2025    CO2 22 (L) 01/13/2025     Lab Results   Component Value Date    PTH 64.6 01/13/2025    CALCIUM 10.0 01/13/2025    CALCIUM 10.0 01/13/2025    PHOS 4.3 01/13/2025     Lab Results   Component Value Date    HCT 41.3 01/13/2025     Prot/Creat Ratio, Urine "   Date Value Ref Range Status   01/13/2025 0.19 0.00 - 0.20 Final   08/07/2019 Unable to calculate 0.00 - 0.20 Final       Lab Results   Component Value Date    MICALBCREAT 67.1 (H) 01/13/2025    MICALBCREAT 26.2 04/05/2023    MICALBCREAT 9.4 04/12/2022    MICALBCREAT 3.2 01/14/2019           1. Stage 3a chronic kidney disease    2. Hypercholesterolemia    3. Mild CAD    4. Essential hypertension    5. Albuminuria          Plan:   Return to clinic in 6 months  Labs for next visit include RFP, UACR,   Baseline creatinine is 1.2-1.5mg/dL.    CKD stage G3a / A2  -risk: long standing htn, nephrolithiasis h/o  -awaiting imaging evaluation  -does not want to start additional therapy today; not on any RASI  -still, estimated low risk of renal progression based on KFRE model    HLD, elevated trg  -per PCP. We discussed making lifestyle changes and using a Mediterranean diet for health benefits and weight loss.  This diet also is beneficial in improving HTN, DM, protein in urine as well as kidney function.    CAD  -per cardiology;     Prostate cancer h/o  -followed by H/O and urology; fformerly on Lupron    Hypertension  -BP trends reviewed; medication list reviewed; continue current medication at this time    Albuminuria  -see above; pt would like to defer addition of RASi at this time.    Nephrolithiasis  -Has a history of nephrolithiasis; will plan for repeat ultrasound.       __________________________  Emeka Marks MD  [facility]    Part of this note has been created using [employer] dictation system. Errors in transcription may not be completely avoided.        KFRE 2-Year: 0.7% at 1/13/2025  2:21 PM  Calculated from:  Serum Creatinine: 1.6 mg/dL at 1/13/2025  2:18 PM  Urine Albumin Creatinine Ratio: 67.1 ug/mg at 1/13/2025  2:21 PM  Age: 84 years  Sex: Male at 1/13/2025  2:21 PM  Has CKD-3 to CKD-5: Yes    KFRE 5-Year: 2.2% at 1/13/2025  2:21 PM  Calculated from:  Serum Creatinine: 1.6 mg/dL at  1/13/2025  2:18 PM  Urine Albumin Creatinine Ratio: 67.1 ug/mg at 1/13/2025  2:21 PM  Age: 84 years  Sex: Male at 1/13/2025  2:21 PM  Has CKD-3 to CKD-5: Yes

## 2025-01-28 NOTE — PROGRESS NOTES
Subjective:      Patient ID: Dilip Lopez is a 84 y.o. male.    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  - osteoporosis with T score -3.4 at L spine  - PMR diagnosed by Dr Norman in 3/2024 based on hip and shoulder girdle stiffness, elevated acute phase reactants and excellent steroid responsiveness  - Positive serologies: -  - Negative serologies: PRATIMA, RF, CCP  - Infectious screening labs: Negative hepatitis B, C, quantiferon, and HIV (9/16/24)  - Imaging:              - DEXA (2/2023) osteoporosis              - Temporal artery US (9/16/24) Negative  - Previous Treatments: -  - Current Treatments:               - Allopurinol 300mg daily per PCP              - Prolia per Heme (3/2023- ; most recent 4/2024)  Interval History:  He reports right foot drop, chronic back pain, and recurrent falls related to his foot drop. Every time he falls, he has flare of widespread pain.     Objective:   /77 (BP Location: Right arm, Patient Position: Sitting)   Pulse 106   Ht 6' (1.829 m)   BMI 26.61 kg/m²   Physical Exam   Constitutional: normal appearance.   HENT:   Head: Normocephalic and atraumatic.   Cardiovascular: Normal rate, regular rhythm and normal heart sounds.   Pulmonary/Chest: Effort normal and breath sounds normal.   Musculoskeletal:      Comments: No synovitis, dactylitis, enthesitis, effusions  Right foot drop   Neurological: He is alert.   Skin: Skin is warm and dry.   No skin thickening, telangiectasias, calcinosis, psoriasiform lesions, lupoid lesions       No data to display    Labs (1/13/25)   CBC WBC, HGB, PLT WNL   CMP CR 1.6, GFR 42.5, stable, AST, ALT WNL     Assessment:     1. Polymyalgia rheumatica    2. Age-related osteoporosis without current pathological fracture    3. Lumbar radiculopathy, chronic    4. 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 3a, HF, osteoporosis with T score -3.4 at L spine on Prolia, neuropathy, prostate cancer on Leupron (he has not been taking this due to adverse effects) without known metastasis, and PMR diagnosed by Dr Norman in 3/2024 based on hip and shoulder girdle stiffness, elevated acute phase reactants and excellent steroid responsiveness. He is currently off steroids. He reports right foot drop, chronic back pain, and recurrent falls related to his foot drop. Every time he falls, he has flare of widespread pain. [facility] and [facility] to Podiatry for foot drop brace.     Plan:     Problem List Items Addressed This Visit          Immunology/Multi System    Polymyalgia rheumatica - Primary       Endocrine    Age-related osteoporosis without current pathological fracture     Other Visit Diagnoses       Lumbar radiculopathy, chronic        Relevant Orders    MRI Lumbar Spine Without Contrast    Ambulatory referral/consult to Podiatry    Foot drop, right        Relevant Orders    MRI Lumbar Spine Without Contrast    Ambulatory referral/consult to Podiatry          - [facility]  - refer to Podiatry for foot drop brace.   - at previous visit, I discussed that polymyalgia rheumatica can be associated with giant cell arteritis and if he develops new/worsening headaches, vision changes, jaw claudication he should reach out to me.      Follow up once MRI is done    30 minutes of total time spent on the encounter, which includes face to face time and non-face to face time preparing to see the patient (eg, review of tests), Obtaining and/or reviewing separately obtained history, Documenting clinical information in the electronic or other health record, Independently interpreting results (not  separately reported) and communicating results to the patient/family/caregiver, or Care coordination (not separately reported).     This note was prepared with [employer] Direct voice recognition transcription software. Garbled syntax, mangled pronouns, and other bizarre constructions may be attributed to that software system       Kelli Massey M.D.  Rheumatology Dept  [place], [place]

## 2025-01-29 ENCOUNTER — OFFICE VISIT (OUTPATIENT)
Dept: RHEUMATOLOGY | Facility: CLINIC | Age: 85
End: 2025-01-29
Payer: MEDICARE

## 2025-01-29 VITALS
HEIGHT: 72 IN | SYSTOLIC BLOOD PRESSURE: 102 MMHG | HEART RATE: 106 BPM | BODY MASS INDEX: 26.61 KG/M2 | DIASTOLIC BLOOD PRESSURE: 77 MMHG

## 2025-01-29 DIAGNOSIS — M35.3 POLYMYALGIA RHEUMATICA: Primary | ICD-10-CM

## 2025-01-29 DIAGNOSIS — M21.371 FOOT DROP, RIGHT: ICD-10-CM

## 2025-01-29 DIAGNOSIS — M54.16 LUMBAR RADICULOPATHY, CHRONIC: ICD-10-CM

## 2025-01-29 DIAGNOSIS — M81.0 AGE-RELATED OSTEOPOROSIS WITHOUT CURRENT PATHOLOGICAL FRACTURE: ICD-10-CM

## 2025-01-29 PROCEDURE — 1160F RVW MEDS BY RX/DR IN RCRD: CPT | Mod: CPTII,S$GLB,, | Performed by: STUDENT IN AN ORGANIZED HEALTH CARE EDUCATION/TRAINING PROGRAM

## 2025-01-29 PROCEDURE — 1126F AMNT PAIN NOTED NONE PRSNT: CPT | Mod: CPTII,S$GLB,, | Performed by: STUDENT IN AN ORGANIZED HEALTH CARE EDUCATION/TRAINING PROGRAM

## 2025-01-29 PROCEDURE — 1100F PTFALLS ASSESS-DOCD GE2>/YR: CPT | Mod: CPTII,S$GLB,, | Performed by: STUDENT IN AN ORGANIZED HEALTH CARE EDUCATION/TRAINING PROGRAM

## 2025-01-29 PROCEDURE — 3078F DIAST BP <80 MM HG: CPT | Mod: CPTII,S$GLB,, | Performed by: STUDENT IN AN ORGANIZED HEALTH CARE EDUCATION/TRAINING PROGRAM

## 2025-01-29 PROCEDURE — 1159F MED LIST DOCD IN RCRD: CPT | Mod: CPTII,S$GLB,, | Performed by: STUDENT IN AN ORGANIZED HEALTH CARE EDUCATION/TRAINING PROGRAM

## 2025-01-29 PROCEDURE — 99999 PR PBB SHADOW E&M-EST. PATIENT-LVL IV: CPT | Mod: PBBFAC,,, | Performed by: STUDENT IN AN ORGANIZED HEALTH CARE EDUCATION/TRAINING PROGRAM

## 2025-01-29 PROCEDURE — 3288F FALL RISK ASSESSMENT DOCD: CPT | Mod: CPTII,S$GLB,, | Performed by: STUDENT IN AN ORGANIZED HEALTH CARE EDUCATION/TRAINING PROGRAM

## 2025-01-29 PROCEDURE — 3074F SYST BP LT 130 MM HG: CPT | Mod: CPTII,S$GLB,, | Performed by: STUDENT IN AN ORGANIZED HEALTH CARE EDUCATION/TRAINING PROGRAM

## 2025-01-29 PROCEDURE — 99214 OFFICE O/P EST MOD 30 MIN: CPT | Mod: S$GLB,,, | Performed by: STUDENT IN AN ORGANIZED HEALTH CARE EDUCATION/TRAINING PROGRAM

## 2025-01-29 ASSESSMENT — ROUTINE ASSESSMENT OF PATIENT INDEX DATA (RAPID3)
PATIENT GLOBAL ASSESSMENT SCORE: 7
FATIGUE SCORE: 2.2
TOTAL RAPID3 SCORE: 4.11
MDHAQ FUNCTION SCORE: 1.3
PSYCHOLOGICAL DISTRESS SCORE: 0
PAIN SCORE: 1

## 2025-03-21 DIAGNOSIS — I10 ESSENTIAL HYPERTENSION: ICD-10-CM

## 2025-03-21 DIAGNOSIS — M1A.09X0 IDIOPATHIC CHRONIC GOUT OF MULTIPLE SITES WITHOUT TOPHUS: ICD-10-CM

## 2025-03-22 NOTE — TELEPHONE ENCOUNTER
Refill Routing Note   Medication(s) are not appropriate for processing by [facility] for the following reason(s):        Required labs outdated  Required labs abnormal    [facility] action(s):  Defer               Appointments  past 12m or future 3m with PCP    Date Provider   Last Visit   12/4/2024 Nathalie Salgado MD   Next Visit   6/4/2025 Natahlie Salgado MD   ED visits in past 90 days: 0        Note composed:8:46 AM 03/22/2025

## 2025-03-22 NOTE — TELEPHONE ENCOUNTER
No care due was identified.  [facility] Embedded Care Due Messages. Reference number: [id number].   3/21/2025 9:03:30 PM CDT

## 2025-03-23 RX ORDER — HYDROCHLOROTHIAZIDE 12.5 MG/1
12.5 TABLET ORAL
Qty: 90 TABLET | Refills: 3 | Status: SHIPPED | OUTPATIENT
Start: 2025-03-23

## 2025-03-23 RX ORDER — ALLOPURINOL 300 MG/1
300 TABLET ORAL
Qty: 90 TABLET | Refills: 3 | Status: SHIPPED | OUTPATIENT
Start: 2025-03-23

## 2025-03-24 DIAGNOSIS — Z00.00 ENCOUNTER FOR MEDICARE ANNUAL WELLNESS EXAM: ICD-10-CM

## 2025-05-19 DIAGNOSIS — D07.5 CARCINOMA IN SITU OF PROSTATE: Primary | ICD-10-CM

## 2025-06-04 ENCOUNTER — OFFICE VISIT (OUTPATIENT)
Dept: FAMILY MEDICINE | Facility: CLINIC | Age: 85
End: 2025-06-04
Payer: OTHER GOVERNMENT

## 2025-06-04 VITALS
WEIGHT: 189.63 LBS | DIASTOLIC BLOOD PRESSURE: 76 MMHG | BODY MASS INDEX: 25.71 KG/M2 | HEART RATE: 61 BPM | OXYGEN SATURATION: 98 % | SYSTOLIC BLOOD PRESSURE: 102 MMHG

## 2025-06-04 DIAGNOSIS — I10 ESSENTIAL HYPERTENSION: ICD-10-CM

## 2025-06-04 DIAGNOSIS — N18.32 STAGE 3B CHRONIC KIDNEY DISEASE: ICD-10-CM

## 2025-06-04 DIAGNOSIS — K21.9 GASTROESOPHAGEAL REFLUX DISEASE WITHOUT ESOPHAGITIS: ICD-10-CM

## 2025-06-04 DIAGNOSIS — E78.49 OTHER HYPERLIPIDEMIA: ICD-10-CM

## 2025-06-04 DIAGNOSIS — M06.09 RHEUMATOID ARTHRITIS OF MULTIPLE SITES WITH NEGATIVE RHEUMATOID FACTOR: Primary | ICD-10-CM

## 2025-06-04 DIAGNOSIS — R53.83 OTHER FATIGUE: ICD-10-CM

## 2025-06-04 DIAGNOSIS — M51.379 DEGENERATIVE DISC DISEASE AT L5-S1 LEVEL: ICD-10-CM

## 2025-06-04 DIAGNOSIS — G47.09 OTHER INSOMNIA: ICD-10-CM

## 2025-06-04 DIAGNOSIS — R07.81 RIB PAIN ON RIGHT SIDE: ICD-10-CM

## 2025-06-04 DIAGNOSIS — E53.8 B12 DEFICIENCY: ICD-10-CM

## 2025-06-04 PROBLEM — M06.9 RHEUMATOID ARTHRITIS INVOLVING MULTIPLE SITES, UNSPECIFIED WHETHER RHEUMATOID FACTOR PRESENT: Status: ACTIVE | Noted: 2025-06-04

## 2025-06-04 PROCEDURE — 99214 OFFICE O/P EST MOD 30 MIN: CPT | Mod: PBBFAC,PO | Performed by: FAMILY MEDICINE

## 2025-06-04 PROCEDURE — 99214 OFFICE O/P EST MOD 30 MIN: CPT | Mod: S$PBB,,, | Performed by: FAMILY MEDICINE

## 2025-06-04 PROCEDURE — G2211 COMPLEX E/M VISIT ADD ON: HCPCS | Mod: S$PBB,,, | Performed by: FAMILY MEDICINE

## 2025-06-04 PROCEDURE — 99999 PR PBB SHADOW E&M-EST. PATIENT-LVL IV: CPT | Mod: PBBFAC,,, | Performed by: FAMILY MEDICINE

## 2025-06-04 RX ORDER — OFLOXACIN 3 MG/ML
SOLUTION/ DROPS OPHTHALMIC
COMMUNITY
Start: 2025-03-25

## 2025-06-04 RX ORDER — CYANOCOBALAMIN 1000 UG/ML
1000 INJECTION, SOLUTION INTRAMUSCULAR; SUBCUTANEOUS
Qty: 3 ML | Refills: 3 | Status: SHIPPED | OUTPATIENT
Start: 2025-06-04

## 2025-06-04 RX ORDER — KETOROLAC TROMETHAMINE 5 MG/ML
SOLUTION OPHTHALMIC
COMMUNITY
Start: 2025-03-25

## 2025-06-04 RX ORDER — HYDRALAZINE HYDROCHLORIDE 25 MG/1
25 TABLET, FILM COATED ORAL
COMMUNITY
Start: 2025-03-17

## 2025-06-04 RX ORDER — PREDNISOLONE ACETATE 10 MG/ML
SUSPENSION/ DROPS OPHTHALMIC
COMMUNITY
Start: 2025-03-25

## 2025-06-04 RX ORDER — CHOLECALCIFEROL (VITAMIN D3) 50 MCG
50 TABLET ORAL
COMMUNITY
Start: 2024-07-10

## 2025-06-27 DIAGNOSIS — G47.09 OTHER INSOMNIA: ICD-10-CM

## 2025-06-27 RX ORDER — TEMAZEPAM 30 MG/1
30 CAPSULE ORAL NIGHTLY PRN
Qty: 30 CAPSULE | Refills: 5 | Status: SHIPPED | OUTPATIENT
Start: 2025-06-30

## 2025-06-27 NOTE — TELEPHONE ENCOUNTER
Care Due:                  Date            Visit Type   Department     Provider  --------------------------------------------------------------------------------                                EP -                              [facility]  Last Visit: 06-      CARE ([facility])   JOHN Salgado                              EP -                              PRIMARY      [facility]  Next Visit: 12-      CARE ([facility])   JOHN Salgado                                                            Last  Test          Frequency    Reason                     Performed    Due Date  --------------------------------------------------------------------------------    Uric Acid...  12 months..  allopurinoL..............  01- 12-    [facility] Embedded Care Due Messages. Reference number: [id number].   6/27/2025 11:14:04 AM CDT

## 2025-07-01 DIAGNOSIS — G47.09 OTHER INSOMNIA: ICD-10-CM

## 2025-07-01 RX ORDER — TEMAZEPAM 30 MG/1
30 CAPSULE ORAL NIGHTLY PRN
Qty: 30 CAPSULE | Refills: 5 | Status: CANCELLED | OUTPATIENT
Start: 2025-07-01

## 2025-07-01 NOTE — TELEPHONE ENCOUNTER
No care due was identified.  [facility] Embedded Care Due Messages. Reference number: [id number].   7/01/2025 1:33:53 PM CDT

## 2025-07-19 DIAGNOSIS — K21.9 GASTROESOPHAGEAL REFLUX DISEASE WITHOUT ESOPHAGITIS: ICD-10-CM

## 2025-07-19 NOTE — TELEPHONE ENCOUNTER
No care due was identified.  [facility] Embedded Care Due Messages. Reference number: [id number].   7/19/2025 7:53:41 AM CDT

## 2025-07-20 RX ORDER — PANTOPRAZOLE SODIUM 20 MG/1
20 TABLET, DELAYED RELEASE ORAL DAILY
Qty: 90 TABLET | Refills: 3 | Status: SHIPPED | OUTPATIENT
Start: 2025-07-20

## 2025-07-20 NOTE — TELEPHONE ENCOUNTER
Refill Decision Note   Dilip Lopez  is requesting a refill authorization.  Brief Assessment and Rationale for Refill:  Approve     Medication Therapy Plan:        Comments:     Note composed:7:50 AM 07/20/2025

## 2025-08-25 ENCOUNTER — TELEPHONE (OUTPATIENT)
Dept: DERMATOLOGY | Facility: CLINIC | Age: 85
End: 2025-08-25
Payer: OTHER GOVERNMENT

## (undated) DEVICE — SYR 10CC LUER LOCK

## (undated) DEVICE — GAUZE SPONGE 4'X4' 8PLY NS

## (undated) DEVICE — SCRUB 10% POVIDONE IODINE 4OZ

## (undated) DEVICE — SEE MEDLINE ITEM 157128

## (undated) DEVICE — FORMALIN 60ML PREFILLED CONT

## (undated) DEVICE — NEEDLE HYPO 18X1.5 SG

## (undated) DEVICE — PAD PREP 50/CA

## (undated) DEVICE — GLOVE BIOGEL ECLIPSE SZ 7.5

## (undated) DEVICE — COVER TRANSDUCER LATEX N/STERI

## (undated) DEVICE — SOL IRR STRL WATER 500ML

## (undated) DEVICE — NDL SPINAL 22GX7 SPINOCAN

## (undated) DEVICE — SYR 50CC LL

## (undated) DEVICE — SEE MEDLINE ITEM 146362

## (undated) DEVICE — NDL TISSUE BIOPSY MAGNUM